# Patient Record
Sex: MALE | Race: WHITE | Employment: OTHER | URBAN - METROPOLITAN AREA
[De-identification: names, ages, dates, MRNs, and addresses within clinical notes are randomized per-mention and may not be internally consistent; named-entity substitution may affect disease eponyms.]

---

## 2017-07-01 PROBLEM — R65.10 SIRS (SYSTEMIC INFLAMMATORY RESPONSE SYNDROME) (HCC): Status: ACTIVE | Noted: 2017-07-01

## 2017-07-01 PROBLEM — R91.1 LUNG NODULE: Status: ACTIVE | Noted: 2017-07-01

## 2017-07-01 PROBLEM — E87.5 HYPERKALEMIA: Status: ACTIVE | Noted: 2017-07-01

## 2017-07-01 PROBLEM — E87.1 ACUTE HYPONATREMIA: Status: ACTIVE | Noted: 2017-07-01

## 2017-07-01 PROBLEM — E11.65 HYPERGLYCEMIA DUE TO TYPE 2 DIABETES MELLITUS (HCC): Status: ACTIVE | Noted: 2017-07-01

## 2017-07-01 PROBLEM — J44.1 COPD EXACERBATION (HCC): Status: ACTIVE | Noted: 2017-07-01

## 2017-07-01 PROBLEM — S22.41XK CLOSED FRACTURE OF MULTIPLE RIBS OF RIGHT SIDE WITH NONUNION: Status: ACTIVE | Noted: 2017-07-01

## 2017-07-02 PROBLEM — S22.41XA CLOSED FRACTURE OF MULTIPLE RIBS OF RIGHT SIDE: Status: ACTIVE | Noted: 2017-07-01

## 2017-07-02 PROBLEM — R78.81 MRSA BACTEREMIA: Status: ACTIVE | Noted: 2017-07-02

## 2017-07-02 PROBLEM — B95.62 MRSA BACTEREMIA: Status: ACTIVE | Noted: 2017-07-02

## 2018-03-28 PROBLEM — I24.9 ACS (ACUTE CORONARY SYNDROME) (HCC): Status: ACTIVE | Noted: 2018-03-28

## 2019-03-21 ENCOUNTER — APPOINTMENT (OUTPATIENT)
Dept: CT IMAGING | Age: 54
DRG: 638 | End: 2019-03-21
Payer: MEDICARE

## 2019-03-21 ENCOUNTER — APPOINTMENT (OUTPATIENT)
Dept: GENERAL RADIOLOGY | Age: 54
DRG: 638 | End: 2019-03-21
Payer: MEDICARE

## 2019-03-21 ENCOUNTER — HOSPITAL ENCOUNTER (INPATIENT)
Age: 54
LOS: 4 days | Discharge: HOME HEALTH | DRG: 638 | End: 2019-03-25
Attending: EMERGENCY MEDICINE | Admitting: INTERNAL MEDICINE
Payer: MEDICARE

## 2019-03-21 DIAGNOSIS — E11.10 DIABETIC KETOACIDOSIS WITHOUT COMA ASSOCIATED WITH TYPE 2 DIABETES MELLITUS (HCC): Primary | ICD-10-CM

## 2019-03-21 DIAGNOSIS — Z79.4 TYPE 2 DIABETES MELLITUS WITH HYPERGLYCEMIA, WITH LONG-TERM CURRENT USE OF INSULIN (HCC): ICD-10-CM

## 2019-03-21 DIAGNOSIS — R07.9 CHEST PAIN, UNSPECIFIED TYPE: ICD-10-CM

## 2019-03-21 DIAGNOSIS — E11.65 TYPE 2 DIABETES MELLITUS WITH HYPERGLYCEMIA, WITH LONG-TERM CURRENT USE OF INSULIN (HCC): ICD-10-CM

## 2019-03-21 DIAGNOSIS — J40 BRONCHITIS: ICD-10-CM

## 2019-03-21 DIAGNOSIS — J96.21 ACUTE ON CHRONIC RESPIRATORY FAILURE WITH HYPOXIA (HCC): ICD-10-CM

## 2019-03-21 PROBLEM — Z89.512 HX OF BKA, LEFT (HCC): Status: ACTIVE | Noted: 2019-03-21

## 2019-03-21 PROBLEM — J44.9 COPD (CHRONIC OBSTRUCTIVE PULMONARY DISEASE) (HCC): Status: ACTIVE | Noted: 2019-03-21

## 2019-03-21 LAB
A/G RATIO: 0.7 (ref 1.1–2.2)
ALBUMIN SERPL-MCNC: 3.6 G/DL (ref 3.4–5)
ALP BLD-CCNC: 87 U/L (ref 40–129)
ALT SERPL-CCNC: 38 U/L (ref 10–40)
ANION GAP SERPL CALCULATED.3IONS-SCNC: 19 MMOL/L (ref 3–16)
AST SERPL-CCNC: 25 U/L (ref 15–37)
BASOPHILS ABSOLUTE: 0.1 K/UL (ref 0–0.2)
BASOPHILS RELATIVE PERCENT: 0.9 %
BETA-HYDROXYBUTYRATE: 1.32 MMOL/L (ref 0–0.27)
BILIRUB SERPL-MCNC: 0.7 MG/DL (ref 0–1)
BILIRUBIN URINE: NEGATIVE
BLOOD, URINE: NEGATIVE
BUN BLDV-MCNC: 7 MG/DL (ref 7–20)
CALCIUM SERPL-MCNC: 9.1 MG/DL (ref 8.3–10.6)
CHLORIDE BLD-SCNC: 89 MMOL/L (ref 99–110)
CLARITY: CLEAR
CO2: 21 MMOL/L (ref 21–32)
COLOR: YELLOW
CREAT SERPL-MCNC: 0.6 MG/DL (ref 0.9–1.3)
D DIMER: 558 NG/ML DDU (ref 0–229)
EOSINOPHILS ABSOLUTE: 0 K/UL (ref 0–0.6)
EOSINOPHILS RELATIVE PERCENT: 0 %
GFR AFRICAN AMERICAN: >60
GFR NON-AFRICAN AMERICAN: >60
GLOBULIN: 4.9 G/DL
GLUCOSE BLD-MCNC: 553 MG/DL (ref 70–99)
GLUCOSE URINE: >=1000 MG/DL
HCT VFR BLD CALC: 46.1 % (ref 40.5–52.5)
HEMOGLOBIN: 15.9 G/DL (ref 13.5–17.5)
KETONES, URINE: ABNORMAL MG/DL
LEUKOCYTE ESTERASE, URINE: NEGATIVE
LIPASE: 44 U/L (ref 13–60)
LYMPHOCYTES ABSOLUTE: 1.8 K/UL (ref 1–5.1)
LYMPHOCYTES RELATIVE PERCENT: 28 %
MAGNESIUM: 1.9 MG/DL (ref 1.8–2.4)
MCH RBC QN AUTO: 32.7 PG (ref 26–34)
MCHC RBC AUTO-ENTMCNC: 34.5 G/DL (ref 31–36)
MCV RBC AUTO: 94.9 FL (ref 80–100)
MICROSCOPIC EXAMINATION: ABNORMAL
MONOCYTES ABSOLUTE: 0.8 K/UL (ref 0–1.3)
MONOCYTES RELATIVE PERCENT: 11.9 %
NEUTROPHILS ABSOLUTE: 3.9 K/UL (ref 1.7–7.7)
NEUTROPHILS RELATIVE PERCENT: 59.2 %
NITRITE, URINE: NEGATIVE
PDW BLD-RTO: 13.2 % (ref 12.4–15.4)
PH UA: 6.5 (ref 5–8)
PLATELET # BLD: 285 K/UL (ref 135–450)
PMV BLD AUTO: 10.2 FL (ref 5–10.5)
POTASSIUM REFLEX MAGNESIUM: 3.3 MMOL/L (ref 3.5–5.1)
PRO-BNP: 197 PG/ML (ref 0–124)
PROTEIN UA: NEGATIVE MG/DL
RAPID INFLUENZA  B AGN: NEGATIVE
RAPID INFLUENZA A AGN: NEGATIVE
RBC # BLD: 4.86 M/UL (ref 4.2–5.9)
SODIUM BLD-SCNC: 129 MMOL/L (ref 136–145)
SPECIFIC GRAVITY UA: >1.03 (ref 1–1.03)
TOTAL PROTEIN: 8.5 G/DL (ref 6.4–8.2)
TROPONIN: <0.01 NG/ML
URINE REFLEX TO CULTURE: ABNORMAL
URINE TYPE: ABNORMAL
UROBILINOGEN, URINE: 1 E.U./DL
WBC # BLD: 6.5 K/UL (ref 4–11)

## 2019-03-21 PROCEDURE — 87804 INFLUENZA ASSAY W/OPTIC: CPT

## 2019-03-21 PROCEDURE — 94640 AIRWAY INHALATION TREATMENT: CPT

## 2019-03-21 PROCEDURE — 2580000003 HC RX 258: Performed by: EMERGENCY MEDICINE

## 2019-03-21 PROCEDURE — 93005 ELECTROCARDIOGRAM TRACING: CPT | Performed by: EMERGENCY MEDICINE

## 2019-03-21 PROCEDURE — 80053 COMPREHEN METABOLIC PANEL: CPT

## 2019-03-21 PROCEDURE — 83880 ASSAY OF NATRIURETIC PEPTIDE: CPT

## 2019-03-21 PROCEDURE — 6370000000 HC RX 637 (ALT 250 FOR IP): Performed by: EMERGENCY MEDICINE

## 2019-03-21 PROCEDURE — 6360000004 HC RX CONTRAST MEDICATION: Performed by: EMERGENCY MEDICINE

## 2019-03-21 PROCEDURE — 94664 DEMO&/EVAL PT USE INHALER: CPT

## 2019-03-21 PROCEDURE — 71260 CT THORAX DX C+: CPT

## 2019-03-21 PROCEDURE — 99285 EMERGENCY DEPT VISIT HI MDM: CPT

## 2019-03-21 PROCEDURE — 81003 URINALYSIS AUTO W/O SCOPE: CPT

## 2019-03-21 PROCEDURE — 83735 ASSAY OF MAGNESIUM: CPT

## 2019-03-21 PROCEDURE — 83690 ASSAY OF LIPASE: CPT

## 2019-03-21 PROCEDURE — 2000000000 HC ICU R&B

## 2019-03-21 PROCEDURE — 96374 THER/PROPH/DIAG INJ IV PUSH: CPT

## 2019-03-21 PROCEDURE — 71046 X-RAY EXAM CHEST 2 VIEWS: CPT

## 2019-03-21 PROCEDURE — 85379 FIBRIN DEGRADATION QUANT: CPT

## 2019-03-21 PROCEDURE — 36415 COLL VENOUS BLD VENIPUNCTURE: CPT

## 2019-03-21 PROCEDURE — 6360000002 HC RX W HCPCS: Performed by: EMERGENCY MEDICINE

## 2019-03-21 PROCEDURE — 84484 ASSAY OF TROPONIN QUANT: CPT

## 2019-03-21 PROCEDURE — 82010 KETONE BODYS QUAN: CPT

## 2019-03-21 PROCEDURE — 96375 TX/PRO/DX INJ NEW DRUG ADDON: CPT

## 2019-03-21 PROCEDURE — 96361 HYDRATE IV INFUSION ADD-ON: CPT

## 2019-03-21 PROCEDURE — 6370000000 HC RX 637 (ALT 250 FOR IP): Performed by: INTERNAL MEDICINE

## 2019-03-21 PROCEDURE — 85025 COMPLETE CBC W/AUTO DIFF WBC: CPT

## 2019-03-21 RX ORDER — OXYCODONE HYDROCHLORIDE 30 MG/1
1 TABLET ORAL 4 TIMES DAILY
Refills: 0 | COMMUNITY
Start: 2019-02-01 | End: 2019-09-17 | Stop reason: ALTCHOICE

## 2019-03-21 RX ORDER — SODIUM CHLORIDE 450 MG/100ML
INJECTION, SOLUTION INTRAVENOUS CONTINUOUS
Status: DISCONTINUED | OUTPATIENT
Start: 2019-03-21 | End: 2019-03-22 | Stop reason: ALTCHOICE

## 2019-03-21 RX ORDER — ALBUTEROL SULFATE 90 UG/1
2 AEROSOL, METERED RESPIRATORY (INHALATION) EVERY 4 HOURS PRN
Status: DISCONTINUED | OUTPATIENT
Start: 2019-03-21 | End: 2019-03-25 | Stop reason: HOSPADM

## 2019-03-21 RX ORDER — IPRATROPIUM BROMIDE AND ALBUTEROL SULFATE 2.5; .5 MG/3ML; MG/3ML
1 SOLUTION RESPIRATORY (INHALATION)
Status: DISCONTINUED | OUTPATIENT
Start: 2019-03-21 | End: 2019-03-25 | Stop reason: HOSPADM

## 2019-03-21 RX ORDER — DEXTROSE MONOHYDRATE 25 G/50ML
12.5 INJECTION, SOLUTION INTRAVENOUS PRN
Status: DISCONTINUED | OUTPATIENT
Start: 2019-03-21 | End: 2019-03-25 | Stop reason: HOSPADM

## 2019-03-21 RX ORDER — PREDNISONE 20 MG/1
60 TABLET ORAL ONCE
Status: DISCONTINUED | OUTPATIENT
Start: 2019-03-21 | End: 2019-03-21

## 2019-03-21 RX ORDER — OXYCODONE HYDROCHLORIDE 30 MG/1
30 TABLET ORAL EVERY 6 HOURS SCHEDULED
Status: DISCONTINUED | OUTPATIENT
Start: 2019-03-22 | End: 2019-03-25 | Stop reason: HOSPADM

## 2019-03-21 RX ORDER — ASPIRIN 81 MG/1
81 TABLET, CHEWABLE ORAL DAILY
Status: DISCONTINUED | OUTPATIENT
Start: 2019-03-22 | End: 2019-03-25 | Stop reason: HOSPADM

## 2019-03-21 RX ORDER — MORPHINE SULFATE 4 MG/ML
4 INJECTION, SOLUTION INTRAMUSCULAR; INTRAVENOUS ONCE
Status: COMPLETED | OUTPATIENT
Start: 2019-03-21 | End: 2019-03-21

## 2019-03-21 RX ORDER — THIAMINE MONONITRATE (VIT B1) 100 MG
100 TABLET ORAL DAILY
Status: DISCONTINUED | OUTPATIENT
Start: 2019-03-22 | End: 2019-03-25 | Stop reason: HOSPADM

## 2019-03-21 RX ORDER — BENZONATATE 100 MG/1
200 CAPSULE ORAL 3 TIMES DAILY PRN
Status: DISCONTINUED | OUTPATIENT
Start: 2019-03-21 | End: 2019-03-25 | Stop reason: HOSPADM

## 2019-03-21 RX ORDER — BENZONATATE 100 MG/1
100 CAPSULE ORAL 3 TIMES DAILY PRN
Status: DISCONTINUED | OUTPATIENT
Start: 2019-03-21 | End: 2019-03-21

## 2019-03-21 RX ORDER — ASPIRIN 325 MG
325 TABLET ORAL ONCE
Status: COMPLETED | OUTPATIENT
Start: 2019-03-21 | End: 2019-03-21

## 2019-03-21 RX ORDER — IPRATROPIUM BROMIDE AND ALBUTEROL SULFATE 2.5; .5 MG/3ML; MG/3ML
1 SOLUTION RESPIRATORY (INHALATION) ONCE
Status: COMPLETED | OUTPATIENT
Start: 2019-03-21 | End: 2019-03-21

## 2019-03-21 RX ORDER — ALBUTEROL SULFATE 90 UG/1
2 AEROSOL, METERED RESPIRATORY (INHALATION)
COMMUNITY
Start: 2019-01-30

## 2019-03-21 RX ORDER — CLOPIDOGREL BISULFATE 75 MG/1
75 TABLET ORAL DAILY
Status: DISCONTINUED | OUTPATIENT
Start: 2019-03-22 | End: 2019-03-25 | Stop reason: HOSPADM

## 2019-03-21 RX ORDER — GABAPENTIN 300 MG/1
600 CAPSULE ORAL 3 TIMES DAILY
Status: DISCONTINUED | OUTPATIENT
Start: 2019-03-22 | End: 2019-03-25 | Stop reason: HOSPADM

## 2019-03-21 RX ORDER — GLIPIZIDE 5 MG/1
5 TABLET, FILM COATED, EXTENDED RELEASE ORAL DAILY
Status: ON HOLD | COMMUNITY
Start: 2019-03-20 | End: 2019-03-24 | Stop reason: HOSPADM

## 2019-03-21 RX ORDER — POTASSIUM CHLORIDE 7.45 MG/ML
10 INJECTION INTRAVENOUS PRN
Status: DISCONTINUED | OUTPATIENT
Start: 2019-03-21 | End: 2019-03-22 | Stop reason: ALTCHOICE

## 2019-03-21 RX ORDER — 0.9 % SODIUM CHLORIDE 0.9 %
1000 INTRAVENOUS SOLUTION INTRAVENOUS ONCE
Status: COMPLETED | OUTPATIENT
Start: 2019-03-21 | End: 2019-03-21

## 2019-03-21 RX ORDER — METFORMIN HYDROCHLORIDE 500 MG/1
500 TABLET, EXTENDED RELEASE ORAL DAILY
Status: ON HOLD | COMMUNITY
Start: 2019-03-20 | End: 2019-03-24 | Stop reason: HOSPADM

## 2019-03-21 RX ORDER — IPRATROPIUM BROMIDE AND ALBUTEROL SULFATE 2.5; .5 MG/3ML; MG/3ML
1 SOLUTION RESPIRATORY (INHALATION) EVERY 4 HOURS PRN
Status: DISCONTINUED | OUTPATIENT
Start: 2019-03-21 | End: 2019-03-25 | Stop reason: HOSPADM

## 2019-03-21 RX ORDER — MAGNESIUM SULFATE 1 G/100ML
1 INJECTION INTRAVENOUS PRN
Status: DISCONTINUED | OUTPATIENT
Start: 2019-03-21 | End: 2019-03-22 | Stop reason: ALTCHOICE

## 2019-03-21 RX ORDER — DEXTROSE AND SODIUM CHLORIDE 5; .45 G/100ML; G/100ML
INJECTION, SOLUTION INTRAVENOUS CONTINUOUS PRN
Status: DISCONTINUED | OUTPATIENT
Start: 2019-03-21 | End: 2019-03-24 | Stop reason: ALTCHOICE

## 2019-03-21 RX ORDER — DIAZEPAM 5 MG/1
5 TABLET ORAL NIGHTLY PRN
Status: DISCONTINUED | OUTPATIENT
Start: 2019-03-21 | End: 2019-03-25 | Stop reason: HOSPADM

## 2019-03-21 RX ORDER — BUDESONIDE AND FORMOTEROL FUMARATE DIHYDRATE 160; 4.5 UG/1; UG/1
2 AEROSOL RESPIRATORY (INHALATION) 2 TIMES DAILY
COMMUNITY

## 2019-03-21 RX ORDER — NICOTINE 21 MG/24HR
1 PATCH, TRANSDERMAL 24 HOURS TRANSDERMAL DAILY PRN
Status: DISCONTINUED | OUTPATIENT
Start: 2019-03-21 | End: 2019-03-25 | Stop reason: HOSPADM

## 2019-03-21 RX ORDER — METOPROLOL TARTRATE 50 MG/1
50 TABLET, FILM COATED ORAL 2 TIMES DAILY
Status: DISCONTINUED | OUTPATIENT
Start: 2019-03-22 | End: 2019-03-25 | Stop reason: HOSPADM

## 2019-03-21 RX ADMIN — IPRATROPIUM BROMIDE AND ALBUTEROL SULFATE 1 AMPULE: .5; 3 SOLUTION RESPIRATORY (INHALATION) at 18:57

## 2019-03-21 RX ADMIN — MORPHINE SULFATE 4 MG: 4 INJECTION INTRAVENOUS at 21:50

## 2019-03-21 RX ADMIN — SODIUM CHLORIDE 1000 ML: 9 INJECTION, SOLUTION INTRAVENOUS at 20:41

## 2019-03-21 RX ADMIN — IPRATROPIUM BROMIDE AND ALBUTEROL SULFATE 1 AMPULE: .5; 3 SOLUTION RESPIRATORY (INHALATION) at 18:50

## 2019-03-21 RX ADMIN — IPRATROPIUM BROMIDE AND ALBUTEROL SULFATE 1 AMPULE: .5; 3 SOLUTION RESPIRATORY (INHALATION) at 23:56

## 2019-03-21 RX ADMIN — ASPIRIN 325 MG ORAL TABLET 325 MG: 325 PILL ORAL at 19:22

## 2019-03-21 RX ADMIN — MOMETASONE FUROATE AND FORMOTEROL FUMARATE DIHYDRATE 2 PUFF: 200; 5 AEROSOL RESPIRATORY (INHALATION) at 23:56

## 2019-03-21 RX ADMIN — IOPAMIDOL 75 ML: 755 INJECTION, SOLUTION INTRAVENOUS at 20:21

## 2019-03-21 ASSESSMENT — PAIN SCALES - GENERAL
PAINLEVEL_OUTOF10: 10
PAINLEVEL_OUTOF10: 4
PAINLEVEL_OUTOF10: 4
PAINLEVEL_OUTOF10: 10

## 2019-03-21 ASSESSMENT — PAIN DESCRIPTION - FREQUENCY
FREQUENCY: CONTINUOUS

## 2019-03-21 ASSESSMENT — PAIN - FUNCTIONAL ASSESSMENT: PAIN_FUNCTIONAL_ASSESSMENT: PREVENTS OR INTERFERES SOME ACTIVE ACTIVITIES AND ADLS

## 2019-03-21 ASSESSMENT — PAIN DESCRIPTION - PAIN TYPE
TYPE: CHRONIC PAIN
TYPE: CHRONIC PAIN
TYPE: ACUTE PAIN
TYPE: ACUTE PAIN;CHRONIC PAIN

## 2019-03-21 ASSESSMENT — PAIN DESCRIPTION - LOCATION
LOCATION: LEG
LOCATION: CHEST

## 2019-03-21 ASSESSMENT — ENCOUNTER SYMPTOMS
COUGH: 1
SHORTNESS OF BREATH: 1
VOMITING: 0
COLOR CHANGE: 0
NAUSEA: 0
WHEEZING: 1
BACK PAIN: 0
PHOTOPHOBIA: 0
RHINORRHEA: 0

## 2019-03-21 ASSESSMENT — PAIN DESCRIPTION - DESCRIPTORS
DESCRIPTORS: ACHING

## 2019-03-21 ASSESSMENT — PAIN DESCRIPTION - ORIENTATION
ORIENTATION: RIGHT
ORIENTATION: RIGHT;LEFT

## 2019-03-21 ASSESSMENT — PAIN DESCRIPTION - ONSET
ONSET: ON-GOING
ONSET: ON-GOING

## 2019-03-21 ASSESSMENT — PAIN DESCRIPTION - PROGRESSION
CLINICAL_PROGRESSION: GRADUALLY WORSENING
CLINICAL_PROGRESSION: GRADUALLY IMPROVING
CLINICAL_PROGRESSION: NOT CHANGED
CLINICAL_PROGRESSION: NOT CHANGED

## 2019-03-22 LAB
ANION GAP SERPL CALCULATED.3IONS-SCNC: 12 MMOL/L (ref 3–16)
ANION GAP SERPL CALCULATED.3IONS-SCNC: 12 MMOL/L (ref 3–16)
ANION GAP SERPL CALCULATED.3IONS-SCNC: 14 MMOL/L (ref 3–16)
ANION GAP SERPL CALCULATED.3IONS-SCNC: 9 MMOL/L (ref 3–16)
BASOPHILS ABSOLUTE: 0 K/UL (ref 0–0.2)
BASOPHILS RELATIVE PERCENT: 0.3 %
BUN BLDV-MCNC: 2 MG/DL (ref 7–20)
BUN BLDV-MCNC: 2 MG/DL (ref 7–20)
BUN BLDV-MCNC: 3 MG/DL (ref 7–20)
BUN BLDV-MCNC: 3 MG/DL (ref 7–20)
CALCIUM SERPL-MCNC: 8.1 MG/DL (ref 8.3–10.6)
CALCIUM SERPL-MCNC: 8.3 MG/DL (ref 8.3–10.6)
CALCIUM SERPL-MCNC: 8.3 MG/DL (ref 8.3–10.6)
CALCIUM SERPL-MCNC: 8.7 MG/DL (ref 8.3–10.6)
CHLORIDE BLD-SCNC: 100 MMOL/L (ref 99–110)
CHLORIDE BLD-SCNC: 96 MMOL/L (ref 99–110)
CHLORIDE BLD-SCNC: 97 MMOL/L (ref 99–110)
CHLORIDE BLD-SCNC: 99 MMOL/L (ref 99–110)
CO2: 19 MMOL/L (ref 21–32)
CO2: 24 MMOL/L (ref 21–32)
CREAT SERPL-MCNC: 0.6 MG/DL (ref 0.9–1.3)
EOSINOPHILS ABSOLUTE: 0 K/UL (ref 0–0.6)
EOSINOPHILS RELATIVE PERCENT: 0.7 %
ESTIMATED AVERAGE GLUCOSE: 343.6 MG/DL
GFR AFRICAN AMERICAN: >60
GFR NON-AFRICAN AMERICAN: >60
GLUCOSE BLD-MCNC: 132 MG/DL (ref 70–99)
GLUCOSE BLD-MCNC: 141 MG/DL (ref 70–99)
GLUCOSE BLD-MCNC: 149 MG/DL (ref 70–99)
GLUCOSE BLD-MCNC: 151 MG/DL (ref 70–99)
GLUCOSE BLD-MCNC: 154 MG/DL (ref 70–99)
GLUCOSE BLD-MCNC: 164 MG/DL (ref 70–99)
GLUCOSE BLD-MCNC: 165 MG/DL (ref 70–99)
GLUCOSE BLD-MCNC: 166 MG/DL (ref 70–99)
GLUCOSE BLD-MCNC: 166 MG/DL (ref 70–99)
GLUCOSE BLD-MCNC: 176 MG/DL (ref 70–99)
GLUCOSE BLD-MCNC: 186 MG/DL (ref 70–99)
GLUCOSE BLD-MCNC: 188 MG/DL (ref 70–99)
GLUCOSE BLD-MCNC: 192 MG/DL (ref 70–99)
GLUCOSE BLD-MCNC: 198 MG/DL (ref 70–99)
GLUCOSE BLD-MCNC: 200 MG/DL (ref 70–99)
GLUCOSE BLD-MCNC: 201 MG/DL (ref 70–99)
GLUCOSE BLD-MCNC: 206 MG/DL (ref 70–99)
GLUCOSE BLD-MCNC: 252 MG/DL (ref 70–99)
GLUCOSE BLD-MCNC: 259 MG/DL (ref 70–99)
GLUCOSE BLD-MCNC: 306 MG/DL (ref 70–99)
GLUCOSE BLD-MCNC: 322 MG/DL (ref 70–99)
GLUCOSE BLD-MCNC: 322 MG/DL (ref 70–99)
HBA1C MFR BLD: 13.6 %
HCT VFR BLD CALC: 41.5 % (ref 40.5–52.5)
HEMOGLOBIN: 14.3 G/DL (ref 13.5–17.5)
LYMPHOCYTES ABSOLUTE: 2.3 K/UL (ref 1–5.1)
LYMPHOCYTES RELATIVE PERCENT: 40 %
MAGNESIUM: 1.7 MG/DL (ref 1.8–2.4)
MAGNESIUM: 1.8 MG/DL (ref 1.8–2.4)
MAGNESIUM: 2.1 MG/DL (ref 1.8–2.4)
MAGNESIUM: 2.1 MG/DL (ref 1.8–2.4)
MCH RBC QN AUTO: 32.2 PG (ref 26–34)
MCHC RBC AUTO-ENTMCNC: 34.5 G/DL (ref 31–36)
MCV RBC AUTO: 93.5 FL (ref 80–100)
MONOCYTES ABSOLUTE: 1 K/UL (ref 0–1.3)
MONOCYTES RELATIVE PERCENT: 17 %
NEUTROPHILS ABSOLUTE: 2.5 K/UL (ref 1.7–7.7)
NEUTROPHILS RELATIVE PERCENT: 42 %
PDW BLD-RTO: 13.1 % (ref 12.4–15.4)
PERFORMED ON: ABNORMAL
PHOSPHORUS: 2.3 MG/DL (ref 2.5–4.9)
PHOSPHORUS: 2.4 MG/DL (ref 2.5–4.9)
PHOSPHORUS: 2.6 MG/DL (ref 2.5–4.9)
PHOSPHORUS: 2.9 MG/DL (ref 2.5–4.9)
PLATELET # BLD: 293 K/UL (ref 135–450)
PMV BLD AUTO: 10.4 FL (ref 5–10.5)
POTASSIUM SERPL-SCNC: 3 MMOL/L (ref 3.5–5.1)
POTASSIUM SERPL-SCNC: 3.1 MMOL/L (ref 3.5–5.1)
POTASSIUM SERPL-SCNC: 3.4 MMOL/L (ref 3.5–5.1)
POTASSIUM SERPL-SCNC: 3.8 MMOL/L (ref 3.5–5.1)
RBC # BLD: 4.43 M/UL (ref 4.2–5.9)
SODIUM BLD-SCNC: 131 MMOL/L (ref 136–145)
SODIUM BLD-SCNC: 132 MMOL/L (ref 136–145)
SODIUM BLD-SCNC: 133 MMOL/L (ref 136–145)
SODIUM BLD-SCNC: 134 MMOL/L (ref 136–145)
TROPONIN: <0.01 NG/ML
TROPONIN: <0.01 NG/ML
WBC # BLD: 5.9 K/UL (ref 4–11)

## 2019-03-22 PROCEDURE — 96361 HYDRATE IV INFUSION ADD-ON: CPT

## 2019-03-22 PROCEDURE — 84100 ASSAY OF PHOSPHORUS: CPT

## 2019-03-22 PROCEDURE — 93010 ELECTROCARDIOGRAM REPORT: CPT | Performed by: INTERNAL MEDICINE

## 2019-03-22 PROCEDURE — 83036 HEMOGLOBIN GLYCOSYLATED A1C: CPT

## 2019-03-22 PROCEDURE — 2700000000 HC OXYGEN THERAPY PER DAY

## 2019-03-22 PROCEDURE — 6360000002 HC RX W HCPCS: Performed by: INTERNAL MEDICINE

## 2019-03-22 PROCEDURE — 84484 ASSAY OF TROPONIN QUANT: CPT

## 2019-03-22 PROCEDURE — 2580000003 HC RX 258: Performed by: INTERNAL MEDICINE

## 2019-03-22 PROCEDURE — 6370000000 HC RX 637 (ALT 250 FOR IP): Performed by: INTERNAL MEDICINE

## 2019-03-22 PROCEDURE — 80048 BASIC METABOLIC PNL TOTAL CA: CPT

## 2019-03-22 PROCEDURE — 96375 TX/PRO/DX INJ NEW DRUG ADDON: CPT

## 2019-03-22 PROCEDURE — 2580000003 HC RX 258: Performed by: HOSPITALIST

## 2019-03-22 PROCEDURE — 1200000000 HC SEMI PRIVATE

## 2019-03-22 PROCEDURE — 83735 ASSAY OF MAGNESIUM: CPT

## 2019-03-22 PROCEDURE — 2500000003 HC RX 250 WO HCPCS: Performed by: INTERNAL MEDICINE

## 2019-03-22 PROCEDURE — 96376 TX/PRO/DX INJ SAME DRUG ADON: CPT

## 2019-03-22 PROCEDURE — 85025 COMPLETE CBC W/AUTO DIFF WBC: CPT

## 2019-03-22 PROCEDURE — 94762 N-INVAS EAR/PLS OXIMTRY CONT: CPT

## 2019-03-22 PROCEDURE — 36415 COLL VENOUS BLD VENIPUNCTURE: CPT

## 2019-03-22 PROCEDURE — 96372 THER/PROPH/DIAG INJ SC/IM: CPT

## 2019-03-22 PROCEDURE — 96365 THER/PROPH/DIAG IV INF INIT: CPT

## 2019-03-22 PROCEDURE — 96366 THER/PROPH/DIAG IV INF ADDON: CPT

## 2019-03-22 PROCEDURE — 6370000000 HC RX 637 (ALT 250 FOR IP): Performed by: HOSPITALIST

## 2019-03-22 PROCEDURE — 94640 AIRWAY INHALATION TREATMENT: CPT

## 2019-03-22 RX ORDER — NICOTINE POLACRILEX 4 MG
15 LOZENGE BUCCAL PRN
Status: DISCONTINUED | OUTPATIENT
Start: 2019-03-22 | End: 2019-03-25 | Stop reason: HOSPADM

## 2019-03-22 RX ORDER — INSULIN GLARGINE 100 [IU]/ML
30 INJECTION, SOLUTION SUBCUTANEOUS NIGHTLY
Status: DISCONTINUED | OUTPATIENT
Start: 2019-03-22 | End: 2019-03-24

## 2019-03-22 RX ORDER — DEXTROSE MONOHYDRATE 50 MG/ML
100 INJECTION, SOLUTION INTRAVENOUS PRN
Status: DISCONTINUED | OUTPATIENT
Start: 2019-03-22 | End: 2019-03-25 | Stop reason: HOSPADM

## 2019-03-22 RX ORDER — DEXTROSE MONOHYDRATE 25 G/50ML
12.5 INJECTION, SOLUTION INTRAVENOUS PRN
Status: DISCONTINUED | OUTPATIENT
Start: 2019-03-22 | End: 2019-03-25 | Stop reason: HOSPADM

## 2019-03-22 RX ORDER — PANTOPRAZOLE SODIUM 40 MG/1
40 TABLET, DELAYED RELEASE ORAL
Status: DISCONTINUED | OUTPATIENT
Start: 2019-03-23 | End: 2019-03-25 | Stop reason: HOSPADM

## 2019-03-22 RX ORDER — SODIUM CHLORIDE 9 MG/ML
INJECTION, SOLUTION INTRAVENOUS CONTINUOUS
Status: DISCONTINUED | OUTPATIENT
Start: 2019-03-22 | End: 2019-03-23

## 2019-03-22 RX ADMIN — IPRATROPIUM BROMIDE AND ALBUTEROL SULFATE 1 AMPULE: .5; 3 SOLUTION RESPIRATORY (INHALATION) at 08:13

## 2019-03-22 RX ADMIN — OXYCODONE HYDROCHLORIDE 30 MG: 30 TABLET ORAL at 12:57

## 2019-03-22 RX ADMIN — SODIUM PHOSPHATE, MONOBASIC, MONOHYDRATE 10 MMOL: 276; 142 INJECTION, SOLUTION INTRAVENOUS at 16:08

## 2019-03-22 RX ADMIN — SODIUM CHLORIDE: 4.5 INJECTION, SOLUTION INTRAVENOUS at 00:06

## 2019-03-22 RX ADMIN — IPRATROPIUM BROMIDE AND ALBUTEROL SULFATE 1 AMPULE: .5; 3 SOLUTION RESPIRATORY (INHALATION) at 15:59

## 2019-03-22 RX ADMIN — Medication 100 MG: at 08:51

## 2019-03-22 RX ADMIN — ENOXAPARIN SODIUM 40 MG: 40 INJECTION SUBCUTANEOUS at 08:52

## 2019-03-22 RX ADMIN — POTASSIUM CHLORIDE 10 MEQ: 7.46 INJECTION, SOLUTION INTRAVENOUS at 06:34

## 2019-03-22 RX ADMIN — OXYCODONE HYDROCHLORIDE 30 MG: 30 TABLET ORAL at 23:37

## 2019-03-22 RX ADMIN — POTASSIUM CHLORIDE 10 MEQ: 7.46 INJECTION, SOLUTION INTRAVENOUS at 05:34

## 2019-03-22 RX ADMIN — POTASSIUM CHLORIDE 10 MEQ: 7.46 INJECTION, SOLUTION INTRAVENOUS at 16:08

## 2019-03-22 RX ADMIN — SODIUM CHLORIDE 9.7 UNITS/HR: 9 INJECTION, SOLUTION INTRAVENOUS at 00:08

## 2019-03-22 RX ADMIN — POTASSIUM CHLORIDE 10 MEQ: 7.46 INJECTION, SOLUTION INTRAVENOUS at 09:08

## 2019-03-22 RX ADMIN — MOMETASONE FUROATE AND FORMOTEROL FUMARATE DIHYDRATE 2 PUFF: 200; 5 AEROSOL RESPIRATORY (INHALATION) at 20:47

## 2019-03-22 RX ADMIN — GABAPENTIN 600 MG: 300 CAPSULE ORAL at 14:21

## 2019-03-22 RX ADMIN — METOPROLOL TARTRATE 50 MG: 50 TABLET ORAL at 08:52

## 2019-03-22 RX ADMIN — POTASSIUM CHLORIDE 10 MEQ: 7.46 INJECTION, SOLUTION INTRAVENOUS at 07:36

## 2019-03-22 RX ADMIN — POTASSIUM CHLORIDE 10 MEQ: 7.46 INJECTION, SOLUTION INTRAVENOUS at 10:29

## 2019-03-22 RX ADMIN — METOPROLOL TARTRATE 50 MG: 50 TABLET ORAL at 00:11

## 2019-03-22 RX ADMIN — OXYCODONE HYDROCHLORIDE 30 MG: 30 TABLET ORAL at 05:33

## 2019-03-22 RX ADMIN — IPRATROPIUM BROMIDE AND ALBUTEROL SULFATE 1 AMPULE: .5; 3 SOLUTION RESPIRATORY (INHALATION) at 20:46

## 2019-03-22 RX ADMIN — BENZONATATE 200 MG: 100 CAPSULE ORAL at 09:08

## 2019-03-22 RX ADMIN — ASPIRIN 81 MG 81 MG: 81 TABLET ORAL at 08:51

## 2019-03-22 RX ADMIN — GABAPENTIN 600 MG: 300 CAPSULE ORAL at 08:51

## 2019-03-22 RX ADMIN — OXYCODONE HYDROCHLORIDE 30 MG: 30 TABLET ORAL at 18:04

## 2019-03-22 RX ADMIN — METOPROLOL TARTRATE 50 MG: 50 TABLET ORAL at 22:02

## 2019-03-22 RX ADMIN — GABAPENTIN 600 MG: 300 CAPSULE ORAL at 22:01

## 2019-03-22 RX ADMIN — DEXTROSE AND SODIUM CHLORIDE: 5; 450 INJECTION, SOLUTION INTRAVENOUS at 09:10

## 2019-03-22 RX ADMIN — INSULIN LISPRO 4 UNITS: 100 INJECTION, SOLUTION INTRAVENOUS; SUBCUTANEOUS at 22:14

## 2019-03-22 RX ADMIN — BENZONATATE 200 MG: 100 CAPSULE ORAL at 00:11

## 2019-03-22 RX ADMIN — IPRATROPIUM BROMIDE AND ALBUTEROL SULFATE 1 AMPULE: .5; 3 SOLUTION RESPIRATORY (INHALATION) at 12:20

## 2019-03-22 RX ADMIN — CLOPIDOGREL BISULFATE 75 MG: 75 TABLET ORAL at 08:52

## 2019-03-22 RX ADMIN — INSULIN GLARGINE 30 UNITS: 100 INJECTION, SOLUTION SUBCUTANEOUS at 17:19

## 2019-03-22 RX ADMIN — POTASSIUM CHLORIDE 10 MEQ: 7.46 INJECTION, SOLUTION INTRAVENOUS at 15:05

## 2019-03-22 RX ADMIN — GABAPENTIN 600 MG: 300 CAPSULE ORAL at 00:11

## 2019-03-22 RX ADMIN — DEXTROSE AND SODIUM CHLORIDE: 5; 450 INJECTION, SOLUTION INTRAVENOUS at 16:05

## 2019-03-22 RX ADMIN — MOMETASONE FUROATE AND FORMOTEROL FUMARATE DIHYDRATE 2 PUFF: 200; 5 AEROSOL RESPIRATORY (INHALATION) at 08:14

## 2019-03-22 RX ADMIN — SODIUM CHLORIDE: 9 INJECTION, SOLUTION INTRAVENOUS at 18:05

## 2019-03-22 RX ADMIN — POTASSIUM CHLORIDE 10 MEQ: 7.46 INJECTION, SOLUTION INTRAVENOUS at 17:12

## 2019-03-22 RX ADMIN — OXYCODONE HYDROCHLORIDE 30 MG: 30 TABLET ORAL at 00:11

## 2019-03-22 RX ADMIN — SODIUM PHOSPHATE, MONOBASIC, MONOHYDRATE 10 MMOL: 276; 142 INJECTION, SOLUTION INTRAVENOUS at 06:00

## 2019-03-22 RX ADMIN — POTASSIUM CHLORIDE 10 MEQ: 7.46 INJECTION, SOLUTION INTRAVENOUS at 11:34

## 2019-03-22 RX ADMIN — DEXTROSE AND SODIUM CHLORIDE: 5; 450 INJECTION, SOLUTION INTRAVENOUS at 02:14

## 2019-03-22 ASSESSMENT — PAIN DESCRIPTION - LOCATION
LOCATION: LEG
LOCATION: LEG;CHEST
LOCATION: LEG
LOCATION: LEG;CHEST
LOCATION: LEG
LOCATION: LEG
LOCATION_2: CHEST

## 2019-03-22 ASSESSMENT — PAIN DESCRIPTION - FREQUENCY
FREQUENCY: CONTINUOUS

## 2019-03-22 ASSESSMENT — PAIN DESCRIPTION - PAIN TYPE
TYPE: CHRONIC PAIN
TYPE_2: ACUTE PAIN
TYPE: CHRONIC PAIN

## 2019-03-22 ASSESSMENT — PAIN DESCRIPTION - PROGRESSION
CLINICAL_PROGRESSION: NOT CHANGED
CLINICAL_PROGRESSION: OTHER (COMMENT)
CLINICAL_PROGRESSION: NOT CHANGED
CLINICAL_PROGRESSION_2: NOT CHANGED
CLINICAL_PROGRESSION: NOT CHANGED
CLINICAL_PROGRESSION: NOT CHANGED

## 2019-03-22 ASSESSMENT — PAIN SCALES - GENERAL
PAINLEVEL_OUTOF10: 9
PAINLEVEL_OUTOF10: 9
PAINLEVEL_OUTOF10: 4
PAINLEVEL_OUTOF10: 8
PAINLEVEL_OUTOF10: 9
PAINLEVEL_OUTOF10: 7
PAINLEVEL_OUTOF10: 6
PAINLEVEL_OUTOF10: 9
PAINLEVEL_OUTOF10: 9
PAINLEVEL_OUTOF10: 8
PAINLEVEL_OUTOF10: 4
PAINLEVEL_OUTOF10: 9
PAINLEVEL_OUTOF10: 8

## 2019-03-22 ASSESSMENT — PAIN DESCRIPTION - DESCRIPTORS
DESCRIPTORS: BURNING
DESCRIPTORS: ACHING
DESCRIPTORS_2: DISCOMFORT
DESCRIPTORS: ACHING
DESCRIPTORS: BURNING
DESCRIPTORS: ACHING
DESCRIPTORS: ACHING
DESCRIPTORS: BURNING
DESCRIPTORS: BURNING;THROBBING

## 2019-03-22 ASSESSMENT — PAIN DESCRIPTION - ORIENTATION
ORIENTATION: RIGHT;LEFT
ORIENTATION: RIGHT
ORIENTATION: RIGHT;LEFT
ORIENTATION: RIGHT
ORIENTATION: LEFT;RIGHT
ORIENTATION: RIGHT;LEFT
ORIENTATION_2: MID
ORIENTATION: RIGHT;LEFT
ORIENTATION: RIGHT;LEFT

## 2019-03-22 ASSESSMENT — PAIN DESCRIPTION - ONSET
ONSET: ON-GOING
ONSET_2: ON-GOING
ONSET: ON-GOING

## 2019-03-22 ASSESSMENT — PAIN DESCRIPTION - DURATION: DURATION_2: INTERMITTENT

## 2019-03-22 ASSESSMENT — PAIN - FUNCTIONAL ASSESSMENT
PAIN_FUNCTIONAL_ASSESSMENT: PREVENTS OR INTERFERES SOME ACTIVE ACTIVITIES AND ADLS

## 2019-03-22 ASSESSMENT — PAIN DESCRIPTION - INTENSITY: RATING_2: 7

## 2019-03-23 LAB
ANION GAP SERPL CALCULATED.3IONS-SCNC: 9 MMOL/L (ref 3–16)
BASOPHILS ABSOLUTE: 0 K/UL (ref 0–0.2)
BASOPHILS RELATIVE PERCENT: 0.3 %
BUN BLDV-MCNC: 2 MG/DL (ref 7–20)
CALCIUM SERPL-MCNC: 8.4 MG/DL (ref 8.3–10.6)
CHLORIDE BLD-SCNC: 103 MMOL/L (ref 99–110)
CO2: 24 MMOL/L (ref 21–32)
CREAT SERPL-MCNC: 0.6 MG/DL (ref 0.9–1.3)
EOSINOPHILS ABSOLUTE: 0 K/UL (ref 0–0.6)
EOSINOPHILS RELATIVE PERCENT: 0.9 %
GFR AFRICAN AMERICAN: >60
GFR NON-AFRICAN AMERICAN: >60
GLUCOSE BLD-MCNC: 183 MG/DL (ref 70–99)
GLUCOSE BLD-MCNC: 187 MG/DL (ref 70–99)
GLUCOSE BLD-MCNC: 208 MG/DL (ref 70–99)
GLUCOSE BLD-MCNC: 221 MG/DL (ref 70–99)
GLUCOSE BLD-MCNC: 283 MG/DL (ref 70–99)
HCT VFR BLD CALC: 41 % (ref 40.5–52.5)
HEMOGLOBIN: 14.3 G/DL (ref 13.5–17.5)
LYMPHOCYTES ABSOLUTE: 1.9 K/UL (ref 1–5.1)
LYMPHOCYTES RELATIVE PERCENT: 33.6 %
MAGNESIUM: 1.8 MG/DL (ref 1.8–2.4)
MCH RBC QN AUTO: 32.9 PG (ref 26–34)
MCHC RBC AUTO-ENTMCNC: 34.8 G/DL (ref 31–36)
MCV RBC AUTO: 94.5 FL (ref 80–100)
MONOCYTES ABSOLUTE: 0.8 K/UL (ref 0–1.3)
MONOCYTES RELATIVE PERCENT: 14.6 %
NEUTROPHILS ABSOLUTE: 2.8 K/UL (ref 1.7–7.7)
NEUTROPHILS RELATIVE PERCENT: 50.6 %
PDW BLD-RTO: 13 % (ref 12.4–15.4)
PERFORMED ON: ABNORMAL
PHOSPHORUS: 3.8 MG/DL (ref 2.5–4.9)
PLATELET # BLD: 262 K/UL (ref 135–450)
PMV BLD AUTO: 9.8 FL (ref 5–10.5)
POTASSIUM SERPL-SCNC: 3.8 MMOL/L (ref 3.5–5.1)
RBC # BLD: 4.33 M/UL (ref 4.2–5.9)
SODIUM BLD-SCNC: 136 MMOL/L (ref 136–145)
WBC # BLD: 5.6 K/UL (ref 4–11)

## 2019-03-23 PROCEDURE — 6370000000 HC RX 637 (ALT 250 FOR IP): Performed by: INTERNAL MEDICINE

## 2019-03-23 PROCEDURE — 96366 THER/PROPH/DIAG IV INF ADDON: CPT

## 2019-03-23 PROCEDURE — 96372 THER/PROPH/DIAG INJ SC/IM: CPT

## 2019-03-23 PROCEDURE — 94762 N-INVAS EAR/PLS OXIMTRY CONT: CPT

## 2019-03-23 PROCEDURE — 2580000003 HC RX 258: Performed by: HOSPITALIST

## 2019-03-23 PROCEDURE — 6360000002 HC RX W HCPCS: Performed by: INTERNAL MEDICINE

## 2019-03-23 PROCEDURE — 80048 BASIC METABOLIC PNL TOTAL CA: CPT

## 2019-03-23 PROCEDURE — 85025 COMPLETE CBC W/AUTO DIFF WBC: CPT

## 2019-03-23 PROCEDURE — 1200000000 HC SEMI PRIVATE

## 2019-03-23 PROCEDURE — 96367 TX/PROPH/DG ADDL SEQ IV INF: CPT

## 2019-03-23 PROCEDURE — 84100 ASSAY OF PHOSPHORUS: CPT

## 2019-03-23 PROCEDURE — 96361 HYDRATE IV INFUSION ADD-ON: CPT

## 2019-03-23 PROCEDURE — 6370000000 HC RX 637 (ALT 250 FOR IP): Performed by: HOSPITALIST

## 2019-03-23 PROCEDURE — 83735 ASSAY OF MAGNESIUM: CPT

## 2019-03-23 PROCEDURE — 94640 AIRWAY INHALATION TREATMENT: CPT

## 2019-03-23 RX ORDER — POTASSIUM CHLORIDE 750 MG/1
20 TABLET, FILM COATED, EXTENDED RELEASE ORAL ONCE
Status: COMPLETED | OUTPATIENT
Start: 2019-03-23 | End: 2019-03-23

## 2019-03-23 RX ORDER — MAGNESIUM SULFATE IN WATER 40 MG/ML
2 INJECTION, SOLUTION INTRAVENOUS ONCE
Status: COMPLETED | OUTPATIENT
Start: 2019-03-23 | End: 2019-03-23

## 2019-03-23 RX ADMIN — INSULIN LISPRO 3 UNITS: 100 INJECTION, SOLUTION INTRAVENOUS; SUBCUTANEOUS at 17:08

## 2019-03-23 RX ADMIN — OXYCODONE HYDROCHLORIDE 30 MG: 30 TABLET ORAL at 05:45

## 2019-03-23 RX ADMIN — GABAPENTIN 600 MG: 300 CAPSULE ORAL at 20:24

## 2019-03-23 RX ADMIN — BENZONATATE 200 MG: 100 CAPSULE ORAL at 05:53

## 2019-03-23 RX ADMIN — IPRATROPIUM BROMIDE AND ALBUTEROL SULFATE 1 AMPULE: .5; 3 SOLUTION RESPIRATORY (INHALATION) at 20:58

## 2019-03-23 RX ADMIN — PANTOPRAZOLE SODIUM 40 MG: 40 TABLET, DELAYED RELEASE ORAL at 05:46

## 2019-03-23 RX ADMIN — INSULIN LISPRO 4 UNITS: 100 INJECTION, SOLUTION INTRAVENOUS; SUBCUTANEOUS at 11:33

## 2019-03-23 RX ADMIN — MAGNESIUM SULFATE HEPTAHYDRATE 2 G: 40 INJECTION, SOLUTION INTRAVENOUS at 08:23

## 2019-03-23 RX ADMIN — OXYCODONE HYDROCHLORIDE 30 MG: 30 TABLET ORAL at 22:59

## 2019-03-23 RX ADMIN — OXYCODONE HYDROCHLORIDE 30 MG: 30 TABLET ORAL at 17:09

## 2019-03-23 RX ADMIN — INSULIN LISPRO 3 UNITS: 100 INJECTION, SOLUTION INTRAVENOUS; SUBCUTANEOUS at 11:35

## 2019-03-23 RX ADMIN — SODIUM CHLORIDE: 9 INJECTION, SOLUTION INTRAVENOUS at 04:08

## 2019-03-23 RX ADMIN — INSULIN LISPRO 2 UNITS: 100 INJECTION, SOLUTION INTRAVENOUS; SUBCUTANEOUS at 07:54

## 2019-03-23 RX ADMIN — MOMETASONE FUROATE AND FORMOTEROL FUMARATE DIHYDRATE 2 PUFF: 200; 5 AEROSOL RESPIRATORY (INHALATION) at 08:50

## 2019-03-23 RX ADMIN — INSULIN GLARGINE 30 UNITS: 100 INJECTION, SOLUTION SUBCUTANEOUS at 20:18

## 2019-03-23 RX ADMIN — CLOPIDOGREL BISULFATE 75 MG: 75 TABLET ORAL at 08:22

## 2019-03-23 RX ADMIN — BENZONATATE 200 MG: 100 CAPSULE ORAL at 15:49

## 2019-03-23 RX ADMIN — IPRATROPIUM BROMIDE AND ALBUTEROL SULFATE 1 AMPULE: .5; 3 SOLUTION RESPIRATORY (INHALATION) at 15:38

## 2019-03-23 RX ADMIN — METOPROLOL TARTRATE 50 MG: 50 TABLET ORAL at 08:22

## 2019-03-23 RX ADMIN — INSULIN LISPRO 3 UNITS: 100 INJECTION, SOLUTION INTRAVENOUS; SUBCUTANEOUS at 07:55

## 2019-03-23 RX ADMIN — METOPROLOL TARTRATE 50 MG: 50 TABLET ORAL at 20:24

## 2019-03-23 RX ADMIN — GABAPENTIN 600 MG: 300 CAPSULE ORAL at 13:26

## 2019-03-23 RX ADMIN — OXYCODONE HYDROCHLORIDE 30 MG: 30 TABLET ORAL at 11:37

## 2019-03-23 RX ADMIN — ENOXAPARIN SODIUM 40 MG: 40 INJECTION SUBCUTANEOUS at 08:22

## 2019-03-23 RX ADMIN — INSULIN LISPRO 1 UNITS: 100 INJECTION, SOLUTION INTRAVENOUS; SUBCUTANEOUS at 20:11

## 2019-03-23 RX ADMIN — ASPIRIN 81 MG 81 MG: 81 TABLET ORAL at 08:22

## 2019-03-23 RX ADMIN — GABAPENTIN 600 MG: 300 CAPSULE ORAL at 08:22

## 2019-03-23 RX ADMIN — POTASSIUM CHLORIDE 20 MEQ: 750 TABLET, FILM COATED, EXTENDED RELEASE ORAL at 15:49

## 2019-03-23 RX ADMIN — INSULIN LISPRO 6 UNITS: 100 INJECTION, SOLUTION INTRAVENOUS; SUBCUTANEOUS at 17:04

## 2019-03-23 RX ADMIN — Medication 100 MG: at 08:22

## 2019-03-23 RX ADMIN — IPRATROPIUM BROMIDE AND ALBUTEROL SULFATE 1 AMPULE: .5; 3 SOLUTION RESPIRATORY (INHALATION) at 08:50

## 2019-03-23 RX ADMIN — IPRATROPIUM BROMIDE AND ALBUTEROL SULFATE 1 AMPULE: .5; 3 SOLUTION RESPIRATORY (INHALATION) at 12:20

## 2019-03-23 RX ADMIN — MOMETASONE FUROATE AND FORMOTEROL FUMARATE DIHYDRATE 2 PUFF: 200; 5 AEROSOL RESPIRATORY (INHALATION) at 20:57

## 2019-03-23 ASSESSMENT — PAIN DESCRIPTION - FREQUENCY
FREQUENCY: CONTINUOUS

## 2019-03-23 ASSESSMENT — PAIN DESCRIPTION - DESCRIPTORS
DESCRIPTORS: BURNING;CONSTANT
DESCRIPTORS: ACHING
DESCRIPTORS: ACHING;BURNING
DESCRIPTORS: BURNING;CONSTANT
DESCRIPTORS: ACHING;BURNING
DESCRIPTORS: BURNING

## 2019-03-23 ASSESSMENT — PAIN SCALES - GENERAL
PAINLEVEL_OUTOF10: 0
PAINLEVEL_OUTOF10: 6
PAINLEVEL_OUTOF10: 7
PAINLEVEL_OUTOF10: 8
PAINLEVEL_OUTOF10: 8
PAINLEVEL_OUTOF10: 6
PAINLEVEL_OUTOF10: 8
PAINLEVEL_OUTOF10: 0
PAINLEVEL_OUTOF10: 5
PAINLEVEL_OUTOF10: 0
PAINLEVEL_OUTOF10: 9
PAINLEVEL_OUTOF10: 9

## 2019-03-23 ASSESSMENT — PAIN DESCRIPTION - ONSET
ONSET: ON-GOING
ONSET: AWAKENED FROM SLEEP
ONSET: ON-GOING
ONSET: AWAKENED FROM SLEEP
ONSET: AWAKENED FROM SLEEP
ONSET: ON-GOING

## 2019-03-23 ASSESSMENT — PAIN DESCRIPTION - LOCATION
LOCATION_2: CHEST
LOCATION: FOOT;OTHER (COMMENT)
LOCATION: LEG

## 2019-03-23 ASSESSMENT — PAIN DESCRIPTION - PAIN TYPE
TYPE: CHRONIC PAIN
TYPE_2: ACUTE PAIN

## 2019-03-23 ASSESSMENT — PAIN DESCRIPTION - ORIENTATION
ORIENTATION: RIGHT;LEFT

## 2019-03-23 ASSESSMENT — PAIN DESCRIPTION - PROGRESSION
CLINICAL_PROGRESSION: GRADUALLY WORSENING
CLINICAL_PROGRESSION: NOT CHANGED

## 2019-03-23 ASSESSMENT — PAIN DESCRIPTION - INTENSITY: RATING_2: 5

## 2019-03-24 LAB
ANION GAP SERPL CALCULATED.3IONS-SCNC: 11 MMOL/L (ref 3–16)
BUN BLDV-MCNC: 5 MG/DL (ref 7–20)
CALCIUM SERPL-MCNC: 8.7 MG/DL (ref 8.3–10.6)
CHLORIDE BLD-SCNC: 98 MMOL/L (ref 99–110)
CO2: 25 MMOL/L (ref 21–32)
CREAT SERPL-MCNC: 0.6 MG/DL (ref 0.9–1.3)
GFR AFRICAN AMERICAN: >60
GFR NON-AFRICAN AMERICAN: >60
GLUCOSE BLD-MCNC: 147 MG/DL (ref 70–99)
GLUCOSE BLD-MCNC: 179 MG/DL (ref 70–99)
GLUCOSE BLD-MCNC: 243 MG/DL (ref 70–99)
GLUCOSE BLD-MCNC: 247 MG/DL (ref 70–99)
GLUCOSE BLD-MCNC: 284 MG/DL (ref 70–99)
MAGNESIUM: 2 MG/DL (ref 1.8–2.4)
PERFORMED ON: ABNORMAL
POTASSIUM SERPL-SCNC: 3.6 MMOL/L (ref 3.5–5.1)
SODIUM BLD-SCNC: 134 MMOL/L (ref 136–145)

## 2019-03-24 PROCEDURE — 6370000000 HC RX 637 (ALT 250 FOR IP): Performed by: HOSPITALIST

## 2019-03-24 PROCEDURE — 97116 GAIT TRAINING THERAPY: CPT

## 2019-03-24 PROCEDURE — 94640 AIRWAY INHALATION TREATMENT: CPT

## 2019-03-24 PROCEDURE — 6370000000 HC RX 637 (ALT 250 FOR IP): Performed by: NURSE PRACTITIONER

## 2019-03-24 PROCEDURE — 96372 THER/PROPH/DIAG INJ SC/IM: CPT

## 2019-03-24 PROCEDURE — 80048 BASIC METABOLIC PNL TOTAL CA: CPT

## 2019-03-24 PROCEDURE — 94761 N-INVAS EAR/PLS OXIMETRY MLT: CPT

## 2019-03-24 PROCEDURE — 6360000002 HC RX W HCPCS: Performed by: INTERNAL MEDICINE

## 2019-03-24 PROCEDURE — 83735 ASSAY OF MAGNESIUM: CPT

## 2019-03-24 PROCEDURE — 6370000000 HC RX 637 (ALT 250 FOR IP): Performed by: INTERNAL MEDICINE

## 2019-03-24 PROCEDURE — 94760 N-INVAS EAR/PLS OXIMETRY 1: CPT

## 2019-03-24 PROCEDURE — 1200000000 HC SEMI PRIVATE

## 2019-03-24 PROCEDURE — 97162 PT EVAL MOD COMPLEX 30 MIN: CPT

## 2019-03-24 PROCEDURE — 97530 THERAPEUTIC ACTIVITIES: CPT

## 2019-03-24 PROCEDURE — 36415 COLL VENOUS BLD VENIPUNCTURE: CPT

## 2019-03-24 RX ORDER — POTASSIUM CHLORIDE 750 MG/1
40 TABLET, FILM COATED, EXTENDED RELEASE ORAL ONCE
Status: COMPLETED | OUTPATIENT
Start: 2019-03-24 | End: 2019-03-24

## 2019-03-24 RX ORDER — GUAIFENESIN/DEXTROMETHORPHAN 100-10MG/5
5 SYRUP ORAL EVERY 4 HOURS PRN
Status: DISCONTINUED | OUTPATIENT
Start: 2019-03-24 | End: 2019-03-25 | Stop reason: HOSPADM

## 2019-03-24 RX ADMIN — GABAPENTIN 600 MG: 300 CAPSULE ORAL at 09:04

## 2019-03-24 RX ADMIN — OXYCODONE HYDROCHLORIDE 30 MG: 30 TABLET ORAL at 23:38

## 2019-03-24 RX ADMIN — CLOPIDOGREL BISULFATE 75 MG: 75 TABLET ORAL at 09:04

## 2019-03-24 RX ADMIN — MOMETASONE FUROATE AND FORMOTEROL FUMARATE DIHYDRATE 2 PUFF: 200; 5 AEROSOL RESPIRATORY (INHALATION) at 20:17

## 2019-03-24 RX ADMIN — Medication 100 MG: at 09:04

## 2019-03-24 RX ADMIN — OXYCODONE HYDROCHLORIDE 30 MG: 30 TABLET ORAL at 17:36

## 2019-03-24 RX ADMIN — PANTOPRAZOLE SODIUM 40 MG: 40 TABLET, DELAYED RELEASE ORAL at 05:11

## 2019-03-24 RX ADMIN — INSULIN LISPRO 5 UNITS: 100 INJECTION, SOLUTION INTRAVENOUS; SUBCUTANEOUS at 17:36

## 2019-03-24 RX ADMIN — POTASSIUM CHLORIDE 40 MEQ: 750 TABLET, EXTENDED RELEASE ORAL at 10:25

## 2019-03-24 RX ADMIN — GUAIFENESIN AND DEXTROMETHORPHAN 5 ML: 100; 10 SYRUP ORAL at 20:01

## 2019-03-24 RX ADMIN — IPRATROPIUM BROMIDE AND ALBUTEROL SULFATE 1 AMPULE: .5; 3 SOLUTION RESPIRATORY (INHALATION) at 08:48

## 2019-03-24 RX ADMIN — METOPROLOL TARTRATE 50 MG: 50 TABLET ORAL at 20:01

## 2019-03-24 RX ADMIN — GABAPENTIN 600 MG: 300 CAPSULE ORAL at 15:23

## 2019-03-24 RX ADMIN — GABAPENTIN 600 MG: 300 CAPSULE ORAL at 20:01

## 2019-03-24 RX ADMIN — INSULIN HUMAN 35 UNITS: 100 INJECTION, SUSPENSION SUBCUTANEOUS at 18:14

## 2019-03-24 RX ADMIN — INSULIN LISPRO 1 UNITS: 100 INJECTION, SOLUTION INTRAVENOUS; SUBCUTANEOUS at 21:48

## 2019-03-24 RX ADMIN — METOPROLOL TARTRATE 50 MG: 50 TABLET ORAL at 09:04

## 2019-03-24 RX ADMIN — INSULIN LISPRO 4 UNITS: 100 INJECTION, SOLUTION INTRAVENOUS; SUBCUTANEOUS at 08:08

## 2019-03-24 RX ADMIN — INSULIN LISPRO 2 UNITS: 100 INJECTION, SOLUTION INTRAVENOUS; SUBCUTANEOUS at 11:54

## 2019-03-24 RX ADMIN — INSULIN LISPRO 3 UNITS: 100 INJECTION, SOLUTION INTRAVENOUS; SUBCUTANEOUS at 08:08

## 2019-03-24 RX ADMIN — ASPIRIN 81 MG 81 MG: 81 TABLET ORAL at 09:04

## 2019-03-24 RX ADMIN — OXYCODONE HYDROCHLORIDE 30 MG: 30 TABLET ORAL at 11:30

## 2019-03-24 RX ADMIN — INSULIN LISPRO 5 UNITS: 100 INJECTION, SOLUTION INTRAVENOUS; SUBCUTANEOUS at 12:02

## 2019-03-24 RX ADMIN — ENOXAPARIN SODIUM 40 MG: 40 INJECTION SUBCUTANEOUS at 09:04

## 2019-03-24 RX ADMIN — OXYCODONE HYDROCHLORIDE 30 MG: 30 TABLET ORAL at 05:11

## 2019-03-24 RX ADMIN — GUAIFENESIN AND DEXTROMETHORPHAN 5 ML: 100; 10 SYRUP ORAL at 05:28

## 2019-03-24 RX ADMIN — INSULIN LISPRO 6 UNITS: 100 INJECTION, SOLUTION INTRAVENOUS; SUBCUTANEOUS at 17:33

## 2019-03-24 RX ADMIN — DIAZEPAM 5 MG: 5 TABLET ORAL at 21:38

## 2019-03-24 RX ADMIN — IPRATROPIUM BROMIDE AND ALBUTEROL SULFATE 1 AMPULE: .5; 3 SOLUTION RESPIRATORY (INHALATION) at 20:17

## 2019-03-24 RX ADMIN — IPRATROPIUM BROMIDE AND ALBUTEROL SULFATE 1 AMPULE: .5; 3 SOLUTION RESPIRATORY (INHALATION) at 16:12

## 2019-03-24 RX ADMIN — MOMETASONE FUROATE AND FORMOTEROL FUMARATE DIHYDRATE 2 PUFF: 200; 5 AEROSOL RESPIRATORY (INHALATION) at 08:48

## 2019-03-24 RX ADMIN — IPRATROPIUM BROMIDE AND ALBUTEROL SULFATE 1 AMPULE: .5; 3 SOLUTION RESPIRATORY (INHALATION) at 11:41

## 2019-03-24 ASSESSMENT — PAIN DESCRIPTION - DESCRIPTORS
DESCRIPTORS: BURNING;CONSTANT
DESCRIPTORS: CONSTANT;BURNING
DESCRIPTORS: BURNING;CONSTANT
DESCRIPTORS: CONSTANT;BURNING
DESCRIPTORS: CONSTANT;BURNING

## 2019-03-24 ASSESSMENT — PAIN DESCRIPTION - ONSET
ONSET: ON-GOING
ONSET: AWAKENED FROM SLEEP
ONSET: ON-GOING

## 2019-03-24 ASSESSMENT — PAIN - FUNCTIONAL ASSESSMENT
PAIN_FUNCTIONAL_ASSESSMENT: PREVENTS OR INTERFERES SOME ACTIVE ACTIVITIES AND ADLS
PAIN_FUNCTIONAL_ASSESSMENT: ACTIVITIES ARE NOT PREVENTED
PAIN_FUNCTIONAL_ASSESSMENT: PREVENTS OR INTERFERES SOME ACTIVE ACTIVITIES AND ADLS

## 2019-03-24 ASSESSMENT — PAIN DESCRIPTION - DIRECTION
RADIATING_TOWARDS: NO
RADIATING_TOWARDS: NO

## 2019-03-24 ASSESSMENT — PAIN DESCRIPTION - LOCATION
LOCATION: LEG

## 2019-03-24 ASSESSMENT — PAIN DESCRIPTION - FREQUENCY
FREQUENCY: CONTINUOUS

## 2019-03-24 ASSESSMENT — PAIN DESCRIPTION - ORIENTATION
ORIENTATION: RIGHT;LEFT

## 2019-03-24 ASSESSMENT — PAIN DESCRIPTION - PAIN TYPE
TYPE: CHRONIC PAIN

## 2019-03-24 ASSESSMENT — PAIN SCALES - GENERAL
PAINLEVEL_OUTOF10: 0
PAINLEVEL_OUTOF10: 6
PAINLEVEL_OUTOF10: 7
PAINLEVEL_OUTOF10: 5
PAINLEVEL_OUTOF10: 5
PAINLEVEL_OUTOF10: 8
PAINLEVEL_OUTOF10: 5
PAINLEVEL_OUTOF10: 6
PAINLEVEL_OUTOF10: 7
PAINLEVEL_OUTOF10: 5
PAINLEVEL_OUTOF10: 0

## 2019-03-24 ASSESSMENT — PAIN DESCRIPTION - PROGRESSION
CLINICAL_PROGRESSION: GRADUALLY WORSENING
CLINICAL_PROGRESSION: GRADUALLY IMPROVING
CLINICAL_PROGRESSION: GRADUALLY IMPROVING
CLINICAL_PROGRESSION: GRADUALLY WORSENING
CLINICAL_PROGRESSION: GRADUALLY IMPROVING
CLINICAL_PROGRESSION: GRADUALLY WORSENING
CLINICAL_PROGRESSION: GRADUALLY IMPROVING
CLINICAL_PROGRESSION: NOT CHANGED
CLINICAL_PROGRESSION: GRADUALLY IMPROVING
CLINICAL_PROGRESSION: GRADUALLY IMPROVING

## 2019-03-25 VITALS
OXYGEN SATURATION: 91 % | BODY MASS INDEX: 30.97 KG/M2 | HEART RATE: 89 BPM | DIASTOLIC BLOOD PRESSURE: 55 MMHG | TEMPERATURE: 98.2 F | RESPIRATION RATE: 18 BRPM | HEIGHT: 67 IN | SYSTOLIC BLOOD PRESSURE: 133 MMHG | WEIGHT: 197.31 LBS

## 2019-03-25 LAB
ANION GAP SERPL CALCULATED.3IONS-SCNC: 8 MMOL/L (ref 3–16)
BUN BLDV-MCNC: 5 MG/DL (ref 7–20)
CALCIUM SERPL-MCNC: 8.9 MG/DL (ref 8.3–10.6)
CHLORIDE BLD-SCNC: 98 MMOL/L (ref 99–110)
CO2: 25 MMOL/L (ref 21–32)
CREAT SERPL-MCNC: 0.5 MG/DL (ref 0.9–1.3)
EKG ATRIAL RATE: 102 BPM
EKG DIAGNOSIS: NORMAL
EKG P AXIS: 45 DEGREES
EKG P-R INTERVAL: 150 MS
EKG Q-T INTERVAL: 378 MS
EKG QRS DURATION: 74 MS
EKG QTC CALCULATION (BAZETT): 492 MS
EKG R AXIS: 65 DEGREES
EKG T AXIS: 86 DEGREES
EKG VENTRICULAR RATE: 102 BPM
GFR AFRICAN AMERICAN: >60
GFR NON-AFRICAN AMERICAN: >60
GLUCOSE BLD-MCNC: 143 MG/DL (ref 70–99)
GLUCOSE BLD-MCNC: 156 MG/DL (ref 70–99)
GLUCOSE BLD-MCNC: 80 MG/DL (ref 70–99)
MAGNESIUM: 1.8 MG/DL (ref 1.8–2.4)
PERFORMED ON: ABNORMAL
PERFORMED ON: NORMAL
POTASSIUM SERPL-SCNC: 4.1 MMOL/L (ref 3.5–5.1)
SODIUM BLD-SCNC: 131 MMOL/L (ref 136–145)

## 2019-03-25 PROCEDURE — 83735 ASSAY OF MAGNESIUM: CPT

## 2019-03-25 PROCEDURE — 94640 AIRWAY INHALATION TREATMENT: CPT

## 2019-03-25 PROCEDURE — 97535 SELF CARE MNGMENT TRAINING: CPT

## 2019-03-25 PROCEDURE — 6360000002 HC RX W HCPCS: Performed by: INTERNAL MEDICINE

## 2019-03-25 PROCEDURE — 6370000000 HC RX 637 (ALT 250 FOR IP): Performed by: INTERNAL MEDICINE

## 2019-03-25 PROCEDURE — 80048 BASIC METABOLIC PNL TOTAL CA: CPT

## 2019-03-25 PROCEDURE — 96372 THER/PROPH/DIAG INJ SC/IM: CPT

## 2019-03-25 PROCEDURE — 97165 OT EVAL LOW COMPLEX 30 MIN: CPT

## 2019-03-25 PROCEDURE — 36415 COLL VENOUS BLD VENIPUNCTURE: CPT

## 2019-03-25 PROCEDURE — 6370000000 HC RX 637 (ALT 250 FOR IP): Performed by: NURSE PRACTITIONER

## 2019-03-25 PROCEDURE — 6370000000 HC RX 637 (ALT 250 FOR IP): Performed by: HOSPITALIST

## 2019-03-25 PROCEDURE — 94760 N-INVAS EAR/PLS OXIMETRY 1: CPT

## 2019-03-25 RX ORDER — LANCING DEVICE
1 EACH MISCELLANEOUS
Qty: 2 EACH | Refills: 5 | Status: SHIPPED | OUTPATIENT
Start: 2019-03-25

## 2019-03-25 RX ORDER — OMEPRAZOLE 20 MG/1
20 CAPSULE, DELAYED RELEASE ORAL DAILY
Qty: 30 CAPSULE | Refills: 3 | Status: SHIPPED | OUTPATIENT
Start: 2019-03-25

## 2019-03-25 RX ORDER — METOPROLOL TARTRATE 50 MG/1
50 TABLET, FILM COATED ORAL 2 TIMES DAILY
Qty: 60 TABLET | Refills: 3 | Status: SHIPPED | OUTPATIENT
Start: 2019-03-25

## 2019-03-25 RX ORDER — PRAVASTATIN SODIUM 40 MG
40 TABLET ORAL NIGHTLY
Qty: 30 TABLET | Refills: 3 | Status: SHIPPED | OUTPATIENT
Start: 2019-03-25

## 2019-03-25 RX ORDER — LANCETS 30 GAUGE
1 EACH MISCELLANEOUS 3 TIMES DAILY
Qty: 100 EACH | Refills: 1 | Status: SHIPPED | OUTPATIENT
Start: 2019-03-25

## 2019-03-25 RX ORDER — CLOPIDOGREL BISULFATE 75 MG/1
75 TABLET ORAL DAILY
Qty: 30 TABLET | Refills: 3 | Status: SHIPPED | OUTPATIENT
Start: 2019-03-25

## 2019-03-25 RX ADMIN — IPRATROPIUM BROMIDE AND ALBUTEROL SULFATE 1 AMPULE: .5; 3 SOLUTION RESPIRATORY (INHALATION) at 12:34

## 2019-03-25 RX ADMIN — PANTOPRAZOLE SODIUM 40 MG: 40 TABLET, DELAYED RELEASE ORAL at 05:39

## 2019-03-25 RX ADMIN — GUAIFENESIN AND DEXTROMETHORPHAN 5 ML: 100; 10 SYRUP ORAL at 06:56

## 2019-03-25 RX ADMIN — ASPIRIN 81 MG 81 MG: 81 TABLET ORAL at 09:21

## 2019-03-25 RX ADMIN — METFORMIN HYDROCHLORIDE 1000 MG: 500 TABLET ORAL at 09:24

## 2019-03-25 RX ADMIN — METOPROLOL TARTRATE 50 MG: 50 TABLET ORAL at 09:21

## 2019-03-25 RX ADMIN — GABAPENTIN 600 MG: 300 CAPSULE ORAL at 09:21

## 2019-03-25 RX ADMIN — INSULIN HUMAN 35 UNITS: 100 INJECTION, SUSPENSION SUBCUTANEOUS at 09:24

## 2019-03-25 RX ADMIN — OXYCODONE HYDROCHLORIDE 30 MG: 30 TABLET ORAL at 12:00

## 2019-03-25 RX ADMIN — IPRATROPIUM BROMIDE AND ALBUTEROL SULFATE 1 AMPULE: .5; 3 SOLUTION RESPIRATORY (INHALATION) at 15:52

## 2019-03-25 RX ADMIN — GUAIFENESIN AND DEXTROMETHORPHAN 5 ML: 100; 10 SYRUP ORAL at 02:49

## 2019-03-25 RX ADMIN — IPRATROPIUM BROMIDE AND ALBUTEROL SULFATE 1 AMPULE: .5; 3 SOLUTION RESPIRATORY (INHALATION) at 08:11

## 2019-03-25 RX ADMIN — ENOXAPARIN SODIUM 40 MG: 40 INJECTION SUBCUTANEOUS at 09:21

## 2019-03-25 RX ADMIN — MOMETASONE FUROATE AND FORMOTEROL FUMARATE DIHYDRATE 2 PUFF: 200; 5 AEROSOL RESPIRATORY (INHALATION) at 08:11

## 2019-03-25 RX ADMIN — INSULIN LISPRO 2 UNITS: 100 INJECTION, SOLUTION INTRAVENOUS; SUBCUTANEOUS at 09:25

## 2019-03-25 RX ADMIN — Medication 100 MG: at 09:21

## 2019-03-25 RX ADMIN — CLOPIDOGREL BISULFATE 75 MG: 75 TABLET ORAL at 09:21

## 2019-03-25 RX ADMIN — OXYCODONE HYDROCHLORIDE 30 MG: 30 TABLET ORAL at 05:39

## 2019-03-25 ASSESSMENT — PAIN DESCRIPTION - PROGRESSION
CLINICAL_PROGRESSION: GRADUALLY IMPROVING
CLINICAL_PROGRESSION: NOT CHANGED
CLINICAL_PROGRESSION: GRADUALLY IMPROVING

## 2019-03-25 ASSESSMENT — PAIN SCALES - GENERAL
PAINLEVEL_OUTOF10: 6
PAINLEVEL_OUTOF10: 4
PAINLEVEL_OUTOF10: 6
PAINLEVEL_OUTOF10: 5
PAINLEVEL_OUTOF10: 9
PAINLEVEL_OUTOF10: 3
PAINLEVEL_OUTOF10: 8

## 2019-03-25 ASSESSMENT — PAIN DESCRIPTION - LOCATION
LOCATION: LEG

## 2019-03-25 ASSESSMENT — PAIN DESCRIPTION - DESCRIPTORS
DESCRIPTORS: BURNING;CONSTANT
DESCRIPTORS: BURNING;CONSTANT

## 2019-03-25 ASSESSMENT — PAIN DESCRIPTION - ONSET
ONSET: ON-GOING

## 2019-03-25 ASSESSMENT — PAIN DESCRIPTION - ORIENTATION
ORIENTATION: LEFT;RIGHT
ORIENTATION: RIGHT;LEFT

## 2019-03-25 ASSESSMENT — PAIN DESCRIPTION - PAIN TYPE
TYPE: CHRONIC PAIN

## 2019-03-25 ASSESSMENT — PAIN DESCRIPTION - FREQUENCY
FREQUENCY: CONTINUOUS

## 2019-03-25 ASSESSMENT — PAIN - FUNCTIONAL ASSESSMENT: PAIN_FUNCTIONAL_ASSESSMENT: ACTIVITIES ARE NOT PREVENTED

## 2019-09-12 ENCOUNTER — APPOINTMENT (OUTPATIENT)
Dept: CT IMAGING | Age: 54
End: 2019-09-12
Payer: MEDICARE

## 2019-09-12 ENCOUNTER — HOSPITAL ENCOUNTER (EMERGENCY)
Age: 54
Discharge: ANOTHER HOSPITAL | End: 2019-09-12
Attending: EMERGENCY MEDICINE
Payer: MEDICARE

## 2019-09-12 VITALS
DIASTOLIC BLOOD PRESSURE: 67 MMHG | HEIGHT: 67 IN | BODY MASS INDEX: 33.22 KG/M2 | SYSTOLIC BLOOD PRESSURE: 153 MMHG | HEART RATE: 103 BPM | OXYGEN SATURATION: 98 % | WEIGHT: 211.64 LBS | TEMPERATURE: 98.5 F | RESPIRATION RATE: 14 BRPM

## 2019-09-12 DIAGNOSIS — S22.43XA CLOSED FRACTURE OF MULTIPLE RIBS OF BOTH SIDES, INITIAL ENCOUNTER: ICD-10-CM

## 2019-09-12 DIAGNOSIS — Z91.81 HISTORY OF FALL: ICD-10-CM

## 2019-09-12 DIAGNOSIS — F17.200 SMOKER: ICD-10-CM

## 2019-09-12 DIAGNOSIS — S29.9XXA TRAUMA OF CHEST, INITIAL ENCOUNTER: Primary | ICD-10-CM

## 2019-09-12 DIAGNOSIS — S02.2XXA CLOSED FRACTURE OF NASAL BONE, INITIAL ENCOUNTER: ICD-10-CM

## 2019-09-12 DIAGNOSIS — R00.0 TACHYCARDIA: ICD-10-CM

## 2019-09-12 LAB
A/G RATIO: 1 (ref 1.1–2.2)
ALBUMIN SERPL-MCNC: 4.2 G/DL (ref 3.4–5)
ALP BLD-CCNC: 104 U/L (ref 40–129)
ALT SERPL-CCNC: 133 U/L (ref 10–40)
ANION GAP SERPL CALCULATED.3IONS-SCNC: 14 MMOL/L (ref 3–16)
AST SERPL-CCNC: 79 U/L (ref 15–37)
BASE EXCESS VENOUS: 1.9 MMOL/L
BASOPHILS ABSOLUTE: 0 K/UL (ref 0–0.2)
BASOPHILS RELATIVE PERCENT: 0.5 %
BETA-HYDROXYBUTYRATE: 0.08 MMOL/L (ref 0–0.27)
BILIRUB SERPL-MCNC: 0.5 MG/DL (ref 0–1)
BILIRUBIN URINE: NEGATIVE
BLOOD, URINE: NEGATIVE
BUN BLDV-MCNC: 11 MG/DL (ref 7–20)
CALCIUM SERPL-MCNC: 9.6 MG/DL (ref 8.3–10.6)
CARBOXYHEMOGLOBIN: 3.2 %
CHLORIDE BLD-SCNC: 104 MMOL/L (ref 99–110)
CLARITY: CLEAR
CO2: 23 MMOL/L (ref 21–32)
COLOR: YELLOW
CREAT SERPL-MCNC: 0.7 MG/DL (ref 0.9–1.3)
EKG ATRIAL RATE: 122 BPM
EKG DIAGNOSIS: NORMAL
EKG P AXIS: 56 DEGREES
EKG P-R INTERVAL: 156 MS
EKG Q-T INTERVAL: 308 MS
EKG QRS DURATION: 78 MS
EKG QTC CALCULATION (BAZETT): 438 MS
EKG R AXIS: 48 DEGREES
EKG T AXIS: 58 DEGREES
EKG VENTRICULAR RATE: 122 BPM
EOSINOPHILS ABSOLUTE: 0.1 K/UL (ref 0–0.6)
EOSINOPHILS RELATIVE PERCENT: 1 %
ESTIMATED AVERAGE GLUCOSE: 122.6 MG/DL
GFR AFRICAN AMERICAN: >60
GFR NON-AFRICAN AMERICAN: >60
GLOBULIN: 4.2 G/DL
GLUCOSE BLD-MCNC: 123 MG/DL (ref 70–99)
GLUCOSE URINE: NEGATIVE MG/DL
HBA1C MFR BLD: 5.9 %
HCO3 VENOUS: 26 MMOL/L (ref 23–29)
HCT VFR BLD CALC: 42.2 % (ref 40.5–52.5)
HEMOGLOBIN: 14.8 G/DL (ref 13.5–17.5)
KETONES, URINE: NEGATIVE MG/DL
LEUKOCYTE ESTERASE, URINE: NEGATIVE
LIPASE: 19 U/L (ref 13–60)
LYMPHOCYTES ABSOLUTE: 1.9 K/UL (ref 1–5.1)
LYMPHOCYTES RELATIVE PERCENT: 25.8 %
MAGNESIUM: 2.3 MG/DL (ref 1.8–2.4)
MCH RBC QN AUTO: 33.5 PG (ref 26–34)
MCHC RBC AUTO-ENTMCNC: 34.9 G/DL (ref 31–36)
MCV RBC AUTO: 95.9 FL (ref 80–100)
METHEMOGLOBIN VENOUS: 0.6 %
MICROSCOPIC EXAMINATION: NORMAL
MONOCYTES ABSOLUTE: 0.6 K/UL (ref 0–1.3)
MONOCYTES RELATIVE PERCENT: 7.7 %
NEUTROPHILS ABSOLUTE: 4.7 K/UL (ref 1.7–7.7)
NEUTROPHILS RELATIVE PERCENT: 65 %
NITRITE, URINE: NEGATIVE
O2 CONTENT, VEN: 20 ML/DL
O2 SAT, VEN: 98 %
O2 THERAPY: NORMAL
PCO2, VEN: 42 MMHG (ref 40–50)
PDW BLD-RTO: 14 % (ref 12.4–15.4)
PH UA: 6 (ref 5–8)
PH VENOUS: 7.41 (ref 7.35–7.45)
PLATELET # BLD: 254 K/UL (ref 135–450)
PMV BLD AUTO: 9.6 FL (ref 5–10.5)
PO2, VEN: 96 MMHG
POTASSIUM SERPL-SCNC: 4.2 MMOL/L (ref 3.5–5.1)
PROTEIN UA: NEGATIVE MG/DL
RBC # BLD: 4.41 M/UL (ref 4.2–5.9)
REASON FOR REJECTION: NORMAL
REJECTED TEST: NORMAL
SODIUM BLD-SCNC: 141 MMOL/L (ref 136–145)
SPECIFIC GRAVITY UA: 1.01 (ref 1–1.03)
TCO2 CALC VENOUS: 28 MMOL/L
TOTAL PROTEIN: 8.4 G/DL (ref 6.4–8.2)
TROPONIN: <0.01 NG/ML
URINE REFLEX TO CULTURE: NORMAL
URINE TYPE: NORMAL
UROBILINOGEN, URINE: 1 E.U./DL
WBC # BLD: 7.3 K/UL (ref 4–11)

## 2019-09-12 PROCEDURE — 83690 ASSAY OF LIPASE: CPT

## 2019-09-12 PROCEDURE — 93005 ELECTROCARDIOGRAM TRACING: CPT | Performed by: PHYSICIAN ASSISTANT

## 2019-09-12 PROCEDURE — 96361 HYDRATE IV INFUSION ADD-ON: CPT

## 2019-09-12 PROCEDURE — 99285 EMERGENCY DEPT VISIT HI MDM: CPT

## 2019-09-12 PROCEDURE — 71250 CT THORAX DX C-: CPT

## 2019-09-12 PROCEDURE — 96374 THER/PROPH/DIAG INJ IV PUSH: CPT

## 2019-09-12 PROCEDURE — 85025 COMPLETE CBC W/AUTO DIFF WBC: CPT

## 2019-09-12 PROCEDURE — 82803 BLOOD GASES ANY COMBINATION: CPT

## 2019-09-12 PROCEDURE — 94640 AIRWAY INHALATION TREATMENT: CPT

## 2019-09-12 PROCEDURE — 82010 KETONE BODYS QUAN: CPT

## 2019-09-12 PROCEDURE — 93010 ELECTROCARDIOGRAM REPORT: CPT | Performed by: INTERNAL MEDICINE

## 2019-09-12 PROCEDURE — 80053 COMPREHEN METABOLIC PANEL: CPT

## 2019-09-12 PROCEDURE — 70450 CT HEAD/BRAIN W/O DYE: CPT

## 2019-09-12 PROCEDURE — 81003 URINALYSIS AUTO W/O SCOPE: CPT

## 2019-09-12 PROCEDURE — 6370000000 HC RX 637 (ALT 250 FOR IP): Performed by: PHYSICIAN ASSISTANT

## 2019-09-12 PROCEDURE — 6360000002 HC RX W HCPCS: Performed by: PHYSICIAN ASSISTANT

## 2019-09-12 PROCEDURE — 6370000000 HC RX 637 (ALT 250 FOR IP): Performed by: EMERGENCY MEDICINE

## 2019-09-12 PROCEDURE — 72125 CT NECK SPINE W/O DYE: CPT

## 2019-09-12 PROCEDURE — 36415 COLL VENOUS BLD VENIPUNCTURE: CPT

## 2019-09-12 PROCEDURE — 84484 ASSAY OF TROPONIN QUANT: CPT

## 2019-09-12 PROCEDURE — 83036 HEMOGLOBIN GLYCOSYLATED A1C: CPT

## 2019-09-12 PROCEDURE — 2580000003 HC RX 258: Performed by: PHYSICIAN ASSISTANT

## 2019-09-12 PROCEDURE — 83735 ASSAY OF MAGNESIUM: CPT

## 2019-09-12 RX ORDER — 0.9 % SODIUM CHLORIDE 0.9 %
1000 INTRAVENOUS SOLUTION INTRAVENOUS ONCE
Status: COMPLETED | OUTPATIENT
Start: 2019-09-12 | End: 2019-09-12

## 2019-09-12 RX ORDER — KETOROLAC TROMETHAMINE 30 MG/ML
15 INJECTION, SOLUTION INTRAMUSCULAR; INTRAVENOUS ONCE
Status: COMPLETED | OUTPATIENT
Start: 2019-09-12 | End: 2019-09-12

## 2019-09-12 RX ORDER — OXYCODONE HYDROCHLORIDE 5 MG/1
5 TABLET ORAL ONCE
Status: COMPLETED | OUTPATIENT
Start: 2019-09-12 | End: 2019-09-12

## 2019-09-12 RX ORDER — ACETAMINOPHEN 500 MG
1000 TABLET ORAL ONCE
Status: COMPLETED | OUTPATIENT
Start: 2019-09-12 | End: 2019-09-12

## 2019-09-12 RX ORDER — IPRATROPIUM BROMIDE AND ALBUTEROL SULFATE 2.5; .5 MG/3ML; MG/3ML
1 SOLUTION RESPIRATORY (INHALATION) ONCE
Status: COMPLETED | OUTPATIENT
Start: 2019-09-12 | End: 2019-09-12

## 2019-09-12 RX ORDER — LIDOCAINE 4 G/G
1 PATCH TOPICAL ONCE
Status: DISCONTINUED | OUTPATIENT
Start: 2019-09-12 | End: 2019-09-12 | Stop reason: HOSPADM

## 2019-09-12 RX ADMIN — IPRATROPIUM BROMIDE AND ALBUTEROL SULFATE 1 AMPULE: .5; 3 SOLUTION RESPIRATORY (INHALATION) at 10:48

## 2019-09-12 RX ADMIN — OXYCODONE HYDROCHLORIDE 5 MG: 5 TABLET ORAL at 11:49

## 2019-09-12 RX ADMIN — SODIUM CHLORIDE 1000 ML: 9 INJECTION, SOLUTION INTRAVENOUS at 10:20

## 2019-09-12 RX ADMIN — ACETAMINOPHEN 1000 MG: 500 TABLET ORAL at 11:49

## 2019-09-12 RX ADMIN — KETOROLAC TROMETHAMINE 15 MG: 30 INJECTION, SOLUTION INTRAMUSCULAR at 10:20

## 2019-09-12 ASSESSMENT — PAIN DESCRIPTION - ONSET
ONSET: ON-GOING

## 2019-09-12 ASSESSMENT — PAIN SCALES - GENERAL
PAINLEVEL_OUTOF10: 9

## 2019-09-12 ASSESSMENT — PAIN DESCRIPTION - LOCATION
LOCATION: CHEST

## 2019-09-12 ASSESSMENT — PAIN DESCRIPTION - FREQUENCY
FREQUENCY: CONTINUOUS

## 2019-09-12 ASSESSMENT — PAIN DESCRIPTION - DESCRIPTORS
DESCRIPTORS: CONSTANT

## 2019-09-12 ASSESSMENT — PAIN - FUNCTIONAL ASSESSMENT
PAIN_FUNCTIONAL_ASSESSMENT: PREVENTS OR INTERFERES SOME ACTIVE ACTIVITIES AND ADLS

## 2019-09-12 ASSESSMENT — PAIN DESCRIPTION - PROGRESSION
CLINICAL_PROGRESSION: GRADUALLY WORSENING

## 2019-09-12 ASSESSMENT — PAIN DESCRIPTION - PAIN TYPE
TYPE: ACUTE PAIN

## 2019-09-12 ASSESSMENT — PAIN DESCRIPTION - ORIENTATION
ORIENTATION: MID

## 2019-09-12 NOTE — RESULT ENCOUNTER NOTE
Hemoglobin A1c result reviewed, no further treatment needed. The result is actually significantly less than 5 months ago.

## 2019-09-17 ENCOUNTER — HOSPITAL ENCOUNTER (EMERGENCY)
Age: 54
Discharge: HOME | End: 2019-09-17
Attending: EMERGENCY MEDICINE
Payer: MEDICARE

## 2019-09-17 ENCOUNTER — APPOINTMENT (OUTPATIENT)
Dept: GENERAL RADIOLOGY | Age: 54
End: 2019-09-17
Payer: MEDICARE

## 2019-09-17 VITALS
HEART RATE: 70 BPM | OXYGEN SATURATION: 99 % | DIASTOLIC BLOOD PRESSURE: 77 MMHG | SYSTOLIC BLOOD PRESSURE: 153 MMHG | RESPIRATION RATE: 18 BRPM | TEMPERATURE: 97.7 F

## 2019-09-17 DIAGNOSIS — S22.43XD MULTIPLE CLOSED FRACTURES OF RIBS OF BOTH SIDES WITH ROUTINE HEALING, SUBSEQUENT ENCOUNTER: Primary | ICD-10-CM

## 2019-09-17 DIAGNOSIS — R05.9 COUGH: ICD-10-CM

## 2019-09-17 PROCEDURE — 6370000000 HC RX 637 (ALT 250 FOR IP): Performed by: EMERGENCY MEDICINE

## 2019-09-17 PROCEDURE — 71046 X-RAY EXAM CHEST 2 VIEWS: CPT

## 2019-09-17 PROCEDURE — 99283 EMERGENCY DEPT VISIT LOW MDM: CPT

## 2019-09-17 RX ORDER — OXYCODONE HYDROCHLORIDE AND ACETAMINOPHEN 5; 325 MG/1; MG/1
2 TABLET ORAL ONCE
Status: COMPLETED | OUTPATIENT
Start: 2019-09-17 | End: 2019-09-17

## 2019-09-17 RX ORDER — IBUPROFEN 600 MG/1
600 TABLET ORAL EVERY 6 HOURS PRN
Qty: 40 TABLET | Refills: 0 | Status: SHIPPED | OUTPATIENT
Start: 2019-09-17

## 2019-09-17 RX ORDER — OXYCODONE HYDROCHLORIDE AND ACETAMINOPHEN 5; 325 MG/1; MG/1
1 TABLET ORAL EVERY 6 HOURS PRN
Qty: 12 TABLET | Refills: 0 | Status: SHIPPED | OUTPATIENT
Start: 2019-09-17 | End: 2019-09-20

## 2019-09-17 RX ADMIN — OXYCODONE HYDROCHLORIDE AND ACETAMINOPHEN 2 TABLET: 5; 325 TABLET ORAL at 11:40

## 2019-09-17 ASSESSMENT — PAIN SCALES - GENERAL
PAINLEVEL_OUTOF10: 9
PAINLEVEL_OUTOF10: 8
PAINLEVEL_OUTOF10: 8
PAINLEVEL_OUTOF10: 9

## 2019-09-17 ASSESSMENT — PAIN - FUNCTIONAL ASSESSMENT: PAIN_FUNCTIONAL_ASSESSMENT: PREVENTS OR INTERFERES SOME ACTIVE ACTIVITIES AND ADLS

## 2019-09-17 ASSESSMENT — PAIN DESCRIPTION - DESCRIPTORS: DESCRIPTORS: ACHING;SHARP

## 2019-09-17 ASSESSMENT — PAIN DESCRIPTION - LOCATION: LOCATION: RIB CAGE

## 2019-09-17 ASSESSMENT — PAIN DESCRIPTION - ORIENTATION: ORIENTATION: RIGHT;LEFT

## 2019-09-17 ASSESSMENT — PAIN DESCRIPTION - PROGRESSION: CLINICAL_PROGRESSION: GRADUALLY IMPROVING

## 2019-09-17 NOTE — ED PROVIDER NOTES
education: Not on file    Highest education level: Not on file   Occupational History    Not on file   Social Needs    Financial resource strain: Not on file    Food insecurity:     Worry: Not on file     Inability: Not on file    Transportation needs:     Medical: Not on file     Non-medical: Not on file   Tobacco Use    Smoking status: Current Every Day Smoker     Packs/day: 0.50     Years: 32.00     Pack years: 16.00     Types: Cigarettes    Smokeless tobacco: Never Used   Substance and Sexual Activity    Alcohol use: Yes     Comment: rare    Drug use: No     Comment: Arelia Luke used 9/25/11     Sexual activity: Not on file     Comment: hx of heroin abuse last use 11/10   Lifestyle    Physical activity:     Days per week: Not on file     Minutes per session: Not on file    Stress: Not on file   Relationships    Social connections:     Talks on phone: Not on file     Gets together: Not on file     Attends Taoist service: Not on file     Active member of club or organization: Not on file     Attends meetings of clubs or organizations: Not on file     Relationship status: Not on file    Intimate partner violence:     Fear of current or ex partner: Not on file     Emotionally abused: Not on file     Physically abused: Not on file     Forced sexual activity: Not on file   Other Topics Concern    Not on file   Social History Narrative    Not on file     No current facility-administered medications for this encounter. Current Outpatient Medications   Medication Sig Dispense Refill    oxyCODONE-acetaminophen (PERCOCET) 5-325 MG per tablet Take 1 tablet by mouth every 6 hours as needed for Pain for up to 3 days. Intended supply: 3 days.  Take lowest dose possible to manage pain 12 tablet 0    ibuprofen (ADVIL;MOTRIN) 600 MG tablet Take 1 tablet by mouth every 6 hours as needed for Pain 40 tablet 0    Insulin Syringes, Disposable, U-100 1 ML MISC Inject 1 each into the skin 2 times daily 100 each 3  pravastatin (PRAVACHOL) 40 MG tablet Take 1 tablet by mouth nightly 30 tablet 3    metoprolol tartrate (LOPRESSOR) 50 MG tablet Take 1 tablet by mouth 2 times daily 60 tablet 3    clopidogrel (PLAVIX) 75 MG tablet Take 1 tablet by mouth daily 30 tablet 3    omeprazole (PRILOSEC) 20 MG delayed release capsule Take 1 capsule by mouth daily 30 capsule 3    Blood Glucose Monitoring Suppl (TRUE METRIX METER) w/Device KIT 1 Device by Does not apply route 3 times daily (before meals) 1 kit 1    blood glucose test strips (TRUE METRIX BLOOD GLUCOSE TEST) strip 1 each by In Vitro route daily As needed.  100 each 3    Lancet Devices (LANCING DEVICE) MISC 1 Device by Does not apply route 3 times daily (before meals) 2 each 5    Lancets MISC 1 each by Does not apply route 3 times daily 100 each 1    insulin NPH (HUMULIN N;NOVOLIN N) 100 UNIT/ML injection vial Inject 35 Units into the skin 2 times daily (before meals) 1 vial 3    metFORMIN (GLUCOPHAGE) 1000 MG tablet Take 1 tablet by mouth 2 times daily (with meals) 60 tablet 3    budesonide-formoterol (SYMBICORT) 160-4.5 MCG/ACT AERO Inhale 2 puffs into the lungs 2 times daily      albuterol sulfate HFA (PROAIR HFA) 108 (90 Base) MCG/ACT inhaler Inhale 2 puffs into the lungs every 4-6 hours as needed for Shortness of Breath      aspirin 81 MG chewable tablet Take 1 tablet by mouth daily 30 tablet 3    folic acid (FOLVITE) 1 MG tablet Take 1 tablet by mouth daily 30 tablet 3    docusate sodium (COLACE, DULCOLAX) 100 MG CAPS Take 100 mg by mouth 2 times daily 60 capsule 1    nicotine (NICODERM CQ) 14 MG/24HR Place 1 patch onto the skin daily as needed (if a smoker) 30 patch 3    Multiple Vitamin (MULTIVITAMIN) tablet Take 1 tablet by mouth daily 30 tablet 1    vitamin B-1 100 MG tablet Take 1 tablet by mouth daily 30 tablet 3    gabapentin (NEURONTIN) 600 MG tablet Take 600 mg by mouth 3 times daily      diazepam (VALIUM) 5 MG tablet Take 5 mg by mouth nightly as needed for Anxiety       No Known Allergies      REVIEW OF SYSTEMS  10 systems reviewed, pertinent positives per HPI otherwise noted to be negative    PHYSICAL EXAM  BP (!) 157/90   Pulse 70   Temp 97.7 °F (36.5 °C) (Oral)   Resp 18   SpO2 98%      CONSTITUTIONAL: AOx4, NAD, cooperative with exam, afebrile   HEAD: normocephalic, atraumatic   EYES: PERRL, EOMI, anicteric sclera   ENT: Moist mucous membranes, uvula midline   LUNGS: Bilateral breath sounds, CTAB, no rales/ronchi/wheezes   CARDIOVASCULAR: RRR, normal S1/S2, no m/r/g, 2+ pulses throughout   ABDOMEN: Soft, non-tender, non-distended, +BS   NEUROLOGIC:  MAEx4, 5/5 strength throughout; fine touch sensation intact throughout; GCS 15   MUSCULOSKELETAL: No clubbing, cyanosis or edema, 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 ALL OTHER NON-PLAIN FILM IMAGES SUCH AS CT, ULTRASOUND AND MRI HAVE BEEN READ BY THE RADIOLOGIST. XR CHEST STANDARD (2 VW)   Preliminary Result   No acute process. Stable mild cardiomegaly and postthoracotomy changes as   well as COPD. EKG INTERPRETATION  None    PROCEDURES    ED COURSE/MDM  Uncontrolled pain, multiple rib fractures, pneumonia, pneumothorax     Patient seen and evaluated. Nontoxic and afebrile. O2 saturation 98% on room air. No increased work of breathing. Does have recent history of multiple rib fractures and pain is uncontrolled after finishing his course of pain medication at home. Will obtain chest x-ray to evaluate for possible pneumonia as he has been coughing. Patient lungs clear to auscultation without any rales to suggest pneumonia. Patient encouraged to stop smoking. Chest x-ray unremarkable without any acute changes.     Will refill patient's course of

## 2019-09-17 NOTE — ED NOTES
Patient resting comfortably, respirations easy, unlabored. Patient in no acute distress. Denies needs at this time. Call light within reach, bed in lowest position, side rails up x 2.         Reji Gaines RN  09/17/19 [id number]

## 2019-09-17 NOTE — ED NOTES
D/C: Order noted for d/c. Pt confirmed d/c paperwork and two prescriptions have correct name. Discharge and education instructions reviewed with patient. Teach-back successful. Pt verbalized understanding and signed d/c papers. Pt denied questions at this time. No acute distress noted. Patient instructed to follow-up as noted - return to emergency department if symptoms worsen. Patient verbalized understanding. Discharged per EDMD with discharge instructions. Pt discharged via motorized wheelchair. Patient stable upon departure. Thanked patient for choosing [facility]) for care.         Avis Hernandez RN  09/17/19 [id number]

## 2019-11-07 ENCOUNTER — OFFICE VISIT (OUTPATIENT)
Dept: CARDIOLOGY | Facility: CLINIC | Age: 54
End: 2019-11-07

## 2019-11-07 VITALS
BODY MASS INDEX: 32.58 KG/M2 | WEIGHT: 207.6 LBS | SYSTOLIC BLOOD PRESSURE: 114 MMHG | HEIGHT: 67 IN | DIASTOLIC BLOOD PRESSURE: 69 MMHG | HEART RATE: 71 BPM | OXYGEN SATURATION: 97 %

## 2019-11-07 DIAGNOSIS — I50.9 CHRONIC CONGESTIVE HEART FAILURE, UNSPECIFIED HEART FAILURE TYPE (HCC): Primary | ICD-10-CM

## 2019-11-07 DIAGNOSIS — R06.02 SHORTNESS OF BREATH: ICD-10-CM

## 2019-11-07 DIAGNOSIS — I73.9 PVD (PERIPHERAL VASCULAR DISEASE) WITH CLAUDICATION (HCC): ICD-10-CM

## 2019-11-07 DIAGNOSIS — R07.89 CHEST PAIN, ATYPICAL: ICD-10-CM

## 2019-11-07 DIAGNOSIS — R00.2 PALPITATIONS: ICD-10-CM

## 2019-11-07 DIAGNOSIS — R53.83 FATIGUE, UNSPECIFIED TYPE: ICD-10-CM

## 2019-11-07 DIAGNOSIS — E78.2 MIXED HYPERLIPIDEMIA: ICD-10-CM

## 2019-11-07 PROCEDURE — 93000 ELECTROCARDIOGRAM COMPLETE: CPT | Performed by: NURSE PRACTITIONER

## 2019-11-07 PROCEDURE — 99204 OFFICE O/P NEW MOD 45 MIN: CPT | Performed by: NURSE PRACTITIONER

## 2019-11-07 RX ORDER — CLOPIDOGREL BISULFATE 75 MG/1
75 TABLET ORAL DAILY
COMMUNITY
End: 2021-01-12 | Stop reason: SDUPTHER

## 2019-11-07 RX ORDER — ALBUTEROL SULFATE 2.5 MG/3ML
2.5 SOLUTION RESPIRATORY (INHALATION) EVERY 4 HOURS PRN
COMMUNITY

## 2019-11-07 RX ORDER — DULOXETIN HYDROCHLORIDE 60 MG/1
60 CAPSULE, DELAYED RELEASE ORAL 2 TIMES DAILY
COMMUNITY
End: 2020-10-27

## 2019-11-07 RX ORDER — METOPROLOL TARTRATE 50 MG/1
50 TABLET, FILM COATED ORAL 2 TIMES DAILY
COMMUNITY

## 2019-11-07 RX ORDER — MIRTAZAPINE 15 MG/1
15 TABLET, FILM COATED ORAL NIGHTLY
COMMUNITY
End: 2020-10-27

## 2019-11-07 RX ORDER — ATORVASTATIN CALCIUM 40 MG/1
40 TABLET, FILM COATED ORAL DAILY
COMMUNITY

## 2019-11-07 RX ORDER — ROPINIROLE 3 MG/1
3 TABLET, FILM COATED ORAL NIGHTLY
Status: ON HOLD | COMMUNITY
End: 2021-01-20

## 2019-11-07 RX ORDER — ALBUTEROL SULFATE 90 UG/1
1 AEROSOL, METERED RESPIRATORY (INHALATION) EVERY 6 HOURS PRN
COMMUNITY

## 2019-11-07 RX ORDER — RAMELTEON 8 MG/1
8 TABLET ORAL NIGHTLY
COMMUNITY
End: 2020-10-27

## 2019-11-07 RX ORDER — BUDESONIDE AND FORMOTEROL FUMARATE DIHYDRATE 160; 4.5 UG/1; UG/1
2 AEROSOL RESPIRATORY (INHALATION)
COMMUNITY
End: 2020-10-27 | Stop reason: ALTCHOICE

## 2019-11-07 RX ORDER — NITROGLYCERIN 0.4 MG/1
TABLET SUBLINGUAL
Qty: 30 TABLET | Refills: 5 | Status: SHIPPED | OUTPATIENT
Start: 2019-11-07

## 2019-11-07 RX ORDER — MELOXICAM 15 MG/1
15 TABLET ORAL DAILY
COMMUNITY
End: 2020-10-27

## 2019-11-07 RX ORDER — ASPIRIN 81 MG/1
81 TABLET, CHEWABLE ORAL DAILY
COMMUNITY

## 2019-11-07 RX ORDER — PREGABALIN 150 MG/1
200 CAPSULE ORAL 2 TIMES DAILY
COMMUNITY
End: 2021-01-12 | Stop reason: DRUGHIGH

## 2019-11-07 NOTE — PATIENT INSTRUCTIONS
Nuclear Medicine Exam    A nuclear medicine exam is a safe and painless imaging test. It helps your health care provider detect and diagnose disease in the body. It also provides information about the way your organs work and how they are structured.  For a nuclear medicine exam, you will be given a radioactive tracer. This substance is absorbed by your body's organs. A large scanning machine detects the tracer and creates pictures of the areas that your health care provider wants to look at.  There are several kinds of nuclear medicine exams. They include:  · CT scan.  · MRI.  · PET scan.  Tell a health care provider about:  · Any allergies you have.  · All medicines you are taking, including vitamins, herbs, eye drops, creams, and over-the-counter medicines.  · Any problems you or family members have had with anesthetic medicines.  · Any blood disorders you have.  · Any surgeries you have had.  · Any medical conditions you have.  · Whether you are pregnant or may be pregnant.  · Whether you are nursing.  What happens before the procedure?  · Ask your health care provider about changing or stopping your regular medicines.  · Follow instructions from your health care provider about eating or drinking restrictions.  · Do not wear jewelry.  · Wear loose, comfortable clothing. You may be asked to wear a hospital gown for the procedure.  · Bring previous imaging studies, such as X-rays, with you to the exam if they are available.  What happens during the procedure?  · An IV tube may be inserted into one of your veins.  · You will be asked to lie on a table or sit in a chair.  · You will be given the radioactive tracer. You may get:  ? A pill or liquid to swallow.  ? An injection.  ? Medicine through your IV tube.  ? A gas to inhale.  · A large scanning machine will be used to create images of your body. After the pictures are taken, you may have to wait so your health care provider can make sure that enough good images  were taken.  The procedure may vary among health care providers and hospitals.  What happens after the procedure?  · You may go right home after the procedure and return to your usual activities, unless told otherwise by your health care provider.  · Drink enough water to keep urine clear or pale yellow. This helps to flush the radioactive tracer out of your body.  · It is your responsibility to get your test results. Ask your health care provider or the department performing the test when your results will be ready.  · Seek immediate medical care if you have shortness of breath.  This information is not intended to replace advice given to you by your health care provider. Make sure you discuss any questions you have with your health care provider.  Document Released: 01/25/2006 Document Revised: 08/17/2017 Document Reviewed: 07/06/2016  [employer] Interactive Patient Education © 2019 [employer]  Echocardiogram  An echocardiogram is a procedure that uses painless sound waves (ultrasound) to produce an image of the heart. Images from an echocardiogram can provide important information about:  · Signs of coronary artery disease (CAD).  · Aneurysm detection. An aneurysm is a weak or damaged part of an artery wall that bulges out from the normal force of blood pumping through the body.  · Heart size and shape. Changes in the size or shape of the heart can be associated with certain conditions, including heart failure, aneurysm, and CAD.  · Heart muscle function.  · Heart valve function.  · Signs of a past heart attack.  · Fluid buildup around the heart.  · Thickening of the heart muscle.  · A tumor or infectious growth around the heart valves.  Tell a health care provider about:  · Any allergies you have.  · All medicines you are taking, including vitamins, herbs, eye drops, creams, and over-the-counter medicines.  · Any blood disorders you have.  · Any surgeries you have had.  · Any medical conditions you  have.  · Whether you are pregnant or may be pregnant.  What are the risks?  Generally, this is a safe procedure. However, problems may occur, including:  · Allergic reaction to dye (contrast) that may be used during the procedure.  What happens before the procedure?  No specific preparation is needed. You may eat and drink normally.  What happens during the procedure?    · An IV tube may be inserted into one of your veins.  · You may receive contrast through this tube. A contrast is an injection that improves the quality of the pictures from your heart.  · A gel will be applied to your chest.  · A wand-like tool (transducer) will be moved over your chest. The gel will help to transmit the sound waves from the transducer.  · The sound waves will harmlessly bounce off of your heart to allow the heart images to be captured in real-time motion. The images will be recorded on a computer.  The procedure may vary among health care providers and hospitals.  What happens after the procedure?  · You may return to your normal, everyday life, including diet, activities, and medicines, unless your health care provider tells you not to do that.  Summary  · An echocardiogram is a procedure that uses painless sound waves (ultrasound) to produce an image of the heart.  · Images from an echocardiogram can provide important information about the size and shape of your heart, heart muscle function, heart valve function, and fluid buildup around your heart.  · You do not need to do anything to prepare before this procedure. You may eat and drink normally.  · After the echocardiogram is completed, you may return to your normal, everyday life, unless your health care provider tells you not to do that.  This information is not intended to replace advice given to you by your health care provider. Make sure you discuss any questions you have with your health care provider.  Document Released: 12/15/2001 Document Revised: 01/20/2018 Document  Reviewed: 01/20/2018  [employer] Interactive Patient Education © 2019 [employer]  Palpitations  Palpitations are feelings that your heartbeat is not normal. Your heartbeat may feel like it is:  · Uneven.  · Faster than normal.  · Fluttering.  · Skipping a beat.  This is usually not a serious problem. In some cases, you may need tests to rule out any serious problems.  Follow these instructions at home:  Pay attention to any changes in your condition. Take these actions to help manage your symptoms:  Eating and drinking  · Avoid:  ? Coffee, tea, soft drinks, and energy drinks.  ? Chocolate.  ? Alcohol.  ? Diet pills.  Lifestyle    · Try to lower your stress. These things can help you relax:  ? Yoga.  ? Deep breathing and meditation.  ? Exercise.  ? Using words and images to create positive thoughts (guided imagery).  ? Using your mind to control things in your body (biofeedback).  · Do not use drugs.  · Get plenty of rest and sleep. Keep a regular bed time.  General instructions    · Take over-the-counter and prescription medicines only as told by your doctor.  · Do not use any products that contain nicotine or tobacco, such as cigarettes and e-cigarettes. If you need help quitting, ask your doctor.  · Keep all follow-up visits as told by your doctor. This is important. You may need more tests if palpitations do not go away or get worse.  Contact a doctor if:  · Your symptoms last more than 24 hours.  · Your symptoms occur more often.  Get help right away if you:  · Have chest pain.  · Feel short of breath.  · Have a very bad headache.  · Feel dizzy.  · Pass out (faint).  Summary  · Palpitations are feelings that your heartbeat is uneven or faster than normal. It may feel like your heart is fluttering or skipping a beat.  · Avoid food and drinks that may cause palpitations. These include caffeine, chocolate, and alcohol.  · Try to lower your stress. Do not smoke or use drugs.  · Get help right away if you faint or  have chest pain, shortness of breath, a severe headache, or dizziness.  This information is not intended to replace advice given to you by your health care provider. Make sure you discuss any questions you have with your health care provider.  Document Released: 09/26/2009 Document Revised: 01/30/2019 Document Reviewed: 01/30/2019  [employer] Interactive Patient Education © 2019 [employer]      Acute Coronary Syndrome    Acute coronary syndrome (ACS) is a serious problem in which there is suddenly not enough blood and oxygen reaching the heart. ACS can result in chest pain or a heart attack.  This condition is a medical emergency. If you have any symptoms of this condition, get help right away.  What are the causes?  This condition may be caused by:  · Buildup of fat and cholesterol inside of the arteries (atherosclerosis). This is the most common cause. The buildup (plaque) can cause blood vessels in the heart (coronary arteries) to become narrow or blocked, which reduces blood flow to the heart. Plaque can also break off and lead to a clot, which can block an artery and cause a heart attack or stroke.  · Sudden tightening of the muscles around the coronary arteries (coronary spasm).  · Tearing of a coronary artery (spontaneous coronary artery dissection).  · Very low blood pressure (hypotension).  · An abnormal heartbeat (arrhythmia).  · Other medical conditions that cause a decrease of oxygen to the heart, such as anemiaorrespiratory failure.  · Using cocaine or methamphetamine.  What increases the risk?  The following factors may make you more likely to develop this condition:  · Age. The risk for ACS increases as you get older.  · History of chest pain, heart attack, peripheral artery disease, or stroke.  · Having taken chemotherapy or immune-suppressing medicines.  · Being male.  · Family history of chest pain, heart disease, or stroke.  · Smoking.  · Not exercising enough.  · Being overweight.  · High  cholesterol.  · High blood pressure (hypertension).  · Diabetes.  · Excessive alcohol use.  What are the signs or symptoms?  Common symptoms of this condition include:  · Chest pain. The pain may last a long time, or it may stop and come back (recur). It may feel like:  ? Crushing or squeezing.  ? Tightness, pressure, fullness, or heaviness.  · Arm, neck, jaw, or back pain.  · Heartburn or indigestion.  · Shortness of breath.  · Nausea.  · Sudden cold sweats.  · Light-headedness.  · Dizziness, or passing out.  · Tiredness (fatigue).  Sometimes there are no symptoms.  How is this diagnosed?  This condition may be diagnosed based on:  · Your medical history and symptoms.  · An electrocardiogram (ECG). This imaging test measures the heart's electrical activity.  · Blood tests. Cardiac blood tests may need to be repeated at designated time intervals.  · Chest X-ray.  · A CT scan of the chest.  · A coronary angiogram. This is a procedure in which dye is injected into the bloodstream and then X-rays are taken to show if there is a blockage in a coronary artery.  · Exercise stress testing.  · Echocardiography. This is a test that uses sound waves to produce detailed images of the heart.  How is this treated?  The treatment is to restore blood flow to the heart as soon as possible. Treatment for this condition may include:  · Oxygen therapy.  · Medicines, such as:  ? Antiplatelet medicines and blood-thinning medicines, such as aspirin. These help prevent blood clots.  ? Medicine that dissolves any blood clots (fibrinolytic therapy).  ? Blood pressure medicines.  ? Nitroglycerin. This helps relieve chest pain and widens blood vessels to improve blood flow.  ? Pain medicine.  ? Cholesterol-lowering medicine.  · Surgery, such as:  ? Coronary angioplasty with stent placement. This involves placing a small piece of metal that looks like mesh or a spring into a narrow coronary artery. This widens the artery and keep it  open.  ? Coronary artery bypass surgery. This involves taking a section of a blood vessel from a different part of your body, and placing it on the blocked coronary artery to allow blood to flow around (bypass) the blockage.  · Cardiac rehabilitation. This is a program that helps improve your health and well-being. It includes exercise training, education, and counseling to help you recover.  Follow these instructions at home:  Eating and drinking  · Eat a heart-healthy diet that includes whole grains, fruits and vegetables, lean proteins, and low-fat or nonfat dairy products.  · Limit how much salt (sodium) you eat as told by your health care provider. Follow instructions from your health care provider about any other eating or drinking restrictions, such as limiting foods that are high in fat and processed sugars.  · Use healthy cooking methods such as roasting, grilling, broiling, baking, poaching, steaming, or stir-frying.  · Talk with a dietitian to learn about healthy cooking methods and how to eat less sodium.  Medicines  · Take over-the-counter and prescription medicines only as told by your health care provider.  · Do not take these medicines unless your health care provider approves:  ? Vitamin supplements that contain vitamin A or vitamin E.  ? Nonsteroidal anti-inflammatory drugs (NSAIDs), such as ibuprofen, naproxen, or celecoxib.  ? Hormone replacement therapy that contains estrogen.  If you are taking blood thinners:  · Talk with your health care provider before you take any medicines that contain aspirin or NSAIDs. These medicines increase your risk for dangerous bleeding.  · Take your medicine exactly as told, at the same time every day.  · Avoid activities that could cause injury or bruising, and follow instructions about how to prevent falls.  · Wear a medical alert bracelet, and carry a card that lists what medicines you take.  Activity  · Join a cardiac rehabilitation program. An exercise plan  will be developed for you.  · Ask your health care provider:  ? What activities and exercises are safe for you.  ? If you should follow specific instructions about lifting, driving, or climbing stairs.  Lifestyle  · Do not use any products that contain nicotine or tobacco, such as cigarettes and e-cigarettes. If you need help quitting, ask your health care provider.  · If your health care provider says that alcohol is safe for you, limit your alcohol intake to no more than 1 drink a day. One drink equals 12 oz of beer, 5 oz of wine, or 1½ oz of hard liquor.  · Maintain a healthy weight. If you need to lose weight, work with your health care provider to do so safely.  General instructions  · Tell all the health care providers who care for you about your heart condition, including your dentist. This may affect the medicines or treatment you receive.  · Manage any other health conditions you have, such as hypertension or diabetes. These conditions affect your heart.  · Learn ways to manage stress.  · Get screened for depression, and get mental health treatment if you need it. People with ACS are at higher risk for depression.  · Keep your vaccinations up to date. Get the flu shot (influenza vaccine) every year.  · If directed, monitor your blood pressure at home.  · Keep all follow-up visits as told by your health care provider. This is important.  Contact a health care provider if:  · You feel overwhelmed or sad.  · You have trouble doing your daily activities.  Get help right away if:  · You have pain in your chest, neck, arm, jaw, stomach, or back that recurs, and:  ? It lasts for more than a few minutes.  ? It is not relieved by taking the medicineyour health care provider prescribed.  · You have unexplained:  ? Heavy sweating.  ? Heartburn or indigestion.  ? Nausea or vomiting.  ? Shortness of breath.  ? Difficulty breathing.  ? Fatigue.  ? Nervousness or anxiety.  ? Weakness.  ? Diarrhea.  ? Dark stools or blood  in your stool.  · You have sudden light-headedness or dizziness.  · Your blood pressure is higher than 180/120.  · You faint.  · You have thoughts about hurting yourself.  These symptoms may represent a serious problem that is an emergency. Do not wait to see if the symptoms will go away. Get medical help right away. Call your local emergency services (911 in the [place] Do not drive yourself to the hospital.   If you ever feel like you may hurt yourself or others, or have thoughts about taking your own life, get help right away. You can go to your nearest emergency department or call:  · Emergency services (911 in the [place]  · A suicide crisis helpline, such as the National Suicide Prevention Lifeline at 1-821.763.6824. This is open 24 hours a day.  Summary  · Acute coronary syndrome (ACS) is when there is not enough blood and oxygen being supplied to the heart. ACS can result in chest pain or a heart attack.  · Acute coronary syndrome is a medical emergency. If you have any symptoms of this condition, get help right away.  · Treatment includes medicines and procedures to open the blocked arteries and restore blood flow.  This information is not intended to replace advice given to you by your health care provider. Make sure you discuss any questions you have with your health care provider.  Document Released: 12/18/2006 Document Revised: 08/28/2018 Document Reviewed: 08/28/2018  [employer] Interactive Patient Education © 2019 [employer]

## 2019-11-07 NOTE — PROGRESS NOTES
Subjective     Reg Sanchez is a 53 y.o. male who presents to day for Congestive Heart Failure (CABG x 5 4-6-2011).    CHIEF COMPLIANT  Chief Complaint   Patient presents with   • Congestive Heart Failure     CABG x 5 4-6-2011       Active Problems:  Problem List Items Addressed This Visit     None      Visit Diagnoses     Chronic congestive heart failure, unspecified heart failure type (CMS/HCC)    -  Primary    Relevant Medications    metoprolol tartrate (LOPRESSOR) 50 MG tablet    clopidogrel (PLAVIX) 75 MG tablet    nitroglycerin (NITROSTAT) 0.4 MG SL tablet    Other Relevant Orders    Adult Transthoracic Echo Complete W/ Cont if Necessary Per Protocol    Stress Test With Myocardial Perfusion One Day    Duplex Lower Extremity Art / Grafts - Left CAR    Cardiac Event Monitor    CBC & Differential    Basic Metabolic Panel    Palpitations        Relevant Orders    Adult Transthoracic Echo Complete W/ Cont if Necessary Per Protocol    Stress Test With Myocardial Perfusion One Day    Duplex Lower Extremity Art / Grafts - Left CAR    Cardiac Event Monitor    CBC & Differential    TSH    Basic Metabolic Panel    Magnesium    PVD (peripheral vascular disease) with claudication (CMS/HCC)        Relevant Orders    Adult Transthoracic Echo Complete W/ Cont if Necessary Per Protocol    Stress Test With Myocardial Perfusion One Day    Duplex Lower Extremity Art / Grafts - Left CAR    Cardiac Event Monitor    CBC & Differential    Basic Metabolic Panel    Chest pain, atypical        Relevant Medications    nitroglycerin (NITROSTAT) 0.4 MG SL tablet    Other Relevant Orders    Adult Transthoracic Echo Complete W/ Cont if Necessary Per Protocol    Stress Test With Myocardial Perfusion One Day    Duplex Lower Extremity Art / Grafts - Left CAR    Cardiac Event Monitor    CBC & Differential    Basic Metabolic Panel    Shortness of breath        Relevant Orders    Adult Transthoracic Echo Complete W/ Cont if Necessary Per Protocol     Stress Test With Myocardial Perfusion One Day    Duplex Lower Extremity Art / Grafts - Left CAR    Cardiac Event Monitor    CBC & Differential    Basic Metabolic Panel    Fatigue, unspecified type        Relevant Orders    Adult Transthoracic Echo Complete W/ Cont if Necessary Per Protocol    Stress Test With Myocardial Perfusion One Day    Duplex Lower Extremity Art / Grafts - Left CAR    Cardiac Event Monitor    CBC & Differential    TSH    Basic Metabolic Panel    Mixed hyperlipidemia        Relevant Medications    atorvastatin (LIPITOR) 40 MG tablet    Other Relevant Orders    Lipid Panel          HPI  HPI  Patient establishing care today for history of congestive heart failure, CABG x4 in 2011 in which he has not followed up with a cardiologist since.  Patient states that he is moving here and has is now est patient ablished with family doctor and was referred for cardiac management.  She complains of frequent chest pain with the last episode being earlier today he says it comes and goes and lasts only a few minutes.  Describes the pain as sharp.  Associated symptoms include nausea, shortness of air and is made mainly worse when he is lying down.  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  No relieving factors were able to be determined.  Patient stated he has not had any nitro and has not tried it to resolve the chest pain.  Patient states the pain can get up as severe as 6-7/10.  Also reported palpitations described as a flutter which caused him to get out of breath.  Happens daily to every other day.  In addition patient does report orthopnea to the point where he has to sleep in a recliner.  Patient has a BKA which prosthesis is in place.  Patient complains of severe pain that occurs in his right lower leg with ambulation.  The right leg bothers actually more than his left leg.  Also reports a decrease in his exercise tolerance and previous had  histories of syncope and claudication of his right leg.  Patient has a left below-knee amputation with prosthesis in place.  Denies any PND, or neurological changes.    PRIOR MEDS  Current Outpatient Medications on File Prior to Visit   Medication Sig Dispense Refill   • albuterol (PROVENTIL) (2.5 MG/3ML) 0.083% nebulizer solution Take 2.5 mg by nebulization Every 4 (Four) Hours As Needed for Wheezing.     • albuterol sulfate  (90 Base) MCG/ACT inhaler Inhale 2 puffs Every 4 (Four) Hours As Needed for Wheezing.     • aspirin 81 MG chewable tablet Chew 81 mg Daily.     • atorvastatin (LIPITOR) 40 MG tablet Take 40 mg by mouth Daily.     • budesonide-formoterol (SYMBICORT) 160-4.5 MCG/ACT inhaler Inhale 2 puffs 2 (Two) Times a Day.     • clopidogrel (PLAVIX) 75 MG tablet Take 75 mg by mouth Daily.     • DULoxetine (CYMBALTA) 60 MG capsule Take 60 mg by mouth 2 (Two) Times a Day.     • Insulin Glargine (BASAGLAR KWIKPEN SC) Inject 20 Units under the skin into the appropriate area as directed Daily.     • meloxicam (MOBIC) 15 MG tablet Take 15 mg by mouth Daily.     • metFORMIN (GLUCOPHAGE) 1000 MG tablet Take 1,000 mg by mouth 2 (Two) Times a Day With Meals.     • metoprolol tartrate (LOPRESSOR) 50 MG tablet Take 50 mg by mouth 2 (Two) Times a Day.     • mirtazapine (REMERON) 15 MG tablet Take 15 mg by mouth Every Night.     • pregabalin (LYRICA) 75 MG capsule Take 75 mg by mouth 3 (Three) Times a Day.     • ramelteon (ROZEREM) 8 MG tablet Take 8 mg by mouth Every Night.     • rOPINIRole (REQUIP) 0.25 MG tablet Take 0.25 mg by mouth Every Night. 2 tablets once daily       No current facility-administered medications on file prior to visit.        ALLERGIES  Patient has no known allergies.    HISTORY  Past Medical History:   Diagnosis Date   • Anxiety    • CHF (congestive heart failure) (CMS/HCC)    • COPD (chronic obstructive pulmonary disease) (CMS/HCC)    • Diabetes mellitus (CMS/HCC)    • Hepatitis C    •  "Hyperlipidemia    • Hypertension    • Sleep apnea        Social History     Socioeconomic History   • Marital status: Legally      Spouse name: Not on file   • Number of children: Not on file   • Years of education: Not on file   • Highest education level: Not on file   Tobacco Use   • Smoking status: Current Every Day Smoker   • Smokeless tobacco: Never Used   Substance and Sexual Activity   • Alcohol use: Yes   • Drug use: No   • Sexual activity: Defer       Family History   Problem Relation Age of Onset   • Heart disease Mother    • Anuerysm Father    • Heart disease Father    • Heart disease Sister    • Heart disease Sister        Review of Systems   Constitutional: Positive for activity change (soa quick), appetite change (decreased), diaphoresis and fatigue (tired all the time).   HENT: Positive for rhinorrhea (with eating).         Trouble swollowing has been evaluated   Eyes: Positive for visual disturbance (glasses).   Respiratory: Positive for chest tightness and shortness of breath (with minimal activity).    Cardiovascular: Positive for chest pain and leg swelling.   Gastrointestinal: Positive for nausea.   Endocrine: Positive for polyuria.   Genitourinary: Positive for dysuria and frequency.   Musculoskeletal: Positive for gait problem and myalgias (rle).   Skin: Negative.    Neurological: Positive for dizziness (get up to quick) and syncope (a couple of months ago).   Hematological: Negative.  Does not bruise/bleed easily.   Psychiatric/Behavioral: Positive for sleep disturbance (can't sleep).       Objective     VITALS: /69 (BP Location: Left arm, Patient Position: Sitting)   Pulse 71   Ht 170.2 cm (67\")   Wt 94.2 kg (207 lb 9.6 oz)   SpO2 97%   BMI 32.51 kg/m²     LABS:   Lab Results (most recent)     None          IMAGING:   No Images in the past 120 days found..    EXAM:  Physical Exam   Constitutional: He is oriented to person, place, and time. Vital signs are normal. He " appears well-developed and well-nourished.   HENT:   Head: Normocephalic and atraumatic.   Eyes: EOM are normal. Pupils are equal, round, and reactive to light.   Neck: Neck supple. No JVD present. Carotid bruit is not present.   Cardiovascular: Normal rate, regular rhythm, normal heart sounds and intact distal pulses. Exam reveals no gallop.   No murmur heard.  Pulses:       Carotid pulses are 2+ on the right side, and 2+ on the left side.       Radial pulses are 2+ on the right side, and 2+ on the left side.        Posterior tibial pulses are 2+ on the right side, and 2+ on the left side.   Pulmonary/Chest: Effort normal. No respiratory distress. He has decreased breath sounds in the right upper field, the right middle field, the right lower field, the left upper field, the left middle field and the left lower field. He has rhonchi in the right upper field, the right middle field, the right lower field, the left upper field, the left middle field and the left lower field.   Abdominal: Soft. Bowel sounds are normal. He exhibits no distension. There is no tenderness.   Musculoskeletal: Normal range of motion. He exhibits no edema.        Legs:  Neurological: He is alert and oriented to person, place, and time. He has normal strength. No cranial nerve deficit or sensory deficit.   Skin: Skin is warm, dry and intact.   Psychiatric: He has a normal mood and affect. His speech is normal and behavior is normal. Judgment and thought content normal. Cognition and memory are normal.   Nursing note and vitals reviewed.      Procedure     ECG 12 Lead  Date/Time: 11/8/2019 2:31 PM  Performed by: Devon Kwan APRN  Authorized by: Devon Kwan APRN   Comparison: not compared with previous ECG   Previous ECG: no previous ECG available  Rhythm: sinus rhythm  Rate: normal  BPM: 69  QRS axis: normal    Clinical impression: normal ECG               Assessment/Plan    Diagnosis Plan   1. Chronic congestive heart failure,  unspecified heart failure type (CMS/HCC)  Adult Transthoracic Echo Complete W/ Cont if Necessary Per Protocol    Stress Test With Myocardial Perfusion One Day    Duplex Lower Extremity Art / Grafts - Left CAR    Cardiac Event Monitor    CBC & Differential    Basic Metabolic Panel   2. Palpitations  Adult Transthoracic Echo Complete W/ Cont if Necessary Per Protocol    Stress Test With Myocardial Perfusion One Day    Duplex Lower Extremity Art / Grafts - Left CAR    Cardiac Event Monitor    CBC & Differential    TSH    Basic Metabolic Panel    Magnesium   3. PVD (peripheral vascular disease) with claudication (CMS/HCC)  Adult Transthoracic Echo Complete W/ Cont if Necessary Per Protocol    Stress Test With Myocardial Perfusion One Day    Duplex Lower Extremity Art / Grafts - Left CAR    Cardiac Event Monitor    CBC & Differential    Basic Metabolic Panel   4. Chest pain, atypical  Adult Transthoracic Echo Complete W/ Cont if Necessary Per Protocol    Stress Test With Myocardial Perfusion One Day    Duplex Lower Extremity Art / Grafts - Left CAR    Cardiac Event Monitor    CBC & Differential    Basic Metabolic Panel    nitroglycerin (NITROSTAT) 0.4 MG SL tablet   5. Shortness of breath  Adult Transthoracic Echo Complete W/ Cont if Necessary Per Protocol    Stress Test With Myocardial Perfusion One Day    Duplex Lower Extremity Art / Grafts - Left CAR    Cardiac Event Monitor    CBC & Differential    Basic Metabolic Panel   6. Fatigue, unspecified type  Adult Transthoracic Echo Complete W/ Cont if Necessary Per Protocol    Stress Test With Myocardial Perfusion One Day    Duplex Lower Extremity Art / Grafts - Left CAR    Cardiac Event Monitor    CBC & Differential    TSH    Basic Metabolic Panel   7. Mixed hyperlipidemia  Lipid Panel   1.  Patient has a history of congestive heart failure with lost to follow-up for multiple years.  Patient is having fatigue and shortness of air and right lower extremity edema.  Also has  experienced some some chest pain/chest tightness that is occurring frequently.  And due to his known history of coronary artery disease which required coronary artery bypass grafting approximately 8 years ago I feel is reasonable to evaluate for further ischemic causes of his current symptoms with a stress test and echocardiogram.  Informed of the test and wishes to proceed with the procedures.  2.  Patient's describing anginal type chest pain will prescribe sublingual nitroglycerin.  Patient informed to take nitroglycerin 5 minutes apart for a total of 3 doses and if last dose of nitroglycerin does not relieve the pain to go to the emergency department immediately.  3.  She has a history of chronic lung disease which she is already is scheduled to see pulmonology for on 11/12/2019.  Also informed patient to request evaluation for sleep apnea.  4.  Due to patient's complaints that are consistent with claudication of the right lower extremity.  I think it is appropriate to evaluate an arterial Doppler of the right lower extremity.  5.  Due to the severity of symptoms and frequency of his palpitations I think it reasonable to order patient cardiac event monitor to help determine the actual etiology of the palpitations and to help better treat.  6.  Operatory values will be ordered to help determine if there is any electrolyte his balance is, thyroid disease, and lipid panel for further evaluation and treatment.  7.  Patient informed the signs and symptoms of ACS and advised to seek emergent treatment for any new or worsening symptoms.  Patient also advised for sooner follow-up as needed.    Return in about 3 months (around 2/7/2020), or if symptoms worsen or fail to improve.    Reg was seen today for congestive heart failure.    Diagnoses and all orders for this visit:    Chronic congestive heart failure, unspecified heart failure type (CMS/HCC)  -     Adult Transthoracic Echo Complete W/ Cont if Necessary Per  Protocol; Future  -     Stress Test With Myocardial Perfusion One Day; Future  -     Duplex Lower Extremity Art / Grafts - Left CAR; Future  -     Cardiac Event Monitor; Future  -     CBC & Differential; Future  -     Basic Metabolic Panel; Future    Palpitations  -     Adult Transthoracic Echo Complete W/ Cont if Necessary Per Protocol; Future  -     Stress Test With Myocardial Perfusion One Day; Future  -     Duplex Lower Extremity Art / Grafts - Left CAR; Future  -     Cardiac Event Monitor; Future  -     CBC & Differential; Future  -     TSH; Future  -     Basic Metabolic Panel; Future  -     Magnesium; Future    PVD (peripheral vascular disease) with claudication (CMS/HCC)  -     Adult Transthoracic Echo Complete W/ Cont if Necessary Per Protocol; Future  -     Stress Test With Myocardial Perfusion One Day; Future  -     Duplex Lower Extremity Art / Grafts - Left CAR; Future  -     Cardiac Event Monitor; Future  -     CBC & Differential; Future  -     Basic Metabolic Panel; Future    Chest pain, atypical  -     Adult Transthoracic Echo Complete W/ Cont if Necessary Per Protocol; Future  -     Stress Test With Myocardial Perfusion One Day; Future  -     Duplex Lower Extremity Art / Grafts - Left CAR; Future  -     Cardiac Event Monitor; Future  -     CBC & Differential; Future  -     Basic Metabolic Panel; Future  -     nitroglycerin (NITROSTAT) 0.4 MG SL tablet; 1 under the tongue as needed for angina, may repeat q5mins for up three doses    Shortness of breath  -     Adult Transthoracic Echo Complete W/ Cont if Necessary Per Protocol; Future  -     Stress Test With Myocardial Perfusion One Day; Future  -     Duplex Lower Extremity Art / Grafts - Left CAR; Future  -     Cardiac Event Monitor; Future  -     CBC & Differential; Future  -     Basic Metabolic Panel; Future    Fatigue, unspecified type  -     Adult Transthoracic Echo Complete W/ Cont if Necessary Per Protocol; Future  -     Stress Test With  Myocardial Perfusion One Day; Future  -     Duplex Lower Extremity Art / Grafts - Left CAR; Future  -     Cardiac Event Monitor; Future  -     CBC & Differential; Future  -     TSH; Future  -     Basic Metabolic Panel; Future    Mixed hyperlipidemia  -     Lipid Panel; Future        Reg Sanchez is a current cigarettes user.  He currently smokes 1 pack of cigarettes and packs of cigarettes per day for a duration of 0.5 years. I have educated him on the risk of diseases from using tobacco products such as cancer, COPD and heart diease.     I advised him to quit and he is not willing to quit.    I spent 3  minutes counseling the patient.          Patient's Body mass index is 32.51 kg/m². BMI is above normal parameters. Recommendations include: educational material.           MEDS ORDERED DURING VISIT:  New Medications Ordered This Visit   Medications   • nitroglycerin (NITROSTAT) 0.4 MG SL tablet     Si under the tongue as needed for angina, may repeat q5mins for up three doses     Dispense:  30 tablet     Refill:  5           This document has been electronically signed by Devon Kwan Jr., APRN  2019 2:26 PM

## 2019-11-12 ENCOUNTER — RESULTS ENCOUNTER (OUTPATIENT)
Dept: CARDIOLOGY | Facility: CLINIC | Age: 54
End: 2019-11-12

## 2019-11-12 DIAGNOSIS — R00.2 PALPITATIONS: ICD-10-CM

## 2019-12-07 ENCOUNTER — RESULTS ENCOUNTER (OUTPATIENT)
Dept: CARDIOLOGY | Facility: CLINIC | Age: 54
End: 2019-12-07

## 2019-12-07 DIAGNOSIS — R06.02 SHORTNESS OF BREATH: ICD-10-CM

## 2019-12-07 DIAGNOSIS — I73.9 PVD (PERIPHERAL VASCULAR DISEASE) WITH CLAUDICATION (HCC): ICD-10-CM

## 2019-12-07 DIAGNOSIS — I50.9 CHRONIC CONGESTIVE HEART FAILURE, UNSPECIFIED HEART FAILURE TYPE (HCC): ICD-10-CM

## 2019-12-07 DIAGNOSIS — E78.2 MIXED HYPERLIPIDEMIA: ICD-10-CM

## 2019-12-07 DIAGNOSIS — R53.83 FATIGUE, UNSPECIFIED TYPE: ICD-10-CM

## 2019-12-07 DIAGNOSIS — R07.89 CHEST PAIN, ATYPICAL: ICD-10-CM

## 2019-12-07 DIAGNOSIS — R00.2 PALPITATIONS: ICD-10-CM

## 2020-02-13 ENCOUNTER — OFFICE VISIT (OUTPATIENT)
Dept: CARDIOLOGY | Facility: CLINIC | Age: 55
End: 2020-02-13

## 2020-02-13 VITALS
OXYGEN SATURATION: 95 % | BODY MASS INDEX: 34.06 KG/M2 | WEIGHT: 217 LBS | SYSTOLIC BLOOD PRESSURE: 154 MMHG | DIASTOLIC BLOOD PRESSURE: 82 MMHG | HEIGHT: 67 IN | HEART RATE: 76 BPM

## 2020-02-13 DIAGNOSIS — R00.2 PALPITATIONS: ICD-10-CM

## 2020-02-13 DIAGNOSIS — E78.2 MIXED HYPERLIPIDEMIA: ICD-10-CM

## 2020-02-13 DIAGNOSIS — Z72.0 TOBACCO USE: ICD-10-CM

## 2020-02-13 DIAGNOSIS — I73.9 PVD (PERIPHERAL VASCULAR DISEASE) WITH CLAUDICATION (HCC): ICD-10-CM

## 2020-02-13 DIAGNOSIS — I50.9 CHRONIC CONGESTIVE HEART FAILURE, UNSPECIFIED HEART FAILURE TYPE (HCC): Primary | ICD-10-CM

## 2020-02-13 DIAGNOSIS — L03.115 CELLULITIS OF LEG, RIGHT: ICD-10-CM

## 2020-02-13 DIAGNOSIS — R06.02 SHORTNESS OF BREATH: ICD-10-CM

## 2020-02-13 DIAGNOSIS — R53.83 FATIGUE, UNSPECIFIED TYPE: ICD-10-CM

## 2020-02-13 DIAGNOSIS — R07.89 CHEST PAIN, ATYPICAL: ICD-10-CM

## 2020-02-13 PROCEDURE — 99214 OFFICE O/P EST MOD 30 MIN: CPT | Performed by: NURSE PRACTITIONER

## 2020-02-13 RX ORDER — LISINOPRIL 10 MG/1
10 TABLET ORAL DAILY
COMMUNITY

## 2020-02-13 RX ORDER — OMEPRAZOLE 40 MG/1
40 CAPSULE, DELAYED RELEASE ORAL DAILY
Status: ON HOLD | COMMUNITY
End: 2021-01-20

## 2020-02-13 RX ORDER — CEPHALEXIN 500 MG/1
500 CAPSULE ORAL 2 TIMES DAILY
Qty: 20 CAPSULE | Refills: 0 | Status: SHIPPED | OUTPATIENT
Start: 2020-02-13 | End: 2020-10-27

## 2020-02-13 NOTE — PATIENT INSTRUCTIONS
Steps to Quit Smoking    Smoking tobacco can be bad for your health. It can also affect almost every organ in your body. Smoking puts you and people around you at risk for many serious long-lasting (chronic) diseases. Quitting smoking is hard, but it is one of the best things that you can do for your health. It is never too late to quit.  What are the benefits of quitting smoking?  When you quit smoking, you lower your risk for getting serious diseases and conditions. They can include:  · Lung cancer or lung disease.  · Heart disease.  · Stroke.  · Heart attack.  · Not being able to have children (infertility).  · Weak bones (osteoporosis) and broken bones (fractures).  If you have coughing, wheezing, and shortness of breath, those symptoms may get better when you quit. You may also get sick less often. If you are pregnant, quitting smoking can help to lower your chances of having a baby of low birth weight.  What can I do to help me quit smoking?  Talk with your doctor about what can help you quit smoking. Some things you can do (strategies) include:  · Quitting smoking totally, instead of slowly cutting back how much you smoke over a period of time.  · Going to in-person counseling. You are more likely to quit if you go to many counseling sessions.  · Using resources and support systems, such as:  ? Online chats with a counselor.  ? Phone quitlines.  ? Printed self-help materials.  ? Support groups or group counseling.  ? Text messaging programs.  ? Mobile phone apps or applications.  · Taking medicines. Some of these medicines may have nicotine in them. If you are pregnant or breastfeeding, do not take any medicines to quit smoking unless your doctor says it is okay. Talk with your doctor about counseling or other things that can help you.  Talk with your doctor about using more than one strategy at the same time, such as taking medicines while you are also going to in-person counseling. This can help make  quitting easier.  What things can I do to make it easier to quit?  Quitting smoking might feel very hard at first, but there is a lot that you can do to make it easier. Take these steps:  · Talk to your family and friends. Ask them to support and encourage you.  · Call phone quitlines, reach out to support groups, or work with a counselor.  · Ask people who smoke to not smoke around you.  · Avoid places that make you want (trigger) to smoke, such as:  ? Bars.  ? Parties.  ? Smoke-break areas at work.  · Spend time with people who do not smoke.  · Lower the stress in your life. Stress can make you want to smoke. Try these things to help your stress:  ? Getting regular exercise.  ? Deep-breathing exercises.  ? Yoga.  ? Meditating.  ? Doing a body scan. To do this, close your eyes, focus on one area of your body at a time from head to toe, and notice which parts of your body are tense. Try to relax the muscles in those areas.  · Download or buy apps on your mobile phone or tablet that can help you stick to your quit plan. There are many free apps, such as QuitGuide from the CDC (Centers for Disease Control and Prevention). You can find more support from smokefree.gov and other websites.  This information is not intended to replace advice given to you by your health care provider. Make sure you discuss any questions you have with your health care provider.  Document Released: 10/14/2010 Document Revised: 08/15/2017 Document Reviewed: 05/03/2016  [employer] Interactive Patient Education © 2019 [employer]    Acute Coronary Syndrome    Acute coronary syndrome (ACS) is a serious problem in which there is suddenly not enough blood and oxygen reaching the heart. ACS can result in chest pain or a heart attack.  This condition is a medical emergency. If you have any symptoms of this condition, get help right away.  What are the causes?  This condition may be caused by:  · Buildup of fat and cholesterol inside of the arteries  (atherosclerosis). This is the most common cause. The buildup (plaque) can cause blood vessels in the heart (coronary arteries) to become narrow or blocked, which reduces blood flow to the heart. Plaque can also break off and lead to a clot, which can block an artery and cause a heart attack or stroke.  · Sudden tightening of the muscles around the coronary arteries (coronary spasm).  · Tearing of a coronary artery (spontaneous coronary artery dissection).  · Very low blood pressure (hypotension).  · An abnormal heartbeat (arrhythmia).  · Other medical conditions that cause a decrease of oxygen to the heart, such as anemiaorrespiratory failure.  · Using cocaine or methamphetamine.  What increases the risk?  The following factors may make you more likely to develop this condition:  · Age. The risk for ACS increases as you get older.  · History of chest pain, heart attack, peripheral artery disease, or stroke.  · Having taken chemotherapy or immune-suppressing medicines.  · Being male.  · Family history of chest pain, heart disease, or stroke.  · Smoking.  · Not exercising enough.  · Being overweight.  · High cholesterol.  · High blood pressure (hypertension).  · Diabetes.  · Excessive alcohol use.  What are the signs or symptoms?  Common symptoms of this condition include:  · Chest pain. The pain may last a long time, or it may stop and come back (recur). It may feel like:  ? Crushing or squeezing.  ? Tightness, pressure, fullness, or heaviness.  · Arm, neck, jaw, or back pain.  · Heartburn or indigestion.  · Shortness of breath.  · Nausea.  · Sudden cold sweats.  · Light-headedness.  · Dizziness, or passing out.  · Tiredness (fatigue).  Sometimes there are no symptoms.  How is this diagnosed?  This condition may be diagnosed based on:  · Your medical history and symptoms.  · An electrocardiogram (ECG). This imaging test measures the heart's electrical activity.  · Blood tests. Cardiac blood tests may need to be  repeated at designated time intervals.  · Chest X-ray.  · A CT scan of the chest.  · A coronary angiogram. This is a procedure in which dye is injected into the bloodstream and then X-rays are taken to show if there is a blockage in a coronary artery.  · Exercise stress testing.  · Echocardiography. This is a test that uses sound waves to produce detailed images of the heart.  How is this treated?  The treatment is to restore blood flow to the heart as soon as possible. Treatment for this condition may include:  · Oxygen therapy.  · Medicines, such as:  ? Antiplatelet medicines and blood-thinning medicines, such as aspirin. These help prevent blood clots.  ? Medicine that dissolves any blood clots (fibrinolytic therapy).  ? Blood pressure medicines.  ? Nitroglycerin. This helps relieve chest pain and widens blood vessels to improve blood flow.  ? Pain medicine.  ? Cholesterol-lowering medicine.  · Surgery, such as:  ? Coronary angioplasty with stent placement. This involves placing a small piece of metal that looks like mesh or a spring into a narrow coronary artery. This widens the artery and keep it open.  ? Coronary artery bypass surgery. This involves taking a section of a blood vessel from a different part of your body, and placing it on the blocked coronary artery to allow blood to flow around (bypass) the blockage.  · Cardiac rehabilitation. This is a program that helps improve your health and well-being. It includes exercise training, education, and counseling to help you recover.  Follow these instructions at home:  Eating and drinking  · Eat a heart-healthy diet that includes whole grains, fruits and vegetables, lean proteins, and low-fat or nonfat dairy products.  · Limit how much salt (sodium) you eat as told by your health care provider. Follow instructions from your health care provider about any other eating or drinking restrictions, such as limiting foods that are high in fat and processed  sugars.  · Use healthy cooking methods such as roasting, grilling, broiling, baking, poaching, steaming, or stir-frying.  · Talk with a dietitian to learn about healthy cooking methods and how to eat less sodium.  Medicines  · Take over-the-counter and prescription medicines only as told by your health care provider.  · Do not take these medicines unless your health care provider approves:  ? Vitamin supplements that contain vitamin A or vitamin E.  ? Nonsteroidal anti-inflammatory drugs (NSAIDs), such as ibuprofen, naproxen, or celecoxib.  ? Hormone replacement therapy that contains estrogen.  If you are taking blood thinners:  · Talk with your health care provider before you take any medicines that contain aspirin or NSAIDs. These medicines increase your risk for dangerous bleeding.  · Take your medicine exactly as told, at the same time every day.  · Avoid activities that could cause injury or bruising, and follow instructions about how to prevent falls.  · Wear a medical alert bracelet, and carry a card that lists what medicines you take.  Activity  · Join a cardiac rehabilitation program. An exercise plan will be developed for you.  · Ask your health care provider:  ? What activities and exercises are safe for you.  ? If you should follow specific instructions about lifting, driving, or climbing stairs.  Lifestyle  · Do not use any products that contain nicotine or tobacco, such as cigarettes and e-cigarettes. If you need help quitting, ask your health care provider.  · If your health care provider says that alcohol is safe for you, limit your alcohol intake to no more than 1 drink a day. One drink equals 12 oz of beer, 5 oz of wine, or 1½ oz of hard liquor.  · Maintain a healthy weight. If you need to lose weight, work with your health care provider to do so safely.  General instructions  · Tell all the health care providers who care for you about your heart condition, including your dentist. This may affect  the medicines or treatment you receive.  · Manage any other health conditions you have, such as hypertension or diabetes. These conditions affect your heart.  · Learn ways to manage stress.  · Get screened for depression, and get mental health treatment if you need it. People with ACS are at higher risk for depression.  · Keep your vaccinations up to date. Get the flu shot (influenza vaccine) every year.  · If directed, monitor your blood pressure at home.  · Keep all follow-up visits as told by your health care provider. This is important.  Contact a health care provider if:  · You feel overwhelmed or sad.  · You have trouble doing your daily activities.  Get help right away if:  · You have pain in your chest, neck, arm, jaw, stomach, or back that recurs, and:  ? It lasts for more than a few minutes.  ? It is not relieved by taking the medicineyour health care provider prescribed.  · You have unexplained:  ? Heavy sweating.  ? Heartburn or indigestion.  ? Nausea or vomiting.  ? Shortness of breath.  ? Difficulty breathing.  ? Fatigue.  ? Nervousness or anxiety.  ? Weakness.  ? Diarrhea.  ? Dark stools or blood in your stool.  · You have sudden light-headedness or dizziness.  · Your blood pressure is higher than 180/120.  · You faint.  · You have thoughts about hurting yourself.  These symptoms may represent a serious problem that is an emergency. Do not wait to see if the symptoms will go away. Get medical help right away. Call your local emergency services (421 in the [place] Do not drive yourself to the hospital.   If you ever feel like you may hurt yourself or others, or have thoughts about taking your own life, get help right away. You can go to your nearest emergency department or call:  · Emergency services (134 in the [place]  · A suicide crisis helpline, such as the National Suicide Prevention Lifeline at 1-543.486.7858. This is open 24 hours a day.  Summary  · Acute coronary syndrome (ACS) is when there is  not enough blood and oxygen being supplied to the heart. ACS can result in chest pain or a heart attack.  · Acute coronary syndrome is a medical emergency. If you have any symptoms of this condition, get help right away.  · Treatment includes medicines and procedures to open the blocked arteries and restore blood flow.  This information is not intended to replace advice given to you by your health care provider. Make sure you discuss any questions you have with your health care provider.  Document Released: 12/18/2006 Document Revised: 08/28/2018 Document Reviewed: 08/28/2018  [employer] Patient Education © 2019 [employer]

## 2020-02-13 NOTE — PROGRESS NOTES
Subjective     Reg Sanchez is a 54 y.o. male who presents to day for Congestive Heart Failure (follow up).    CHIEF COMPLIANT  Chief Complaint   Patient presents with   • Congestive Heart Failure     follow up       Active Problems:  Problem List Items Addressed This Visit     None      Visit Diagnoses     Chronic congestive heart failure, unspecified heart failure type (CMS/HCC)    -  Primary    Relevant Orders    Adult Transthoracic Echo Complete W/ Cont if Necessary Per Protocol    Stress Test With Myocardial Perfusion One Day    Palpitations        Relevant Orders    Adult Transthoracic Echo Complete W/ Cont if Necessary Per Protocol    Stress Test With Myocardial Perfusion One Day    PVD (peripheral vascular disease) with claudication (CMS/HCC)        Relevant Orders    Stress Test With Myocardial Perfusion One Day    Duplex Lower Extremity Art / Grafts - Bilateral CAR    Chest pain, atypical        Relevant Orders    Adult Transthoracic Echo Complete W/ Cont if Necessary Per Protocol    Stress Test With Myocardial Perfusion One Day    Shortness of breath        Relevant Orders    Adult Transthoracic Echo Complete W/ Cont if Necessary Per Protocol    Stress Test With Myocardial Perfusion One Day    Fatigue, unspecified type        Relevant Orders    Adult Transthoracic Echo Complete W/ Cont if Necessary Per Protocol    Stress Test With Myocardial Perfusion One Day    Mixed hyperlipidemia        Relevant Orders    Adult Transthoracic Echo Complete W/ Cont if Necessary Per Protocol    Stress Test With Myocardial Perfusion One Day    Cellulitis of leg, right        Relevant Medications    cephalexin (KEFLEX) 500 MG capsule    Tobacco use        Relevant Medications    varenicline (CHANTIX STARTING MONTH GREGG) 0.5 MG X 11 & 1 MG X 42 tablet          HPI  HPI  Mr. Sanchez is a 54-year-old male who is being followed up today for congestive heart failure.  Patient previously complained of frequent chest pain but now states  that chest pain has significantly decreased.  He is also reports no significant change in his shortness of breath and only occurs with exertion.  Does still report associated symptoms of nausea and shortness of air with his chest pain that is intermittent, and sharp in nature.  Activity does seem to make worse.  Potation's have also significantly resided in which she verbalized he has not had any more.  He still complains of dizziness and lightheadedness that occurs more frequently even with walking not just with position changes.  Also complains of chronic fatigue with no significant changes.  He complains of bilateral lower extremity edema and pain.  He also has a 6 mm circular wound on the right lower leg approximately mid shin that is surrounded by erythema.  Margarito with patient in regards to previous stress and echo not being completed.  Patient states that he is having insurance issues and has it all straightened out now and wishes to proceed.  Patient also reports a degree of erectile dysfunction.  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  Patient states that he has less trouble of it than he does the right but it still bothers him to some degree.  Patient denies any chest pain, PND, orthopnea, palpitations, syncope, or neurological changes.  PRIOR MEDS  Current Outpatient Medications on File Prior to Visit   Medication Sig Dispense Refill   • albuterol (PROVENTIL) (2.5 MG/3ML) 0.083% nebulizer solution Take 2.5 mg by nebulization Every 4 (Four) Hours As Needed for Wheezing.     • albuterol sulfate  (90 Base) MCG/ACT inhaler Inhale 2 puffs Every 4 (Four) Hours As Needed for Wheezing.     • aspirin 81 MG chewable tablet Chew 81 mg Daily.     • atorvastatin (LIPITOR) 40 MG tablet Take 40 mg by mouth Daily.     • budesonide-formoterol (SYMBICORT) 160-4.5 MCG/ACT inhaler Inhale 2 puffs 2 (Two) Times a Day.     • clopidogrel (PLAVIX) 75 MG tablet Take 75 mg by mouth Daily.     •  DULoxetine (CYMBALTA) 60 MG capsule Take 60 mg by mouth 2 (Two) Times a Day.     • Insulin Glargine (BASAGLAR KWIKPEN SC) Inject 20 Units under the skin into the appropriate area as directed Daily.     • lisinopril (PRINIVIL,ZESTRIL) 10 MG tablet Take 10 mg by mouth Daily.     • meloxicam (MOBIC) 15 MG tablet Take 15 mg by mouth Daily.     • metFORMIN (GLUCOPHAGE) 1000 MG tablet Take 1,000 mg by mouth 2 (Two) Times a Day With Meals.     • metoprolol tartrate (LOPRESSOR) 50 MG tablet Take 50 mg by mouth 2 (Two) Times a Day.     • nitroglycerin (NITROSTAT) 0.4 MG SL tablet 1 under the tongue as needed for angina, may repeat q5mins for up three doses 30 tablet 5   • omeprazole (priLOSEC) 40 MG capsule Take 40 mg by mouth Daily.     • pregabalin (LYRICA) 150 MG capsule Take 150 mg by mouth 3 (Three) Times a Day.     • rOPINIRole (REQUIP) 0.25 MG tablet Take 0.25 mg by mouth Every Night.     • mirtazapine (REMERON) 15 MG tablet Take 15 mg by mouth Every Night.     • ramelteon (ROZEREM) 8 MG tablet Take 8 mg by mouth Every Night.       No current facility-administered medications on file prior to visit.        ALLERGIES  Patient has no known allergies.    HISTORY  Past Medical History:   Diagnosis Date   • Anxiety    • CHF (congestive heart failure) (CMS/[facility])    • COPD (chronic obstructive pulmonary disease) (CMS/[facility])    • Diabetes mellitus (CMS/[facility])    • Hepatitis C    • Hyperlipidemia    • Hypertension    • Sleep apnea        Social History     Socioeconomic History   • Marital status: Legally      Spouse name: Not on file   • Number of children: Not on file   • Years of education: Not on file   • Highest education level: Not on file   Tobacco Use   • Smoking status: Current Every Day Smoker   • Smokeless tobacco: Never Used   Substance and Sexual Activity   • Alcohol use: Yes   • Drug use: No   • Sexual activity: Defer       Family History   Problem Relation Age of Onset   • Heart disease Mother    • Anuerysm  "Father    • Heart disease Father    • Heart disease Sister    • Heart disease Sister        Review of Systems   Constitutional: Positive for fatigue. Negative for chills and fever.   HENT: Positive for congestion.    Eyes: Positive for visual disturbance (glasses).   Respiratory: Positive for shortness of breath. Negative for chest tightness.    Cardiovascular: Positive for leg swelling. Negative for chest pain and palpitations.   Gastrointestinal: Negative.  Negative for abdominal pain, blood in stool, constipation, diarrhea, nausea and vomiting.   Endocrine: Negative.  Negative for cold intolerance and heat intolerance.   Genitourinary: Negative.  Negative for dysuria, frequency, hematuria and urgency.   Musculoskeletal: Positive for back pain and neck pain.   Skin: Positive for wound.   Allergic/Immunologic: Negative.  Negative for environmental allergies and food allergies.   Neurological: Positive for dizziness and light-headedness. Negative for syncope.   Hematological: Bruises/bleeds easily (bruises).   Psychiatric/Behavioral: Positive for sleep disturbance (wakes with soa and cp).       Objective     VITALS: /82 (BP Location: Left arm, Patient Position: Sitting)   Pulse 76   Ht 170.2 cm (67\")   Wt 98.4 kg (217 lb)   SpO2 95%   BMI 33.99 kg/m²     LABS:   Lab Results (most recent)     None          IMAGING:   No Images in the past 120 days found..    EXAM:  Physical Exam   Constitutional: He is oriented to person, place, and time. He appears well-developed and well-nourished.   HENT:   Head: Normocephalic and atraumatic.   Eyes: Pupils are equal, round, and reactive to light. EOM are normal.   Neck: Neck supple. No JVD present. Carotid bruit is not present.   Cardiovascular: Normal rate, regular rhythm, normal heart sounds and intact distal pulses. Exam reveals no gallop.   No murmur heard.  Pulses:       Carotid pulses are 2+ on the right side, and 2+ on the left side.       Radial pulses are 2+ " on the right side, and 2+ on the left side.        Posterior tibial pulses are 2+ on the right side.   Pulmonary/Chest: Effort normal. No respiratory distress. He has decreased breath sounds. He has rhonchi in the right middle field and the right lower field.   Abdominal: Soft. Bowel sounds are normal. He exhibits no distension. There is no tenderness.   Musculoskeletal: Normal range of motion. He exhibits edema (LBKA, 2+ RLE).   Neurological: He is alert and oriented to person, place, and time. He has normal strength. No cranial nerve deficit or sensory deficit.   Skin: Skin is warm, dry and intact.        Psychiatric: He has a normal mood and affect. His speech is normal and behavior is normal. Judgment and thought content normal. Cognition and memory are normal.   Nursing note and vitals reviewed.      Procedure   Procedures       Assessment/Plan    Diagnosis Plan   1. Chronic congestive heart failure, unspecified heart failure type (CMS/HCC)  Adult Transthoracic Echo Complete W/ Cont if Necessary Per Protocol    Stress Test With Myocardial Perfusion One Day   2. Palpitations  Adult Transthoracic Echo Complete W/ Cont if Necessary Per Protocol    Stress Test With Myocardial Perfusion One Day   3. PVD (peripheral vascular disease) with claudication (CMS/HCC)  Stress Test With Myocardial Perfusion One Day    Duplex Lower Extremity Art / Grafts - Bilateral CAR   4. Chest pain, atypical  Adult Transthoracic Echo Complete W/ Cont if Necessary Per Protocol    Stress Test With Myocardial Perfusion One Day   5. Shortness of breath  Adult Transthoracic Echo Complete W/ Cont if Necessary Per Protocol    Stress Test With Myocardial Perfusion One Day   6. Fatigue, unspecified type  Adult Transthoracic Echo Complete W/ Cont if Necessary Per Protocol    Stress Test With Myocardial Perfusion One Day   7. Mixed hyperlipidemia  Adult Transthoracic Echo Complete W/ Cont if Necessary Per Protocol    Stress Test With Myocardial  Perfusion One Day   8. Cellulitis of leg, right  cephalexin (KEFLEX) 500 MG capsule   9. Tobacco use  varenicline (CHANTIX STARTING MONTH PAK) 0.5 MG X 11 & 1 MG X 42 tablet   1.  Patient does have chronic just of heart failure which is self-reported by patient who is recently relocated here.  Old medical records are unable to be obtained.  We will try to obtain these medical records.  Patient does not have any gross edema or crackles in his lungs which leads me to think that his congestive heart failure is relatively stable.  2.  Due to patient's previous reported chest pain, shortness of air, fatigue, and known history of hypertension, hyperlipidemia, diabetes mellitus, and obstructive sleep apnea as well and has a strong family history of coronary artery disease before the age of 60 and his mother I feel it is appropriate to reorder stress and echocardiogram for further evaluation of ischemia pelvic splaying patient symptoms.  Patient also has known PVD with left BKA.  3.  Patient does have known PVD in which led to amputation of his left lower extremity below the knee.  Patient still complains of symptoms and has unhealing wound on the right lower extremity.  I will get a arterial Doppler of bilateral lower extremities to rule out any further PVD.  If no nephric and PVD remains will refer patient to the wound care center to help get the wound healed.  Due to the fact of the surrounding erythema and potential cellulitis I will start patient on Keflex 500 mg twice daily for 10 days.  4.  Patient does have a history of tobacco use and is a current user.  Patient requested Chantix.  We will ordered the starting pack and advised to follow-up with his family doctor for further management of his smoking cessation.  Patient also warned of the potential side effects of Chantix.  5.  Informed of signs and symptoms of ACS and advised to seek emergent treatment for any new worsening symptoms.  Patient also advised sooner  follow-up as needed.  Also advised to follow-up with family doctor as needed  6.  And also complained of erectile dysfunction.  Will get echocardiogram and stress test and will reevaluate after that.  Did discuss medications for erectile dysfunction with patient but wants to make sure that his heart is healthy enough for these type medications.    Received patient's operative report from [facility].  It appears as if patient had LIMA to LAD, SVG to OM 2 to circumflex, SVG to first OM side to side transverse anastomosis, and SVG to first diagonal.  Underwent left heart catheterization on 3/28/2018 which identified the LAD LAD 7080% stenosis in the proximal segment and 100% occluded in the midsegment, circumflex 70% stenosis in the proximal segment and was 100% occluded in the midsegment, RCA discrete 70% stenosis in the proximal portion of the right coronary artery and 100% occluded in the midsegment with acute marginal has moderate disease just before the 100% occluded lesion, SVG to the distal right is widely patent with patent distal RCA beyond the anastomosis, SVG to OM1 OM 2 and diagonal branch the body of the graft is widely patent the anastomosis is also patent throughout the distal vessels beyond anastomosis are extremely small suggestive of diffusely diseased arteries, left internal mammary artery to the LAD is widely patent with patent anastomosis, aortogram shows a normal caliber aorta with mild aortic regurgitation with an ejection fraction of 50 to 55%.    Return in about 6 weeks (around 3/26/2020), or if symptoms worsen or fail to improve.    Reg was seen today for congestive heart failure.    Diagnoses and all orders for this visit:    Chronic congestive heart failure, unspecified heart failure type (CMS/HCC)  -     Adult Transthoracic Echo Complete W/ Cont if Necessary Per Protocol; Future  -     Cancel: Cardiac Event Monitor; Future  -     Stress Test With Myocardial Perfusion One Day;  Future    Palpitations  -     Adult Transthoracic Echo Complete W/ Cont if Necessary Per Protocol; Future  -     Cancel: Cardiac Event Monitor; Future  -     Stress Test With Myocardial Perfusion One Day; Future    PVD (peripheral vascular disease) with claudication (CMS/HCC)  -     Cancel: Cardiac Event Monitor; Future  -     Cancel: Duplex Lower Extremity Art / Grafts - Right CAR; Future  -     Stress Test With Myocardial Perfusion One Day; Future  -     Duplex Lower Extremity Art / Grafts - Bilateral CAR; Future    Chest pain, atypical  -     Adult Transthoracic Echo Complete W/ Cont if Necessary Per Protocol; Future  -     Cancel: Cardiac Event Monitor; Future  -     Stress Test With Myocardial Perfusion One Day; Future    Shortness of breath  -     Adult Transthoracic Echo Complete W/ Cont if Necessary Per Protocol; Future  -     Cancel: Cardiac Event Monitor; Future  -     Stress Test With Myocardial Perfusion One Day; Future    Fatigue, unspecified type  -     Adult Transthoracic Echo Complete W/ Cont if Necessary Per Protocol; Future  -     Cancel: Cardiac Event Monitor; Future  -     Cancel: Duplex Lower Extremity Art / Grafts - Right CAR; Future  -     Stress Test With Myocardial Perfusion One Day; Future    Mixed hyperlipidemia  -     Adult Transthoracic Echo Complete W/ Cont if Necessary Per Protocol; Future  -     Cancel: Cardiac Event Monitor; Future  -     Cancel: Duplex Lower Extremity Art / Grafts - Right CAR; Future  -     Stress Test With Myocardial Perfusion One Day; Future    Cellulitis of leg, right  -     cephalexin (KEFLEX) 500 MG capsule; Take 1 capsule by mouth 2 (Two) Times a Day.    Tobacco use  -     varenicline (CHANTIX STARTING MONTH PAK) 0.5 MG X 11 & 1 MG X 42 tablet; Take 0.5 mg one daily on days 1-3 and and 0.5 mg twice daily on days 4-7.Then 1 mg twice daily for a total of 12 weeks.        Reg Sanchez  reports that he has been smoking. He has never used smokeless tobacco.. I have  educated him on the risk of diseases from using tobacco products such as cancer, COPD and heart diease.     I advised him to quit and he is not willing to quit.    I spent 3  minutes counseling the patient.           Patient's Body mass index is 33.99 kg/m². BMI is above normal parameters. Recommendations include: educational material.           MEDS ORDERED DURING VISIT:  New Medications Ordered This Visit   Medications   • cephalexin (KEFLEX) 500 MG capsule     Sig: Take 1 capsule by mouth 2 (Two) Times a Day.     Dispense:  20 capsule     Refill:  0   • varenicline (CHANTIX STARTING MONTH PAK) 0.5 MG X 11 & 1 MG X 42 tablet     Sig: Take 0.5 mg one daily on days 1-3 and and 0.5 mg twice daily on days 4-7.Then 1 mg twice daily for a total of 12 weeks.     Dispense:  42 tablet     Refill:  0           This document has been electronically signed by Devon Kwan Jr., APRN  February 17, 2020 7:52 AM

## 2020-03-16 ENCOUNTER — APPOINTMENT (OUTPATIENT)
Dept: CARDIOLOGY | Facility: HOSPITAL | Age: 55
End: 2020-03-16

## 2020-03-30 ENCOUNTER — TELEPHONE (OUTPATIENT)
Dept: CARDIOLOGY | Facility: CLINIC | Age: 55
End: 2020-03-30

## 2020-03-30 DIAGNOSIS — R00.2 PALPITATIONS: Primary | ICD-10-CM

## 2020-03-30 NOTE — TELEPHONE ENCOUNTER
Left patient message,name on voicemail, per DARRYL VIDAL, patient to wear monitor. Order in chart.   Pallavi Becerra LPN      ----- Message from YOUNG Alvarado sent at 3/30/2020 10:36 AM EDT -----  I would like to have the cardiac event monitor due to the patient's frequent palpitations that occur on a daily basis in which he describes as a fluttering.  ----- Message -----  From: Pallavi Becerra LPN  Sent: 3/30/2020   9:53 AM EDT  To: YOUNG Alvarado        ----- Message -----  From: Vivienne Leon  Sent: 3/26/2020   8:00 AM EDT  To: Pallavi Becerra LPN    Pt wants to know if he still needs an event monitor ordered. It looks like one was ordered but cancelled at his last visit.

## 2020-04-14 ENCOUNTER — APPOINTMENT (OUTPATIENT)
Dept: CARDIOLOGY | Facility: HOSPITAL | Age: 55
End: 2020-04-14

## 2020-10-22 ENCOUNTER — HOSPITAL ENCOUNTER (OUTPATIENT)
Dept: CARDIOLOGY | Facility: HOSPITAL | Age: 55
Discharge: HOME | End: 2020-10-22

## 2020-10-22 DIAGNOSIS — R07.89 CHEST PAIN, ATYPICAL: ICD-10-CM

## 2020-10-22 DIAGNOSIS — I73.9 PVD (PERIPHERAL VASCULAR DISEASE) WITH CLAUDICATION (HCC): ICD-10-CM

## 2020-10-22 DIAGNOSIS — E78.2 MIXED HYPERLIPIDEMIA: ICD-10-CM

## 2020-10-22 DIAGNOSIS — R00.2 PALPITATIONS: ICD-10-CM

## 2020-10-22 DIAGNOSIS — R53.83 FATIGUE, UNSPECIFIED TYPE: ICD-10-CM

## 2020-10-22 DIAGNOSIS — R06.02 SHORTNESS OF BREATH: ICD-10-CM

## 2020-10-22 DIAGNOSIS — I50.9 CHRONIC CONGESTIVE HEART FAILURE, UNSPECIFIED HEART FAILURE TYPE (HCC): ICD-10-CM

## 2020-10-22 LAB
BH CV NUCLEAR PRIOR STUDY: 3
BH CV STRESS BP STAGE 1: NORMAL
BH CV STRESS COMMENTS STAGE 1: NORMAL
BH CV STRESS DOSE REGADENOSON STAGE 1: 0.4
BH CV STRESS DURATION MIN STAGE 1: 0
BH CV STRESS DURATION SEC STAGE 1: 10
BH CV STRESS HR STAGE 1: 81
BH CV STRESS PROTOCOL 1: NORMAL
BH CV STRESS RECOVERY BP: NORMAL MMHG
BH CV STRESS RECOVERY HR: 80 BPM
BH CV STRESS STAGE 1: 1
MAXIMAL PREDICTED HEART RATE: 166 BPM
PERCENT MAX PREDICTED HR: 48.8 %
STRESS BASELINE BP: NORMAL MMHG
STRESS BASELINE HR: 73 BPM
STRESS PERCENT HR: 57 %
STRESS POST PEAK BP: NORMAL MMHG
STRESS POST PEAK HR: 81 BPM
STRESS TARGET HR: 141 BPM

## 2020-10-22 PROCEDURE — 25010000002 REGADENOSON 0.4 MG/5ML SOLUTION: Performed by: INTERNAL MEDICINE

## 2020-10-22 PROCEDURE — 93016 CV STRESS TEST SUPVJ ONLY: CPT | Performed by: NURSE PRACTITIONER

## 2020-10-22 PROCEDURE — 93017 CV STRESS TEST TRACING ONLY: CPT

## 2020-10-22 PROCEDURE — A9500 TC99M SESTAMIBI: HCPCS | Performed by: INTERNAL MEDICINE

## 2020-10-22 PROCEDURE — 0 TECHNETIUM SESTAMIBI: Performed by: INTERNAL MEDICINE

## 2020-10-22 PROCEDURE — 93925 LOWER EXTREMITY STUDY: CPT | Performed by: INTERNAL MEDICINE

## 2020-10-22 PROCEDURE — 78452 HT MUSCLE IMAGE SPECT MULT: CPT | Performed by: INTERNAL MEDICINE

## 2020-10-22 PROCEDURE — 78452 HT MUSCLE IMAGE SPECT MULT: CPT

## 2020-10-22 PROCEDURE — 93925 LOWER EXTREMITY STUDY: CPT

## 2020-10-22 PROCEDURE — 93018 CV STRESS TEST I&R ONLY: CPT | Performed by: INTERNAL MEDICINE

## 2020-10-22 RX ADMIN — REGADENOSON 0.4 MG: 0.08 INJECTION, SOLUTION INTRAVENOUS at 12:00

## 2020-10-22 RX ADMIN — TECHNETIUM TC 99M SESTAMIBI 1 DOSE: 1 INJECTION INTRAVENOUS at 12:00

## 2020-10-22 RX ADMIN — TECHNETIUM TC 99M SESTAMIBI 1 DOSE: 1 INJECTION INTRAVENOUS at 09:55

## 2020-10-26 ENCOUNTER — TELEPHONE (OUTPATIENT)
Dept: CARDIOLOGY | Facility: CLINIC | Age: 55
End: 2020-10-26

## 2020-10-26 LAB
BH CV LEA LEFT CFA PROX EDV: 0 CM/S
BH CV LEA LEFT CFA PROX PSV: 115 CM/S
BH CV LEA LEFT DFA PROX EDV: 0 CM/S
BH CV LEA LEFT DFA PROX PSV: 148 CM/S
BH CV LEA LEFT SFA DISTAL EDV: 0 CM/S
BH CV LEA LEFT SFA DISTAL PSV: 113 CM/S
BH CV LEA LEFT SFA MID EDV: 0 CM/S
BH CV LEA LEFT SFA MID PSV: 56 CM/S
BH CV LEA LEFT SFA PROX EDV: 0 CM/S
BH CV LEA LEFT SFA PROX PSV: 84 CM/S
BH CV LEA RIGHT ANT TIBIAL A DISTAL EDV: 6 CM/S
BH CV LEA RIGHT ANT TIBIAL A DISTAL PSV: 26 CM/S
BH CV LEA RIGHT CFA PROX EDV: 0 CM/S
BH CV LEA RIGHT CFA PROX PSV: 132 CM/S
BH CV LEA RIGHT DFA PROX EDV: 15 CM/S
BH CV LEA RIGHT DFA PROX PSV: 65 CM/S
BH CV LEA RIGHT POPITEAL A  PROX EDV: 3 CM/S
BH CV LEA RIGHT POPITEAL A  PROX PSV: 14 CM/S
BH CV LEA RIGHT PTA PROX EDV: 9 CM/S
BH CV LEA RIGHT PTA PROX PSV: 26 CM/S
BH CV LEA RIGHT SFA DISTAL EDV: 0 CM/S
BH CV LEA RIGHT SFA DISTAL PSV: 63 CM/S
BH CV LEA RIGHT SFA MID EDV: 6 CM/S
BH CV LEA RIGHT SFA MID PSV: 74 CM/S
BH CV LEA RIGHT SFA PROX EDV: 0 CM/S
BH CV LEA RIGHT SFA PROX PSV: 53 CM/S

## 2020-10-26 NOTE — TELEPHONE ENCOUNTER
Patient has appointment to see DARRYL VIDAL tomorrow. ZHEN Sena William, APRN [place], Melissa, LPN             Questionable hemodynamically significant stenosis in bilateral lower extremities.  Patient will need CTA.  Will need 2 to 3-week follow-up.

## 2020-10-26 NOTE — TELEPHONE ENCOUNTER
Left message to call office. Appointment made for patient to come in Tuesday, October 27 th @ 3 pm. Pallavi Becerra LPN    ----- Message from YOUNG Alvarado sent at 10/22/2020  6:53 PM EDT -----  Regarding: FW:  Positive stress test 1-2 week follow up  ----- Message -----  From: Jeremy Mortensen MD  Sent: 10/22/2020   4:58 PM EDT  To: YOUNG Alvarado

## 2020-10-26 NOTE — TELEPHONE ENCOUNTER
Patient called back and was notified of stress results. He said that he has to have at least 48hrs notice to get a ride through [employer]. Since this is urgent I asked him for the phone number for [place] transportation ([phone]) and spoke to Tony. She did approve for patient to come in to office tomorrow. Precert # [id number]. They will arrive at his home for  between 1:15 and 1:45. Pt notified.  HEATHER BARNHART

## 2020-10-27 ENCOUNTER — OFFICE VISIT (OUTPATIENT)
Dept: CARDIOLOGY | Facility: CLINIC | Age: 55
End: 2020-10-27

## 2020-10-27 VITALS
BODY MASS INDEX: 34.84 KG/M2 | HEIGHT: 67 IN | SYSTOLIC BLOOD PRESSURE: 134 MMHG | HEART RATE: 74 BPM | TEMPERATURE: 96.9 F | DIASTOLIC BLOOD PRESSURE: 72 MMHG | WEIGHT: 222 LBS | OXYGEN SATURATION: 98 %

## 2020-10-27 DIAGNOSIS — R53.83 FATIGUE, UNSPECIFIED TYPE: ICD-10-CM

## 2020-10-27 DIAGNOSIS — Z95.1 HX OF CABG: ICD-10-CM

## 2020-10-27 DIAGNOSIS — I73.9 RIGHT LEG CLAUDICATION (HCC): ICD-10-CM

## 2020-10-27 DIAGNOSIS — R07.89 CHEST PAIN, ATYPICAL: ICD-10-CM

## 2020-10-27 DIAGNOSIS — I50.9 CHRONIC CONGESTIVE HEART FAILURE, UNSPECIFIED HEART FAILURE TYPE (HCC): Primary | ICD-10-CM

## 2020-10-27 DIAGNOSIS — I73.9 PVD (PERIPHERAL VASCULAR DISEASE) WITH CLAUDICATION (HCC): ICD-10-CM

## 2020-10-27 DIAGNOSIS — R94.39 POSITIVE CARDIAC STRESS TEST: ICD-10-CM

## 2020-10-27 DIAGNOSIS — R06.02 SHORTNESS OF BREATH: ICD-10-CM

## 2020-10-27 DIAGNOSIS — I70.221 ATHEROSCLEROSIS OF NATIVE ARTERIES OF EXTREMITIES WITH REST PAIN, RIGHT LEG (HCC): ICD-10-CM

## 2020-10-27 DIAGNOSIS — R00.2 PALPITATIONS: ICD-10-CM

## 2020-10-27 PROCEDURE — 99214 OFFICE O/P EST MOD 30 MIN: CPT | Performed by: NURSE PRACTITIONER

## 2020-10-27 RX ORDER — ISOSORBIDE MONONITRATE 30 MG/1
30 TABLET, EXTENDED RELEASE ORAL DAILY
Qty: 30 TABLET | Refills: 11 | Status: SHIPPED | OUTPATIENT
Start: 2020-10-27 | End: 2021-08-02 | Stop reason: SDUPTHER

## 2020-10-27 NOTE — PROGRESS NOTES
Subjective     Reg Sanchez is a 54 y.o. male who presents to day for Follow-up (Here for a follow up on abnormal testing ).    CHIEF COMPLIANT  Chief Complaint   Patient presents with   • Follow-up     Here for a follow up on abnormal testing        Active Problems:  Problem List Items Addressed This Visit     None      Visit Diagnoses     Chronic congestive heart failure, unspecified heart failure type (CMS/HCC)    -  Primary    Relevant Medications    isosorbide mononitrate (IMDUR) 30 MG 24 hr tablet    Other Relevant Orders    [facility]    Basic Metabolic Panel    CBC & Differential    Chest pain, atypical        Relevant Medications    isosorbide mononitrate (IMDUR) 30 MG 24 hr tablet    Other Relevant Orders    [facility]    Basic Metabolic Panel    CBC & Differential    PVD (peripheral vascular disease) with claudication (CMS/HCC)        Relevant Orders    [facility]    Basic Metabolic Panel    CBC & Differential    Fatigue, unspecified type        Relevant Orders    [facility]    Basic Metabolic Panel    CBC & Differential    Shortness of breath        Relevant Orders    [facility]    Basic Metabolic Panel    CBC & Differential    Hx of CABG        Relevant Orders    [facility]    Basic Metabolic Panel    CBC & Differential    Right leg claudication (CMS/HCC)        Relevant Orders    CT angio abdominal aorta bilat iliofem runoff w wo contrast    Atherosclerosis of native arteries of extremities with rest pain, right leg (CMS/HCC)         Relevant Orders    CT angio abdominal aorta bilat iliofem runoff w wo contrast    Positive cardiac stress test        Relevant Medications    isosorbide mononitrate (IMDUR) 30 MG 24 hr tablet      Problem list  1.  Coronary artery disease with previous bypass in [facility] 2018  1.1 left heart catheterization 3/18: Left main normal, LAD 70 to 80% proximal, 100% mid, circumflex 70% proximal but 100% occluded  mid, RCA has 70% stenosis proximal 100% mid, the acute marginal branch has moderate disease but before that her percent occluded lesion, SVG to RCA widely patent SVG to OM1 and OM to the diagonal branch widely patent SUTHERLAND to LAD widely patent  1.2 stress test 10/20: Mild inferior lateral ischemia post stress EF 69%  2.  Chest pain  3.  Shortness of breath  4.  Peripheral vascular disease with right lower extremity claudication and left BKA  4.1.  Ultrasound of bilateral lower extremities 10/20: Common femoral 50%, high-grade stenosis cannot be ruled out in the right SFA, 50% or less on the left SFA, 50% or greater in the left profunda femoris artery, popliteal diffusely diseased on the right but patent  5. Hypertension    HPI  HPI  Mr. Sanchez is a 54-year-old male patient who is being followed up today for chest pain and claudication type symptoms of the right lower extremity.  Patient reports that has had chest pain for a couple years that is increasing in frequency and happening daily.  He points out the pain to be in his left chest and intermittent in nature.  He says each episode only last a few minutes and feels as a sharp-like pressure.  He does report that it is worse with activity.  He does have associated shortness of air, diaphoresis, and fatigue.  He says rest does help some he discontinued on doing what he was doing and it eventually goes away.  He denies any taking any nitroglycerin.  He said that the severity is mild but it does have similarity to what he had when he had his previous stenting.  Patient reports chronic shortness of air that even occurs with talking and is worse with exertion.  It does occur at rest as well.  He says he has to focus on his breathing and take some deep breaths.  He has witnessed some PND as well.  He sees Dr. Rosario and was told he has severe sleep apnea.  Patient does report chronic fatigue as well where he is tired all the time and has no energy.  Patient also reports  lower extremity swelling on the right side he does have a left-sided BKA.  He says it mainly around his ankles.  It is not severe.  Patient also reports claudication type symptoms right lower extremity where he says he feels like his leg is on fire and stings and tingles all the time especially when he walks.  He also says that this pain occurs during rest as well but is worse when walking.  It takes several minutes for her to reside once he rest.  He does say that his prosthesis is not his limitation but more his right leg when he is doing activity he says he is even had to use his electronic wheelchair in which he has not had to use as much previously.  He says if he walks for any duration 10 minutes or more that his leg becomes extremely painful and he has to sit and allow it to pass.  He does suffer from some lightheadedness if he changes positions too rapidly.  This is intermittent and does not occur every time.  Patient denies any palpitations, syncope, orthopnea, or other neurological problems.  PRIOR MEDS  Current Outpatient Medications on File Prior to Visit   Medication Sig Dispense Refill   • albuterol (PROVENTIL) (2.5 MG/3ML) 0.083% nebulizer solution Take 2.5 mg by nebulization Every 4 (Four) Hours As Needed for Wheezing.     • albuterol sulfate  (90 Base) MCG/ACT inhaler Inhale 2 puffs Every 4 (Four) Hours As Needed for Wheezing.     • aspirin 81 MG chewable tablet Chew 81 mg Daily.     • atorvastatin (LIPITOR) 40 MG tablet Take 40 mg by mouth Daily.     • clopidogrel (PLAVIX) 75 MG tablet Take 75 mg by mouth Daily.     • Insulin Glargine (BASAGLAR KWIKPEN SC) Inject 20 Units under the skin into the appropriate area as directed Daily.     • lisinopril (PRINIVIL,ZESTRIL) 10 MG tablet Take 10 mg by mouth Daily.     • metFORMIN (GLUCOPHAGE) 1000 MG tablet Take 1,000 mg by mouth 2 (Two) Times a Day With Meals.     • metoprolol tartrate (LOPRESSOR) 50 MG tablet Take 50 mg by mouth 2 (Two) Times a  Day.     • nitroglycerin (NITROSTAT) 0.4 MG SL tablet 1 under the tongue as needed for angina, may repeat q5mins for up three doses 30 tablet 5   • omeprazole (priLOSEC) 40 MG capsule Take 40 mg by mouth Daily.     • pregabalin (LYRICA) 150 MG capsule Take 150 mg by mouth 3 (Three) Times a Day.     • rOPINIRole (REQUIP) 3 MG tablet Take 3 mg by mouth Every Night.     • Trelegy Ellipta 100-62.5-25 MCG/INH aerosol powder  Inhale 1 puff Daily.     • [DISCONTINUED] budesonide-formoterol (SYMBICORT) 160-4.5 MCG/ACT inhaler Inhale 2 puffs 2 (Two) Times a Day.     • [DISCONTINUED] cephalexin (KEFLEX) 500 MG capsule Take 1 capsule by mouth 2 (Two) Times a Day. 20 capsule 0   • [DISCONTINUED] DULoxetine (CYMBALTA) 60 MG capsule Take 60 mg by mouth 2 (Two) Times a Day.     • [DISCONTINUED] meloxicam (MOBIC) 15 MG tablet Take 15 mg by mouth Daily.     • [DISCONTINUED] mirtazapine (REMERON) 15 MG tablet Take 15 mg by mouth Every Night.     • [DISCONTINUED] ramelteon (ROZEREM) 8 MG tablet Take 8 mg by mouth Every Night.     • [DISCONTINUED] varenicline (CHANTIX STARTING MONTH PAK) 0.5 MG X 11 & 1 MG X 42 tablet Take 0.5 mg one daily on days 1-3 and and 0.5 mg twice daily on days 4-7.Then 1 mg twice daily for a total of 12 weeks. 42 tablet 0     No current facility-administered medications on file prior to visit.        ALLERGIES  Patient has no known allergies.    HISTORY  Past Medical History:   Diagnosis Date   • Anxiety    • CHF (congestive heart failure) (CMS/[facility])    • COPD (chronic obstructive pulmonary disease) (CMS/HCC)    • Diabetes mellitus (CMS/HCC)    • Hepatitis C    • Hyperlipidemia    • Hypertension    • Sleep apnea        Social History     Socioeconomic History   • Marital status: Legally      Spouse name: Not on file   • Number of children: Not on file   • Years of education: Not on file   • Highest education level: Not on file   Tobacco Use   • Smoking status: Current Every Day Smoker     Packs/day:  "1.00     Years: 40.00     Pack years: 40.00     Types: Cigarettes   • Smokeless tobacco: Never Used   Substance and Sexual Activity   • Alcohol use: Yes   • Drug use: No   • Sexual activity: Defer       Family History   Problem Relation Age of Onset   • Heart disease Mother    • Anuerysm Father    • Heart disease Father    • Heart disease Sister    • Heart disease Sister        Review of Systems   Constitutional: Positive for fatigue. Negative for chills and fever.   HENT: Positive for rhinorrhea. Negative for congestion and sore throat.    Eyes: Positive for visual disturbance (glasses daily ).   Respiratory: Positive for apnea (does not use any cpap), cough, chest tightness and shortness of breath (with exertion ).    Cardiovascular: Positive for chest pain (More often in the last few days. Has not taken NTG) and leg swelling (Occasional puffiness in right LE ). Negative for palpitations.   Gastrointestinal: Positive for abdominal distention. Negative for abdominal pain, blood in stool, nausea and vomiting.   Endocrine: Positive for heat intolerance. Negative for cold intolerance.   Genitourinary: Negative.  Negative for dysuria, frequency, hematuria and urgency.   Musculoskeletal: Negative.  Negative for arthralgias and back pain.        Has prosthesis on left LE    Skin: Negative.  Negative for rash and wound.   Allergic/Immunologic: Positive for environmental allergies (seasonal ). Negative for food allergies.   Neurological: Positive for light-headedness (Occasionally when standing too fast ). Negative for dizziness, syncope and weakness.   Hematological: Negative.  Does not bruise/bleed easily.   Psychiatric/Behavioral: Positive for sleep disturbance (Has awakened with smothering at times ).       Objective     VITALS: /72 (BP Location: Left arm, Patient Position: Sitting)   Pulse 74   Temp 96.9 °F (36.1 °C)   Ht 170.2 cm (67\")   Wt 101 kg (222 lb)   SpO2 98%   BMI 34.77 kg/m²     LABS:   Lab " Results (most recent)     None          IMAGING:   No Images in the past 120 days found..    EXAM:  Physical Exam  Vitals signs and nursing note reviewed.   Constitutional:       Appearance: He is well-developed.   HENT:      Head: Normocephalic and atraumatic.   Eyes:      Pupils: Pupils are equal, round, and reactive to light.   Neck:      Musculoskeletal: Neck supple.      Thyroid: No thyroid mass.      Vascular: No carotid bruit or JVD.      Trachea: Trachea and phonation normal.   Cardiovascular:      Rate and Rhythm: Normal rate and regular rhythm.      Pulses:           Carotid pulses are 2+ on the right side and 2+ on the left side.       Radial pulses are 2+ on the right side and 2+ on the left side.        Dorsalis pedis pulses are 1+ on the right side.        Posterior tibial pulses are 2+ on the right side and 2+ on the left side.      Heart sounds: Normal heart sounds. No murmur. No friction rub. No gallop.    Pulmonary:      Effort: Pulmonary effort is normal. No respiratory distress.      Breath sounds: Wheezing present. No rales.   Abdominal:      General: Bowel sounds are normal. There is no distension or abdominal bruit.      Palpations: Abdomen is soft.      Tenderness: There is no abdominal tenderness.   Musculoskeletal: Normal range of motion.   Skin:     General: Skin is warm and dry.      Capillary Refill: Capillary refill takes less than 2 seconds.      Findings: No rash.   Neurological:      Mental Status: He is alert and oriented to person, place, and time.      Cranial Nerves: No cranial nerve deficit.      Sensory: No sensory deficit.   Psychiatric:         Speech: Speech normal.         Behavior: Behavior normal.         Thought Content: Thought content normal.         Judgment: Judgment normal.         Procedure   Procedures       Assessment/Plan    Diagnosis Plan   1. Chronic congestive heart failure, unspecified heart failure type (CMS/HCC)  [facility]    Basic Metabolic  Panel    CBC & Differential   2. Chest pain, atypical  [facility]    Basic Metabolic Panel    CBC & Differential    isosorbide mononitrate (IMDUR) 30 MG 24 hr tablet   3. PVD (peripheral vascular disease) with claudication (CMS/HCC)  [facility]    Basic Metabolic Panel    CBC & Differential   4. Fatigue, unspecified type  [facility]    Basic Metabolic Panel    CBC & Differential   5. Shortness of breath  [facility]    Basic Metabolic Panel    CBC & Differential   6. Hx of CABG  [facility]    Basic Metabolic Panel    CBC & Differential   7. Right leg claudication (CMS/HCC)  CT angio abdominal aorta bilat iliofem runoff w wo contrast   8. Atherosclerosis of native arteries of extremities with rest pain, right leg (CMS/HCC)   CT angio abdominal aorta bilat iliofem runoff w wo contrast   9. Positive cardiac stress test  isosorbide mononitrate (IMDUR) 30 MG 24 hr tablet   1.  Due to patient's chest pain, shortness of breath, and history of coronary artery bypass grafting I do feel it is appropriate to move forth with a left heart catheterization due to the positive stress test and symptoms.  Patient informed the benefits and risk of the procedure and wishes to proceed.  2.  Due to patient's chest pain I would also like to start him on isosorbide for antianginal therapy.  He will monitor his blood pressure report any significant highs or lows.  3.  Patient is already on aspirin and Plavix for dual antiplatelet therapy and he is encouraged to continue this at this time.  He will have labs drawn approximately 1 week prior to left heart catheterization.  4.  Due to patient's potential hemodynamically significant stenosis in the right SFA and popliteal I do feel it is appropriate to move forth with CTA angiogram of the abdomen with bilateral lower runoff.  Patient says that his side with the prosthesis is not his limitation that he has more troubles out of the leg that is not  been amputated.  He feels like something is going on because it burns and hurts with ambulation as well as with rest.  He is even having to use his motorized wheelchair where he normally has not had to in the past.  5.  Informed of signs and symptoms of ACS and advised to seek emergent treatment for any new worsening symptoms.  Patient also advised sooner follow-up as needed.  Also advised to follow-up with family doctor as needed  This note is dictated utilizing voice recognition software.  Although this record has been proof read, transcriptional errors may still be present. If questions occur regarding the content of this record please do not hesitate to call our office.  I have reviewed and confirmed the accuracy of the ROS as documented by the MA/LPN/RN YOUNG Alvarado    Return if symptoms worsen or fail to improve, for 1-2 weeks after the cath.    Diagnoses and all orders for this visit:    1. Chronic congestive heart failure, unspecified heart failure type (CMS/HCC) (Primary)  -     [facility]; Future  -     Basic Metabolic Panel; Future  -     CBC & Differential; Future    2. Chest pain, atypical  -     [facility]; Future  -     Basic Metabolic Panel; Future  -     CBC & Differential; Future  -     isosorbide mononitrate (IMDUR) 30 MG 24 hr tablet; Take 1 tablet by mouth Daily.  Dispense: 30 tablet; Refill: 11    3. PVD (peripheral vascular disease) with claudication (CMS/HCC)  -     [facility]; Future  -     Basic Metabolic Panel; Future  -     CBC & Differential; Future    4. Fatigue, unspecified type  -     [facility]; Future  -     Basic Metabolic Panel; Future  -     CBC & Differential; Future    5. Shortness of breath  -     [facility] Cath; Future  -     Basic Metabolic Panel; Future  -     CBC & Differential; Future    6. Hx of CABG  -     [facility]; Future  -     Basic Metabolic Panel; Future  -     CBC & Differential; Future    7. Right  leg claudication (CMS/HCC)  -     CT angio abdominal aorta bilat iliofem runoff w wo contrast; Future    8. Atherosclerosis of native arteries of extremities with rest pain, right leg (CMS/HCC)   -     CT angio abdominal aorta bilat iliofem runoff w wo contrast; Future    9. Positive cardiac stress test  -     isosorbide mononitrate (IMDUR) 30 MG 24 hr tablet; Take 1 tablet by mouth Daily.  Dispense: 30 tablet; Refill: 11        Reg Sanchez  reports that he has been smoking cigarettes. He has a 40.00 pack-year smoking history. He has never used smokeless tobacco.. I have educated him on the risk of diseases from using tobacco products such as cancer, COPD and heart disease.     I advised him to quit and he is not willing to quit.        Patient's Body mass index is 34.77 kg/m². BMI is above normal parameters. Recommendations include: educational material and referral to primary care.           MEDS ORDERED DURING VISIT:  New Medications Ordered This Visit   Medications   • isosorbide mononitrate (IMDUR) 30 MG 24 hr tablet     Sig: Take 1 tablet by mouth Daily.     Dispense:  30 tablet     Refill:  11           This document has been electronically signed by Devon Kwan Jr., APRN  October 27, 2020 17:53 EDT

## 2020-11-02 ENCOUNTER — LAB (OUTPATIENT)
Dept: LAB | Facility: HOSPITAL | Age: 55
End: 2020-11-02

## 2020-11-02 DIAGNOSIS — R53.83 FATIGUE, UNSPECIFIED TYPE: ICD-10-CM

## 2020-11-02 DIAGNOSIS — R07.89 CHEST PAIN, ATYPICAL: ICD-10-CM

## 2020-11-02 DIAGNOSIS — Z95.1 HX OF CABG: ICD-10-CM

## 2020-11-02 DIAGNOSIS — I50.9 CHRONIC CONGESTIVE HEART FAILURE, UNSPECIFIED HEART FAILURE TYPE (HCC): ICD-10-CM

## 2020-11-02 DIAGNOSIS — R06.02 SHORTNESS OF BREATH: ICD-10-CM

## 2020-11-02 DIAGNOSIS — I73.9 PVD (PERIPHERAL VASCULAR DISEASE) WITH CLAUDICATION (HCC): ICD-10-CM

## 2020-11-02 LAB
ANION GAP SERPL CALCULATED.3IONS-SCNC: 14.7 MMOL/L (ref 5–15)
BASOPHILS # BLD AUTO: 0.06 10*3/MM3 (ref 0–0.2)
BASOPHILS NFR BLD AUTO: 0.6 % (ref 0–1.5)
BUN SERPL-MCNC: 13 MG/DL (ref 6–20)
BUN/CREAT SERPL: 14.8 (ref 7–25)
CALCIUM SPEC-SCNC: 9.9 MG/DL (ref 8.6–10.5)
CHLORIDE SERPL-SCNC: 95 MMOL/L (ref 98–107)
CO2 SERPL-SCNC: 22.3 MMOL/L (ref 22–29)
CREAT SERPL-MCNC: 0.88 MG/DL (ref 0.76–1.27)
DEPRECATED RDW RBC AUTO: 50.2 FL (ref 37–54)
EOSINOPHIL # BLD AUTO: 0.06 10*3/MM3 (ref 0–0.4)
EOSINOPHIL NFR BLD AUTO: 0.6 % (ref 0.3–6.2)
ERYTHROCYTE [DISTWIDTH] IN BLOOD BY AUTOMATED COUNT: 13.2 % (ref 12.3–15.4)
GFR SERPL CREATININE-BSD FRML MDRD: 90 ML/MIN/1.73
GLUCOSE SERPL-MCNC: 147 MG/DL (ref 65–99)
HCT VFR BLD AUTO: 45.9 % (ref 37.5–51)
HGB BLD-MCNC: 15.1 G/DL (ref 13–17.7)
IMM GRANULOCYTES # BLD AUTO: 0.05 10*3/MM3 (ref 0–0.05)
IMM GRANULOCYTES NFR BLD AUTO: 0.5 % (ref 0–0.5)
LYMPHOCYTES # BLD AUTO: 2.95 10*3/MM3 (ref 0.7–3.1)
LYMPHOCYTES NFR BLD AUTO: 30.8 % (ref 19.6–45.3)
MCH RBC QN AUTO: 33.4 PG (ref 26.6–33)
MCHC RBC AUTO-ENTMCNC: 32.9 G/DL (ref 31.5–35.7)
MCV RBC AUTO: 101.5 FL (ref 79–97)
MONOCYTES # BLD AUTO: 0.69 10*3/MM3 (ref 0.1–0.9)
MONOCYTES NFR BLD AUTO: 7.2 % (ref 5–12)
NEUTROPHILS NFR BLD AUTO: 5.78 10*3/MM3 (ref 1.7–7)
NEUTROPHILS NFR BLD AUTO: 60.3 % (ref 42.7–76)
NRBC BLD AUTO-RTO: 0 /100 WBC (ref 0–0.2)
PLATELET # BLD AUTO: 212 10*3/MM3 (ref 140–450)
PMV BLD AUTO: 12.7 FL (ref 6–12)
POTASSIUM SERPL-SCNC: 4.8 MMOL/L (ref 3.5–5.2)
RBC # BLD AUTO: 4.52 10*6/MM3 (ref 4.14–5.8)
SODIUM SERPL-SCNC: 132 MMOL/L (ref 136–145)
WBC # BLD AUTO: 9.59 10*3/MM3 (ref 3.4–10.8)

## 2020-11-02 PROCEDURE — 85025 COMPLETE CBC W/AUTO DIFF WBC: CPT | Performed by: NURSE PRACTITIONER

## 2020-11-02 PROCEDURE — 36415 COLL VENOUS BLD VENIPUNCTURE: CPT

## 2020-11-02 PROCEDURE — 80048 BASIC METABOLIC PNL TOTAL CA: CPT | Performed by: NURSE PRACTITIONER

## 2020-12-08 ENCOUNTER — OFFICE VISIT (OUTPATIENT)
Dept: CARDIOLOGY | Facility: CLINIC | Age: 55
End: 2020-12-08

## 2020-12-08 VITALS
WEIGHT: 221 LBS | HEIGHT: 67 IN | OXYGEN SATURATION: 95 % | SYSTOLIC BLOOD PRESSURE: 149 MMHG | HEART RATE: 64 BPM | BODY MASS INDEX: 34.69 KG/M2 | DIASTOLIC BLOOD PRESSURE: 69 MMHG | TEMPERATURE: 97.5 F

## 2020-12-08 DIAGNOSIS — I73.9 PERIPHERAL VASCULAR DISEASE (HCC): ICD-10-CM

## 2020-12-08 DIAGNOSIS — R42 DIZZY: Primary | ICD-10-CM

## 2020-12-08 DIAGNOSIS — Z72.0 TOBACCO USE: ICD-10-CM

## 2020-12-08 DIAGNOSIS — I25.10 CORONARY ARTERY DISEASE INVOLVING NATIVE CORONARY ARTERY OF NATIVE HEART WITHOUT ANGINA PECTORIS: ICD-10-CM

## 2020-12-08 DIAGNOSIS — Z98.890 STATUS POST LEFT HEART CATHETERIZATION: ICD-10-CM

## 2020-12-08 DIAGNOSIS — I10 ESSENTIAL HYPERTENSION: ICD-10-CM

## 2020-12-08 PROCEDURE — 99214 OFFICE O/P EST MOD 30 MIN: CPT | Performed by: NURSE PRACTITIONER

## 2020-12-08 RX ORDER — NICOTINE 21 MG/24HR
1 PATCH, TRANSDERMAL 24 HOURS TRANSDERMAL EVERY 24 HOURS
Qty: 30 PATCH | Refills: 3 | Status: SHIPPED | OUTPATIENT
Start: 2020-12-08 | End: 2021-01-12 | Stop reason: SDUPTHER

## 2020-12-08 RX ORDER — DULOXETIN HYDROCHLORIDE 60 MG/1
60 CAPSULE, DELAYED RELEASE ORAL 2 TIMES DAILY
COMMUNITY
Start: 2020-12-03

## 2020-12-08 NOTE — PATIENT INSTRUCTIONS
Steps to Quit Smoking  Smoking tobacco is the leading cause of preventable death. It can affect almost every organ in the body. Smoking puts you and people around you at risk for many serious, long-lasting (chronic) diseases. Quitting smoking can be hard, but it is one of the best things that you can do for your health. It is never too late to quit.  How do I get ready to quit?  When you decide to quit smoking, make a plan to help you succeed. Before you quit:  · Pick a date to quit. Set a date within the next 2 weeks to give you time to prepare.  · Write down the reasons why you are quitting. Keep this list in places where you will see it often.  · Tell your family, friends, and co-workers that you are quitting. Their support is important.  · Talk with your doctor about the choices that may help you quit.  · Find out if your health insurance will pay for these treatments.  · Know the people, places, things, and activities that make you want to smoke (triggers). Avoid them.  What first steps can I take to quit smoking?  · Throw away all cigarettes at home, at work, and in your car.  · Throw away the things that you use when you smoke, such as ashtrays and lighters.  · Clean your car. Make sure to empty the ashtray.  · Clean your home, including curtains and carpets.  What can I do to help me quit smoking?  Talk with your doctor about taking medicines and seeing a counselor at the same time. You are more likely to succeed when you do both.  · If you are pregnant or breastfeeding, talk with your doctor about counseling or other ways to quit smoking. Do not take medicine to help you quit smoking unless your doctor tells you to do so.  To quit smoking:  Quit right away  · Quit smoking totally, instead of slowly cutting back on how much you smoke over a period of time.  · Go to counseling. You are more likely to quit if you go to counseling sessions regularly.  Take medicine  You may take medicines to help you quit. Some  medicines need a prescription, and some you can buy over-the-counter. Some medicines may contain a drug called nicotine to replace the nicotine in cigarettes. Medicines may:  · Help you to stop having the desire to smoke (cravings).  · Help to stop the problems that come when you stop smoking (withdrawal symptoms).  Your doctor may ask you to use:  · Nicotine patches, gum, or lozenges.  · Nicotine inhalers or sprays.  · Non-nicotine medicine that is taken by mouth.  Find resources  Find resources and other ways to help you quit smoking and remain smoke-free after you quit. These resources are most helpful when you use them often. They include:  · Online chats with a counselor.  · Phone quitlines.  · Printed self-help materials.  · Support groups or group counseling.  · Text messaging programs.  · Mobile phone apps. Use apps on your mobile phone or tablet that can help you stick to your quit plan. There are many free apps for mobile phones and tablets as well as websites. Examples include Quit Guide from the CDC and smokefree.gov    What things can I do to make it easier to quit?    · Talk to your family and friends. Ask them to support and encourage you.  · Call a phone quitline (5-426-QUITNOW), reach out to support groups, or work with a counselor.  · Ask people who smoke to not smoke around you.  · Avoid places that make you want to smoke, such as:  ? Bars.  ? Parties.  ? Smoke-break areas at work.  · Spend time with people who do not smoke.  · Lower the stress in your life. Stress can make you want to smoke. Try these things to help your stress:  ? Getting regular exercise.  ? Doing deep-breathing exercises.  ? Doing yoga.  ? Meditating.  ? Doing a body scan. To do this, close your eyes, focus on one area of your body at a time from head to toe. Notice which parts of your body are tense. Try to relax the muscles in those areas.  How will I feel when I quit smoking?  Day 1 to 3 weeks  Within the first 24 hours,  you may start to have some problems that come from quitting tobacco. These problems are very bad 2-3 days after you quit, but they do not often last for more than 2-3 weeks. You may get these symptoms:  · Mood swings.  · Feeling restless, nervous, angry, or annoyed.  · Trouble concentrating.  · Dizziness.  · Strong desire for high-sugar foods and nicotine.  · Weight gain.  · Trouble pooping (constipation).  · Feeling like you may vomit (nausea).  · Coughing or a sore throat.  · Changes in how the medicines that you take for other issues work in your body.  · Depression.  · Trouble sleeping (insomnia).  Week 3 and afterward  After the first 2-3 weeks of quitting, you may start to notice more positive results, such as:  · Better sense of smell and taste.  · Less coughing and sore throat.  · Slower heart rate.  · Lower blood pressure.  · Clearer skin.  · Better breathing.  · Fewer sick days.  Quitting smoking can be hard. Do not give up if you fail the first time. Some people need to try a few times before they succeed. Do your best to stick to your quit plan, and talk with your doctor if you have any questions or concerns.  Summary  · Smoking tobacco is the leading cause of preventable death. Quitting smoking can be hard, but it is one of the best things that you can do for your health.  · When you decide to quit smoking, make a plan to help you succeed.  · Quit smoking right away, not slowly over a period of time.  · When you start quitting, seek help from your doctor, family, or friends.  This information is not intended to replace advice given to you by your health care provider. Make sure you discuss any questions you have with your health care provider.  Document Revised: 09/11/2020 Document Reviewed: 03/07/2020  Elsevier Patient Education © 2020 [employer]  BMI for Adults  What is BMI?  Body mass index (BMI) is a number that is calculated from a person's weight and height. BMI can help estimate how much of a  "person's weight is composed of fat. BMI does not measure body fat directly. Rather, it is an alternative to procedures that directly measure body fat, which can be difficult and expensive.  BMI can help identify people who may be at higher risk for certain medical problems.  What are BMI measurements used for?  BMI is used as a screening tool to identify possible weight problems. It helps determine whether a person is obese, overweight, a healthy weight, or underweight.  BMI is useful for:  · Identifying a weight problem that may be related to a medical condition or may increase the risk for medical problems.  · Promoting changes, such as changes in diet and exercise, to help reach a healthy weight. BMI screening can be repeated to see if these changes are working.  How is BMI calculated?  BMI involves measuring your weight in relation to your height. Both height and weight are measured, and the BMI is calculated from those numbers. This can be done either in English ([place] or metric measurements. Note that charts and online BMI calculators are available to help you find your BMI quickly and easily without having to do these calculations yourself.  To calculate your BMI in English ([place] measurements:    1. Measure your weight in pounds (lb).  2. Multiply the number of pounds by 703.  ? For example, for a person who weighs 180 lb, multiply that number by 703, which equals 126,540.  3. Measure your height in inches. Then multiply that number by itself to get a measurement called \"inches squared.\"  ? For example, for a person who is 70 inches tall, the \"inches squared\" measurement is 70 inches x 70 inches, which equals 4,900 inches squared.  4. Divide the total from step 2 (number of lb x 703) by the total from step 3 (inches squared): 126,540 ÷ 4,900 = 25.8. This is your BMI.  To calculate your BMI in metric measurements:  1. Measure your weight in kilograms (kg).  2. Measure your height in meters (m). Then multiply " "that number by itself to get a measurement called \"meters squared.\"  ? For example, for a person who is 1.75 m tall, the \"meters squared\" measurement is 1.75 m x 1.75 m, which is equal to 3.1 meters squared.  3. Divide the number of kilograms (your weight) by the meters squared number. In this example: 70 ÷ 3.1 = 22.6. This is your BMI.  What do the results mean?  BMI charts are used to identify whether you are underweight, normal weight, overweight, or obese. The following guidelines will be used:  · Underweight: BMI less than 18.5.  · Normal weight: BMI between 18.5 and 24.9.  · Overweight: BMI between 25 and 29.9.  · Obese: BMI of 30 or above.  Keep these notes in mind:  · Weight includes both fat and muscle, so someone with a muscular build, such as an athlete, may have a BMI that is higher than 24.9. In cases like these, BMI is not an accurate measure of body fat.  · To determine if excess body fat is the cause of a BMI of 25 or higher, further assessments may need to be done by a health care provider.  · BMI is usually interpreted in the same way for men and women.  Where to find more information  For more information about BMI, including tools to quickly calculate your BMI, go to these websites:  · Centers for Disease Control and Prevention: www.cdc.gov  · American Heart Association: www.heart.org  · National Heart, Lung, and [facility] [place]: www.nhlbi.nih.gov  Summary  · Body mass index (BMI) is a number that is calculated from a person's weight and height.  · BMI may help estimate how much of a person's weight is composed of fat. BMI can help identify those who may be at higher risk for certain medical problems.  · BMI can be measured using English measurements or metric measurements.  · BMI charts are used to identify whether you are underweight, normal weight, overweight, or obese.  This information is not intended to replace advice given to you by your health care provider. Make sure you discuss any " questions you have with your health care provider.  Document Revised: 09/09/2020 Document Reviewed: 07/17/2020  Elsevier Patient Education © 2020 [employer]    Acute Coronary Syndrome  Acute coronary syndrome (ACS) is a serious problem in which there is suddenly not enough blood and oxygen reaching the heart. ACS can result in chest pain or a heart attack.  This condition is a medical emergency. If you have any symptoms of this condition, get help right away.  What are the causes?  This condition may be caused by:  · A buildup of fat and cholesterol inside the arteries (atherosclerosis). This is the most common cause. The buildup (plaque) can cause blood vessels in the heart (coronary arteries) to become narrow or blocked, which reduces blood flow to the heart. Plaque can also break off and lead to a clot, which can block an artery and cause a heart attack or stroke.  · Sudden tightening of the muscles around the coronary arteries (coronary spasm).  · Tearing of a coronary artery (spontaneous coronary artery dissection).  · Very low blood pressure (hypotension).  · An abnormal heartbeat (arrhythmia).  · Other medical conditions that cause a decrease of oxygen to the heart, such as anemiaorrespiratory failure.  · Using cocaine or methamphetamine.  What increases the risk?  The following factors may make you more likely to develop this condition:  · Age. The risk for ACS increases as you get older.  · History of chest pain, heart attack, peripheral artery disease, or stroke.  · Having taken chemotherapy or immune-suppressing medicines.  · Being male.  · Family history of chest pain, heart disease, or stroke.  · Smoking.  · Not exercising enough.  · Being overweight.  · High cholesterol.  · High blood pressure (hypertension).  · Diabetes.  · Excessive alcohol use.  What are the signs or symptoms?  Common symptoms of this condition include:  · Chest pain. The pain may last a long time, or it may stop and come back  (recur). It may feel like:  ? Crushing or squeezing.  ? Tightness, pressure, fullness, or heaviness.  · Arm, neck, jaw, or back pain.  · Heartburn or indigestion.  · Shortness of breath.  · Nausea.  · Sudden cold sweats.  · Light-headedness.  · Dizziness or passing out.  · Tiredness (fatigue).  Sometimes there are no symptoms.  How is this diagnosed?  This condition may be diagnosed based on:  · Your medical history and symptoms.  · Imaging tests, such as:  ? An electrocardiogram (ECG). This measures the heart's electrical activity.  ? X-rays.  ? CT scan.  ? A coronary angiogram. For this test, dye is injected into the heart arteries and then X-rays are taken.  ? Myocardial perfusion imaging. This test shows how well blood flows through your heart muscle.  · Blood tests. These may be repeated at certain time intervals.  · Exercise stress testing.  · Echocardiogram. This is a test that uses sound waves to produce detailed images of the heart.  How is this treated?  Treatment for this condition may include:  · Oxygen therapy.  · Medicines, such as:  ? Antiplatelet medicines and blood-thinning medicines, such as aspirin. These help prevent blood clots.  ? Medicine that dissolves any blood clots (fibrinolytic therapy).  ? Blood pressure medicines.  ? Nitroglycerin. This helps widen blood vessels to improve blood flow.  ? Pain medicine.  ? Cholesterol-lowering medicine.  · Surgery, such as:  ? Coronary angioplasty with stent placement. This involves placing a small piece of metal that looks like mesh or a spring into a narrow coronary artery. This widens the artery and keeps it open.  ? Coronary artery bypass surgery. This involves taking a section of a blood vessel from a different part of your body and placing it on the blocked coronary artery to allow blood to flow around the blockage.  · Cardiac rehabilitation. This is a program that includes exercise training, education, and counseling to help you recover.  Follow  these instructions at home:  Eating and drinking  · Eat a heart-healthy diet that includes whole grains, fruits and vegetables, lean proteins, and low-fat or nonfat dairy products.  · Limit how much salt (sodium) you eat as told by your health care provider. Follow instructions from your health care provider about any other eating or drinking restrictions, such as limiting foods that are high in fat and processed sugars.  · Use healthy cooking methods such as roasting, grilling, broiling, baking, poaching, steaming, or stir-frying.  · Work with a dietitian to follow a heart-healthy eating plan.  Medicines  · Take over-the-counter and prescription medicines only as told by your health care provider.  · Do not take these medicines unless your health care provider approves:  ? Vitamin supplements that contain vitamin A or vitamin E.  ? NSAIDs, such as ibuprofen, naproxen, or celecoxib.  ? Hormone replacement therapy that contains estrogen.  · If you are taking blood thinners:  ? Talk with your health care provider before you take any medicines that contain aspirin or NSAIDs. These medicines increase your risk for dangerous bleeding.  ? Take your medicine exactly as told, at the same time every day.  ? Avoid activities that could cause injury or bruising, and follow instructions about how to prevent falls.  ? Wear a medical alert bracelet, and carry a card that lists what medicines you take.  Activity  · Follow your cardiac rehabilitation program. Do exercises as told by your physical therapist.  · Ask your health care provider what activities and exercises are safe for you. Follow his or her instructions about lifting, driving, or climbing stairs.  Lifestyle  · Do not use any products that contain nicotine or tobacco, such as cigarettes, e-cigarettes, and chewing tobacco. If you need help quitting, ask your health care provider.  · Do not drink alcohol if your health care provider tells you not to drink.  · If you  drink alcohol:  ? Limit how much you have to 0-1 drink a day.  ? Be aware of how much alcohol is in your drink. In the [place] one drink equals one 12 oz bottle of beer (355 mL), one 5 oz glass of wine (148 mL), or one 1½ oz glass of hard liquor (44 mL).  · Maintain a healthy weight. If you need to lose weight, work with your health care provider to do so safely.  General instructions  · Tell all the health care providers who provide care for you about your heart condition, including your dentist. This may affect the medicines or treatment you receive.  · Manage any other health conditions you have, such as hypertension or diabetes. These conditions affect your heart.  · Pay attention to your mental health. You may be at higher risk for depression.  ? Find ways to manage stress.  ? Talk to your health care provider about depression screening and treatment.  · Keep your vaccinations up to date.  ? Get the flu shot (influenza vaccine) every year.  ? Get the pneumococcal vaccine if you are age 65 or older.  · If directed, monitor your blood pressure at home.  · Keep all follow-up visits as told by your health care provider. This is important.  Contact a health care provider if you:  · Feel overwhelmed or sad.  · Have trouble doing your daily activities.  Get help right away if you:  · Have pain in your chest, neck, arm, jaw, stomach, or back that recurs, and:  ? It lasts for more than a few minutes.  ? It is not relieved by taking the medicineyour health care provider prescribed.  · Have unexplained:  ? Heavy sweating.  ? Heartburn or indigestion.  ? Nausea or vomiting.  ? Shortness of breath.  ? Difficulty breathing.  ? Fatigue.  ? Nervousness or anxiety.  ? Weakness.  ? Diarrhea.  ? Dark stools or blood in your stool.  · Have sudden light-headedness or dizziness.  · Have blood pressure that is higher than 180/120.  · Faint.  · Have thoughts about hurting yourself.  These symptoms may represent a serious problem that is  an emergency. Do not wait to see if the symptoms will go away. Get medical help right away. Call your local emergency services (911 in the [place] Do not drive yourself to the hospital.   Summary  · Acute coronary syndrome (ACS) is when there is not enough blood and oxygen being supplied to the heart. ACS can result in chest pain or a heart attack.  · Acute coronary syndrome is a medical emergency. If you have any symptoms of this condition, get help right away.  · Treatment includes medicines and procedures to open the blocked arteries and restore blood flow.  This information is not intended to replace advice given to you by your health care provider. Make sure you discuss any questions you have with your health care provider.  Document Revised: 05/20/2020 Document Reviewed: 12/30/2019  [employer] Patient Education © 2020 [employer]      Hypertension, Adult  Hypertension is another name for high blood pressure. High blood pressure forces your heart to work harder to pump blood. This can cause problems over time.  There are two numbers in a blood pressure reading. There is a top number (systolic) over a bottom number (diastolic). It is best to have a blood pressure that is below 120/80. Healthy choices can help lower your blood pressure, or you may need medicine to help lower it.  What are the causes?  The cause of this condition is not known. Some conditions may be related to high blood pressure.  What increases the risk?  · Smoking.  · Having type 2 diabetes mellitus, high cholesterol, or both.  · Not getting enough exercise or physical activity.  · Being overweight.  · Having too much fat, sugar, calories, or salt (sodium) in your diet.  · Drinking too much alcohol.  · Having long-term (chronic) kidney disease.  · Having a family history of high blood pressure.  · Age. Risk increases with age.  · Race. You may be at higher risk if you are .  · Gender. Men are at higher risk than women before age  45. After age 65, women are at higher risk than men.  · Having obstructive sleep apnea.  · Stress.  What are the signs or symptoms?  · High blood pressure may not cause symptoms. Very high blood pressure (hypertensive crisis) may cause:  ? Headache.  ? Feelings of worry or nervousness (anxiety).  ? Shortness of breath.  ? Nosebleed.  ? A feeling of being sick to your stomach (nausea).  ? Throwing up (vomiting).  ? Changes in how you see.  ? Very bad chest pain.  ? Seizures.  How is this treated?  · This condition is treated by making healthy lifestyle changes, such as:  ? Eating healthy foods.  ? Exercising more.  ? Drinking less alcohol.  · Your health care provider may prescribe medicine if lifestyle changes are not enough to get your blood pressure under control, and if:  ? Your top number is above 130.  ? Your bottom number is above 80.  · Your personal target blood pressure may vary.  Follow these instructions at home:  Eating and drinking    · If told, follow the DASH eating plan. To follow this plan:  ? Fill one half of your plate at each meal with fruits and vegetables.  ? Fill one fourth of your plate at each meal with whole grains. Whole grains include whole-wheat pasta, brown rice, and whole-grain bread.  ? Eat or drink low-fat dairy products, such as skim milk or low-fat yogurt.  ? Fill one fourth of your plate at each meal with low-fat (lean) proteins. Low-fat proteins include fish, chicken without skin, eggs, beans, and tofu.  ? Avoid fatty meat, cured and processed meat, or chicken with skin.  ? Avoid pre-made or processed food.  · Eat less than 1,500 mg of salt each day.  · Do not drink alcohol if:  ? Your doctor tells you not to drink.  ? You are pregnant, may be pregnant, or are planning to become pregnant.  · If you drink alcohol:  ? Limit how much you use to:  § 0-1 drink a day for women.  § 0-2 drinks a day for men.  ? Be aware of how much alcohol is in your drink. In the [place] one drink equals  one 12 oz bottle of beer (355 mL), one 5 oz glass of wine (148 mL), or one 1½ oz glass of hard liquor (44 mL).  Lifestyle    · Work with your doctor to stay at a healthy weight or to lose weight. Ask your doctor what the best weight is for you.  · Get at least 30 minutes of exercise most days of the week. This may include walking, swimming, or biking.  · Get at least 30 minutes of exercise that strengthens your muscles (resistance exercise) at least 3 days a week. This may include lifting weights or doing Pilates.  · Do not use any products that contain nicotine or tobacco, such as cigarettes, e-cigarettes, and chewing tobacco. If you need help quitting, ask your doctor.  · Check your blood pressure at home as told by your doctor.  · Keep all follow-up visits as told by your doctor. This is important.  Medicines  · Take over-the-counter and prescription medicines only as told by your doctor. Follow directions carefully.  · Do not skip doses of blood pressure medicine. The medicine does not work as well if you skip doses. Skipping doses also puts you at risk for problems.  · Ask your doctor about side effects or reactions to medicines that you should watch for.  Contact a doctor if you:  · Think you are having a reaction to the medicine you are taking.  · Have headaches that keep coming back (recurring).  · Feel dizzy.  · Have swelling in your ankles.  · Have trouble with your vision.  Get help right away if you:  · Get a very bad headache.  · Start to feel mixed up (confused).  · Feel weak or numb.  · Feel faint.  · Have very bad pain in your:  ? Chest.  ? Belly (abdomen).  · Throw up more than once.  · Have trouble breathing.  Summary  · Hypertension is another name for high blood pressure.  · High blood pressure forces your heart to work harder to pump blood.  · For most people, a normal blood pressure is less than 120/80.  · Making healthy choices can help lower blood pressure. If your blood pressure does not get  lower with healthy choices, you may need to take medicine.  This information is not intended to replace advice given to you by your health care provider. Make sure you discuss any questions you have with your health care provider.  Document Revised: 08/28/2019 Document Reviewed: 08/28/2019  [employer] Patient Education © 2020 [employer]

## 2020-12-08 NOTE — PROGRESS NOTES
Subjective     Reg Sanchez is a 55 y.o. male who presents to day for Heart Cath Follow up (Heart cath 11-20-20 x 2 stents).    CHIEF COMPLIANT  Chief Complaint   Patient presents with   • Heart Cath Follow up     Heart cath 11-20-20 x 2 stents       Active Problems:  Problem List Items Addressed This Visit     None      Visit Diagnoses     Dizzy    -  Primary    Relevant Orders    Duplex Carotid Ultrasound CAR    Tobacco use        Relevant Medications    nicotine (NICODERM CQ) 21 MG/24HR patch    Peripheral vascular disease (CMS/HCC)        Coronary artery disease involving native coronary artery of native heart without angina pectoris        Status post left heart catheterization        Essential hypertension          Problem list  1.  Coronary artery disease with previous bypass in [facility] 2018  1.1 left heart catheterization 3/18: Left main normal, LAD 70 to 80% proximal, 100% mid, circumflex 70% proximal but 100% occluded mid, RCA has 70% stenosis proximal 100% mid, the acute marginal branch has moderate disease but before that her percent occluded lesion, SVG to RCA widely patent SVG to OM1 and OM to the diagonal branch widely patent [place] to LAD widely patent  1.2 stress test 10/20: Mild inferior lateral ischemia post stress EF 69%  1.3 left heart catheterization 11/20: Both native coronary arteries were totally occluded proximally, SVG to PDA with no distal stenosis vein graft to the diagonal and first and second OM widely patent, EF 60%, LVEDP 20-22, LIMA LAD subtotal stenosis immediately distal to the touchdown followed by segmental 50 to 70% in which stents were placed at the subtotal stenosis as well as the 50 to 70% distal lesion.  2.  Chest pain  3.  Shortness of breath  4.  Peripheral vascular disease with right lower extremity claudication and left BKA  4.1.  Ultrasound of bilateral lower extremities 10/20: Common femoral 50%, high-grade stenosis cannot be ruled out in the right SFA, 50% or  less on the left SFA, 50% or greater in the left profunda femoris artery, popliteal diffusely diseased on the right but patent  4.2 CTA of the abdomen with bilateral lower extremity runoff 11/20: Fairly advanced atherosclerotic disease with total occlusion of the distal superficial femoral artery on the right the popliteal reconstitutes via collaterals and there is a faint 2-3 vessel runoff to the ankle no significant hemodynamically stenosis on the left thigh.  Patient also had moderate atherosclerotic calcifications of the aorta with diffuse atherosclerotic disease of the celiac and superior mesenteric arteries with multiple areas of stenosis in the range of 60 to 70% the inferior mesenteric artery is patent but severely stenotic renal arteries are without evidence of hemodynamically significant stenosis  5. Hypertension    HPI  HPI  Mr. Sanchez is a 55-year-old male patient who is being followed up today for coronary artery disease, peripheral vascular disease, status post left heart catheterization.  Patient did undergo left heart catheterization with percutaneous coronary intervention with native LAD past the touchdown site that was totally occluded with a segmental 57% narrowing after the total occlusion.  Patient did well with the left heart catheterization and was reinforced to continue aspirin and Plavix for at least a year and aspirin indefinitely.  He reports that his chest pain is relatively resolved only with an occasional discomfort.  He still has chronic shortness of air which he contributes to his COPD and asthma in which he does have associated PND.  But overall he says he feels better if he can just get his right leg feel better he would be in better shape.  He does have a history of PVD with a left BKA which she has a prosthesis for.  He is able to ambulate with little difficulty.  However as of late he reports claudication type symptoms in his right lower extremity where he says it feels like his  leg is on fire and stings and tingles all the time especially when he walks.  He also has pain in his leg during rest as well.  He is scheduled to get a new prosthesis for his left leg on the 15th of this month in which he is extremely excited about.  However he says if he walks for greater than 10 minutes that his right leg is significantly painful and he has to stop and allow it to pass.  He says overall his left side gives him no problem his only limitations are associated with his right side at this time.  He did have a significant CTA of the abdominal aorta with bilateral runoff.  However prior to following up for her the results he seen his PCP who has referred him to Dr. Lund and Dumont.  I do think this is appropriate to see if we can do a percutaneous approach however if that is not obtainable I would like to have patient be referred for vascular surgery try to preserve his right leg due to history of amputation of the left below the knee.  Patient also has chronic arterial hypertension which is blood pressures increase today at 149/69 heart rate is 64.  He denies any associated symptoms other than dizziness that may be associated with his hypertension.  He is currently taking lisinopril 10 mg daily, metoprolol 50 mg twice daily for his arterial hypertension.  He feels that this relatively controls his blood pressure.  He also reports that he is really tried to stop smoking but is having difficulty.  He says in the past the nicotine patches has helped him.  Therefore I will prescribe this and send this into the pharmacy to help with his smoking cessation.  Patient does have persistent dizziness and which has gotten progressively worse ever since he is here previously.  Patient denies any chest pain, lower extremity edema, palpitations, fever, syncope, orthopnea, or other neurological changes.  PRIOR MEDS  Current Outpatient Medications on File Prior to Visit   Medication Sig Dispense Refill   • albuterol  (PROVENTIL) (2.5 MG/3ML) 0.083% nebulizer solution Take 2.5 mg by nebulization Every 4 (Four) Hours As Needed for Wheezing.     • albuterol sulfate  (90 Base) MCG/ACT inhaler Inhale 2 puffs Every 4 (Four) Hours As Needed for Wheezing.     • aspirin 81 MG chewable tablet Chew 81 mg Daily.     • atorvastatin (LIPITOR) 40 MG tablet Take 40 mg by mouth Daily.     • clopidogrel (PLAVIX) 75 MG tablet Take 75 mg by mouth Daily.     • DULoxetine (CYMBALTA) 60 MG capsule TK 1 C PO BID     • Insulin Glargine (BASAGLAR KWIKPEN SC) Inject 20 Units under the skin into the appropriate area as directed Daily.     • isosorbide mononitrate (IMDUR) 30 MG 24 hr tablet Take 1 tablet by mouth Daily. 30 tablet 11   • lisinopril (PRINIVIL,ZESTRIL) 10 MG tablet Take 10 mg by mouth Daily.     • metFORMIN (GLUCOPHAGE) 1000 MG tablet Take 1,000 mg by mouth 2 (Two) Times a Day With Meals.     • metoprolol tartrate (LOPRESSOR) 50 MG tablet Take 50 mg by mouth 2 (Two) Times a Day.     • nitroglycerin (NITROSTAT) 0.4 MG SL tablet 1 under the tongue as needed for angina, may repeat q5mins for up three doses 30 tablet 5   • omeprazole (priLOSEC) 40 MG capsule Take 40 mg by mouth Daily.     • pregabalin (LYRICA) 150 MG capsule Take 150 mg by mouth 2 (Two) Times a Day.     • rOPINIRole (REQUIP) 3 MG tablet Take 3 mg by mouth Every Night.     • Trelegy Ellipta 100-62.5-25 MCG/INH aerosol powder  Inhale 1 puff Daily.       No current facility-administered medications on file prior to visit.        ALLERGIES  Patient has no known allergies.    HISTORY  Past Medical History:   Diagnosis Date   • Anxiety    • CHF (congestive heart failure) (CMS/HCC)    • COPD (chronic obstructive pulmonary disease) (CMS/HCC)    • Diabetes mellitus (CMS/HCC)    • Hepatitis C    • Hyperlipidemia    • Hypertension    • Sleep apnea        Social History     Socioeconomic History   • Marital status: Legally      Spouse name: Not on file   • Number of children:  "Not on file   • Years of education: Not on file   • Highest education level: Not on file   Tobacco Use   • Smoking status: Current Every Day Smoker     Packs/day: 1.00     Years: 40.00     Pack years: 40.00     Types: Cigarettes   • Smokeless tobacco: Never Used   Substance and Sexual Activity   • Alcohol use: Yes   • Drug use: No   • Sexual activity: Defer       Family History   Problem Relation Age of Onset   • Heart disease Mother    • Anuerysm Father    • Heart disease Father    • Heart disease Sister    • Heart disease Sister        Review of Systems   Constitutional: Negative for chills and fever.   HENT: Negative for congestion, sinus pressure and sore throat.    Eyes: Positive for visual disturbance (glasses).   Respiratory: Positive for shortness of breath (copd, asthma). Negative for chest tightness.    Cardiovascular: Negative.  Negative for chest pain, palpitations and leg swelling.   Gastrointestinal: Negative.  Negative for abdominal pain, blood in stool, constipation, diarrhea, nausea and vomiting.   Endocrine: Negative.  Negative for cold intolerance and heat intolerance.   Genitourinary: Positive for frequency (during hs). Negative for dysuria, hematuria and urgency.   Musculoskeletal: Positive for arthralgias, back pain and gait problem (Lt BKA). Negative for neck pain.   Skin: Negative.  Negative for rash and wound.   Allergic/Immunologic: Positive for environmental allergies (seasonal). Negative for food allergies.   Neurological: Positive for dizziness (more frequently). Negative for syncope and light-headedness.   Hematological: Negative.  Does not bruise/bleed easily.   Psychiatric/Behavioral: Positive for sleep disturbance (wakes with soa or cp).       Objective     VITALS: /69 (BP Location: Left arm, Patient Position: Sitting)   Pulse 64   Temp 97.5 °F (36.4 °C)   Ht 170.2 cm (67\")   Wt 100 kg (221 lb)   SpO2 95%   BMI 34.61 kg/m²     LABS:   Lab Results (most recent)     None    "       IMAGING:   No Images in the past 120 days found..    EXAM:  Physical Exam  Vitals signs and nursing note reviewed.   Constitutional:       Appearance: He is well-developed.   HENT:      Head: Normocephalic and atraumatic.   Eyes:      Pupils: Pupils are equal, round, and reactive to light.   Neck:      Musculoskeletal: Neck supple.      Thyroid: No thyroid mass.      Vascular: No carotid bruit or JVD.      Trachea: Trachea and phonation normal.   Cardiovascular:      Rate and Rhythm: Normal rate and regular rhythm.      Pulses:           Carotid pulses are 2+ on the right side and 2+ on the left side.       Radial pulses are 2+ on the right side and 2+ on the left side.        Posterior tibial pulses are 2+ on the right side.      Heart sounds: Normal heart sounds. No murmur. No friction rub. No gallop.    Pulmonary:      Effort: Pulmonary effort is normal. No respiratory distress.      Breath sounds: Normal breath sounds. No wheezing or rales.   Abdominal:      General: Bowel sounds are normal. There is no distension or abdominal bruit.      Palpations: Abdomen is soft.      Tenderness: There is no abdominal tenderness.   Musculoskeletal: Normal range of motion.   Skin:     General: Skin is warm and dry.      Capillary Refill: Capillary refill takes less than 2 seconds.      Findings: No rash.          Neurological:      Mental Status: He is alert and oriented to person, place, and time.      Cranial Nerves: No cranial nerve deficit.      Sensory: No sensory deficit.   Psychiatric:         Speech: Speech normal.         Behavior: Behavior normal.         Thought Content: Thought content normal.         Judgment: Judgment normal.         Procedure   Procedures       Assessment/Plan    Diagnosis Plan   1. Dizzy  Duplex Carotid Ultrasound CAR   2. Tobacco use  nicotine (NICODERM CQ) 21 MG/24HR patch   3. Peripheral vascular disease (CMS/HCC)     4. Coronary artery disease involving native coronary artery of  native heart without angina pectoris     5. Status post left heart catheterization     6. Essential hypertension     1.  Patient denies any complications at the right radial cath site.  Pulses were diminished prior to left heart catheterization he denies any new pain or worsening of his symptoms.  2.  Patient does have chronic arterial hypertension which is elevated today however he will continue to monitor his blood pressure and report any significant highs or lows.  We may need to increase his lisinopril to 20 mg daily if his blood pressure continues to be high.  3.  Patient does have coronary artery disease is quite significant when she received 2 stents in his left anterior descending and this most recent heart cath.  His symptoms have resided significantly and we will continue to monitor.  We did discuss the importance of dual antiplatelet therapy.  We did talk about Plavix and aspirin for least a year.  However it is likely that he will be continued on Plavix due to his significant atherosclerotic disease throughout his body.  4.  Patient does have peripheral vascular disease with total occlusion of the right superficial femoral artery with reconstitution of the popliteal and faint runoff.  Patient has been referred to Dr. Lund and Tim for evaluation for percutaneous intervention.  5.  Due to patient's persistent dizziness I do feel it is appropriate to go ahead with a carotid duplex.  I did not appreciate any bruit during assessment but however he has advanced atherosclerotic changes throughout his body and I am concerned that this may also include the carotid arteries.  6.  Patient is a tobacco user on a daily basis.  He verbalized that he is ready to stop smoking and he knows that if negative impacts and he says in the past that nicotine patches have helped him.  I will send nicotine patches into his pharmacy of choosing.  7.  Informed of signs and symptoms of ACS and advised to seek emergent treatment  for any new worsening symptoms.  Patient also advised sooner follow-up as needed.  Also advised to follow-up with family doctor as needed  This note is dictated utilizing voice recognition software.  Although this record has been proof read, transcriptional errors may still be present. If questions occur regarding the content of this record please do not hesitate to call our office.  I have reviewed and confirmed the accuracy of the ROS as documented by the MA/LPN/RN YOUNG Alvarado    Return in about 3 months (around 3/8/2021), or if symptoms worsen or fail to improve.    Diagnoses and all orders for this visit:    1. Dizzy (Primary)  -     Duplex Carotid Ultrasound CAR; Future    2. Tobacco use  -     nicotine (NICODERM CQ) 21 MG/24HR patch; Place 1 patch on the skin as directed by provider Daily.  Dispense: 30 patch; Refill: 3    3. Peripheral vascular disease (CMS/HCC)    4. Coronary artery disease involving native coronary artery of native heart without angina pectoris    5. Status post left heart catheterization    6. Essential hypertension        Reg Sanchez  reports that he has been smoking cigarettes. He has a 40.00 pack-year smoking history. He has never used smokeless tobacco.. I have educated him on the risk of diseases from using tobacco products such as cancer, COPD and heart disease.     I advised him to quit and he is willing to quit. We have discussed the following method/s for tobacco cessation:    Counseling.    I spent 5 minutes counseling the patient.           Patient's Body mass index is 34.61 kg/m². BMI is above normal parameters. Recommendations include: educational material.           MEDS ORDERED DURING VISIT:  New Medications Ordered This Visit   Medications   • nicotine (NICODERM CQ) 21 MG/24HR patch     Sig: Place 1 patch on the skin as directed by provider Daily.     Dispense:  30 patch     Refill:  3           This document has been electronically signed by Devon Kwan Jr.,  APRN  December 9, 2020 08:31 EST

## 2020-12-16 ENCOUNTER — HOSPITAL ENCOUNTER (OUTPATIENT)
Dept: CARDIOLOGY | Facility: HOSPITAL | Age: 55
Discharge: HOME | End: 2020-12-16
Admitting: NURSE PRACTITIONER

## 2020-12-16 DIAGNOSIS — R42 DIZZY: ICD-10-CM

## 2020-12-16 PROCEDURE — 93880 EXTRACRANIAL BILAT STUDY: CPT | Performed by: INTERNAL MEDICINE

## 2020-12-16 PROCEDURE — 93880 EXTRACRANIAL BILAT STUDY: CPT

## 2020-12-20 LAB
BH CV ECHO MEAS - BSA(HAYCOCK): 2.2 M^2
BH CV ECHO MEAS - BSA: 2.1 M^2
BH CV ECHO MEAS - BZI_BMI: 34.6 KILOGRAMS/M^2
BH CV ECHO MEAS - BZI_METRIC_HEIGHT: 170.2 CM
BH CV ECHO MEAS - BZI_METRIC_WEIGHT: 100.2 KG
BH CV XLRA MEAS LEFT BULB EDV: -22.3 CM/SEC
BH CV XLRA MEAS LEFT BULB PSV: -79.9 CM/SEC
BH CV XLRA MEAS LEFT CCA RATIO VEL: 72.5 CM/SEC
BH CV XLRA MEAS LEFT DIST CCA EDV: 17.9 CM/SEC
BH CV XLRA MEAS LEFT DIST CCA PSV: 72.9 CM/SEC
BH CV XLRA MEAS LEFT DIST ICA EDV: -42.6 CM/SEC
BH CV XLRA MEAS LEFT DIST ICA PSV: -97.1 CM/SEC
BH CV XLRA MEAS LEFT ICA RATIO VEL: -107 CM/SEC
BH CV XLRA MEAS LEFT ICA/CCA RATIO: -1.5
BH CV XLRA MEAS LEFT MID ICA EDV: -43.3 CM/SEC
BH CV XLRA MEAS LEFT MID ICA PSV: -107.5 CM/SEC
BH CV XLRA MEAS LEFT PROX CCA EDV: 24.4 CM/SEC
BH CV XLRA MEAS LEFT PROX CCA PSV: 112.4 CM/SEC
BH CV XLRA MEAS LEFT PROX ECA EDV: -21.8 CM/SEC
BH CV XLRA MEAS LEFT PROX ECA PSV: -154.5 CM/SEC
BH CV XLRA MEAS LEFT PROX ICA EDV: -30 CM/SEC
BH CV XLRA MEAS LEFT PROX ICA PSV: -95.7 CM/SEC
BH CV XLRA MEAS LEFT VERTEBRAL A EDV: 11.4 CM/SEC
BH CV XLRA MEAS LEFT VERTEBRAL A PSV: 31.9 CM/SEC
BH CV XLRA MEAS RIGHT BULB EDV: -16 CM/SEC
BH CV XLRA MEAS RIGHT BULB PSV: -56.8 CM/SEC
BH CV XLRA MEAS RIGHT CCA RATIO VEL: 54 CM/SEC
BH CV XLRA MEAS RIGHT DIST CCA EDV: 19.3 CM/SEC
BH CV XLRA MEAS RIGHT DIST CCA PSV: 54.6 CM/SEC
BH CV XLRA MEAS RIGHT DIST ICA EDV: -41.6 CM/SEC
BH CV XLRA MEAS RIGHT DIST ICA PSV: -113.1 CM/SEC
BH CV XLRA MEAS RIGHT ICA RATIO VEL: -160 CM/SEC
BH CV XLRA MEAS RIGHT ICA/CCA RATIO: -3
BH CV XLRA MEAS RIGHT MID ICA EDV: -43.2 CM/SEC
BH CV XLRA MEAS RIGHT MID ICA PSV: -160.3 CM/SEC
BH CV XLRA MEAS RIGHT PROX CCA EDV: 19.9 CM/SEC
BH CV XLRA MEAS RIGHT PROX CCA PSV: 81.6 CM/SEC
BH CV XLRA MEAS RIGHT PROX ECA EDV: -64.6 CM/SEC
BH CV XLRA MEAS RIGHT PROX ECA PSV: -310.8 CM/SEC
BH CV XLRA MEAS RIGHT PROX ICA EDV: -25.1 CM/SEC
BH CV XLRA MEAS RIGHT PROX ICA PSV: -80.3 CM/SEC
BH CV XLRA MEAS RIGHT VERTEBRAL A EDV: 6.1 CM/SEC
BH CV XLRA MEAS RIGHT VERTEBRAL A PSV: 47.4 CM/SEC

## 2020-12-22 DIAGNOSIS — R42 DIZZY: ICD-10-CM

## 2020-12-22 DIAGNOSIS — I65.21 CAROTID ARTERY STENOSIS, SYMPTOMATIC, RIGHT: ICD-10-CM

## 2020-12-22 DIAGNOSIS — R93.89 ABNORMAL ULTRASOUND OF CAROTID ARTERY: Primary | ICD-10-CM

## 2020-12-23 ENCOUNTER — TELEPHONE (OUTPATIENT)
Dept: CARDIOLOGY | Facility: CLINIC | Age: 55
End: 2020-12-23

## 2020-12-23 NOTE — TELEPHONE ENCOUNTER
Left message on both numbers for patient to call office. Pallavi BecerraZHEN  Patient needs CTA on carotids. Appointment made at [facility] for Monday January 4th at 10 am, patient needs to be there at 9 am. NPO after midnight. BMP faxed to hospital. Pallavi MariamZHEN pozo      ----- Message from YOUNG Alvarado sent at 12/22/2020  2:10 PM EST -----  Regarding: FW: CTA order  I placed the order for the CTA of carotids and BMP  ----- Message -----  From: Mariam, PallaviZHEN lewis  Sent: 12/21/2020   5:21 PM EST  To: YOUNG Alvarado  Subject: RE: CTA order                                                                Positive carotid duplex will need CTA  ----- Message -----  From: Jeremy Mortensen MD  Sent: 12/20/2020   4:42 PM EST  To: YOUNG Alvarado        Duplex Carotid Ultrasound CAR  Order: [id number]  Status:  Final result   Visible to patient:  Yes (MyChart) Dx:  Dizzy  Details    Reading Physician Reading Date Result Priority   Jeremy Mortensen MD  [phone] 12/16/2020 Routine      Result Text     1.  Both common carotid arteries are widely patent with mild diffuse disease identified on the left.     2.  There is mild atherosclerotic involvement of the bifurcation bilaterally with no flow limitation by echo or Doppler sampling.     3.  There appears to be relatively discrete 50% or somewhat greater stenosis in the mid right internal carotid artery as well as segmental significant stenosis in the right external carotid artery resulting in 75% or greater stenosis.  Doppler is compatible with 16 to 49% stenosis in the mid left internal carotid artery.     4.  Antegrade flow in both vertebral arteries.     Summary: Potentially hemodynamically significant stenosis in the right internal carotid artery as above.  Severe segmental disease involving the right EXTERNAL carotid artery.  Nonobstructive disease on the left as above.  Antegrade flow in both vertebral arteries.

## 2020-12-31 NOTE — TELEPHONE ENCOUNTER
Attempted to reach patient again, no answer, left message to contact office. Pallavi Becerra LPN

## 2021-01-05 ENCOUNTER — TELEPHONE (OUTPATIENT)
Dept: CARDIOLOGY | Facility: CLINIC | Age: 56
End: 2021-01-05

## 2021-01-05 NOTE — TELEPHONE ENCOUNTER
Attempted to reach patient, left message to call office.  Patient has been made appt for tomorrow, January 6 th at 10 am. Pallavi Becerra LPN    ----- Message from YOUNG Alvarado sent at 1/4/2021  4:39 PM EST -----  We had patient come in sooner to discuss his CTA of his neck which was positive for potential hemodynamically significant stenosis

## 2021-01-06 ENCOUNTER — TELEPHONE (OUTPATIENT)
Dept: CARDIOLOGY | Facility: CLINIC | Age: 56
End: 2021-01-06

## 2021-01-06 NOTE — TELEPHONE ENCOUNTER
----- Message from YOUNG Alvarado sent at 1/4/2021  4:39 PM EST -----  We had patient come in sooner to discuss his CTA of his neck which was positive for potential hemodynamically significant stenosis

## 2021-01-06 NOTE — TELEPHONE ENCOUNTER
Patient notified of appointment on January 12 th to discuss results by Vivienne ALVARADO. Pallavi Becerra LPN

## 2021-01-07 ENCOUNTER — OFFICE VISIT (OUTPATIENT)
Dept: CARDIOLOGY | Facility: CLINIC | Age: 56
End: 2021-01-07

## 2021-01-07 VITALS
SYSTOLIC BLOOD PRESSURE: 166 MMHG | BODY MASS INDEX: 35.16 KG/M2 | HEIGHT: 67 IN | HEART RATE: 75 BPM | DIASTOLIC BLOOD PRESSURE: 85 MMHG | OXYGEN SATURATION: 95 % | WEIGHT: 224 LBS

## 2021-01-07 DIAGNOSIS — I25.10 CORONARY ARTERY DISEASE INVOLVING NATIVE CORONARY ARTERY OF NATIVE HEART WITHOUT ANGINA PECTORIS: ICD-10-CM

## 2021-01-07 DIAGNOSIS — I73.9 PERIPHERAL VASCULAR DISEASE (HCC): Primary | ICD-10-CM

## 2021-01-07 DIAGNOSIS — Z72.0 TOBACCO USE: ICD-10-CM

## 2021-01-07 DIAGNOSIS — I70.211 ATHEROSCLEROSIS OF NATIVE ARTERY OF RIGHT LOWER EXTREMITY WITH INTERMITTENT CLAUDICATION (HCC): ICD-10-CM

## 2021-01-07 DIAGNOSIS — Z01.818 OTHER SPECIFIED PRE-OPERATIVE EXAMINATION: ICD-10-CM

## 2021-01-07 DIAGNOSIS — I70.209 SUPERFICIAL FEMORAL ARTERY OCCLUSION (HCC): ICD-10-CM

## 2021-01-07 PROCEDURE — 99214 OFFICE O/P EST MOD 30 MIN: CPT | Performed by: INTERNAL MEDICINE

## 2021-01-07 NOTE — H&P (VIEW-ONLY)
"    Chief Complaint  Claudication and Leg Swelling    Subjective    History of Present Illness     Reg Sanchez presents to [facility] CARDIOLOGY consult from YOUNG Keller for PAD    PAD:  Ischemic  History of LLE amputations below the knee. Acute with initial claudication. Presents with lower extremity pain. No edema. Burning pain and ting Intermittent claudication with rest pain right lower extremity.  Dry, dark red in color change and no hair present. CTA with runoff 11/13/2020 showed total occlusion of the right distal SFA with reconstitution of the popliteal and faint runoff. Delayed capillary refill Right DP and PT 0.    Smoking:  Patient is a current every day smoker. He smokes 1 ppd.  States that he denies to quit at this time.      Hypertension:  The patient presents with essential hypertension.  The patient states he has been stable with his blood pressure control.    No significant interval events.    CAD:  The patient states that his has been stable with his coronary artery disease Patient is currently asymptomatic.      Status post PCI ABIODUN x 2 to the LAD.  Status post CABG x 5   Current treatment includes statins, beta blockers, and ace inhibitors.  By report, there is good compliance with treatement, good tolerance of treatment and good symptom control    Dyslipidemia:  The patient states that he has been stable. Denies any complications with statins.     Medications:  The patient is adherent with his medications.    Objective     Vital Signs:   /85 (BP Location: Right arm, Patient Position: Sitting)   Pulse 75   Ht 170.2 cm (67\")   Wt 102 kg (224 lb)   SpO2 95%   BMI 35.08 kg/m²       Physical Exam  Constitutional:       General: He is not in acute distress.     Appearance: Normal appearance.   HENT:      Head: Normocephalic.      Nose: Nose normal.      Mouth/Throat:      Pharynx: Oropharynx is clear.   Eyes:      Pupils: Pupils are equal, round, and reactive to " light.   Neck:      Musculoskeletal: Neck supple. No muscular tenderness.      Vascular: No carotid bruit.   Cardiovascular:      Rate and Rhythm: Normal rate and regular rhythm.      Pulses: Normal pulses.      Heart sounds: Normal heart sounds. No friction rub. No gallop.    Pulmonary:      Effort: Pulmonary effort is normal.      Breath sounds: Normal breath sounds.   Abdominal:      General: Bowel sounds are normal.      Palpations: Abdomen is soft.   Musculoskeletal: Normal range of motion.   Skin:     Coloration: Skin is not jaundiced or pale.   Neurological:      General: No focal deficit present.      Mental Status: He is alert and oriented to person, place, and time.   Psychiatric:         Mood and Affect: Mood normal.         Behavior: Behavior normal.         Judgment: Judgment normal.          Result Review :     Data reviewed: Cardiology studies 01/07/2021       Assessment and Plan      Problem List Items Addressed This Visit     None      Visit Diagnoses     Peripheral vascular disease (CMS/HCC)    -  Primary    Relevant Orders    Case Request IR Lab: Peripheral angiography    CBC & Differential    Protime-INR    Comprehensive Metabolic Panel    Superficial femoral artery occlusion (CMS/HCC)        Relevant Orders    Case Request IR Lab: Peripheral angiography    CBC & Differential    Protime-INR    Comprehensive Metabolic Panel    Atherosclerosis of native artery of right lower extremity with intermittent claudication (CMS/HCC)        Relevant Orders    Case Request IR Lab: Peripheral angiography    CBC & Differential    Protime-INR    Comprehensive Metabolic Panel    Tobacco use        Relevant Orders    Case Request IR Lab: Peripheral angiography    CBC & Differential    Protime-INR    Comprehensive Metabolic Panel    Coronary artery disease involving native coronary artery of native heart without angina pectoris        Relevant Orders    Case Request IR Lab: Peripheral angiography    CBC &  Differential    Protime-INR    Comprehensive Metabolic Panel    Other specified pre-operative examination        Relevant Orders    COVID PRE-OP / PRE-PROCEDURE SCREENING ORDER (NO ISOLATION) - Swab, Nasopharynx        PAD with status post LLE amputation below the knee  Atherosclerosis of native artery with intermittent claudication and rest pain of right lower extremity  Abnormal CTA with runoff. total occlusion of the right distal SFA  CAD with status post PCI ABIODUN x 2 to the LAD.  Status post CABG x 5   Essential hypertension is well controlled  Dyslipidemia is stable. Being follow by his primary cardiologist in [place].  Chronic tobacco abuse. Continues to smoke 1 ppd    We will proceed with a peripheral angiogram 1-  Follow up post procedure.     I have explained the risks associated with the procedure to the patient including but not limited to an allergic reaction to the contrast material or medications used during the procedure bleeding, infection, and bruising at the catheter insertion site blood clots, which may trigger heart attack, stroke,   damage to the artery where the catheter was inserted, or damage to the arteries as the catheter travels through your body, irregular heart rhythm arrhythmias, kidney damage caused by the contrast material.     I extensively discussed consequences of smoking namely cardiovascular/metabolic, lung cancer, mouth cancer, pulmonary disorder including COPD and reduced quality of life.  At the end of the conversation, patient voices understanding of the risk involved in smoking. Patient also understands the benefits of quitting.  I provided the patient smoking cessation material. Patient displayed understanding and stated that he denies to quit smoking.  During this visit I spent 5 minutes counseling the patient regarding the smoking cessation.      Follow Up Post procedure    Return for Post procedure.  Patient was given instructions and counseling regarding his  condition or for health maintenance advice. Please see specific information pulled into the AVS if appropriate.             Director, Cardiac Cath Lab    HEATHER Daigle, acting as scribe for Wesly Lund MD, [facility], [facility].   01/07/21  14:53 EST     I have read and agree the documentation that has been completed regarding this visit.  By signing this record, I attest that the documentation was completed by my physical presence and is an accurate record of the encounter.  Voice recognition / transcription technology used for some documentation in this chart in attempt to mitigate substantial inefficiencies created by this electronic health record technology.  As a result, there may be some typos and.or nonsensical language introduced into the chart that either overlooked in editing/review and/or that I am unable to correct as patient care needs require me to prioritize my attention to bedside patient care rather than electronic documentation. MA

## 2021-01-07 NOTE — PROGRESS NOTES
"    Chief Complaint  Claudication and Leg Swelling    Subjective    History of Present Illness     Reg Sanchez presents to [facility] CARDIOLOGY consult from [facility] for PAD    PAD:  Ischemic  History of LLE amputations below the knee. Acute with initial claudication. Presents with lower extremity pain. No edema. Burning pain and ting Intermittent claudication with rest pain right lower extremity.  Dry, dark red in color change and no hair present. CTA with runoff 11/13/2020 showed total occlusion of the right distal SFA with reconstitution of the popliteal and faint runoff. Delayed capillary refill Right DP and PT 0.    Smoking:  Patient is a current every day smoker. He smokes 1 ppd.  States that he denies to quit at this time.      Hypertension:  The patient presents with essential hypertension.  The patient states he has been stable with his blood pressure control.    No significant interval events.    CAD:  The patient states that his has been stable with his coronary artery disease Patient is currently asymptomatic.      Status post PCI ABIODUN x 2 to the LAD.  Status post CABG x 5   Current treatment includes statins, beta blockers, and ace inhibitors.  By report, there is good compliance with treatement, good tolerance of treatment and good symptom control    Dyslipidemia:  The patient states that he has been stable. Denies any complications with statins.     Medications:  The patient is adherent with his medications.    Objective     Vital Signs:   /85 (BP Location: Right arm, Patient Position: Sitting)   Pulse 75   Ht 170.2 cm (67\")   Wt 102 kg (224 lb)   SpO2 95%   BMI 35.08 kg/m²       Physical Exam  Constitutional:       General: He is not in acute distress.     Appearance: Normal appearance.   HENT:      Head: Normocephalic.      Nose: Nose normal.      Mouth/Throat:      Pharynx: Oropharynx is clear.   Eyes:      Pupils: Pupils are equal, round, and reactive to " light.   Neck:      Musculoskeletal: Neck supple. No muscular tenderness.      Vascular: No carotid bruit.   Cardiovascular:      Rate and Rhythm: Normal rate and regular rhythm.      Pulses: Normal pulses.      Heart sounds: Normal heart sounds. No friction rub. No gallop.    Pulmonary:      Effort: Pulmonary effort is normal.      Breath sounds: Normal breath sounds.   Abdominal:      General: Bowel sounds are normal.      Palpations: Abdomen is soft.   Musculoskeletal: Normal range of motion.   Skin:     Coloration: Skin is not jaundiced or pale.   Neurological:      General: No focal deficit present.      Mental Status: He is alert and oriented to person, place, and time.   Psychiatric:         Mood and Affect: Mood normal.         Behavior: Behavior normal.         Judgment: Judgment normal.          Result Review :     Data reviewed: Cardiology studies 01/07/2021       Assessment and Plan      Problem List Items Addressed This Visit     None      Visit Diagnoses     Peripheral vascular disease (CMS/HCC)    -  Primary    Relevant Orders    Case Request IR Lab: Peripheral angiography    CBC & Differential    Protime-INR    Comprehensive Metabolic Panel    Superficial femoral artery occlusion (CMS/HCC)        Relevant Orders    Case Request IR Lab: Peripheral angiography    CBC & Differential    Protime-INR    Comprehensive Metabolic Panel    Atherosclerosis of native artery of right lower extremity with intermittent claudication (CMS/HCC)        Relevant Orders    Case Request IR Lab: Peripheral angiography    CBC & Differential    Protime-INR    Comprehensive Metabolic Panel    Tobacco use        Relevant Orders    Case Request IR Lab: Peripheral angiography    CBC & Differential    Protime-INR    Comprehensive Metabolic Panel    Coronary artery disease involving native coronary artery of native heart without angina pectoris        Relevant Orders    Case Request IR Lab: Peripheral angiography    CBC &  Differential    Protime-INR    Comprehensive Metabolic Panel    Other specified pre-operative examination        Relevant Orders    COVID PRE-OP / PRE-PROCEDURE SCREENING ORDER (NO ISOLATION) - Swab, Nasopharynx        PAD with status post LLE amputation below the knee  Atherosclerosis of native artery with intermittent claudication and rest pain of right lower extremity  Abnormal CTA with runoff. total occlusion of the right distal SFA  CAD with status post PCI ABIODUN x 2 to the LAD.  Status post CABG x 5   Essential hypertension is well controlled  Dyslipidemia is stable. Being follow by his primary cardiologist in [place].  Chronic tobacco abuse. Continues to smoke 1 ppd    We will proceed with a peripheral angiogram 1-  Follow up post procedure.     I have explained the risks associated with the procedure to the patient including but not limited to an allergic reaction to the contrast material or medications used during the procedure bleeding, infection, and bruising at the catheter insertion site blood clots, which may trigger heart attack, stroke,   damage to the artery where the catheter was inserted, or damage to the arteries as the catheter travels through your body, irregular heart rhythm arrhythmias, kidney damage caused by the contrast material.     I extensively discussed consequences of smoking namely cardiovascular/metabolic, lung cancer, mouth cancer, pulmonary disorder including COPD and reduced quality of life.  At the end of the conversation, patient voices understanding of the risk involved in smoking. Patient also understands the benefits of quitting.  I provided the patient smoking cessation material. Patient displayed understanding and stated that he denies to quit smoking.  During this visit I spent 5 minutes counseling the patient regarding the smoking cessation.      Follow Up Post procedure    Return for Post procedure.  Patient was given instructions and counseling regarding his  condition or for health maintenance advice. Please see specific information pulled into the AVS if appropriate.             Director, Cardiac Cath Lab    HEATHER Daigle, acting as scribe for Wesly Lund MD, [facility].   01/07/21  14:53 EST     I have read and agree the documentation that has been completed regarding this visit.  By signing this record, I attest that the documentation was completed by my physical presence and is an accurate record of the encounter.  Voice recognition / transcription technology used for some documentation in this chart in attempt to mitigate substantial inefficiencies created by this electronic health record technology.  As a result, there may be some typos and.or nonsensical language introduced into the chart that either overlooked in editing/review and/or that I am unable to correct as patient care needs require me to prioritize my attention to bedside patient care rather than electronic documentation. MA

## 2021-01-12 ENCOUNTER — OFFICE VISIT (OUTPATIENT)
Dept: CARDIOLOGY | Facility: CLINIC | Age: 56
End: 2021-01-12

## 2021-01-12 ENCOUNTER — TELEPHONE (OUTPATIENT)
Dept: CARDIOLOGY | Facility: CLINIC | Age: 56
End: 2021-01-12

## 2021-01-12 VITALS
HEIGHT: 67 IN | DIASTOLIC BLOOD PRESSURE: 82 MMHG | HEART RATE: 62 BPM | OXYGEN SATURATION: 96 % | WEIGHT: 225 LBS | BODY MASS INDEX: 35.31 KG/M2 | TEMPERATURE: 96.2 F | SYSTOLIC BLOOD PRESSURE: 152 MMHG

## 2021-01-12 DIAGNOSIS — L08.9 BITE WOUND OF LEFT HAND WITH INFECTION, INITIAL ENCOUNTER: ICD-10-CM

## 2021-01-12 DIAGNOSIS — I65.21 CAROTID ARTERY STENOSIS, SYMPTOMATIC, RIGHT: ICD-10-CM

## 2021-01-12 DIAGNOSIS — R41.3 MEMORY LOSS, SHORT TERM: ICD-10-CM

## 2021-01-12 DIAGNOSIS — R60.0 EDEMA OF ABDOMINAL WALL: ICD-10-CM

## 2021-01-12 DIAGNOSIS — Z72.0 TOBACCO USE: ICD-10-CM

## 2021-01-12 DIAGNOSIS — I25.10 CORONARY ARTERY DISEASE INVOLVING NATIVE CORONARY ARTERY OF NATIVE HEART WITHOUT ANGINA PECTORIS: ICD-10-CM

## 2021-01-12 DIAGNOSIS — R42 DIZZY: ICD-10-CM

## 2021-01-12 DIAGNOSIS — R07.2 PRECORDIAL PAIN: Primary | ICD-10-CM

## 2021-01-12 DIAGNOSIS — R60.0 EDEMA OF RIGHT LOWER EXTREMITY: ICD-10-CM

## 2021-01-12 DIAGNOSIS — S61.452A BITE WOUND OF LEFT HAND WITH INFECTION, INITIAL ENCOUNTER: ICD-10-CM

## 2021-01-12 PROBLEM — I73.9 PERIPHERAL VASCULAR DISEASE (HCC): Status: ACTIVE | Noted: 2021-01-12

## 2021-01-12 PROBLEM — I70.209 SUPERFICIAL FEMORAL ARTERY OCCLUSION (HCC): Status: ACTIVE | Noted: 2021-01-12

## 2021-01-12 PROBLEM — I70.211 ATHEROSCLEROSIS OF NATIVE ARTERY OF RIGHT LOWER EXTREMITY WITH INTERMITTENT CLAUDICATION (HCC): Status: ACTIVE | Noted: 2021-01-12

## 2021-01-12 PROCEDURE — 99214 OFFICE O/P EST MOD 30 MIN: CPT | Performed by: NURSE PRACTITIONER

## 2021-01-12 PROCEDURE — 93000 ELECTROCARDIOGRAM COMPLETE: CPT | Performed by: NURSE PRACTITIONER

## 2021-01-12 RX ORDER — CLOPIDOGREL BISULFATE 75 MG/1
75 TABLET ORAL DAILY
Qty: 30 TABLET | Refills: 11 | Status: SHIPPED | OUTPATIENT
Start: 2021-01-12 | End: 2021-01-21 | Stop reason: HOSPADM

## 2021-01-12 RX ORDER — POTASSIUM CHLORIDE 750 MG/1
10 TABLET, FILM COATED, EXTENDED RELEASE ORAL DAILY
Qty: 30 TABLET | Refills: 11 | Status: SHIPPED | OUTPATIENT
Start: 2021-01-12 | End: 2021-08-02 | Stop reason: SDUPTHER

## 2021-01-12 RX ORDER — AMOXICILLIN AND CLAVULANATE POTASSIUM 875; 125 MG/1; MG/1
1 TABLET, FILM COATED ORAL 2 TIMES DAILY
Qty: 20 TABLET | Refills: 0 | Status: SHIPPED | OUTPATIENT
Start: 2021-01-12 | End: 2021-02-01 | Stop reason: HOSPADM

## 2021-01-12 RX ORDER — PREGABALIN 200 MG/1
200 CAPSULE ORAL 3 TIMES DAILY
COMMUNITY
Start: 2021-01-08

## 2021-01-12 RX ORDER — NICOTINE 21 MG/24HR
1 PATCH, TRANSDERMAL 24 HOURS TRANSDERMAL EVERY 24 HOURS
Qty: 30 PATCH | Refills: 3 | Status: ON HOLD | OUTPATIENT
Start: 2021-01-12 | End: 2021-01-20

## 2021-01-12 RX ORDER — FUROSEMIDE 20 MG/1
20 TABLET ORAL DAILY
Qty: 30 TABLET | Refills: 11 | Status: SHIPPED | OUTPATIENT
Start: 2021-01-12 | End: 2021-08-02 | Stop reason: SDUPTHER

## 2021-01-12 NOTE — TELEPHONE ENCOUNTER
Faxed copy of CTA of neck, CT angio abd runoff to patients pcp, Sheila Sheikh APRN. Pallavi Becerra, JERAMYN

## 2021-01-12 NOTE — PATIENT INSTRUCTIONS
"BMI for Adults  What is BMI?  Body mass index (BMI) is a number that is calculated from a person's weight and height. BMI can help estimate how much of a person's weight is composed of fat. BMI does not measure body fat directly. Rather, it is an alternative to procedures that directly measure body fat, which can be difficult and expensive.  BMI can help identify people who may be at higher risk for certain medical problems.  What are BMI measurements used for?  BMI is used as a screening tool to identify possible weight problems. It helps determine whether a person is obese, overweight, a healthy weight, or underweight.  BMI is useful for:  · Identifying a weight problem that may be related to a medical condition or may increase the risk for medical problems.  · Promoting changes, such as changes in diet and exercise, to help reach a healthy weight. BMI screening can be repeated to see if these changes are working.  How is BMI calculated?  BMI involves measuring your weight in relation to your height. Both height and weight are measured, and the BMI is calculated from those numbers. This can be done either in English (U.S[place] or metric measurements. Note that charts and online BMI calculators are available to help you find your BMI quickly and easily without having to do these calculations yourself.  To calculate your BMI in English (U.S[place] measurements:    1. Measure your weight in pounds (lb).  2. Multiply the number of pounds by 703.  ? For example, for a person who weighs 180 lb, multiply that number by 703, which equals 126,540.  3. Measure your height in inches. Then multiply that number by itself to get a measurement called \"inches squared.\"  ? For example, for a person who is 70 inches tall, the \"inches squared\" measurement is 70 inches x 70 inches, which equals 4,900 inches squared.  4. Divide the total from step 2 (number of lb x 703) by the total from step 3 (inches squared): 126,540 ÷ 4,900 = 25.8. This is " "your BMI.  To calculate your BMI in metric measurements:  1. Measure your weight in kilograms (kg).  2. Measure your height in meters (m). Then multiply that number by itself to get a measurement called \"meters squared.\"  ? For example, for a person who is 1.75 m tall, the \"meters squared\" measurement is 1.75 m x 1.75 m, which is equal to 3.1 meters squared.  3. Divide the number of kilograms (your weight) by the meters squared number. In this example: 70 ÷ 3.1 = 22.6. This is your BMI.  What do the results mean?  BMI charts are used to identify whether you are underweight, normal weight, overweight, or obese. The following guidelines will be used:  · Underweight: BMI less than 18.5.  · Normal weight: BMI between 18.5 and 24.9.  · Overweight: BMI between 25 and 29.9.  · Obese: BMI of 30 or above.  Keep these notes in mind:  · Weight includes both fat and muscle, so someone with a muscular build, such as an athlete, may have a BMI that is higher than 24.9. In cases like these, BMI is not an accurate measure of body fat.  · To determine if excess body fat is the cause of a BMI of 25 or higher, further assessments may need to be done by a health care provider.  · BMI is usually interpreted in the same way for men and women.  Where to find more information  For more information about BMI, including tools to quickly calculate your BMI, go to these websites:  · Centers for Disease Control and Prevention: www.cdc.gov  · American Heart Association: www.heart.org  · National Heart, Lung, and Blood Rand: www.nhlbi.nih.gov  Summary  · Body mass index (BMI) is a number that is calculated from a person's weight and height.  · BMI may help estimate how much of a person's weight is composed of fat. BMI can help identify those who may be at higher risk for certain medical problems.  · BMI can be measured using English measurements or metric measurements.  · BMI charts are used to identify whether you are underweight, normal " weight, overweight, or obese.  This information is not intended to replace advice given to you by your health care provider. Make sure you discuss any questions you have with your health care provider.  Document Revised: 09/09/2020 Document Reviewed: 07/17/2020  Elsevier Patient Education © 2020 [employer]  Steps to Quit Smoking  Smoking tobacco is the leading cause of preventable death. It can affect almost every organ in the body. Smoking puts you and people around you at risk for many serious, long-lasting (chronic) diseases. Quitting smoking can be hard, but it is one of the best things that you can do for your health. It is never too late to quit.  How do I get ready to quit?  When you decide to quit smoking, make a plan to help you succeed. Before you quit:  · Pick a date to quit. Set a date within the next 2 weeks to give you time to prepare.  · Write down the reasons why you are quitting. Keep this list in places where you will see it often.  · Tell your family, friends, and co-workers that you are quitting. Their support is important.  · Talk with your doctor about the choices that may help you quit.  · Find out if your health insurance will pay for these treatments.  · Know the people, places, things, and activities that make you want to smoke (triggers). Avoid them.  What first steps can I take to quit smoking?  · Throw away all cigarettes at home, at work, and in your car.  · Throw away the things that you use when you smoke, such as ashtrays and lighters.  · Clean your car. Make sure to empty the ashtray.  · Clean your home, including curtains and carpets.  What can I do to help me quit smoking?  Talk with your doctor about taking medicines and seeing a counselor at the same time. You are more likely to succeed when you do both.  · If you are pregnant or breastfeeding, talk with your doctor about counseling or other ways to quit smoking. Do not take medicine to help you quit smoking unless your  doctor tells you to do so.  To quit smoking:  Quit right away  · Quit smoking totally, instead of slowly cutting back on how much you smoke over a period of time.  · Go to counseling. You are more likely to quit if you go to counseling sessions regularly.  Take medicine  You may take medicines to help you quit. Some medicines need a prescription, and some you can buy over-the-counter. Some medicines may contain a drug called nicotine to replace the nicotine in cigarettes. Medicines may:  · Help you to stop having the desire to smoke (cravings).  · Help to stop the problems that come when you stop smoking (withdrawal symptoms).  Your doctor may ask you to use:  · Nicotine patches, gum, or lozenges.  · Nicotine inhalers or sprays.  · Non-nicotine medicine that is taken by mouth.  Find resources  Find resources and other ways to help you quit smoking and remain smoke-free after you quit. These resources are most helpful when you use them often. They include:  · Online chats with a counselor.  · Phone quitlines.  · Printed self-help materials.  · Support groups or group counseling.  · Text messaging programs.  · Mobile phone apps. Use apps on your mobile phone or tablet that can help you stick to your quit plan. There are many free apps for mobile phones and tablets as well as websites. Examples include Quit Guide from the CDC and smokefree.gov    What things can I do to make it easier to quit?    · Talk to your family and friends. Ask them to support and encourage you.  · Call a phone quitline (3-936-QUIT-NOW), reach out to support groups, or work with a counselor.  · Ask people who smoke to not smoke around you.  · Avoid places that make you want to smoke, such as:  ? Bars.  ? Parties.  ? Smoke-break areas at work.  · Spend time with people who do not smoke.  · Lower the stress in your life. Stress can make you want to smoke. Try these things to help your stress:  ? Getting regular exercise.  ? Doing deep-breathing  exercises.  ? Doing yoga.  ? Meditating.  ? Doing a body scan. To do this, close your eyes, focus on one area of your body at a time from head to toe. Notice which parts of your body are tense. Try to relax the muscles in those areas.  How will I feel when I quit smoking?  Day 1 to 3 weeks  Within the first 24 hours, you may start to have some problems that come from quitting tobacco. These problems are very bad 2-3 days after you quit, but they do not often last for more than 2-3 weeks. You may get these symptoms:  · Mood swings.  · Feeling restless, nervous, angry, or annoyed.  · Trouble concentrating.  · Dizziness.  · Strong desire for high-sugar foods and nicotine.  · Weight gain.  · Trouble pooping (constipation).  · Feeling like you may vomit (nausea).  · Coughing or a sore throat.  · Changes in how the medicines that you take for other issues work in your body.  · Depression.  · Trouble sleeping (insomnia).  Week 3 and afterward  After the first 2-3 weeks of quitting, you may start to notice more positive results, such as:  · Better sense of smell and taste.  · Less coughing and sore throat.  · Slower heart rate.  · Lower blood pressure.  · Clearer skin.  · Better breathing.  · Fewer sick days.  Quitting smoking can be hard. Do not give up if you fail the first time. Some people need to try a few times before they succeed. Do your best to stick to your quit plan, and talk with your doctor if you have any questions or concerns.  Summary  · Smoking tobacco is the leading cause of preventable death. Quitting smoking can be hard, but it is one of the best things that you can do for your health.  · When you decide to quit smoking, make a plan to help you succeed.  · Quit smoking right away, not slowly over a period of time.  · When you start quitting, seek help from your doctor, family, or friends.  This information is not intended to replace advice given to you by your health care provider. Make sure you discuss  any questions you have with your health care provider.  Document Revised: 09/11/2020 Document Reviewed: 03/07/2020  Elsevier Patient Education © 2020 [employer]vier Inc.

## 2021-01-12 NOTE — PROGRESS NOTES
Subjective     Reg Sanchez is a 55 y.o. male who presents to day for CTA of Neck (1-4-20 Patient thinks he has not been taking Plavix).    CHIEF COMPLIANT  Chief Complaint   Patient presents with   • CTA of Neck     1-4-20 Patient thinks he has not been taking Plavix       Active Problems:  Problem List Items Addressed This Visit        Other    Tobacco use    Overview     Added automatically from request for surgery [id number]         Relevant Medications    nicotine (NICODERM CQ) 21 MG/24HR patch    Coronary artery disease involving native coronary artery of native heart without angina pectoris    Overview     Added automatically from request for surgery [id number]         Relevant Medications    clopidogrel (PLAVIX) 75 MG tablet      Other Visit Diagnoses     Precordial pain    -  Primary    Relevant Orders    ECG 12 Lead    Carotid artery stenosis, symptomatic, right        Relevant Orders    Ambulatory Referral to Neurosurgery    Dizzy        Relevant Orders    Ambulatory Referral to Neurosurgery    Memory loss, short term        Relevant Orders    Ambulatory Referral to Neurosurgery    Edema of right lower extremity        Relevant Medications    furosemide (LASIX) 20 MG tablet    potassium chloride 10 MEQ CR tablet    Edema of abdominal wall        Relevant Medications    furosemide (LASIX) 20 MG tablet    potassium chloride 10 MEQ CR tablet    Bite wound of left hand with infection, initial encounter        Relevant Medications    amoxicillin-clavulanate (Augmentin) 875-125 MG per tablet      Problem list  1.  Coronary artery disease with previous bypass in [facility] 2018  1.1 left heart catheterization 3/18: Left main normal, LAD 70 to 80% proximal, 100% mid, circumflex 70% proximal but 100% occluded mid, RCA has 70% stenosis proximal 100% mid, the acute marginal branch has moderate disease but before that her percent occluded lesion, SVG to RCA widely patent SVG to OM1 and OM to the diagonal branch widely  patent [place] to LAD widely patent  1.2 stress test 10/20: Mild inferior lateral ischemia post stress EF 69%  1.3 left heart catheterization 11/20: Both native coronary arteries were totally occluded proximally, SVG to PDA with no distal stenosis vein graft to the diagonal and first and second OM widely patent, EF 60%, LVEDP 20-22, LIMA LAD subtotal stenosis immediately distal to the touchdown followed by segmental 50 to 70% in which stents were placed at the subtotal stenosis as well as the 50 to 70% distal lesion.  2.  Chest pain  3.  Shortness of breath  4.  Peripheral vascular disease with right lower extremity claudication and left BKA  4.1.  Ultrasound of bilateral lower extremities 10/20: Common femoral 50%, high-grade stenosis cannot be ruled out in the right SFA, 50% or less on the left SFA, 50% or greater in the left profunda femoris artery, popliteal diffusely diseased on the right but patent  4.2 CTA of the abdomen with bilateral lower extremity runoff 11/20: Fairly advanced atherosclerotic disease with total occlusion of the distal superficial femoral artery on the right the popliteal reconstitutes via collaterals and there is a faint 2-3 vessel runoff to the ankle no significant hemodynamically stenosis on the left thigh.  Patient also had moderate atherosclerotic calcifications of the aorta with diffuse atherosclerotic disease of the celiac and superior mesenteric arteries with multiple areas of stenosis in the range of 60 to 70% the inferior mesenteric artery is patent but severely stenotic renal arteries are without evidence of hemodynamically significant stenosis  5. Hypertension  6.  Dizziness  6.112/20 carotid duplex: Potentially hemodynamically significant stenosis in the right internal carotid artery severe segmental disease involving the right external carotid artery,       HPI  HPI  Mr. Sanchez is a 55-year-old male patient who is being followed up today for carotid duplex results.  Patient  underwent carotid duplex due to increasing dizziness.  It was found that he had potentially hemodynamically significant carotid artery disease in the right internal and external carotid arteries.  He also reports associated memory loss.  Patient also reports chronic chest pain that occurred earlier today and the left side of his chest is intermittent and last about 10 minutes.  He described as sharp.  He does have associated shortness of air and usually occurs with activity.  He does say it is tender to touch as well.  He says that chest pain is occurring less often patient also has chronic shortness of breath that occurs at rest as well.  He says he has to stop to take a deep breath at times definitely worse with activity.  He says carrying wood in the house causes him to be dyspneic.  He says is not very heavy but if he carries anything at all he becomes dyspneic.  He has been taking Plavix but that there is an accidental mixup in which she will resume taking Plavix.  He is also followed by Dr. Lund which he follows up on the 20th.  He also experiences some PND.  Patient also has fatigue where he gets tired easier than what he used to.  He does report dizziness that he has when he is up moving around he says at times that he even has a dark.  Patient denies palpitations syncope, orthopnea, or other neurological problems.  PRIOR MEDS  Current Outpatient Medications on File Prior to Visit   Medication Sig Dispense Refill   • albuterol (PROVENTIL) (2.5 MG/3ML) 0.083% nebulizer solution Take 2.5 mg by nebulization Every 4 (Four) Hours As Needed for Wheezing.     • albuterol sulfate  (90 Base) MCG/ACT inhaler Inhale 2 puffs Every 4 (Four) Hours As Needed for Wheezing.     • aspirin 81 MG chewable tablet Chew 81 mg Daily.     • atorvastatin (LIPITOR) 40 MG tablet Take 40 mg by mouth Daily.     • DULoxetine (CYMBALTA) 60 MG capsule TK 1 C PO BID     • Fluticasone-Umeclidin-Vilant (Trelegy Ellipta) 100-62.5-25  MCG/INH aerosol powder  Inhale 1 puff.     • Insulin Glargine (BASAGLAR KWIKPEN SC) Inject 20 Units under the skin into the appropriate area as directed Daily.     • isosorbide mononitrate (IMDUR) 30 MG 24 hr tablet Take 1 tablet by mouth Daily. 30 tablet 11   • lisinopril (PRINIVIL,ZESTRIL) 10 MG tablet Take 10 mg by mouth Daily.     • metFORMIN (GLUCOPHAGE) 1000 MG tablet Take 1,000 mg by mouth 2 (Two) Times a Day With Meals.     • metoprolol tartrate (LOPRESSOR) 50 MG tablet Take 50 mg by mouth 2 (Two) Times a Day.     • nitroglycerin (NITROSTAT) 0.4 MG SL tablet 1 under the tongue as needed for angina, may repeat q5mins for up three doses 30 tablet 5   • omeprazole (priLOSEC) 40 MG capsule Take 40 mg by mouth Daily.     • pregabalin (LYRICA) 200 MG capsule Take 200 mg by mouth 3 (Three) Times a Day.     • rOPINIRole (REQUIP) 3 MG tablet Take 3 mg by mouth Every Night.       No current facility-administered medications on file prior to visit.        ALLERGIES  Patient has no known allergies.    HISTORY  Past Medical History:   Diagnosis Date   • Anxiety    • CHF (congestive heart failure) (CMS/[facility])    • COPD (chronic obstructive pulmonary disease) (CMS/[facility])    • Diabetes mellitus (CMS/[facility])    • Hepatitis C    • Hyperlipidemia    • Hypertension    • Sleep apnea        Social History     Socioeconomic History   • Marital status: Legally      Spouse name: Not on file   • Number of children: Not on file   • Years of education: Not on file   • Highest education level: Not on file   Tobacco Use   • Smoking status: Current Every Day Smoker     Packs/day: 1.00     Years: 40.00     Pack years: 40.00     Types: Cigarettes   • Smokeless tobacco: Never Used   Substance and Sexual Activity   • Alcohol use: Yes   • Drug use: No   • Sexual activity: Defer       Family History   Problem Relation Age of Onset   • Heart disease Mother    • Anuerysm Father    • Heart disease Father    • Heart disease Sister    • Heart  "disease Sister        Review of Systems   Constitutional: Positive for diaphoresis and fatigue. Negative for chills and fever.   HENT: Negative.  Negative for congestion, sinus pressure and sore throat.    Eyes: Positive for visual disturbance (contacts).   Respiratory: Positive for chest tightness and shortness of breath.    Cardiovascular: Positive for chest pain (comes and goes, cp today) and leg swelling. Negative for palpitations.   Gastrointestinal: Negative.  Negative for abdominal pain, blood in stool, constipation, diarrhea, nausea and vomiting.   Endocrine: Positive for heat intolerance. Negative for cold intolerance.   Genitourinary: Positive for urgency. Negative for dysuria, frequency and hematuria.   Musculoskeletal: Positive for arthralgias, back pain, gait problem (Left BKA) and neck stiffness.   Skin: Positive for wound (dog bit left hand). Negative for rash.   Allergic/Immunologic: Negative.  Negative for environmental allergies and food allergies.   Neurological: Positive for dizziness (with position change at times, moving around, blackness for a second, does not pass out). Negative for syncope and light-headedness.   Hematological: Bruises/bleeds easily (bruises).   Psychiatric/Behavioral: Positive for sleep disturbance (wakes with soa and cp).       Objective     VITALS: /82 (BP Location: Left arm, Patient Position: Sitting)   Pulse 62   Temp 96.2 °F (35.7 °C)   Ht 170.2 cm (67\")   Wt 102 kg (225 lb)   SpO2 96%   BMI 35.24 kg/m²     LABS:   Lab Results (most recent)     None          IMAGING:   No Images in the past 120 days found..    EXAM:  Physical Exam  Skin:               Procedure     ECG 12 Lead    Date/Time: 1/12/2021 10:32 AM  Performed by: Devon Kwan APRN  Authorized by: Devon Kwan APRN   Comparison: compared with previous ECG from 11/7/2019  Similar to previous ECG  Rhythm: sinus arrhythmia  Rate: bradycardic  BPM: 59  ST Elevation: II, III and aVF  QRS axis: " normal  Comments:  ms  No acute changes               Assessment/Plan    Diagnosis Plan   1. Precordial pain  ECG 12 Lead   2. Carotid artery stenosis, symptomatic, right  Ambulatory Referral to Neurosurgery   3. Dizzy  Ambulatory Referral to Neurosurgery   4. Memory loss, short term  Ambulatory Referral to Neurosurgery   5. Tobacco use  nicotine (NICODERM CQ) 21 MG/24HR patch   6. Coronary artery disease involving native coronary artery of native heart without angina pectoris  clopidogrel (PLAVIX) 75 MG tablet   7. Edema of right lower extremity  furosemide (LASIX) 20 MG tablet    potassium chloride 10 MEQ CR tablet   8. Edema of abdominal wall  furosemide (LASIX) 20 MG tablet    potassium chloride 10 MEQ CR tablet   9. Bite wound of left hand with infection, initial encounter  amoxicillin-clavulanate (Augmentin) 875-125 MG per tablet   1.  Patient does have persistent chest pain however is occurring less frequent than what it was.  We will continue antianginal therapy at this time.  He recently went underneath left heart catheterization in November 2020.  2.  Patient was found to have right sided carotid artery stenosis that may be hemodynamically significant in which she has associated dizziness and memory loss.  Therefore I would like to refer him to neurosurgery for further evaluation.  3.  Patient also reports some lower extremity edema and his right lower extremity I would like to start patient on Lasix 20 mg daily and supplement him with potassium 10 mEq.  4.  Patient has an infected wound to his left hand in which I would like to put him on Augmentin.  5.  Patient's blood pressure is up today 152/82 heart rate is 67.  We will continue to monitor his blood pressure at this time if it continues to be elevated we will need to increase patient's blood pressure medication.  6.  Patient does have tobacco abuse and requested nicotine gum to see if he could stop smoking.  I will send this into the pharmacy  of his choosing.  7.  We did discuss potential of stress test due to his chest pain however he wishes to defer at this time.  8.  Informed of signs and symptoms of ACS and advised to seek emergent treatment for any new worsening symptoms.  Patient also advised sooner follow-up as needed.  Also advised to follow-up with family doctor as needed  This note is dictated utilizing voice recognition software.  Although this record has been proof read, transcriptional errors may still be present. If questions occur regarding the content of this record please do not hesitate to call our office.  I have reviewed and confirmed the accuracy of the ROS as documented by the MA/LPN/RN YOUNG Alvarado    No follow-ups on file.    Diagnoses and all orders for this visit:    1. Precordial pain (Primary)  -     ECG 12 Lead    2. Carotid artery stenosis, symptomatic, right  -     Ambulatory Referral to Neurosurgery    3. Dizzy  -     Ambulatory Referral to Neurosurgery    4. Memory loss, short term  -     Ambulatory Referral to Neurosurgery    5. Tobacco use  -     nicotine (NICODERM CQ) 21 MG/24HR patch; Place 1 patch on the skin as directed by provider Daily.  Dispense: 30 patch; Refill: 3    6. Coronary artery disease involving native coronary artery of native heart without angina pectoris  -     clopidogrel (PLAVIX) 75 MG tablet; Take 1 tablet by mouth Daily.  Dispense: 30 tablet; Refill: 11    7. Edema of right lower extremity  -     furosemide (LASIX) 20 MG tablet; Take 1 tablet by mouth Daily.  Dispense: 30 tablet; Refill: 11  -     potassium chloride 10 MEQ CR tablet; Take 1 tablet by mouth Daily.  Dispense: 30 tablet; Refill: 11    8. Edema of abdominal wall  -     furosemide (LASIX) 20 MG tablet; Take 1 tablet by mouth Daily.  Dispense: 30 tablet; Refill: 11  -     potassium chloride 10 MEQ CR tablet; Take 1 tablet by mouth Daily.  Dispense: 30 tablet; Refill: 11    9. Bite wound of left hand with infection, initial  encounter  -     amoxicillin-clavulanate (Augmentin) 875-125 MG per tablet; Take 1 tablet by mouth 2 (Two) Times a Day.  Dispense: 20 tablet; Refill: 0        Reg Sanchez  reports that he has been smoking cigarettes. He has a 40.00 pack-year smoking history. He has never used smokeless tobacco.. I have educated him on the risk of diseases from using tobacco products such as cancer, COPD and heart disease.     I advised him to quit and he is willing to quit. We have discussed the following method/s for tobacco cessation:  Counseling.      I spent 3  minutes counseling the patient.           Patient's Body mass index is 35.24 kg/m². BMI is above normal parameters. Recommendations include: educational material.           MEDS ORDERED DURING VISIT:  New Medications Ordered This Visit   Medications   • furosemide (LASIX) 20 MG tablet     Sig: Take 1 tablet by mouth Daily.     Dispense:  30 tablet     Refill:  11   • clopidogrel (PLAVIX) 75 MG tablet     Sig: Take 1 tablet by mouth Daily.     Dispense:  30 tablet     Refill:  11   • nicotine (NICODERM CQ) 21 MG/24HR patch     Sig: Place 1 patch on the skin as directed by provider Daily.     Dispense:  30 patch     Refill:  3   • potassium chloride 10 MEQ CR tablet     Sig: Take 1 tablet by mouth Daily.     Dispense:  30 tablet     Refill:  11   • amoxicillin-clavulanate (Augmentin) 875-125 MG per tablet     Sig: Take 1 tablet by mouth 2 (Two) Times a Day.     Dispense:  20 tablet     Refill:  0           This document has been electronically signed by Devon Kwan Jr., APRN  January 18, 2021 09:49 EST

## 2021-01-14 ENCOUNTER — TRANSCRIBE ORDERS (OUTPATIENT)
Dept: ADMINISTRATIVE | Facility: HOSPITAL | Age: 56
End: 2021-01-14

## 2021-01-14 DIAGNOSIS — Z01.818 PRE-OPERATIVE CLEARANCE: Primary | ICD-10-CM

## 2021-01-18 ENCOUNTER — LAB (OUTPATIENT)
Dept: LAB | Facility: HOSPITAL | Age: 56
End: 2021-01-18

## 2021-01-18 DIAGNOSIS — Z01.818 PRE-OPERATIVE CLEARANCE: ICD-10-CM

## 2021-01-18 PROCEDURE — C9803 HOPD COVID-19 SPEC COLLECT: HCPCS | Performed by: INTERNAL MEDICINE

## 2021-01-18 PROCEDURE — U0004 COV-19 TEST NON-CDC HGH THRU: HCPCS | Performed by: INTERNAL MEDICINE

## 2021-01-19 LAB — SARS-COV-2 RNA RESP QL NAA+PROBE: NOT DETECTED

## 2021-01-20 ENCOUNTER — HOSPITAL ENCOUNTER (OUTPATIENT)
Facility: HOSPITAL | Age: 56
Discharge: HOME | End: 2021-01-21
Attending: INTERNAL MEDICINE | Admitting: INTERNAL MEDICINE

## 2021-01-20 DIAGNOSIS — R93.89 ABNORMAL ULTRASOUND OF CAROTID ARTERY: ICD-10-CM

## 2021-01-20 DIAGNOSIS — I65.21 CAROTID ARTERY STENOSIS, SYMPTOMATIC, RIGHT: ICD-10-CM

## 2021-01-20 DIAGNOSIS — I70.209 SUPERFICIAL FEMORAL ARTERY OCCLUSION (HCC): ICD-10-CM

## 2021-01-20 DIAGNOSIS — I73.9 PERIPHERAL VASCULAR DISEASE (HCC): ICD-10-CM

## 2021-01-20 DIAGNOSIS — I70.211 ATHEROSCLEROSIS OF NATIVE ARTERY OF RIGHT LOWER EXTREMITY WITH INTERMITTENT CLAUDICATION (HCC): ICD-10-CM

## 2021-01-20 DIAGNOSIS — R42 DIZZY: ICD-10-CM

## 2021-01-20 DIAGNOSIS — Z72.0 TOBACCO USE: ICD-10-CM

## 2021-01-20 DIAGNOSIS — I25.10 CORONARY ARTERY DISEASE INVOLVING NATIVE CORONARY ARTERY OF NATIVE HEART WITHOUT ANGINA PECTORIS: ICD-10-CM

## 2021-01-20 DIAGNOSIS — I73.9 PERIPHERAL VASCULAR DISEASE (HCC): Primary | ICD-10-CM

## 2021-01-20 LAB
ACT BLD: 213 SECONDS (ref 82–152)
ALBUMIN SERPL-MCNC: 3.92 G/DL (ref 3.5–5.2)
ALBUMIN/GLOB SERPL: 1 G/DL
ALP SERPL-CCNC: 89 U/L (ref 39–117)
ALT SERPL W P-5'-P-CCNC: 103 U/L (ref 1–41)
ANION GAP SERPL CALCULATED.3IONS-SCNC: 8 MMOL/L (ref 5–15)
AST SERPL-CCNC: 69 U/L (ref 1–40)
BASOPHILS # BLD AUTO: 0.05 10*3/MM3 (ref 0–0.2)
BASOPHILS NFR BLD AUTO: 0.6 % (ref 0–1.5)
BILIRUB SERPL-MCNC: 0.4 MG/DL (ref 0–1.2)
BUN SERPL-MCNC: 16 MG/DL (ref 6–20)
BUN/CREAT SERPL: 20.3 (ref 7–25)
CALCIUM SPEC-SCNC: 9.8 MG/DL (ref 8.6–10.5)
CHLORIDE SERPL-SCNC: 94 MMOL/L (ref 98–107)
CO2 SERPL-SCNC: 29 MMOL/L (ref 22–29)
CREAT SERPL-MCNC: 0.79 MG/DL (ref 0.76–1.27)
DEPRECATED RDW RBC AUTO: 46.2 FL (ref 37–54)
EOSINOPHIL # BLD AUTO: 0.13 10*3/MM3 (ref 0–0.4)
EOSINOPHIL NFR BLD AUTO: 1.4 % (ref 0.3–6.2)
ERYTHROCYTE [DISTWIDTH] IN BLOOD BY AUTOMATED COUNT: 12.9 % (ref 12.3–15.4)
GFR SERPL CREATININE-BSD FRML MDRD: 102 ML/MIN/1.73
GLOBULIN UR ELPH-MCNC: 4.1 GM/DL
GLUCOSE BLDC GLUCOMTR-MCNC: 235 MG/DL (ref 70–130)
GLUCOSE BLDC GLUCOMTR-MCNC: 245 MG/DL (ref 70–130)
GLUCOSE SERPL-MCNC: 265 MG/DL (ref 65–99)
HCT VFR BLD AUTO: 43.9 % (ref 37.5–51)
HGB BLD-MCNC: 15 G/DL (ref 13–17.7)
IMM GRANULOCYTES # BLD AUTO: 0.04 10*3/MM3 (ref 0–0.05)
IMM GRANULOCYTES NFR BLD AUTO: 0.4 % (ref 0–0.5)
INR PPP: 0.91 (ref 0.9–1.1)
LYMPHOCYTES # BLD AUTO: 2.55 10*3/MM3 (ref 0.7–3.1)
LYMPHOCYTES NFR BLD AUTO: 28.1 % (ref 19.6–45.3)
MCH RBC QN AUTO: 33.1 PG (ref 26.6–33)
MCHC RBC AUTO-ENTMCNC: 34.2 G/DL (ref 31.5–35.7)
MCV RBC AUTO: 96.9 FL (ref 79–97)
MONOCYTES # BLD AUTO: 0.81 10*3/MM3 (ref 0.1–0.9)
MONOCYTES NFR BLD AUTO: 8.9 % (ref 5–12)
NEUTROPHILS NFR BLD AUTO: 5.48 10*3/MM3 (ref 1.7–7)
NEUTROPHILS NFR BLD AUTO: 60.6 % (ref 42.7–76)
NRBC BLD AUTO-RTO: 0 /100 WBC (ref 0–0.2)
PLATELET # BLD AUTO: 195 10*3/MM3 (ref 140–450)
PMV BLD AUTO: 12.5 FL (ref 6–12)
POTASSIUM SERPL-SCNC: 4.6 MMOL/L (ref 3.5–5.2)
PROT SERPL-MCNC: 8 G/DL (ref 6–8.5)
PROTHROMBIN TIME: 12.1 SECONDS (ref 11.9–14.1)
RBC # BLD AUTO: 4.53 10*6/MM3 (ref 4.14–5.8)
SODIUM SERPL-SCNC: 131 MMOL/L (ref 136–145)
WBC # BLD AUTO: 9.06 10*3/MM3 (ref 3.4–10.8)

## 2021-01-20 PROCEDURE — 99152 MOD SED SAME PHYS/QHP 5/>YRS: CPT | Performed by: INTERNAL MEDICINE

## 2021-01-20 PROCEDURE — 63710000001 ASPIRIN 81 MG TABLET DELAYED-RELEASE: Performed by: INTERNAL MEDICINE

## 2021-01-20 PROCEDURE — 94799 UNLISTED PULMONARY SVC/PX: CPT

## 2021-01-20 PROCEDURE — A9270 NON-COVERED ITEM OR SERVICE: HCPCS | Performed by: INTERNAL MEDICINE

## 2021-01-20 PROCEDURE — 75625 CONTRAST EXAM ABDOMINL AORTA: CPT | Performed by: INTERNAL MEDICINE

## 2021-01-20 PROCEDURE — 85347 COAGULATION TIME ACTIVATED: CPT

## 2021-01-20 PROCEDURE — 63710000001 ROPINIROLE 1 MG TABLET: Performed by: INTERNAL MEDICINE

## 2021-01-20 PROCEDURE — 99153 MOD SED SAME PHYS/QHP EA: CPT | Performed by: INTERNAL MEDICINE

## 2021-01-20 PROCEDURE — C1887 CATHETER, GUIDING: HCPCS | Performed by: INTERNAL MEDICINE

## 2021-01-20 PROCEDURE — 63710000001 DULOXETINE 60 MG CAPSULE DELAYED-RELEASE PARTICLES: Performed by: INTERNAL MEDICINE

## 2021-01-20 PROCEDURE — 80053 COMPREHEN METABOLIC PANEL: CPT | Performed by: INTERNAL MEDICINE

## 2021-01-20 PROCEDURE — 85025 COMPLETE CBC W/AUTO DIFF WBC: CPT | Performed by: INTERNAL MEDICINE

## 2021-01-20 PROCEDURE — 63710000001 ISOSORBIDE MONONITRATE 30 MG TABLET SUSTAINED-RELEASE 24 HOUR: Performed by: INTERNAL MEDICINE

## 2021-01-20 PROCEDURE — 82962 GLUCOSE BLOOD TEST: CPT

## 2021-01-20 PROCEDURE — C1894 INTRO/SHEATH, NON-LASER: HCPCS | Performed by: INTERNAL MEDICINE

## 2021-01-20 PROCEDURE — 25010000002 IOPAMIDOL 61 % SOLUTION: Performed by: INTERNAL MEDICINE

## 2021-01-20 PROCEDURE — 63710000001 NICOTINE 21 MG/24HR PATCH 24 HOUR: Performed by: INTERNAL MEDICINE

## 2021-01-20 PROCEDURE — 63710000001 PREGABALIN 100 MG CAPSULE: Performed by: INTERNAL MEDICINE

## 2021-01-20 PROCEDURE — 36245 INS CATH ABD/L-EXT ART 1ST: CPT | Performed by: INTERNAL MEDICINE

## 2021-01-20 PROCEDURE — 85610 PROTHROMBIN TIME: CPT | Performed by: INTERNAL MEDICINE

## 2021-01-20 PROCEDURE — 63710000001 LISINOPRIL 10 MG TABLET: Performed by: INTERNAL MEDICINE

## 2021-01-20 PROCEDURE — C1760 CLOSURE DEV, VASC: HCPCS

## 2021-01-20 PROCEDURE — 25010000002 HEPARIN (PORCINE) PER 1000 UNITS: Performed by: INTERNAL MEDICINE

## 2021-01-20 PROCEDURE — 25010000002 FENTANYL CITRATE (PF) 100 MCG/2ML SOLUTION: Performed by: INTERNAL MEDICINE

## 2021-01-20 PROCEDURE — 25010000002 MIDAZOLAM PER 1 MG: Performed by: INTERNAL MEDICINE

## 2021-01-20 PROCEDURE — C1769 GUIDE WIRE: HCPCS | Performed by: INTERNAL MEDICINE

## 2021-01-20 PROCEDURE — 63710000001 METOPROLOL TARTRATE 50 MG TABLET: Performed by: INTERNAL MEDICINE

## 2021-01-20 PROCEDURE — 75716 ARTERY X-RAYS ARMS/LEGS: CPT | Performed by: INTERNAL MEDICINE

## 2021-01-20 PROCEDURE — 63710000001 AMOXICILLIN-CLAVULANATE 875-125 MG TABLET: Performed by: INTERNAL MEDICINE

## 2021-01-20 PROCEDURE — 63710000001 ATORVASTATIN 40 MG TABLET: Performed by: INTERNAL MEDICINE

## 2021-01-20 PROCEDURE — 63710000001 BUDESONIDE-FORMOTEROL 160-4.5 MCG/ACT AEROSOL 6 G INHALER: Performed by: INTERNAL MEDICINE

## 2021-01-20 PROCEDURE — C1751 CATH, INF, PER/CENT/MIDLINE: HCPCS | Performed by: INTERNAL MEDICINE

## 2021-01-20 PROCEDURE — 63710000001 INSULIN ASPART PER 5 UNITS: Performed by: INTERNAL MEDICINE

## 2021-01-20 PROCEDURE — 63710000001 HYDROCODONE-ACETAMINOPHEN 7.5-325 MG TABLET: Performed by: INTERNAL MEDICINE

## 2021-01-20 PROCEDURE — 63710000001 CLOPIDOGREL 75 MG TABLET: Performed by: INTERNAL MEDICINE

## 2021-01-20 RX ORDER — ASPIRIN 81 MG/1
81 TABLET ORAL DAILY
Status: DISCONTINUED | OUTPATIENT
Start: 2021-01-20 | End: 2021-01-21 | Stop reason: HOSPADM

## 2021-01-20 RX ORDER — MIDAZOLAM HYDROCHLORIDE 1 MG/ML
INJECTION INTRAMUSCULAR; INTRAVENOUS AS NEEDED
Status: DISCONTINUED | OUTPATIENT
Start: 2021-01-20 | End: 2021-01-20 | Stop reason: HOSPADM

## 2021-01-20 RX ORDER — BUDESONIDE AND FORMOTEROL FUMARATE DIHYDRATE 160; 4.5 UG/1; UG/1
2 AEROSOL RESPIRATORY (INHALATION) 2 TIMES DAILY PRN
Status: CANCELLED | OUTPATIENT
Start: 2021-01-20

## 2021-01-20 RX ORDER — DIPHENOXYLATE HYDROCHLORIDE AND ATROPINE SULFATE 2.5; .025 MG/1; MG/1
1 TABLET ORAL DAILY
COMMUNITY

## 2021-01-20 RX ORDER — LIDOCAINE HYDROCHLORIDE 20 MG/ML
INJECTION, SOLUTION INFILTRATION; PERINEURAL AS NEEDED
Status: DISCONTINUED | OUTPATIENT
Start: 2021-01-20 | End: 2021-01-20 | Stop reason: HOSPADM

## 2021-01-20 RX ORDER — DIPHENOXYLATE HYDROCHLORIDE AND ATROPINE SULFATE 2.5; .025 MG/1; MG/1
1 TABLET ORAL DAILY
Status: CANCELLED | OUTPATIENT
Start: 2021-01-20

## 2021-01-20 RX ORDER — PANTOPRAZOLE SODIUM 40 MG/1
40 TABLET, DELAYED RELEASE ORAL EVERY MORNING
Status: CANCELLED | OUTPATIENT
Start: 2021-01-21

## 2021-01-20 RX ORDER — FENTANYL CITRATE 50 UG/ML
INJECTION, SOLUTION INTRAMUSCULAR; INTRAVENOUS AS NEEDED
Status: DISCONTINUED | OUTPATIENT
Start: 2021-01-20 | End: 2021-01-20 | Stop reason: HOSPADM

## 2021-01-20 RX ORDER — SODIUM CHLORIDE 9 MG/ML
INJECTION, SOLUTION INTRAVENOUS CONTINUOUS PRN
Status: COMPLETED | OUTPATIENT
Start: 2021-01-20 | End: 2021-01-20

## 2021-01-20 RX ORDER — OMEPRAZOLE 20 MG/1
20 CAPSULE, DELAYED RELEASE ORAL DAILY
COMMUNITY

## 2021-01-20 RX ORDER — BUDESONIDE AND FORMOTEROL FUMARATE DIHYDRATE 160; 4.5 UG/1; UG/1
2 AEROSOL RESPIRATORY (INHALATION) 2 TIMES DAILY PRN
Status: DISCONTINUED | OUTPATIENT
Start: 2021-01-20 | End: 2021-01-21 | Stop reason: HOSPADM

## 2021-01-20 RX ORDER — PANTOPRAZOLE SODIUM 40 MG/1
40 TABLET, DELAYED RELEASE ORAL EVERY MORNING
Status: DISCONTINUED | OUTPATIENT
Start: 2021-01-21 | End: 2021-01-21 | Stop reason: HOSPADM

## 2021-01-20 RX ORDER — NICOTINE 21 MG/24HR
1 PATCH, TRANSDERMAL 24 HOURS TRANSDERMAL EVERY 24 HOURS
Status: DISCONTINUED | OUTPATIENT
Start: 2021-01-20 | End: 2021-01-21 | Stop reason: HOSPADM

## 2021-01-20 RX ORDER — ROPINIROLE 1 MG/1
4 TABLET, FILM COATED ORAL NIGHTLY
Status: DISCONTINUED | OUTPATIENT
Start: 2021-01-20 | End: 2021-01-21 | Stop reason: HOSPADM

## 2021-01-20 RX ORDER — ISOSORBIDE MONONITRATE 30 MG/1
30 TABLET, EXTENDED RELEASE ORAL DAILY
Status: DISCONTINUED | OUTPATIENT
Start: 2021-01-20 | End: 2021-01-21 | Stop reason: HOSPADM

## 2021-01-20 RX ORDER — DIPHENOXYLATE HYDROCHLORIDE AND ATROPINE SULFATE 2.5; .025 MG/1; MG/1
1 TABLET ORAL DAILY
Status: DISCONTINUED | OUTPATIENT
Start: 2021-01-21 | End: 2021-01-21 | Stop reason: HOSPADM

## 2021-01-20 RX ORDER — SODIUM CHLORIDE 9 MG/ML
100 INJECTION, SOLUTION INTRAVENOUS ONCE
Status: DISCONTINUED | OUTPATIENT
Start: 2021-01-20 | End: 2021-01-21 | Stop reason: HOSPADM

## 2021-01-20 RX ORDER — ASPIRIN 81 MG/1
81 TABLET, CHEWABLE ORAL DAILY
Status: DISCONTINUED | OUTPATIENT
Start: 2021-01-20 | End: 2021-01-20 | Stop reason: SDUPTHER

## 2021-01-20 RX ORDER — PREGABALIN 100 MG/1
200 CAPSULE ORAL 3 TIMES DAILY
Status: DISCONTINUED | OUTPATIENT
Start: 2021-01-20 | End: 2021-01-21 | Stop reason: HOSPADM

## 2021-01-20 RX ORDER — LISINOPRIL 10 MG/1
10 TABLET ORAL DAILY
Status: DISCONTINUED | OUTPATIENT
Start: 2021-01-20 | End: 2021-01-21 | Stop reason: HOSPADM

## 2021-01-20 RX ORDER — NAPROXEN 250 MG/1
250 TABLET ORAL 2 TIMES DAILY WITH MEALS
Status: CANCELLED | OUTPATIENT
Start: 2021-01-20

## 2021-01-20 RX ORDER — SULINDAC 200 MG/1
200 TABLET ORAL 2 TIMES DAILY
COMMUNITY
End: 2021-02-17 | Stop reason: HOSPADM

## 2021-01-20 RX ORDER — ROPINIROLE 1 MG/1
3 TABLET, FILM COATED ORAL NIGHTLY
Status: DISCONTINUED | OUTPATIENT
Start: 2021-01-20 | End: 2021-01-20 | Stop reason: ALTCHOICE

## 2021-01-20 RX ORDER — CLOPIDOGREL BISULFATE 75 MG/1
75 TABLET ORAL DAILY
Status: DISCONTINUED | OUTPATIENT
Start: 2021-01-20 | End: 2021-01-21 | Stop reason: HOSPADM

## 2021-01-20 RX ORDER — BUDESONIDE AND FORMOTEROL FUMARATE DIHYDRATE 160; 4.5 UG/1; UG/1
2 AEROSOL RESPIRATORY (INHALATION) 2 TIMES DAILY PRN
COMMUNITY

## 2021-01-20 RX ORDER — NICOTINE POLACRILEX 4 MG
15 LOZENGE BUCCAL
Status: DISCONTINUED | OUTPATIENT
Start: 2021-01-20 | End: 2021-01-21 | Stop reason: HOSPADM

## 2021-01-20 RX ORDER — HEPARIN SODIUM 1000 [USP'U]/ML
INJECTION, SOLUTION INTRAVENOUS; SUBCUTANEOUS AS NEEDED
Status: DISCONTINUED | OUTPATIENT
Start: 2021-01-20 | End: 2021-01-20 | Stop reason: HOSPADM

## 2021-01-20 RX ORDER — AMOXICILLIN AND CLAVULANATE POTASSIUM 875; 125 MG/1; MG/1
1 TABLET, FILM COATED ORAL 2 TIMES DAILY
Status: DISCONTINUED | OUTPATIENT
Start: 2021-01-20 | End: 2021-01-21 | Stop reason: HOSPADM

## 2021-01-20 RX ORDER — DEXTROSE MONOHYDRATE 25 G/50ML
25 INJECTION, SOLUTION INTRAVENOUS
Status: DISCONTINUED | OUTPATIENT
Start: 2021-01-20 | End: 2021-01-21 | Stop reason: HOSPADM

## 2021-01-20 RX ORDER — ROPINIROLE 4 MG/1
4 TABLET, FILM COATED ORAL NIGHTLY
COMMUNITY

## 2021-01-20 RX ORDER — ATORVASTATIN CALCIUM 40 MG/1
40 TABLET, FILM COATED ORAL DAILY
Status: DISCONTINUED | OUTPATIENT
Start: 2021-01-20 | End: 2021-01-21 | Stop reason: HOSPADM

## 2021-01-20 RX ORDER — METOPROLOL TARTRATE 50 MG/1
50 TABLET, FILM COATED ORAL 2 TIMES DAILY
Status: DISCONTINUED | OUTPATIENT
Start: 2021-01-20 | End: 2021-01-21 | Stop reason: HOSPADM

## 2021-01-20 RX ORDER — DULOXETIN HYDROCHLORIDE 60 MG/1
60 CAPSULE, DELAYED RELEASE ORAL EVERY 12 HOURS SCHEDULED
Status: DISCONTINUED | OUTPATIENT
Start: 2021-01-20 | End: 2021-01-21 | Stop reason: HOSPADM

## 2021-01-20 RX ORDER — DULOXETIN HYDROCHLORIDE 20 MG/1
20 CAPSULE, DELAYED RELEASE ORAL DAILY
Status: DISCONTINUED | OUTPATIENT
Start: 2021-01-20 | End: 2021-01-20

## 2021-01-20 RX ORDER — ALBUTEROL SULFATE 2.5 MG/3ML
2.5 SOLUTION RESPIRATORY (INHALATION) EVERY 4 HOURS PRN
Status: DISCONTINUED | OUTPATIENT
Start: 2021-01-20 | End: 2021-01-21 | Stop reason: HOSPADM

## 2021-01-20 RX ORDER — HYDROCODONE BITARTRATE AND ACETAMINOPHEN 7.5; 325 MG/1; MG/1
1 TABLET ORAL EVERY 6 HOURS PRN
Status: DISCONTINUED | OUTPATIENT
Start: 2021-01-20 | End: 2021-01-21 | Stop reason: HOSPADM

## 2021-01-20 RX ORDER — ACETAMINOPHEN 325 MG/1
650 TABLET ORAL EVERY 4 HOURS PRN
Status: DISCONTINUED | OUTPATIENT
Start: 2021-01-20 | End: 2021-01-21 | Stop reason: HOSPADM

## 2021-01-20 RX ORDER — ROPINIROLE 1 MG/1
4 TABLET, FILM COATED ORAL NIGHTLY
Status: CANCELLED | OUTPATIENT
Start: 2021-01-20

## 2021-01-20 RX ADMIN — ATORVASTATIN CALCIUM 40 MG: 40 TABLET, FILM COATED ORAL at 18:42

## 2021-01-20 RX ADMIN — ASPIRIN 81 MG: 81 TABLET, COATED ORAL at 18:42

## 2021-01-20 RX ADMIN — BUDESONIDE AND FORMOTEROL FUMARATE DIHYDRATE 2 PUFF: 160; 4.5 AEROSOL RESPIRATORY (INHALATION) at 19:16

## 2021-01-20 RX ADMIN — NICOTINE 1 PATCH: 21 PATCH TRANSDERMAL at 18:42

## 2021-01-20 RX ADMIN — METOPROLOL TARTRATE 50 MG: 50 TABLET, FILM COATED ORAL at 20:19

## 2021-01-20 RX ADMIN — LISINOPRIL 10 MG: 10 TABLET ORAL at 18:42

## 2021-01-20 RX ADMIN — AMOXICILLIN AND CLAVULANATE POTASSIUM 1 TABLET: 875; 125 TABLET, FILM COATED ORAL at 20:19

## 2021-01-20 RX ADMIN — ROPINIROLE HYDROCHLORIDE 4 MG: 1 TABLET, FILM COATED ORAL at 20:19

## 2021-01-20 RX ADMIN — CLOPIDOGREL 75 MG: 75 TABLET, FILM COATED ORAL at 18:42

## 2021-01-20 RX ADMIN — ISOSORBIDE MONONITRATE 30 MG: 30 TABLET, EXTENDED RELEASE ORAL at 18:41

## 2021-01-20 RX ADMIN — INSULIN ASPART 3 UNITS: 100 INJECTION, SOLUTION INTRAVENOUS; SUBCUTANEOUS at 18:42

## 2021-01-20 RX ADMIN — HYDROCODONE BITARTRATE AND ACETAMINOPHEN 1 TABLET: 7.5; 325 TABLET ORAL at 18:38

## 2021-01-20 RX ADMIN — PREGABALIN 200 MG: 100 CAPSULE ORAL at 18:41

## 2021-01-20 RX ADMIN — PREGABALIN 200 MG: 100 CAPSULE ORAL at 20:18

## 2021-01-20 RX ADMIN — DULOXETINE HYDROCHLORIDE 60 MG: 60 CAPSULE, DELAYED RELEASE ORAL at 20:23

## 2021-01-20 NOTE — PLAN OF CARE
Goal Outcome Evaluation:  Plan of Care Reviewed With: patient     Outcome Summary: Pt arrived to the floor from cath lab this shift. Dressing on L groin area is clean, dry, and intact. Pt has complained of leg pain, prn meds given. No s/s of distress noted at this time. Will continue to monitor and follow plan of care.

## 2021-01-21 VITALS
SYSTOLIC BLOOD PRESSURE: 154 MMHG | DIASTOLIC BLOOD PRESSURE: 76 MMHG | BODY MASS INDEX: 34.84 KG/M2 | HEIGHT: 67 IN | RESPIRATION RATE: 16 BRPM | OXYGEN SATURATION: 97 % | WEIGHT: 222 LBS | TEMPERATURE: 98.7 F | HEART RATE: 91 BPM

## 2021-01-21 LAB
ANION GAP SERPL CALCULATED.3IONS-SCNC: 7.8 MMOL/L (ref 5–15)
BUN SERPL-MCNC: 16 MG/DL (ref 6–20)
BUN/CREAT SERPL: 19 (ref 7–25)
CALCIUM SPEC-SCNC: 9.4 MG/DL (ref 8.6–10.5)
CHLORIDE SERPL-SCNC: 94 MMOL/L (ref 98–107)
CO2 SERPL-SCNC: 28.2 MMOL/L (ref 22–29)
CREAT SERPL-MCNC: 0.84 MG/DL (ref 0.76–1.27)
DEPRECATED RDW RBC AUTO: 47.5 FL (ref 37–54)
ERYTHROCYTE [DISTWIDTH] IN BLOOD BY AUTOMATED COUNT: 12.8 % (ref 12.3–15.4)
GFR SERPL CREATININE-BSD FRML MDRD: 95 ML/MIN/1.73
GLUCOSE BLDC GLUCOMTR-MCNC: 297 MG/DL (ref 70–130)
GLUCOSE BLDC GLUCOMTR-MCNC: 302 MG/DL (ref 70–130)
GLUCOSE SERPL-MCNC: 276 MG/DL (ref 65–99)
HBA1C MFR BLD: 7.2 % (ref 4.8–5.6)
HCT VFR BLD AUTO: 42.7 % (ref 37.5–51)
HGB BLD-MCNC: 14.4 G/DL (ref 13–17.7)
MCH RBC QN AUTO: 33.7 PG (ref 26.6–33)
MCHC RBC AUTO-ENTMCNC: 33.7 G/DL (ref 31.5–35.7)
MCV RBC AUTO: 100 FL (ref 79–97)
PLATELET # BLD AUTO: 176 10*3/MM3 (ref 140–450)
PMV BLD AUTO: 13.2 FL (ref 6–12)
POTASSIUM SERPL-SCNC: 4.3 MMOL/L (ref 3.5–5.2)
RBC # BLD AUTO: 4.27 10*6/MM3 (ref 4.14–5.8)
SODIUM SERPL-SCNC: 130 MMOL/L (ref 136–145)
TROPONIN T SERPL-MCNC: <0.01 NG/ML (ref 0–0.03)
TROPONIN T SERPL-MCNC: <0.01 NG/ML (ref 0–0.03)
WBC # BLD AUTO: 7.76 10*3/MM3 (ref 3.4–10.8)

## 2021-01-21 PROCEDURE — 63710000001 CLOPIDOGREL 75 MG TABLET: Performed by: INTERNAL MEDICINE

## 2021-01-21 PROCEDURE — A9270 NON-COVERED ITEM OR SERVICE: HCPCS | Performed by: INTERNAL MEDICINE

## 2021-01-21 PROCEDURE — 63710000001 ASPIRIN 81 MG TABLET DELAYED-RELEASE: Performed by: INTERNAL MEDICINE

## 2021-01-21 PROCEDURE — 63710000001 MULTIVITAMIN TABLET: Performed by: INTERNAL MEDICINE

## 2021-01-21 PROCEDURE — 63710000001 ISOSORBIDE MONONITRATE 30 MG TABLET SUSTAINED-RELEASE 24 HOUR: Performed by: INTERNAL MEDICINE

## 2021-01-21 PROCEDURE — 94799 UNLISTED PULMONARY SVC/PX: CPT

## 2021-01-21 PROCEDURE — 63710000001 LISINOPRIL 10 MG TABLET: Performed by: INTERNAL MEDICINE

## 2021-01-21 PROCEDURE — 63710000001 ATORVASTATIN 40 MG TABLET: Performed by: INTERNAL MEDICINE

## 2021-01-21 PROCEDURE — 80048 BASIC METABOLIC PNL TOTAL CA: CPT | Performed by: INTERNAL MEDICINE

## 2021-01-21 PROCEDURE — 93005 ELECTROCARDIOGRAM TRACING: CPT | Performed by: INTERNAL MEDICINE

## 2021-01-21 PROCEDURE — 99212 OFFICE O/P EST SF 10 MIN: CPT | Performed by: INTERNAL MEDICINE

## 2021-01-21 PROCEDURE — 84484 ASSAY OF TROPONIN QUANT: CPT | Performed by: INTERNAL MEDICINE

## 2021-01-21 PROCEDURE — 63710000001 PANTOPRAZOLE 40 MG TABLET DELAYED-RELEASE: Performed by: INTERNAL MEDICINE

## 2021-01-21 PROCEDURE — 83036 HEMOGLOBIN GLYCOSYLATED A1C: CPT | Performed by: INTERNAL MEDICINE

## 2021-01-21 PROCEDURE — 63710000001 DULOXETINE 60 MG CAPSULE DELAYED-RELEASE PARTICLES: Performed by: INTERNAL MEDICINE

## 2021-01-21 PROCEDURE — 82962 GLUCOSE BLOOD TEST: CPT

## 2021-01-21 PROCEDURE — 63710000001 INSULIN ASPART PER 5 UNITS: Performed by: INTERNAL MEDICINE

## 2021-01-21 PROCEDURE — 63710000001 METOPROLOL TARTRATE 50 MG TABLET: Performed by: INTERNAL MEDICINE

## 2021-01-21 PROCEDURE — 63710000001 HYDROCODONE-ACETAMINOPHEN 7.5-325 MG TABLET: Performed by: INTERNAL MEDICINE

## 2021-01-21 PROCEDURE — 85027 COMPLETE CBC AUTOMATED: CPT | Performed by: INTERNAL MEDICINE

## 2021-01-21 PROCEDURE — 63710000001 PREGABALIN 100 MG CAPSULE: Performed by: INTERNAL MEDICINE

## 2021-01-21 PROCEDURE — 63710000001 AMOXICILLIN-CLAVULANATE 875-125 MG TABLET: Performed by: INTERNAL MEDICINE

## 2021-01-21 RX ORDER — NICOTINE 21 MG/24HR
1 PATCH, TRANSDERMAL 24 HOURS TRANSDERMAL EVERY 24 HOURS
Qty: 30 PATCH | Refills: 3 | Status: ON HOLD | OUTPATIENT
Start: 2021-01-21 | End: 2021-02-01 | Stop reason: SDUPTHER

## 2021-01-21 RX ADMIN — ATORVASTATIN CALCIUM 40 MG: 40 TABLET, FILM COATED ORAL at 08:18

## 2021-01-21 RX ADMIN — LISINOPRIL 10 MG: 10 TABLET ORAL at 08:18

## 2021-01-21 RX ADMIN — HYDROCODONE BITARTRATE AND ACETAMINOPHEN 1 TABLET: 7.5; 325 TABLET ORAL at 06:00

## 2021-01-21 RX ADMIN — Medication 1 TABLET: at 08:18

## 2021-01-21 RX ADMIN — BUDESONIDE AND FORMOTEROL FUMARATE DIHYDRATE 2 PUFF: 160; 4.5 AEROSOL RESPIRATORY (INHALATION) at 07:09

## 2021-01-21 RX ADMIN — PANTOPRAZOLE SODIUM 40 MG: 40 TABLET, DELAYED RELEASE ORAL at 06:01

## 2021-01-21 RX ADMIN — ASPIRIN 81 MG: 81 TABLET, COATED ORAL at 08:18

## 2021-01-21 RX ADMIN — INSULIN ASPART 5 UNITS: 100 INJECTION, SOLUTION INTRAVENOUS; SUBCUTANEOUS at 12:04

## 2021-01-21 RX ADMIN — ISOSORBIDE MONONITRATE 30 MG: 30 TABLET, EXTENDED RELEASE ORAL at 08:18

## 2021-01-21 RX ADMIN — AMOXICILLIN AND CLAVULANATE POTASSIUM 1 TABLET: 875; 125 TABLET, FILM COATED ORAL at 08:18

## 2021-01-21 RX ADMIN — INSULIN ASPART 4 UNITS: 100 INJECTION, SOLUTION INTRAVENOUS; SUBCUTANEOUS at 08:17

## 2021-01-21 RX ADMIN — DULOXETINE HYDROCHLORIDE 60 MG: 60 CAPSULE, DELAYED RELEASE ORAL at 08:18

## 2021-01-21 RX ADMIN — METOPROLOL TARTRATE 50 MG: 50 TABLET, FILM COATED ORAL at 08:18

## 2021-01-21 RX ADMIN — CLOPIDOGREL 75 MG: 75 TABLET, FILM COATED ORAL at 08:18

## 2021-01-21 RX ADMIN — HYDROCODONE BITARTRATE AND ACETAMINOPHEN 1 TABLET: 7.5; 325 TABLET ORAL at 00:02

## 2021-01-21 RX ADMIN — HYDROCODONE BITARTRATE AND ACETAMINOPHEN 1 TABLET: 7.5; 325 TABLET ORAL at 12:04

## 2021-01-21 RX ADMIN — PREGABALIN 200 MG: 100 CAPSULE ORAL at 08:17

## 2021-01-21 NOTE — NURSING NOTE
Cardiac Rehab referral received on patient. After reviewing patient information there is no qualifying diagnosis noted at this time .Qualifications for Cardiac Rehab include Coronary Stenting, AMI (NSTEMI, STEMI), Stable Angina, CABG, Heart Valve Repair/Replacement, Heart Transplant or Stable CHF (with these specific qualifications, Left Ventricular Ejection Fraction of 35% or less, NYHA class II to IV symptoms despite optimal heart failure therapy for at least 6 weeks and has not had a recent (less than or equal to 6 weeks) or planned (less than or equal to 6 months) major cardiovascular hospitalizations or procedures. Due to patient being in the hospital, does not meet criteria at this time) Please contact us at [phone] with questions.    If the diagnosis is CHF when the patient meets the CHF criteria for Cardiac Rehab with a new order we will gladly schedule them.

## 2021-01-21 NOTE — NURSING NOTE
Spoke with Jake Lock. Scheduled discharge transportation approximately 2 hours from now. Trip # [phone].

## 2021-01-21 NOTE — PLAN OF CARE
Goal Outcome Evaluation:  Plan of Care Reviewed With: patient  Patient appears to be resting in bed. Vital signs are stable and is on room air. Cath site appears to be free of blood or hematoma. No complaints are noted at this time. Will continue to monitor.

## 2021-01-21 NOTE — PLAN OF CARE
Problem: Adult Inpatient Plan of Care  Goal: Plan of Care Review  Outcome: Met  Flowsheets (Taken 1/21/2021 1344)  Outcome Summary: Patient being discharged today. Will check in Monday at [facility] for further intervention.  Goal: Patient-Specific Goal (Individualized)  Outcome: Met  Goal: Absence of Hospital-Acquired Illness or Injury  Outcome: Met  Intervention: Identify and Manage Fall Risk  Recent Flowsheet Documentation  Taken 1/21/2021 1300 by Damaris Obrien RN  Safety Promotion/Fall Prevention: safety round/check completed  Taken 1/21/2021 1100 by Damaris Obrien RN  Safety Promotion/Fall Prevention: safety round/check completed  Taken 1/21/2021 0900 by Damaris Obrien RN  Safety Promotion/Fall Prevention: safety round/check completed  Taken 1/21/2021 0700 by Damaris Obrien RN  Safety Promotion/Fall Prevention: safety round/check completed  Intervention: Prevent and Manage VTE (venous thromboembolism) Risk  Recent Flowsheet Documentation  Taken 1/21/2021 0830 by Damaris Obrien RN  VTE Prevention/Management: bleeding risk factor(s) identified  Intervention: Prevent Infection  Recent Flowsheet Documentation  Taken 1/21/2021 1300 by Damaris Obrien RN  Infection Prevention: rest/sleep promoted  Taken 1/21/2021 1100 by Damaris Obrien RN  Infection Prevention: rest/sleep promoted  Taken 1/21/2021 0900 by Damaris Obrien RN  Infection Prevention: rest/sleep promoted  Taken 1/21/2021 0700 by Damaris Obrien RN  Infection Prevention: rest/sleep promoted  Goal: Optimal Comfort and Wellbeing  Outcome: Met  Intervention: Provide Person-Centered Care  Recent Flowsheet Documentation  Taken 1/21/2021 0830 by Damaris Obrien RN  Trust Relationship/Rapport:   care explained   choices provided  Goal: Readiness for Transition of Care  Outcome: Met     Problem: Diabetes Comorbidity  Goal: Blood Glucose Level Within Desired Range  Outcome: Met  Intervention: Maintain Glycemic Control  Recent Flowsheet  Documentation  Taken 1/21/2021 0830 by Damaris Obrien RN  Glycemic Management: oral hydration promoted     Problem: Hypertension Comorbidity  Goal: Blood Pressure in Desired Range  Outcome: Met  Intervention: Maintain Hypertension-Management Strategies  Recent Flowsheet Documentation  Taken 1/21/2021 1300 by Damaris Obrien RN  Medication Review/Management: medications reviewed  Taken 1/21/2021 1100 by Damaris Obrien RN  Medication Review/Management: medications reviewed  Taken 1/21/2021 0900 by Damaris Obrien RN  Medication Review/Management: medications reviewed  Taken 1/21/2021 0700 by Damaris Obrien RN  Medication Review/Management: medications reviewed     Problem: Obstructive Sleep Apnea Risk or Actual (Comorbidity Management)  Goal: Unobstructed Breathing During Sleep  Outcome: Met   Goal Outcome Evaluation:  Plan of Care Reviewed With: patient     Outcome Summary: Patient being discharged today. Will check in Monday at [facility] for further intervention.

## 2021-01-21 NOTE — PROGRESS NOTES
Discharge Planning Assessment  MOSHE Higgins     Patient Name: Reg Sanchez  MRN: [id number]  Today's Date: 1/21/2021    Admit Date: 1/20/2021    Discharge Needs Assessment    No documentation.       Discharge Plan     Row Name 01/21/21 1403       Plan    Final Discharge Disposition Code  01 - home or self-care    Final Note  Pt is being discharged home. No other needs identified. Pt states [facility] will be transporting him home.          Damaris Foster

## 2021-01-21 NOTE — DISCHARGE SUMMARY
Patient Identification:  Name:  Reg Sanchez  Age:  55 y.o.  Sex:  male  :  1965  MRN:  [id number]  Visit Number:  [id number]    Date of Admission: 2021  Date of Discharge:   21    PCP: Sheila Sheikh APRN    DISCHARGE DIAGNOSIS  Severe claudication right LE  Hx of BKA LLE  Chronic pain syndrome  Right SFA occlusion    CONSULTS   None    PROCEDURES PERFORMED  Peripheral arteriogram with bilat LE runoff  Attempt at crossing right SFA unsuccessful via left Femoral approach.      HOSPITAL COURSE  Patient is a 55 y.o. male presented to [facility] complaining of Limiting claudication of the left leg with pain at rest.  Please see the admitting history and physical for further details.      Peripheral angiogram showed  short occlusion of the right SFA at adductor canal which reconstitutes just above the ankle joint and then secondary distal occlusions of the Anterior and posterior tibial arteries proximally with good recon distally of both large AT and PT.     Attempt to open the SFA was not successful as even the stiff wires would not cross the distal cap. At this time I contacted Dr. Trevizo and reviewed images. He agreed that pt needs a above knee to AT and/or PT bypass. He will see patient on Monday and schedule for surgery. Today his left groin cath site is benign and right foot is warm. He will be discharged today.    VITAL SIGNS:      21  1021   Weight: 101 kg (222 lb)     Body mass index is 34.77 kg/m².    PHYSICAL EXAM:  Constitutional:  Well-developed and well-nourished.  No respiratory distress.      HENT:  Head: Normocephalic and atraumatic.  Mouth:  Moist mucous membranes.    Eyes:  Conjunctivae and EOM are normal.  Pupils are equal, round, and reactive to light.  No scleral icterus.  Neck:  Neck supple.  No JVD present.    Cardiovascular:  Normal rate, regular rhythm and normal heart sounds with no murmur.  Pulmonary/Chest:  No respiratory distress, no wheezes, no  crackles, with normal breath sounds and good air movement.  Abdominal:  Soft.  Bowel sounds are normal.  No distension and no tenderness.   Musculoskeletal:  No edema, no tenderness, and no deformity.  No red or swollen joints anywhere.    Neurological:  Alert and oriented to person, place, and time.  No cranial nerve deficit.  No tongue deviation.  No facial droop.  No slurred speech.   Skin:  Skin is warm and dry.  No rash noted.  No pallor.   Psychiatric:  Normal mood and affect.  Behavior is normal.  Judgment and thought content normal.   Peripheral vascular:  No edema and strong pulses on all 4 extremities.    DISCHARGE DISPOSITION   Stable    DISCHARGE MEDICATIONS:     Discharge Medications      Changes to Medications      Instructions Start Date   amoxicillin-clavulanate 875-125 MG per tablet  Commonly known as: Augmentin  What changed: additional instructions   1 tablet, Oral, 2 Times Daily         Continue These Medications      Instructions Start Date   albuterol sulfate  (90 Base) MCG/ACT inhaler  Commonly known as: PROVENTIL HFA;VENTOLIN HFA;PROAIR HFA   1 puff, Inhalation, Every 6 Hours PRN, Prior to [facility] Admission, Patient was on: 1 to 2 puffs every 4 to 6 hours as needed      albuterol (2.5 MG/3ML) 0.083% nebulizer solution  Commonly known as: PROVENTIL   2.5 mg, Nebulization, Every 4 Hours PRN      aspirin 81 MG chewable tablet   81 mg, Oral, Daily      atorvastatin 40 MG tablet  Commonly known as: LIPITOR   40 mg, Oral, Daily      BASAGLAR KWIKPEN SC   20 Units, Subcutaneous, 2 Times Daily PRN      budesonide-formoterol 160-4.5 MCG/ACT inhaler  Commonly known as: SYMBICORT   2 puffs, Inhalation, 2 Times Daily PRN      Diclofenac Sodium 1 % gel gel  Commonly known as: VOLTAREN   2 g, Topical, Every 6 Hours PRN, Prior to [facility] Admission, Patient was on: apply 2G to upper body & 4G to lower body every 6 hours as needed      Diclofenac Sodium 1 % gel gel  Commonly known as: VOLTAREN   4 g,  Topical, Every 6 Hours PRN, Prior to [facility] Admission, Patient was on: apply 2G to upper body & 4G to lower body every 6 hours as needed      DULoxetine 60 MG capsule  Commonly known as: CYMBALTA   60 mg, Oral, 2 Times Daily      furosemide 20 MG tablet  Commonly known as: LASIX   20 mg, Oral, Daily      isosorbide mononitrate 30 MG 24 hr tablet  Commonly known as: IMDUR   30 mg, Oral, Daily      lisinopril 10 MG tablet  Commonly known as: PRINIVIL,ZESTRIL   10 mg, Oral, Daily      metFORMIN 1000 MG tablet  Commonly known as: GLUCOPHAGE   1,000 mg, Oral, 2 Times Daily With Meals      metoprolol tartrate 50 MG tablet  Commonly known as: LOPRESSOR   50 mg, Oral, 2 Times Daily      multivitamin tablet tablet   1 tablet, Oral, Daily      nicotine 21 MG/24HR patch  Commonly known as: NICODERM CQ   1 patch, Transdermal, Every 24 Hours      nitroglycerin 0.4 MG SL tablet  Commonly known as: NITROSTAT   1 under the tongue as needed for angina, may repeat q5mins for up three doses      omeprazole 20 MG capsule  Commonly known as: priLOSEC   20 mg, Oral, Daily      pregabalin 200 MG capsule  Commonly known as: LYRICA   200 mg, Oral, 3 Times Daily      rOPINIRole 4 MG tablet  Commonly known as: REQUIP   4 mg, Oral, Nightly      sulindac 200 MG tablet  Commonly known as: CLINORIL   200 mg, Oral, 2 Times Daily         Stop These Medications    clopidogrel 75 MG tablet  Commonly known as: PLAVIX        ASK your doctor about these medications      Instructions Start Date   potassium chloride 10 MEQ CR tablet   10 mEq, Oral, Daily             Your Scheduled Appointments    Jan 25, 2021  8:00 AM  New Patient with Jeremy Trevizo MD  [facility] (--) [address] [place] [id number] [phone]   Bring all previous medical records and films, along with current medications and insurance information.     Apr 12, 2021  2:00 PM  Follow Up with Devon Kwan  [facility]  [facility] CARDIOLOGY (--) [address] [facility] [address] [phone]   Arrive 15 minutes prior to appointment.            Additional Instructions for the Follow-ups that You Need to Schedule     Discharge Follow-up with Specialty: Vascular surgery Dr. Trevizo; 3 Days   As directed      Specialty: Vascular surgery Dr. Trevizo    Follow Up: 3 Days    Follow Up Details: Pt is schedule to visit Dr. Trevizo on Monday. Please provide him the office number for PREETHI Trevizo. He is to stop Plavix until after surgery.           Follow-up Information     Sheila Sheikh APRN .    Specialty: Nurse Practitioner  Contact information:  [address] [place] [id number]  [phone]                    Wesly Lund MD  01/21/21  12:53 EST    Please note that this discharge summary required more than 30 minutes to complete.        Director, Cardiac Cath Lab

## 2021-01-22 ENCOUNTER — HOSPITAL ENCOUNTER (OUTPATIENT)
Facility: HOSPITAL | Age: 56
Setting detail: SURGERY ADMIT
End: 2021-01-22
Attending: THORACIC SURGERY (CARDIOTHORACIC VASCULAR SURGERY) | Admitting: THORACIC SURGERY (CARDIOTHORACIC VASCULAR SURGERY)

## 2021-01-24 LAB
QT INTERVAL: 390 MS
QT INTERVAL: 394 MS
QTC INTERVAL: 439 MS
QTC INTERVAL: 449 MS

## 2021-01-25 ENCOUNTER — HOSPITAL ENCOUNTER (INPATIENT)
Facility: HOSPITAL | Age: 56
LOS: 7 days | Discharge: HOME | End: 2021-02-01
Attending: THORACIC SURGERY (CARDIOTHORACIC VASCULAR SURGERY) | Admitting: THORACIC SURGERY (CARDIOTHORACIC VASCULAR SURGERY)

## 2021-01-25 ENCOUNTER — APPOINTMENT (OUTPATIENT)
Dept: CARDIOLOGY | Facility: HOSPITAL | Age: 56
End: 2021-01-25

## 2021-01-25 ENCOUNTER — OFFICE VISIT (OUTPATIENT)
Dept: CARDIAC SURGERY | Facility: CLINIC | Age: 56
End: 2021-01-25

## 2021-01-25 VITALS
HEART RATE: 84 BPM | HEIGHT: 67 IN | TEMPERATURE: 97.3 F | SYSTOLIC BLOOD PRESSURE: 148 MMHG | BODY MASS INDEX: 35 KG/M2 | DIASTOLIC BLOOD PRESSURE: 80 MMHG | OXYGEN SATURATION: 97 % | WEIGHT: 223 LBS

## 2021-01-25 DIAGNOSIS — I73.9 PAD (PERIPHERAL ARTERY DISEASE) (HCC): Primary | ICD-10-CM

## 2021-01-25 DIAGNOSIS — I73.9 PERIPHERAL VASCULAR DISEASE (HCC): Primary | Chronic | ICD-10-CM

## 2021-01-25 DIAGNOSIS — I73.9 PERIPHERAL VASCULAR DISEASE (HCC): ICD-10-CM

## 2021-01-25 DIAGNOSIS — Z72.0 TOBACCO USE: ICD-10-CM

## 2021-01-25 PROBLEM — I25.10 CORONARY ARTERY DISEASE: Status: ACTIVE | Noted: 2021-01-25

## 2021-01-25 LAB
ANION GAP SERPL CALCULATED.3IONS-SCNC: 10 MMOL/L (ref 5–15)
BH CV ECHO MEAS - BSA(HAYCOCK): 2.2 M^2
BH CV ECHO MEAS - BSA: 2.1 M^2
BH CV ECHO MEAS - BZI_BMI: 34.9 KILOGRAMS/M^2
BH CV ECHO MEAS - BZI_METRIC_HEIGHT: 170.2 CM
BH CV ECHO MEAS - BZI_METRIC_WEIGHT: 101.2 KG
BH CV XLRA MEAS - DIST GSV CALF DIST RIGHT: 0.4 CM
BH CV XLRA MEAS - DIST GSV THIGH DIST RIGHT: 0.2 CM
BH CV XLRA MEAS - DIST LSV CALF DIST RIGHT: 0.1 CM
BH CV XLRA MEAS - GSV ANKLE DIST RIGHT: 0.4 CM
BH CV XLRA MEAS - GSV KNEE DIST RIGHT: 0.2 CM
BH CV XLRA MEAS - GSV ORIGIN DIST RIGHT: 0.6 CM
BH CV XLRA MEAS - MID GSV CALF RIGHT: 0.1 CM
BH CV XLRA MEAS - MID GSV THIGH  RIGHT: 0.2 CM
BH CV XLRA MEAS - MID LSV CALF DIST RIGHT: 0.1 CM
BH CV XLRA MEAS - PROX GSV CALF DIST RIGHT: 0.1 CM
BH CV XLRA MEAS - PROX GSV THIGH  RIGHT: 0.2 CM
BH CV XLRA MEAS - PROX LSV CALF DIST RIGHT: 0.1 CM
BUN SERPL-MCNC: 15 MG/DL (ref 6–20)
BUN/CREAT SERPL: 16.5 (ref 7–25)
CALCIUM SPEC-SCNC: 9 MG/DL (ref 8.6–10.5)
CHLORIDE SERPL-SCNC: 95 MMOL/L (ref 98–107)
CO2 SERPL-SCNC: 27 MMOL/L (ref 22–29)
CREAT SERPL-MCNC: 0.91 MG/DL (ref 0.76–1.27)
DEPRECATED RDW RBC AUTO: 46.4 FL (ref 37–54)
ERYTHROCYTE [DISTWIDTH] IN BLOOD BY AUTOMATED COUNT: 12.6 % (ref 12.3–15.4)
GFR SERPL CREATININE-BSD FRML MDRD: 86 ML/MIN/1.73
GLUCOSE BLDC GLUCOMTR-MCNC: 169 MG/DL (ref 70–130)
GLUCOSE BLDC GLUCOMTR-MCNC: 180 MG/DL (ref 70–130)
GLUCOSE BLDC GLUCOMTR-MCNC: 263 MG/DL (ref 70–130)
GLUCOSE SERPL-MCNC: 260 MG/DL (ref 65–99)
HCT VFR BLD AUTO: 40.2 % (ref 37.5–51)
HGB BLD-MCNC: 13.3 G/DL (ref 13–17.7)
MCH RBC QN AUTO: 33.2 PG (ref 26.6–33)
MCHC RBC AUTO-ENTMCNC: 33.1 G/DL (ref 31.5–35.7)
MCV RBC AUTO: 100.2 FL (ref 79–97)
PLATELET # BLD AUTO: 176 10*3/MM3 (ref 140–450)
PMV BLD AUTO: 12.5 FL (ref 6–12)
POTASSIUM SERPL-SCNC: 4.4 MMOL/L (ref 3.5–5.2)
RBC # BLD AUTO: 4.01 10*6/MM3 (ref 4.14–5.8)
SODIUM SERPL-SCNC: 132 MMOL/L (ref 136–145)
WBC # BLD AUTO: 7.65 10*3/MM3 (ref 3.4–10.8)

## 2021-01-25 PROCEDURE — 82962 GLUCOSE BLOOD TEST: CPT

## 2021-01-25 PROCEDURE — 93306 TTE W/DOPPLER COMPLETE: CPT

## 2021-01-25 PROCEDURE — 99205 OFFICE O/P NEW HI 60 MIN: CPT | Performed by: PHYSICIAN ASSISTANT

## 2021-01-25 PROCEDURE — 80048 BASIC METABOLIC PNL TOTAL CA: CPT | Performed by: PHYSICIAN ASSISTANT

## 2021-01-25 PROCEDURE — 63710000001 INSULIN LISPRO (HUMAN) PER 5 UNITS: Performed by: PHYSICIAN ASSISTANT

## 2021-01-25 PROCEDURE — 85027 COMPLETE CBC AUTOMATED: CPT | Performed by: PHYSICIAN ASSISTANT

## 2021-01-25 PROCEDURE — 93306 TTE W/DOPPLER COMPLETE: CPT | Performed by: INTERNAL MEDICINE

## 2021-01-25 PROCEDURE — 93971 EXTREMITY STUDY: CPT | Performed by: INTERNAL MEDICINE

## 2021-01-25 PROCEDURE — 93971 EXTREMITY STUDY: CPT

## 2021-01-25 PROCEDURE — U0004 COV-19 TEST NON-CDC HGH THRU: HCPCS | Performed by: PHYSICIAN ASSISTANT

## 2021-01-25 PROCEDURE — 99223 1ST HOSP IP/OBS HIGH 75: CPT | Performed by: THORACIC SURGERY (CARDIOTHORACIC VASCULAR SURGERY)

## 2021-01-25 RX ORDER — ROPINIROLE 2 MG/1
4 TABLET, FILM COATED ORAL NIGHTLY
Status: DISCONTINUED | OUTPATIENT
Start: 2021-01-25 | End: 2021-02-01 | Stop reason: HOSPADM

## 2021-01-25 RX ORDER — BUDESONIDE AND FORMOTEROL FUMARATE DIHYDRATE 160; 4.5 UG/1; UG/1
2 AEROSOL RESPIRATORY (INHALATION) 2 TIMES DAILY PRN
Status: DISCONTINUED | OUTPATIENT
Start: 2021-01-25 | End: 2021-01-27

## 2021-01-25 RX ORDER — ISOSORBIDE MONONITRATE 30 MG/1
30 TABLET, EXTENDED RELEASE ORAL DAILY
Status: DISCONTINUED | OUTPATIENT
Start: 2021-01-25 | End: 2021-01-27

## 2021-01-25 RX ORDER — ATORVASTATIN CALCIUM 40 MG/1
40 TABLET, FILM COATED ORAL NIGHTLY
Status: DISCONTINUED | OUTPATIENT
Start: 2021-01-25 | End: 2021-02-01 | Stop reason: HOSPADM

## 2021-01-25 RX ORDER — DEXTROSE MONOHYDRATE 25 G/50ML
25 INJECTION, SOLUTION INTRAVENOUS
Status: DISCONTINUED | OUTPATIENT
Start: 2021-01-25 | End: 2021-02-01 | Stop reason: HOSPADM

## 2021-01-25 RX ORDER — METOPROLOL TARTRATE 50 MG/1
50 TABLET, FILM COATED ORAL 2 TIMES DAILY
Status: DISCONTINUED | OUTPATIENT
Start: 2021-01-25 | End: 2021-01-27

## 2021-01-25 RX ORDER — ALBUTEROL SULFATE 2.5 MG/3ML
2.5 SOLUTION RESPIRATORY (INHALATION) EVERY 4 HOURS PRN
Status: DISCONTINUED | OUTPATIENT
Start: 2021-01-25 | End: 2021-02-01 | Stop reason: HOSPADM

## 2021-01-25 RX ORDER — POTASSIUM CHLORIDE 750 MG/1
10 CAPSULE, EXTENDED RELEASE ORAL DAILY
Status: DISCONTINUED | OUTPATIENT
Start: 2021-01-25 | End: 2021-02-01 | Stop reason: HOSPADM

## 2021-01-25 RX ORDER — PANTOPRAZOLE SODIUM 40 MG/1
40 TABLET, DELAYED RELEASE ORAL EVERY MORNING
Status: DISCONTINUED | OUTPATIENT
Start: 2021-01-25 | End: 2021-02-01 | Stop reason: HOSPADM

## 2021-01-25 RX ORDER — DULOXETIN HYDROCHLORIDE 60 MG/1
60 CAPSULE, DELAYED RELEASE ORAL 2 TIMES DAILY
Status: DISCONTINUED | OUTPATIENT
Start: 2021-01-25 | End: 2021-02-01 | Stop reason: HOSPADM

## 2021-01-25 RX ORDER — AMOXICILLIN AND CLAVULANATE POTASSIUM 875; 125 MG/1; MG/1
1 TABLET, FILM COATED ORAL 2 TIMES DAILY
Status: COMPLETED | OUTPATIENT
Start: 2021-01-25 | End: 2021-01-25

## 2021-01-25 RX ORDER — OXYCODONE AND ACETAMINOPHEN 7.5; 325 MG/1; MG/1
1 TABLET ORAL EVERY 6 HOURS PRN
Status: DISPENSED | OUTPATIENT
Start: 2021-01-25 | End: 2021-02-01

## 2021-01-25 RX ORDER — ASPIRIN 81 MG/1
81 TABLET, CHEWABLE ORAL DAILY
Status: DISCONTINUED | OUTPATIENT
Start: 2021-01-25 | End: 2021-02-01 | Stop reason: HOSPADM

## 2021-01-25 RX ORDER — NITROGLYCERIN 0.4 MG/1
0.4 TABLET SUBLINGUAL
Status: DISCONTINUED | OUTPATIENT
Start: 2021-01-25 | End: 2021-02-01 | Stop reason: HOSPADM

## 2021-01-25 RX ORDER — NICOTINE 21 MG/24HR
1 PATCH, TRANSDERMAL 24 HOURS TRANSDERMAL EVERY 24 HOURS
Status: DISCONTINUED | OUTPATIENT
Start: 2021-01-25 | End: 2021-02-01 | Stop reason: HOSPADM

## 2021-01-25 RX ORDER — LISINOPRIL 10 MG/1
10 TABLET ORAL DAILY
Status: DISCONTINUED | OUTPATIENT
Start: 2021-01-25 | End: 2021-01-27

## 2021-01-25 RX ORDER — DIPHENOXYLATE HYDROCHLORIDE AND ATROPINE SULFATE 2.5; .025 MG/1; MG/1
1 TABLET ORAL DAILY
Status: DISCONTINUED | OUTPATIENT
Start: 2021-01-25 | End: 2021-02-01 | Stop reason: HOSPADM

## 2021-01-25 RX ORDER — FUROSEMIDE 20 MG/1
20 TABLET ORAL DAILY
Status: DISCONTINUED | OUTPATIENT
Start: 2021-01-25 | End: 2021-01-27

## 2021-01-25 RX ORDER — NICOTINE POLACRILEX 4 MG
15 LOZENGE BUCCAL
Status: DISCONTINUED | OUTPATIENT
Start: 2021-01-25 | End: 2021-02-01 | Stop reason: HOSPADM

## 2021-01-25 RX ORDER — ALBUTEROL SULFATE 2.5 MG/3ML
2.5 SOLUTION RESPIRATORY (INHALATION) EVERY 6 HOURS PRN
Status: DISCONTINUED | OUTPATIENT
Start: 2021-01-25 | End: 2021-02-01 | Stop reason: HOSPADM

## 2021-01-25 RX ORDER — PREGABALIN 100 MG/1
200 CAPSULE ORAL EVERY 8 HOURS SCHEDULED
Status: DISCONTINUED | OUTPATIENT
Start: 2021-01-25 | End: 2021-02-01 | Stop reason: HOSPADM

## 2021-01-25 RX ADMIN — ATORVASTATIN CALCIUM 40 MG: 40 TABLET, FILM COATED ORAL at 20:45

## 2021-01-25 RX ADMIN — OXYCODONE HYDROCHLORIDE AND ACETAMINOPHEN 1 TABLET: 7.5; 325 TABLET ORAL at 20:45

## 2021-01-25 RX ADMIN — AMOXICILLIN AND CLAVULANATE POTASSIUM 1 TABLET: 875; 125 TABLET, FILM COATED ORAL at 14:03

## 2021-01-25 RX ADMIN — ISOSORBIDE MONONITRATE 30 MG: 30 TABLET, EXTENDED RELEASE ORAL at 11:52

## 2021-01-25 RX ADMIN — ASPIRIN 81 MG CHEWABLE TABLET 81 MG: 81 TABLET CHEWABLE at 11:53

## 2021-01-25 RX ADMIN — METFORMIN HYDROCHLORIDE 1000 MG: 1000 TABLET ORAL at 18:23

## 2021-01-25 RX ADMIN — Medication 1 PATCH: at 11:54

## 2021-01-25 RX ADMIN — INSULIN LISPRO 2 UNITS: 100 INJECTION, SOLUTION INTRAVENOUS; SUBCUTANEOUS at 18:23

## 2021-01-25 RX ADMIN — DULOXETINE 60 MG: 60 CAPSULE, DELAYED RELEASE ORAL at 20:45

## 2021-01-25 RX ADMIN — POTASSIUM CHLORIDE 10 MEQ: 10 CAPSULE, COATED, EXTENDED RELEASE ORAL at 11:53

## 2021-01-25 RX ADMIN — METOPROLOL TARTRATE 50 MG: 50 TABLET, FILM COATED ORAL at 20:45

## 2021-01-25 RX ADMIN — DULOXETINE 60 MG: 60 CAPSULE, DELAYED RELEASE ORAL at 11:53

## 2021-01-25 RX ADMIN — PANTOPRAZOLE SODIUM 40 MG: 40 TABLET, DELAYED RELEASE ORAL at 11:52

## 2021-01-25 RX ADMIN — FUROSEMIDE 20 MG: 20 TABLET ORAL at 11:53

## 2021-01-25 RX ADMIN — AMOXICILLIN AND CLAVULANATE POTASSIUM 1 TABLET: 875; 125 TABLET, FILM COATED ORAL at 20:45

## 2021-01-25 RX ADMIN — ROPINIROLE HYDROCHLORIDE 4 MG: 2 TABLET, FILM COATED ORAL at 20:46

## 2021-01-25 RX ADMIN — MULTIVITAMIN TABLET 1 TABLET: TABLET at 11:52

## 2021-01-25 RX ADMIN — PREGABALIN 200 MG: 100 CAPSULE ORAL at 14:02

## 2021-01-25 RX ADMIN — OXYCODONE HYDROCHLORIDE AND ACETAMINOPHEN 1 TABLET: 7.5; 325 TABLET ORAL at 14:59

## 2021-01-25 RX ADMIN — LISINOPRIL 10 MG: 10 TABLET ORAL at 11:53

## 2021-01-25 RX ADMIN — PREGABALIN 200 MG: 100 CAPSULE ORAL at 21:21

## 2021-01-25 RX ADMIN — METOPROLOL TARTRATE 50 MG: 50 TABLET, FILM COATED ORAL at 11:52

## 2021-01-25 NOTE — PROGRESS NOTES
01/25/2021  Patient Information  Reg Sanchez                                                                                          [address] [facility] [address]   1965  'PCP/Referring Physician'  hSeila Sheikh, APRN  [phone]  No ref. provider found    Chief Complaint   Patient presents with   • Consult     NP per Dr. Lund for PVD. Pt stated he has severe pain in his RT leg.    • Peripheral Vascular Disease       History of Present Illness:   The patient is a 55-year-old male who was referred to me to evaluate severe peripheral arterial vascular disease. The patient states he has rest pain in the right foot and he essentially says it keeps him up all night and because he cannot sleep secondary to the pain.  He saw a vascular surgeon in [place], approximately 1 year ago, who indicated that there was no good options for repair and gave him some pain medications and blood thinners. The patient does not walk fast because he had a previous left leg amputation secondary to trauma in 2013.  The patient has also had a 5 vessel coronary bypass grafting surgery as well as a 70% right-sided carotid artery stenosis.  Recently, this patient underwent attempted catheter-based intervention in [facility].  They were unable to cross the chronically occluded distal superficial femoral and popliteal artery and there is also trifurcation disease.  He does have single-vessel runoff through the posterior tibial artery.  The patient also has some ulcerations in the ankle area which have not healed.      Patient Active Problem List   Diagnosis   • PAD (peripheral artery disease) (CMS/HCC)   • Superficial femoral artery occlusion (CMS/HCC)   • Atherosclerosis of native artery of right lower extremity with intermittent claudication (CMS/HCC)   • Tobacco use   • Coronary artery disease involving native coronary artery of native heart without angina pectoris     Past Medical History:   Diagnosis  Date   • Anxiety    • CHF (congestive heart failure) (CMS/[facility])    • COPD (chronic obstructive pulmonary disease) (CMS/[facility])    • Diabetes mellitus (CMS/[facility])    • Hepatitis C    • Hyperlipidemia    • Hypertension    • Sleep apnea      Past Surgical History:   Procedure Laterality Date   • CARDIAC CATHETERIZATION  11/20/2020    x 2 stents   • CARDIAC CATHETERIZATION Right 1/20/2021    Procedure: Peripheral angiography;  Surgeon: Wesly Lund MD;  Location: [facility] INVASIVE LOCATION;  Service: Cardiovascular;  Laterality: Right;   • CORONARY ARTERY BYPASS GRAFT      x 5   • CORONARY STENT PLACEMENT  11/20/2020    Dr Mortensen x 2 stents   • FINGER SURGERY      left   • LEG AMPUTATION Left     left lower leg   • SHOULDER SURGERY Bilateral      No current outpatient medications on file.  No Known Allergies  Social History     Socioeconomic History   • Marital status: Legally      Spouse name: Not on file   • Number of children: 3   • Years of education: Not on file   • Highest education level: Not on file   Occupational History   • Occupation: [employer]      Comment: Disabled    Tobacco Use   • Smoking status: Current Every Day Smoker     Packs/day: 1.00     Years: 40.00     Pack years: 40.00     Types: Cigarettes   • Smokeless tobacco: Never Used   Substance and Sexual Activity   • Alcohol use: Yes     Alcohol/week: 3.0 standard drinks     Types: 2 Cans of beer, 1 Shots of liquor per week     Comment: weekly   • Drug use: Yes     Types: Marijuana     Comment: smoke Marijuana everyday   • Sexual activity: Defer   Social History Narrative    Pt lives in [place], [place] with somebody else.      Family History   Problem Relation Age of Onset   • Heart disease Mother    • Anuerysm Father    • Heart disease Father    • Heart disease Sister    • Heart disease Sister      Review of Systems   Constitution: Negative for chills, fever, malaise/fatigue, night sweats and weight loss.   HENT: Negative for hearing loss,  "odynophagia and sore throat.    Cardiovascular: Positive for chest pain, leg swelling and palpitations. Negative for dyspnea on exertion and orthopnea.   Respiratory: Positive for cough, shortness of breath and wheezing.    Hematologic/Lymphatic: Bruises/bleeds easily.   Skin: Negative for itching and rash.   Musculoskeletal: Positive for arthritis and back pain. Negative for joint pain, joint swelling and myalgias.   Gastrointestinal: Positive for abdominal pain and constipation. Negative for diarrhea, hematemesis, hematochezia, nausea and vomiting.   Genitourinary: Negative for dysuria, frequency and hematuria.   Neurological: Positive for dizziness, light-headedness, loss of balance and numbness. Negative for focal weakness, headaches and seizures.   Psychiatric/Behavioral: Positive for depression. Negative for suicidal ideas. The patient has insomnia.      Vitals:    01/25/21 0840   BP: 148/80   BP Location: Left arm   Pulse: 84   Temp: 97.3 °F (36.3 °C)   SpO2: 97%   Weight: 101 kg (223 lb)   Height: 170.2 cm (67\")      Physical Exam   CONSTITUTIONAL: Alert and conversant, Well dressed,  no acute distress obese  EYES: Sclera clean, Anicteric, Pupils equal  ENT: No nasal deviation, Trachea midline  NECK: No neck masses, Supple  LUNGS: No wheezing, Cough, non-congested  HEART: No rubs, No murmurs  GASTROINTESTINAL: Soft, non-distended, No masses, Non tender  to palpation, normal bowel sounds  NEURO: No motor deficits, No sensory deficits, Cranial Nerves 2 through 12 grossly intact  PSYCHIATRIC: Oriented to person, place and time, No memory deficits, Mood appropriate  VASCULAR: No carotid bruits, there is been a left leg amputation.  Right femoral pulses palpable.  The right foot is extremely hyperemic with thin hairless skin.  The Doppler signal in the posterior tibial artery is extremely weak.  I cannot detect a Doppler signal in the dorsalis pedis  MUSKULOSKELETAL: Left leg amputation    The ROS, past medical " history, surgical history, family history, social history and vitals were reviewed by myself and corrected as needed.      Labs/Imaging:   I reviewed the outside CT scan as well as the formal arteriogram from [place] of the reviewed the actual images.    Assessment/Plan:   The patient is a 55-year-old male who is being referred for peripheral artery disease. He has a severe problem in that he has a left leg amputation and has severe peripheral vascular disease.  Unfortunately, he has obesity, diabetes, and ongoing tobacco abuse.  He has almost continuous rest pain in the right foot and his foot is hyperemic with thin hairless skin and has tenuous viability.  This patient will probably not do well with an above-knee to posterior tibial bypass, because of the extensive calcium burden in the distal third of the superficial femoral artery.  He will probably require a right femoral to posterior tibial bypass with vein graft.  I have explained that the risk of failure are high and there is a risk of limb loss bleeding infection and death.  The patient does have threatened limb loss and I will admit him to the hospital today.  We will obtain an echocardiogram because of his heart history and he will be scheduled for surgery Wednesday morning.  1 admit to the hospital.  2 saphenous vein mapping of the right great saphenous vein.  3 echocardiogram.  4 anticipate right femoral to posterior tibial bypass Wednesday morning.    Patient Active Problem List   Diagnosis   • PAD (peripheral artery disease) (CMS/HCC)   • Superficial femoral artery occlusion (CMS/HCC)   • Atherosclerosis of native artery of right lower extremity with intermittent claudication (CMS/HCC)   • Tobacco use   • Coronary artery disease involving native coronary artery of native heart without angina pectoris       CC: YOUNG Keller editing for Jeremy Trevizo MD    I, Jeremy Trevizo MD, have read and agree with the editing done by  Denise Anne, .

## 2021-01-26 ENCOUNTER — APPOINTMENT (OUTPATIENT)
Dept: OTHER | Facility: HOSPITAL | Age: 56
End: 2021-01-26

## 2021-01-26 DIAGNOSIS — Z00.6 EXAMINATION FOR NORMAL COMPARISON FOR CLINICAL RESEARCH: ICD-10-CM

## 2021-01-26 DIAGNOSIS — Z00.6 EXAMINATION FOR NORMAL COMPARISON FOR CLINICAL RESEARCH: Primary | ICD-10-CM

## 2021-01-26 LAB
ANION GAP SERPL CALCULATED.3IONS-SCNC: 10 MMOL/L (ref 5–15)
BH CV ECHO MEAS - AO MAX PG (FULL): 1.2 MMHG
BH CV ECHO MEAS - AO MAX PG: 4.5 MMHG
BH CV ECHO MEAS - AO MEAN PG (FULL): 0.61 MMHG
BH CV ECHO MEAS - AO MEAN PG: 2.4 MMHG
BH CV ECHO MEAS - AO ROOT AREA (BSA CORRECTED): 1.7
BH CV ECHO MEAS - AO ROOT AREA: 9.9 CM^2
BH CV ECHO MEAS - AO ROOT DIAM: 3.5 CM
BH CV ECHO MEAS - AO V2 MAX: 106.2 CM/SEC
BH CV ECHO MEAS - AO V2 MEAN: 72.2 CM/SEC
BH CV ECHO MEAS - AO V2 VTI: 25.7 CM
BH CV ECHO MEAS - AVA(I,A): 3.1 CM^2
BH CV ECHO MEAS - AVA(I,D): 3.1 CM^2
BH CV ECHO MEAS - AVA(V,A): 3 CM^2
BH CV ECHO MEAS - AVA(V,D): 3 CM^2
BH CV ECHO MEAS - BSA(HAYCOCK): 2.2 M^2
BH CV ECHO MEAS - BSA: 2.1 M^2
BH CV ECHO MEAS - BZI_BMI: 34.9 KILOGRAMS/M^2
BH CV ECHO MEAS - BZI_METRIC_HEIGHT: 170.2 CM
BH CV ECHO MEAS - BZI_METRIC_WEIGHT: 101.2 KG
BH CV ECHO MEAS - EDV(CUBED): 89 ML
BH CV ECHO MEAS - EDV(MOD-SP2): 38 ML
BH CV ECHO MEAS - EDV(MOD-SP4): 61 ML
BH CV ECHO MEAS - EDV(TEICH): 90.8 ML
BH CV ECHO MEAS - EF(CUBED): 83 %
BH CV ECHO MEAS - EF(MOD-BP): 69 %
BH CV ECHO MEAS - EF(MOD-SP2): 73.7 %
BH CV ECHO MEAS - EF(MOD-SP4): 62.3 %
BH CV ECHO MEAS - EF(TEICH): 76.1 %
BH CV ECHO MEAS - ESV(CUBED): 15.1 ML
BH CV ECHO MEAS - ESV(MOD-SP2): 10 ML
BH CV ECHO MEAS - ESV(MOD-SP4): 23 ML
BH CV ECHO MEAS - ESV(TEICH): 21.7 ML
BH CV ECHO MEAS - FS: 44.6 %
BH CV ECHO MEAS - IVS/LVPW: 0.93
BH CV ECHO MEAS - IVSD: 1.1 CM
BH CV ECHO MEAS - LA DIMENSION: 3.8 CM
BH CV ECHO MEAS - LA/AO: 1.1
BH CV ECHO MEAS - LAD MAJOR: 5.2 CM
BH CV ECHO MEAS - LAT PEAK E' VEL: 8.1 CM/SEC
BH CV ECHO MEAS - LATERAL E/E' RATIO: 7.7
BH CV ECHO MEAS - LV DIASTOLIC VOL/BSA (35-75): 28.8 ML/M^2
BH CV ECHO MEAS - LV MASS(C)D: 173.7 GRAMS
BH CV ECHO MEAS - LV MASS(C)DI: 82 GRAMS/M^2
BH CV ECHO MEAS - LV MAX PG: 3.3 MMHG
BH CV ECHO MEAS - LV MEAN PG: 1.8 MMHG
BH CV ECHO MEAS - LV SYSTOLIC VOL/BSA (12-30): 10.9 ML/M^2
BH CV ECHO MEAS - LV V1 MAX: 91.3 CM/SEC
BH CV ECHO MEAS - LV V1 MEAN: 63.5 CM/SEC
BH CV ECHO MEAS - LV V1 VTI: 22.4 CM
BH CV ECHO MEAS - LVIDD: 4.5 CM
BH CV ECHO MEAS - LVIDS: 2.5 CM
BH CV ECHO MEAS - LVLD AP2: 7 CM
BH CV ECHO MEAS - LVLD AP4: 7.2 CM
BH CV ECHO MEAS - LVLS AP2: 5.8 CM
BH CV ECHO MEAS - LVLS AP4: 6 CM
BH CV ECHO MEAS - LVOT AREA (M): 3.5 CM^2
BH CV ECHO MEAS - LVOT AREA: 3.5 CM^2
BH CV ECHO MEAS - LVOT DIAM: 2.1 CM
BH CV ECHO MEAS - LVPWD: 1.1 CM
BH CV ECHO MEAS - MED PEAK E' VEL: 6.8 CM/SEC
BH CV ECHO MEAS - MEDIAL E/E' RATIO: 9.2
BH CV ECHO MEAS - MV A MAX VEL: 56.8 CM/SEC
BH CV ECHO MEAS - MV DEC TIME: 0.24 SEC
BH CV ECHO MEAS - MV E MAX VEL: 63.9 CM/SEC
BH CV ECHO MEAS - MV E/A: 1.1
BH CV ECHO MEAS - PA ACC SLOPE: 535.2 CM/SEC^2
BH CV ECHO MEAS - PA ACC TIME: 0.15 SEC
BH CV ECHO MEAS - PA PR(ACCEL): 9.3 MMHG
BH CV ECHO MEAS - PULM DIAS VEL: 41.8 CM/SEC
BH CV ECHO MEAS - PULM S/D: 1.2
BH CV ECHO MEAS - PULM SYS VEL: 51.8 CM/SEC
BH CV ECHO MEAS - SI(AO): 119.8 ML/M^2
BH CV ECHO MEAS - SI(CUBED): 34.9 ML/M^2
BH CV ECHO MEAS - SI(LVOT): 37.2 ML/M^2
BH CV ECHO MEAS - SI(MOD-SP2): 13.2 ML/M^2
BH CV ECHO MEAS - SI(MOD-SP4): 17.9 ML/M^2
BH CV ECHO MEAS - SI(TEICH): 32.6 ML/M^2
BH CV ECHO MEAS - SV(AO): 253.7 ML
BH CV ECHO MEAS - SV(CUBED): 73.9 ML
BH CV ECHO MEAS - SV(LVOT): 78.8 ML
BH CV ECHO MEAS - SV(MOD-SP2): 28 ML
BH CV ECHO MEAS - SV(MOD-SP4): 38 ML
BH CV ECHO MEAS - SV(TEICH): 69 ML
BH CV ECHO MEAS - TAPSE (>1.6): 1.9 CM
BH CV ECHO MEASUREMENTS AVERAGE E/E' RATIO: 8.58
BH CV VAS BP LEFT ARM: NORMAL MMHG
BH CV XLRA - RV BASE: 4.1 CM
BH CV XLRA - RV LENGTH: 7.3 CM
BH CV XLRA - RV MID: 4 CM
BH CV XLRA - TDI S': 9.21 CM/SEC
BUN SERPL-MCNC: 14 MG/DL (ref 6–20)
BUN/CREAT SERPL: 17.5 (ref 7–25)
CALCIUM SPEC-SCNC: 9.1 MG/DL (ref 8.6–10.5)
CHLORIDE SERPL-SCNC: 98 MMOL/L (ref 98–107)
CO2 SERPL-SCNC: 25 MMOL/L (ref 22–29)
CREAT SERPL-MCNC: 0.8 MG/DL (ref 0.76–1.27)
DEPRECATED RDW RBC AUTO: 47.1 FL (ref 37–54)
ERYTHROCYTE [DISTWIDTH] IN BLOOD BY AUTOMATED COUNT: 12.9 % (ref 12.3–15.4)
GFR SERPL CREATININE-BSD FRML MDRD: 100 ML/MIN/1.73
GLUCOSE BLDC GLUCOMTR-MCNC: 153 MG/DL (ref 70–130)
GLUCOSE BLDC GLUCOMTR-MCNC: 246 MG/DL (ref 70–130)
GLUCOSE BLDC GLUCOMTR-MCNC: 329 MG/DL (ref 70–130)
GLUCOSE BLDC GLUCOMTR-MCNC: 92 MG/DL (ref 70–130)
GLUCOSE SERPL-MCNC: 294 MG/DL (ref 65–99)
HCT VFR BLD AUTO: 44 % (ref 37.5–51)
HGB BLD-MCNC: 14.3 G/DL (ref 13–17.7)
LEFT ATRIUM VOLUME INDEX: 13.7 ML/M^2
LEFT ATRIUM VOLUME: 29 ML
MCH RBC QN AUTO: 32.3 PG (ref 26.6–33)
MCHC RBC AUTO-ENTMCNC: 32.5 G/DL (ref 31.5–35.7)
MCV RBC AUTO: 99.3 FL (ref 79–97)
PLATELET # BLD AUTO: 188 10*3/MM3 (ref 140–450)
PMV BLD AUTO: 12.7 FL (ref 6–12)
POTASSIUM SERPL-SCNC: 4.3 MMOL/L (ref 3.5–5.2)
RBC # BLD AUTO: 4.43 10*6/MM3 (ref 4.14–5.8)
SARS-COV-2 RNA RESP QL NAA+PROBE: NOT DETECTED
SODIUM SERPL-SCNC: 133 MMOL/L (ref 136–145)
WBC # BLD AUTO: 6.66 10*3/MM3 (ref 3.4–10.8)

## 2021-01-26 PROCEDURE — 86901 BLOOD TYPING SEROLOGIC RH(D): CPT

## 2021-01-26 PROCEDURE — 99024 POSTOP FOLLOW-UP VISIT: CPT | Performed by: THORACIC SURGERY (CARDIOTHORACIC VASCULAR SURGERY)

## 2021-01-26 PROCEDURE — 85027 COMPLETE CBC AUTOMATED: CPT | Performed by: PHYSICIAN ASSISTANT

## 2021-01-26 PROCEDURE — 82962 GLUCOSE BLOOD TEST: CPT

## 2021-01-26 PROCEDURE — 63710000001 INSULIN LISPRO (HUMAN) PER 5 UNITS: Performed by: PHYSICIAN ASSISTANT

## 2021-01-26 PROCEDURE — 86900 BLOOD TYPING SEROLOGIC ABO: CPT

## 2021-01-26 PROCEDURE — 80048 BASIC METABOLIC PNL TOTAL CA: CPT | Performed by: PHYSICIAN ASSISTANT

## 2021-01-26 RX ORDER — ALPRAZOLAM 0.25 MG/1
0.25 TABLET ORAL NIGHTLY PRN
Status: DISCONTINUED | OUTPATIENT
Start: 2021-01-26 | End: 2021-01-27

## 2021-01-26 RX ADMIN — INSULIN LISPRO 7 UNITS: 100 INJECTION, SOLUTION INTRAVENOUS; SUBCUTANEOUS at 08:20

## 2021-01-26 RX ADMIN — OXYCODONE HYDROCHLORIDE AND ACETAMINOPHEN 1 TABLET: 7.5; 325 TABLET ORAL at 22:28

## 2021-01-26 RX ADMIN — DULOXETINE 60 MG: 60 CAPSULE, DELAYED RELEASE ORAL at 08:20

## 2021-01-26 RX ADMIN — ASPIRIN 81 MG CHEWABLE TABLET 81 MG: 81 TABLET CHEWABLE at 08:19

## 2021-01-26 RX ADMIN — OXYCODONE HYDROCHLORIDE AND ACETAMINOPHEN 1 TABLET: 7.5; 325 TABLET ORAL at 16:50

## 2021-01-26 RX ADMIN — METOPROLOL TARTRATE 50 MG: 50 TABLET, FILM COATED ORAL at 08:20

## 2021-01-26 RX ADMIN — ATORVASTATIN CALCIUM 40 MG: 40 TABLET, FILM COATED ORAL at 20:13

## 2021-01-26 RX ADMIN — POTASSIUM CHLORIDE 10 MEQ: 10 CAPSULE, COATED, EXTENDED RELEASE ORAL at 08:19

## 2021-01-26 RX ADMIN — FUROSEMIDE 20 MG: 20 TABLET ORAL at 08:20

## 2021-01-26 RX ADMIN — PREGABALIN 200 MG: 100 CAPSULE ORAL at 06:06

## 2021-01-26 RX ADMIN — ISOSORBIDE MONONITRATE 30 MG: 30 TABLET, EXTENDED RELEASE ORAL at 08:19

## 2021-01-26 RX ADMIN — OXYCODONE HYDROCHLORIDE AND ACETAMINOPHEN 1 TABLET: 7.5; 325 TABLET ORAL at 10:22

## 2021-01-26 RX ADMIN — METFORMIN HYDROCHLORIDE 1000 MG: 1000 TABLET ORAL at 08:24

## 2021-01-26 RX ADMIN — PREGABALIN 200 MG: 100 CAPSULE ORAL at 21:01

## 2021-01-26 RX ADMIN — PREGABALIN 200 MG: 100 CAPSULE ORAL at 14:25

## 2021-01-26 RX ADMIN — OXYCODONE HYDROCHLORIDE AND ACETAMINOPHEN 1 TABLET: 7.5; 325 TABLET ORAL at 04:08

## 2021-01-26 RX ADMIN — LISINOPRIL 10 MG: 10 TABLET ORAL at 08:20

## 2021-01-26 RX ADMIN — ALPRAZOLAM 0.25 MG: 0.25 TABLET ORAL at 20:13

## 2021-01-26 RX ADMIN — INSULIN LISPRO 4 UNITS: 100 INJECTION, SOLUTION INTRAVENOUS; SUBCUTANEOUS at 16:50

## 2021-01-26 RX ADMIN — Medication 1 PATCH: at 10:58

## 2021-01-26 RX ADMIN — MULTIVITAMIN TABLET 1 TABLET: TABLET at 08:19

## 2021-01-26 RX ADMIN — METFORMIN HYDROCHLORIDE 1000 MG: 1000 TABLET ORAL at 16:53

## 2021-01-26 RX ADMIN — ROPINIROLE HYDROCHLORIDE 4 MG: 2 TABLET, FILM COATED ORAL at 20:13

## 2021-01-26 RX ADMIN — DULOXETINE 60 MG: 60 CAPSULE, DELAYED RELEASE ORAL at 20:13

## 2021-01-26 RX ADMIN — PANTOPRAZOLE SODIUM 40 MG: 40 TABLET, DELAYED RELEASE ORAL at 08:19

## 2021-01-26 RX ADMIN — METOPROLOL TARTRATE 50 MG: 50 TABLET, FILM COATED ORAL at 20:13

## 2021-01-27 ENCOUNTER — ANESTHESIA EVENT (OUTPATIENT)
Dept: PERIOP | Facility: HOSPITAL | Age: 56
End: 2021-01-27

## 2021-01-27 ENCOUNTER — ANESTHESIA (OUTPATIENT)
Dept: PERIOP | Facility: HOSPITAL | Age: 56
End: 2021-01-27

## 2021-01-27 PROBLEM — S88.919A AMPUTATION OF LEG (HCC): Chronic | Status: ACTIVE | Noted: 2021-01-27

## 2021-01-27 PROBLEM — F17.200 SMOKER: Chronic | Status: ACTIVE | Noted: 2021-01-27

## 2021-01-27 PROBLEM — E66.9 CLASS 1 OBESITY IN ADULT: Status: ACTIVE | Noted: 2021-01-27

## 2021-01-27 PROBLEM — D75.89 MACROCYTOSIS WITHOUT ANEMIA: Status: ACTIVE | Noted: 2021-01-27

## 2021-01-27 PROBLEM — Z72.0 TOBACCO USE: Chronic | Status: ACTIVE | Noted: 2021-01-12

## 2021-01-27 PROBLEM — F12.90 MARIJUANA USE: Chronic | Status: ACTIVE | Noted: 2021-01-27

## 2021-01-27 PROBLEM — S88.919A AMPUTATION OF LEG (HCC): Status: ACTIVE | Noted: 2021-01-27

## 2021-01-27 PROBLEM — I73.9 PERIPHERAL VASCULAR DISEASE (HCC): Chronic | Status: ACTIVE | Noted: 2021-01-22

## 2021-01-27 PROBLEM — I25.10 CORONARY ARTERY DISEASE: Chronic | Status: ACTIVE | Noted: 2021-01-25

## 2021-01-27 PROBLEM — I70.209 SUPERFICIAL FEMORAL ARTERY OCCLUSION (HCC): Chronic | Status: ACTIVE | Noted: 2021-01-12

## 2021-01-27 PROBLEM — F10.939 ALCOHOL WITHDRAWAL (HCC): Status: ACTIVE | Noted: 2021-01-27

## 2021-01-27 PROBLEM — F17.200 SMOKER: Status: ACTIVE | Noted: 2021-01-27

## 2021-01-27 PROBLEM — E11.9 TYPE 2 DIABETES MELLITUS: Chronic | Status: ACTIVE | Noted: 2021-01-27

## 2021-01-27 PROBLEM — E66.9 CLASS 1 OBESITY IN ADULT: Chronic | Status: ACTIVE | Noted: 2021-01-27

## 2021-01-27 PROBLEM — E11.9 TYPE 2 DIABETES MELLITUS (HCC): Status: ACTIVE | Noted: 2021-01-27

## 2021-01-27 PROBLEM — F12.90 MARIJUANA USE: Status: ACTIVE | Noted: 2021-01-27

## 2021-01-27 LAB
ABO GROUP BLD: NORMAL
ABO GROUP BLD: NORMAL
BLD GP AB SCN SERPL QL: NEGATIVE
GLUCOSE BLDC GLUCOMTR-MCNC: 132 MG/DL (ref 70–130)
GLUCOSE BLDC GLUCOMTR-MCNC: 180 MG/DL (ref 70–130)
GLUCOSE BLDC GLUCOMTR-MCNC: 381 MG/DL (ref 70–130)
RH BLD: NEGATIVE
RH BLD: NEGATIVE
T&S EXPIRATION DATE: NORMAL

## 2021-01-27 PROCEDURE — 041K09N BYPASS RIGHT FEMORAL ARTERY TO POSTERIOR TIBIAL ARTERY WITH AUTOLOGOUS VENOUS TISSUE, OPEN APPROACH: ICD-10-PCS | Performed by: THORACIC SURGERY (CARDIOTHORACIC VASCULAR SURGERY)

## 2021-01-27 PROCEDURE — 25010000002 HYDROMORPHONE PER 4 MG: Performed by: NURSE ANESTHETIST, CERTIFIED REGISTERED

## 2021-01-27 PROCEDURE — 25010000002 NEOSTIGMINE 10 MG/10ML SOLUTION: Performed by: NURSE ANESTHETIST, CERTIFIED REGISTERED

## 2021-01-27 PROCEDURE — 25010000002 ALBUMIN HUMAN 25% PER 50 ML: Performed by: INTERNAL MEDICINE

## 2021-01-27 PROCEDURE — 25010000002 FENTANYL CITRATE (PF) 100 MCG/2ML SOLUTION: Performed by: NURSE ANESTHETIST, CERTIFIED REGISTERED

## 2021-01-27 PROCEDURE — P9041 ALBUMIN (HUMAN),5%, 50ML: HCPCS | Performed by: ANESTHESIOLOGY

## 2021-01-27 PROCEDURE — 25010000002 CEFAZOLIN PER 500 MG: Performed by: PHYSICIAN ASSISTANT

## 2021-01-27 PROCEDURE — 35566 ART BYP FEM-ANT-POST TIB/PRL: CPT | Performed by: THORACIC SURGERY (CARDIOTHORACIC VASCULAR SURGERY)

## 2021-01-27 PROCEDURE — 25010000002 ALBUMIN HUMAN 5% PER 50 ML: Performed by: ANESTHESIOLOGY

## 2021-01-27 PROCEDURE — P9047 ALBUMIN (HUMAN), 25%, 50ML: HCPCS | Performed by: INTERNAL MEDICINE

## 2021-01-27 PROCEDURE — C1768 GRAFT, VASCULAR: HCPCS | Performed by: THORACIC SURGERY (CARDIOTHORACIC VASCULAR SURGERY)

## 2021-01-27 PROCEDURE — 25010000002 PHENYLEPHRINE 10 MG/ML SOLUTION 1 ML VIAL: Performed by: NURSE ANESTHETIST, CERTIFIED REGISTERED

## 2021-01-27 PROCEDURE — 99233 SBSQ HOSP IP/OBS HIGH 50: CPT | Performed by: INTERNAL MEDICINE

## 2021-01-27 PROCEDURE — 25010000002 CEFUROXIME: Performed by: THORACIC SURGERY (CARDIOTHORACIC VASCULAR SURGERY)

## 2021-01-27 PROCEDURE — 25010000002 PROTAMINE SULFATE PER 10 MG: Performed by: NURSE ANESTHETIST, CERTIFIED REGISTERED

## 2021-01-27 PROCEDURE — 86900 BLOOD TYPING SEROLOGIC ABO: CPT | Performed by: PHYSICIAN ASSISTANT

## 2021-01-27 PROCEDURE — 25010000002 HEPARIN (PORCINE) PER 1000 UNITS: Performed by: NURSE ANESTHETIST, CERTIFIED REGISTERED

## 2021-01-27 PROCEDURE — 35566 ART BYP FEM-ANT-POST TIB/PRL: CPT | Performed by: PHYSICIAN ASSISTANT

## 2021-01-27 PROCEDURE — 63710000001 INSULIN LISPRO (HUMAN) PER 5 UNITS: Performed by: NURSE PRACTITIONER

## 2021-01-27 PROCEDURE — 25010000002 MIDAZOLAM PER 1 MG: Performed by: NURSE ANESTHETIST, CERTIFIED REGISTERED

## 2021-01-27 PROCEDURE — 82962 GLUCOSE BLOOD TEST: CPT

## 2021-01-27 PROCEDURE — 25010000002 ONDANSETRON PER 1 MG: Performed by: NURSE ANESTHETIST, CERTIFIED REGISTERED

## 2021-01-27 PROCEDURE — 99024 POSTOP FOLLOW-UP VISIT: CPT | Performed by: THORACIC SURGERY (CARDIOTHORACIC VASCULAR SURGERY)

## 2021-01-27 PROCEDURE — 86850 RBC ANTIBODY SCREEN: CPT | Performed by: PHYSICIAN ASSISTANT

## 2021-01-27 PROCEDURE — 25010000002 KETOROLAC TROMETHAMINE PER 15 MG: Performed by: NURSE PRACTITIONER

## 2021-01-27 PROCEDURE — 25010000002 HEPARIN (PORCINE) PER 1000 UNITS: Performed by: THORACIC SURGERY (CARDIOTHORACIC VASCULAR SURGERY)

## 2021-01-27 PROCEDURE — 25010000002 PROPOFOL 10 MG/ML EMULSION: Performed by: NURSE ANESTHETIST, CERTIFIED REGISTERED

## 2021-01-27 PROCEDURE — 86923 COMPATIBILITY TEST ELECTRIC: CPT

## 2021-01-27 PROCEDURE — 86901 BLOOD TYPING SEROLOGIC RH(D): CPT | Performed by: PHYSICIAN ASSISTANT

## 2021-01-27 PROCEDURE — 25010000002 DEXAMETHASONE PER 1 MG: Performed by: NURSE ANESTHETIST, CERTIFIED REGISTERED

## 2021-01-27 DEVICE — IMPLANTABLE DEVICE: Type: IMPLANTABLE DEVICE | Site: ARTERY FEMORAL | Status: FUNCTIONAL

## 2021-01-27 DEVICE — CLIP LIGAT VASC HORIZON TI LG ORNG 6CT: Type: IMPLANTABLE DEVICE | Site: ARTERY FEMORAL | Status: FUNCTIONAL

## 2021-01-27 RX ORDER — ONDANSETRON 2 MG/ML
INJECTION INTRAMUSCULAR; INTRAVENOUS AS NEEDED
Status: DISCONTINUED | OUTPATIENT
Start: 2021-01-27 | End: 2021-01-27 | Stop reason: SURG

## 2021-01-27 RX ORDER — ALBUMIN, HUMAN INJ 5% 5 %
12.5 SOLUTION INTRAVENOUS ONCE
Status: COMPLETED | OUTPATIENT
Start: 2021-01-27 | End: 2021-01-27

## 2021-01-27 RX ORDER — EPHEDRINE SULFATE 50 MG/ML
INJECTION, SOLUTION INTRAVENOUS AS NEEDED
Status: DISCONTINUED | OUTPATIENT
Start: 2021-01-27 | End: 2021-01-27 | Stop reason: SURG

## 2021-01-27 RX ORDER — FENTANYL CITRATE 50 UG/ML
50 INJECTION, SOLUTION INTRAMUSCULAR; INTRAVENOUS
Status: DISCONTINUED | OUTPATIENT
Start: 2021-01-27 | End: 2021-01-27 | Stop reason: HOSPADM

## 2021-01-27 RX ORDER — SODIUM CHLORIDE, SODIUM LACTATE, POTASSIUM CHLORIDE, CALCIUM CHLORIDE 600; 310; 30; 20 MG/100ML; MG/100ML; MG/100ML; MG/100ML
9 INJECTION, SOLUTION INTRAVENOUS CONTINUOUS
Status: DISCONTINUED | OUTPATIENT
Start: 2021-01-27 | End: 2021-01-28

## 2021-01-27 RX ORDER — FENTANYL CITRATE 50 UG/ML
INJECTION, SOLUTION INTRAMUSCULAR; INTRAVENOUS AS NEEDED
Status: DISCONTINUED | OUTPATIENT
Start: 2021-01-27 | End: 2021-01-27 | Stop reason: SURG

## 2021-01-27 RX ORDER — BUPIVACAINE HCL/0.9 % NACL/PF 0.125 %
PLASTIC BAG, INJECTION (ML) EPIDURAL AS NEEDED
Status: DISCONTINUED | OUTPATIENT
Start: 2021-01-27 | End: 2021-01-27 | Stop reason: SURG

## 2021-01-27 RX ORDER — BUDESONIDE AND FORMOTEROL FUMARATE DIHYDRATE 160; 4.5 UG/1; UG/1
2 AEROSOL RESPIRATORY (INHALATION)
Status: DISCONTINUED | OUTPATIENT
Start: 2021-01-27 | End: 2021-02-01 | Stop reason: HOSPADM

## 2021-01-27 RX ORDER — ALBUMIN (HUMAN) 12.5 G/50ML
12.5 SOLUTION INTRAVENOUS ONCE
Status: COMPLETED | OUTPATIENT
Start: 2021-01-27 | End: 2021-01-27

## 2021-01-27 RX ORDER — 0.9 % SODIUM CHLORIDE 0.9 %
10 VIAL (ML) INJECTION ONCE
Status: COMPLETED | OUTPATIENT
Start: 2021-01-27 | End: 2021-01-27

## 2021-01-27 RX ORDER — GLYCOPYRROLATE 0.2 MG/ML
INJECTION INTRAMUSCULAR; INTRAVENOUS AS NEEDED
Status: DISCONTINUED | OUTPATIENT
Start: 2021-01-27 | End: 2021-01-27 | Stop reason: SURG

## 2021-01-27 RX ORDER — PROPOFOL 10 MG/ML
VIAL (ML) INTRAVENOUS AS NEEDED
Status: DISCONTINUED | OUTPATIENT
Start: 2021-01-27 | End: 2021-01-27 | Stop reason: SURG

## 2021-01-27 RX ORDER — SODIUM CHLORIDE 450 MG/100ML
75 INJECTION, SOLUTION INTRAVENOUS CONTINUOUS
Status: DISCONTINUED | OUTPATIENT
Start: 2021-01-27 | End: 2021-01-30

## 2021-01-27 RX ORDER — DEXAMETHASONE SODIUM PHOSPHATE 4 MG/ML
INJECTION, SOLUTION INTRA-ARTICULAR; INTRALESIONAL; INTRAMUSCULAR; INTRAVENOUS; SOFT TISSUE AS NEEDED
Status: DISCONTINUED | OUTPATIENT
Start: 2021-01-27 | End: 2021-01-27 | Stop reason: SURG

## 2021-01-27 RX ORDER — PROMETHAZINE HYDROCHLORIDE 25 MG/1
25 TABLET ORAL ONCE AS NEEDED
Status: DISCONTINUED | OUTPATIENT
Start: 2021-01-27 | End: 2021-01-27 | Stop reason: HOSPADM

## 2021-01-27 RX ORDER — ALPRAZOLAM 0.5 MG/1
0.5 TABLET ORAL 4 TIMES DAILY PRN
Status: DISCONTINUED | OUTPATIENT
Start: 2021-01-27 | End: 2021-02-01 | Stop reason: HOSPADM

## 2021-01-27 RX ORDER — ROCURONIUM BROMIDE 10 MG/ML
INJECTION, SOLUTION INTRAVENOUS AS NEEDED
Status: DISCONTINUED | OUTPATIENT
Start: 2021-01-27 | End: 2021-01-27 | Stop reason: SURG

## 2021-01-27 RX ORDER — PROMETHAZINE HYDROCHLORIDE 25 MG/1
25 SUPPOSITORY RECTAL ONCE AS NEEDED
Status: DISCONTINUED | OUTPATIENT
Start: 2021-01-27 | End: 2021-01-27 | Stop reason: HOSPADM

## 2021-01-27 RX ORDER — CEFAZOLIN SODIUM 2 G/100ML
2 INJECTION, SOLUTION INTRAVENOUS EVERY 8 HOURS
Status: COMPLETED | OUTPATIENT
Start: 2021-01-27 | End: 2021-01-28

## 2021-01-27 RX ORDER — CLOPIDOGREL BISULFATE 75 MG/1
75 TABLET ORAL DAILY
Status: DISCONTINUED | OUTPATIENT
Start: 2021-01-28 | End: 2021-02-01 | Stop reason: HOSPADM

## 2021-01-27 RX ORDER — IPRATROPIUM BROMIDE AND ALBUTEROL SULFATE 2.5; .5 MG/3ML; MG/3ML
3 SOLUTION RESPIRATORY (INHALATION) EVERY 6 HOURS PRN
Status: DISCONTINUED | OUTPATIENT
Start: 2021-01-27 | End: 2021-02-01 | Stop reason: HOSPADM

## 2021-01-27 RX ORDER — PROTAMINE SULFATE 10 MG/ML
INJECTION, SOLUTION INTRAVENOUS AS NEEDED
Status: DISCONTINUED | OUTPATIENT
Start: 2021-01-27 | End: 2021-01-27 | Stop reason: SURG

## 2021-01-27 RX ORDER — HYDROMORPHONE HYDROCHLORIDE 1 MG/ML
0.5 INJECTION, SOLUTION INTRAMUSCULAR; INTRAVENOUS; SUBCUTANEOUS
Status: COMPLETED | OUTPATIENT
Start: 2021-01-27 | End: 2021-01-27

## 2021-01-27 RX ORDER — LIDOCAINE HYDROCHLORIDE 10 MG/ML
INJECTION, SOLUTION EPIDURAL; INFILTRATION; INTRACAUDAL; PERINEURAL AS NEEDED
Status: DISCONTINUED | OUTPATIENT
Start: 2021-01-27 | End: 2021-01-27 | Stop reason: SURG

## 2021-01-27 RX ORDER — DEXTROSE, SODIUM CHLORIDE, SODIUM LACTATE, POTASSIUM CHLORIDE, AND CALCIUM CHLORIDE 5; .6; .31; .03; .02 G/100ML; G/100ML; G/100ML; G/100ML; G/100ML
INJECTION, SOLUTION INTRAVENOUS CONTINUOUS PRN
Status: COMPLETED | OUTPATIENT
Start: 2021-01-27 | End: 2021-01-27

## 2021-01-27 RX ORDER — NEOSTIGMINE METHYLSULFATE 1 MG/ML
INJECTION, SOLUTION INTRAVENOUS AS NEEDED
Status: DISCONTINUED | OUTPATIENT
Start: 2021-01-27 | End: 2021-01-27 | Stop reason: SURG

## 2021-01-27 RX ORDER — LABETALOL HYDROCHLORIDE 5 MG/ML
5 INJECTION, SOLUTION INTRAVENOUS
Status: DISCONTINUED | OUTPATIENT
Start: 2021-01-27 | End: 2021-01-27 | Stop reason: HOSPADM

## 2021-01-27 RX ORDER — ONDANSETRON 2 MG/ML
4 INJECTION INTRAMUSCULAR; INTRAVENOUS ONCE AS NEEDED
Status: DISCONTINUED | OUTPATIENT
Start: 2021-01-27 | End: 2021-01-27 | Stop reason: HOSPADM

## 2021-01-27 RX ORDER — KETOROLAC TROMETHAMINE 30 MG/ML
30 INJECTION, SOLUTION INTRAMUSCULAR; INTRAVENOUS ONCE
Status: COMPLETED | OUTPATIENT
Start: 2021-01-27 | End: 2021-01-27

## 2021-01-27 RX ORDER — HEPARIN SODIUM 1000 [USP'U]/ML
INJECTION, SOLUTION INTRAVENOUS; SUBCUTANEOUS AS NEEDED
Status: DISCONTINUED | OUTPATIENT
Start: 2021-01-27 | End: 2021-01-27 | Stop reason: SURG

## 2021-01-27 RX ORDER — MIDAZOLAM HYDROCHLORIDE 1 MG/ML
INJECTION INTRAMUSCULAR; INTRAVENOUS AS NEEDED
Status: DISCONTINUED | OUTPATIENT
Start: 2021-01-27 | End: 2021-01-27 | Stop reason: SURG

## 2021-01-27 RX ADMIN — FENTANYL CITRATE 100 MCG: 50 INJECTION, SOLUTION INTRAMUSCULAR; INTRAVENOUS at 13:08

## 2021-01-27 RX ADMIN — FENTANYL CITRATE 50 MCG: 50 INJECTION, SOLUTION INTRAMUSCULAR; INTRAVENOUS at 16:01

## 2021-01-27 RX ADMIN — ALBUMIN HUMAN 12.5 G: 0.25 SOLUTION INTRAVENOUS at 19:43

## 2021-01-27 RX ADMIN — Medication 80 MCG: at 13:23

## 2021-01-27 RX ADMIN — ASPIRIN 81 MG CHEWABLE TABLET 81 MG: 81 TABLET CHEWABLE at 08:57

## 2021-01-27 RX ADMIN — ALPRAZOLAM 0.5 MG: 0.5 TABLET ORAL at 20:48

## 2021-01-27 RX ADMIN — DULOXETINE 60 MG: 60 CAPSULE, DELAYED RELEASE ORAL at 20:47

## 2021-01-27 RX ADMIN — METFORMIN HYDROCHLORIDE 1000 MG: 1000 TABLET ORAL at 08:56

## 2021-01-27 RX ADMIN — GLYCOPYRROLATE 0.4 MG: 0.4 INJECTION INTRAMUSCULAR; INTRAVENOUS at 15:11

## 2021-01-27 RX ADMIN — Medication 80 MCG: at 13:12

## 2021-01-27 RX ADMIN — ALBUMIN HUMAN 12.5 G: 0.05 INJECTION, SOLUTION INTRAVENOUS at 17:08

## 2021-01-27 RX ADMIN — DEXAMETHASONE SODIUM PHOSPHATE 4 MG: 4 INJECTION, SOLUTION INTRA-ARTICULAR; INTRALESIONAL; INTRAMUSCULAR; INTRAVENOUS; SOFT TISSUE at 13:15

## 2021-01-27 RX ADMIN — INSULIN LISPRO 8 UNITS: 100 INJECTION, SOLUTION INTRAVENOUS; SUBCUTANEOUS at 21:46

## 2021-01-27 RX ADMIN — OXYCODONE HYDROCHLORIDE AND ACETAMINOPHEN 1 TABLET: 7.5; 325 TABLET ORAL at 04:12

## 2021-01-27 RX ADMIN — PREGABALIN 200 MG: 100 CAPSULE ORAL at 20:47

## 2021-01-27 RX ADMIN — KETOROLAC TROMETHAMINE 30 MG: 30 INJECTION, SOLUTION INTRAMUSCULAR; INTRAVENOUS at 20:47

## 2021-01-27 RX ADMIN — METFORMIN HYDROCHLORIDE 1000 MG: 1000 TABLET ORAL at 17:58

## 2021-01-27 RX ADMIN — SODIUM CHLORIDE, POTASSIUM CHLORIDE, SODIUM LACTATE AND CALCIUM CHLORIDE 9 ML/HR: 600; 310; 30; 20 INJECTION, SOLUTION INTRAVENOUS at 11:22

## 2021-01-27 RX ADMIN — MIDAZOLAM HYDROCHLORIDE 2 MG: 1 INJECTION, SOLUTION INTRAMUSCULAR; INTRAVENOUS at 13:08

## 2021-01-27 RX ADMIN — METOPROLOL TARTRATE 50 MG: 50 TABLET, FILM COATED ORAL at 08:57

## 2021-01-27 RX ADMIN — FENTANYL CITRATE 50 MCG: 50 INJECTION, SOLUTION INTRAMUSCULAR; INTRAVENOUS at 17:08

## 2021-01-27 RX ADMIN — ROCURONIUM BROMIDE 10 MG: 10 INJECTION INTRAVENOUS at 14:48

## 2021-01-27 RX ADMIN — PROPOFOL 200 MG: 10 INJECTION, EMULSION INTRAVENOUS at 13:08

## 2021-01-27 RX ADMIN — LISINOPRIL 10 MG: 10 TABLET ORAL at 08:57

## 2021-01-27 RX ADMIN — PROPOFOL 30 MG: 10 INJECTION, EMULSION INTRAVENOUS at 14:12

## 2021-01-27 RX ADMIN — PROPOFOL 30 MG: 10 INJECTION, EMULSION INTRAVENOUS at 14:48

## 2021-01-27 RX ADMIN — NEOSTIGMINE 4 MG: 1 INJECTION INTRAVENOUS at 15:11

## 2021-01-27 RX ADMIN — OXYCODONE HYDROCHLORIDE AND ACETAMINOPHEN 1 TABLET: 7.5; 325 TABLET ORAL at 17:58

## 2021-01-27 RX ADMIN — CEFUROXIME 1.5 G: 1.5 INJECTION, POWDER, FOR SOLUTION INTRAVENOUS at 13:18

## 2021-01-27 RX ADMIN — OXYCODONE HYDROCHLORIDE AND ACETAMINOPHEN 1 TABLET: 7.5; 325 TABLET ORAL at 10:07

## 2021-01-27 RX ADMIN — FENTANYL CITRATE 50 MCG: 50 INJECTION, SOLUTION INTRAMUSCULAR; INTRAVENOUS at 16:18

## 2021-01-27 RX ADMIN — FUROSEMIDE 20 MG: 20 TABLET ORAL at 08:56

## 2021-01-27 RX ADMIN — ISOSORBIDE MONONITRATE 30 MG: 30 TABLET, EXTENDED RELEASE ORAL at 09:00

## 2021-01-27 RX ADMIN — ROCURONIUM BROMIDE 50 MG: 10 INJECTION INTRAVENOUS at 13:08

## 2021-01-27 RX ADMIN — ALBUMIN HUMAN 12.5 G: 0.05 INJECTION, SOLUTION INTRAVENOUS at 16:59

## 2021-01-27 RX ADMIN — ATORVASTATIN CALCIUM 40 MG: 40 TABLET, FILM COATED ORAL at 20:48

## 2021-01-27 RX ADMIN — DULOXETINE 60 MG: 60 CAPSULE, DELAYED RELEASE ORAL at 08:57

## 2021-01-27 RX ADMIN — PANTOPRAZOLE SODIUM 40 MG: 40 TABLET, DELAYED RELEASE ORAL at 08:57

## 2021-01-27 RX ADMIN — PHENYLEPHRINE HYDROCHLORIDE 0.5 MCG/KG/MIN: 10 INJECTION INTRAVENOUS at 13:23

## 2021-01-27 RX ADMIN — ROPINIROLE HYDROCHLORIDE 4 MG: 2 TABLET, FILM COATED ORAL at 20:47

## 2021-01-27 RX ADMIN — POTASSIUM CHLORIDE 10 MEQ: 10 CAPSULE, COATED, EXTENDED RELEASE ORAL at 08:57

## 2021-01-27 RX ADMIN — EPHEDRINE SULFATE 10 MG: 50 INJECTION, SOLUTION INTRAVENOUS at 13:15

## 2021-01-27 RX ADMIN — Medication 80 MCG: at 13:15

## 2021-01-27 RX ADMIN — ROCURONIUM BROMIDE 10 MG: 10 INJECTION INTRAVENOUS at 14:12

## 2021-01-27 RX ADMIN — FENTANYL CITRATE 50 MCG: 50 INJECTION, SOLUTION INTRAMUSCULAR; INTRAVENOUS at 16:25

## 2021-01-27 RX ADMIN — MULTIVITAMIN TABLET 1 TABLET: TABLET at 08:57

## 2021-01-27 RX ADMIN — SODIUM CHLORIDE 75 ML/HR: 4.5 INJECTION, SOLUTION INTRAVENOUS at 15:43

## 2021-01-27 RX ADMIN — HYDROMORPHONE HYDROCHLORIDE 0.5 MG: 1 INJECTION, SOLUTION INTRAMUSCULAR; INTRAVENOUS; SUBCUTANEOUS at 15:57

## 2021-01-27 RX ADMIN — CEFAZOLIN 2 G: 10 INJECTION, POWDER, FOR SOLUTION INTRAVENOUS at 20:51

## 2021-01-27 RX ADMIN — SODIUM CHLORIDE, PRESERVATIVE FREE 10 ML: 5 INJECTION INTRAVENOUS at 11:22

## 2021-01-27 RX ADMIN — HYDROMORPHONE HYDROCHLORIDE 0.5 MG: 1 INJECTION, SOLUTION INTRAMUSCULAR; INTRAVENOUS; SUBCUTANEOUS at 15:40

## 2021-01-27 RX ADMIN — HEPARIN SODIUM 5000 UNITS: 1000 INJECTION, SOLUTION INTRAVENOUS; SUBCUTANEOUS at 13:44

## 2021-01-27 RX ADMIN — PREGABALIN 200 MG: 100 CAPSULE ORAL at 04:12

## 2021-01-27 RX ADMIN — HYDROMORPHONE HYDROCHLORIDE 0.5 MG: 1 INJECTION, SOLUTION INTRAMUSCULAR; INTRAVENOUS; SUBCUTANEOUS at 15:29

## 2021-01-27 RX ADMIN — ONDANSETRON 4 MG: 2 INJECTION INTRAMUSCULAR; INTRAVENOUS at 15:07

## 2021-01-27 RX ADMIN — HYDROMORPHONE HYDROCHLORIDE 0.5 MG: 1 INJECTION, SOLUTION INTRAMUSCULAR; INTRAVENOUS; SUBCUTANEOUS at 15:48

## 2021-01-27 RX ADMIN — PROTAMINE SULFATE 50 MG: 10 INJECTION, SOLUTION INTRAVENOUS at 14:52

## 2021-01-27 RX ADMIN — NICARDIPINE HYDROCHLORIDE 10 MG/HR: 0.1 INJECTION, SOLUTION INTRAVENOUS at 15:07

## 2021-01-27 RX ADMIN — LIDOCAINE HYDROCHLORIDE 100 MG: 10 INJECTION, SOLUTION EPIDURAL; INFILTRATION; INTRACAUDAL; PERINEURAL at 13:08

## 2021-01-27 NOTE — ANESTHESIA POSTPROCEDURE EVALUATION
Patient: Reg Sanchez    Procedure Summary     Date: 01/27/21 Room / Location:  [facility]  /  [facility]    Anesthesia Start: 1302 Anesthesia Stop: 1524    Procedure: FEMORAL DISTAL BYPASS (N/A Thigh) Diagnosis:       Peripheral vascular disease (CMS/HCC)      (Peripheral vascular disease (CMS/HCC) [I73.9])    Surgeon: Jeremy Trevizo MD Provider: Kei Sprague MD    Anesthesia Type: general ASA Status: 3          Anesthesia Type: general    Vitals  Vitals Value Taken Time   /50 01/27/21 1523   Temp 99.3 °F (37.4 °C) 01/27/21 1520   Pulse 68 01/27/21 1522   Resp 16 01/27/21 1520   SpO2 95 % 01/27/21 1523   Vitals shown include unvalidated device data.        Post Anesthesia Care and Evaluation    Patient location during evaluation: PACU  Patient participation: waiting for patient participation  Level of consciousness: responsive to physical stimuli  Pain management: adequate  Airway patency: patent  Anesthetic complications: No anesthetic complications  PONV Status: none  Cardiovascular status: hemodynamically stable and acceptable  Respiratory status: nonlabored ventilation, acceptable and nasal cannula  Hydration status: acceptable

## 2021-01-27 NOTE — ANESTHESIA PROCEDURE NOTES
Airway  Urgency: elective    Date/Time: 1/27/2021 1:10 PM  Airway not difficult    General Information and Staff    Patient location during procedure: OR  CRNA: Afua Portillo CRNA    Indications and Patient Condition  Indications for airway management: airway protection    Preoxygenated: yes  MILS not maintained throughout  Mask difficulty assessment: 1 - vent by mask    Final Airway Details  Final airway type: endotracheal airway      Successful airway: ETT  Cuffed: yes   Successful intubation technique: direct laryngoscopy  Endotracheal tube insertion site: oral  Blade: Elana  Blade size: 4  ETT size (mm): 7.5  Cormack-Lehane Classification: grade I - full view of glottis  Placement verified by: chest auscultation and capnometry   Measured from: lips  ETT/EBT  to lips (cm): 20  Number of attempts at approach: 1  Assessment: lips, teeth, and gum same as pre-op and atraumatic intubation    Additional Comments  Negative epigastric sounds, Breath sound equal bilaterally with symmetric chest rise and fall

## 2021-01-27 NOTE — ANESTHESIA PREPROCEDURE EVALUATION
Anesthesia Evaluation     Patient summary reviewed and Nursing notes reviewed   NPO Solid Status: > 8 hours  NPO Liquid Status: > 8 hours           Airway   Mallampati: I  TM distance: >3 FB  Neck ROM: full  No difficulty expected  Dental    (+) upper dentures    Pulmonary    (+) a smoker Current, COPD (MDI) moderate, sleep apnea,   (-) shortness of breath  Cardiovascular     ECG reviewed  Patient on routine beta blocker    (+) hypertension, CAD, CABG (2011), cardiac stents within the past 12 months CHF Diastolic >=55%, PVD, hyperlipidemia,   (-) dysrhythmias, angina    ROS comment: ECG NSR   ECHO EF>66 % RV cavity is borderline dilated.  Stress EF  69% -no focal wall motion abnormalities.  No evidence of  ischemic dilation or of increased lung uptake of radiopharmaceutical.    Neuro/Psych  GI/Hepatic/Renal/Endo    (+) obesity, morbid obesity,  hepatitis C, diabetes mellitus (aic >7) type 2 poorly controlled,   (-) liver disease    Musculoskeletal     Abdominal    Substance History      OB/GYN          Other        ROS/Med Hx Other: BSL: 1321      Phys Exam Other: Beard     Left Leg amuptee trauma               Anesthesia Plan    ASA 3     general     intravenous induction     Anesthetic plan, all risks, benefits, and alternatives have been provided, discussed and informed consent has been obtained with: patient.    Plan discussed with CRNA.

## 2021-01-28 LAB
ANION GAP SERPL CALCULATED.3IONS-SCNC: 10 MMOL/L (ref 5–15)
BASOPHILS # BLD AUTO: 0.01 10*3/MM3 (ref 0–0.2)
BASOPHILS NFR BLD AUTO: 0.1 % (ref 0–1.5)
BUN SERPL-MCNC: 21 MG/DL (ref 6–20)
BUN/CREAT SERPL: 20.8 (ref 7–25)
CALCIUM SPEC-SCNC: 8.3 MG/DL (ref 8.6–10.5)
CHLORIDE SERPL-SCNC: 94 MMOL/L (ref 98–107)
CO2 SERPL-SCNC: 25 MMOL/L (ref 22–29)
CREAT SERPL-MCNC: 1.01 MG/DL (ref 0.76–1.27)
DEPRECATED RDW RBC AUTO: 46.5 FL (ref 37–54)
EOSINOPHIL # BLD AUTO: 0 10*3/MM3 (ref 0–0.4)
EOSINOPHIL NFR BLD AUTO: 0 % (ref 0.3–6.2)
ERYTHROCYTE [DISTWIDTH] IN BLOOD BY AUTOMATED COUNT: 13.1 % (ref 12.3–15.4)
GFR SERPL CREATININE-BSD FRML MDRD: 77 ML/MIN/1.73
GLUCOSE BLDC GLUCOMTR-MCNC: 203 MG/DL (ref 70–130)
GLUCOSE BLDC GLUCOMTR-MCNC: 251 MG/DL (ref 70–130)
GLUCOSE BLDC GLUCOMTR-MCNC: 263 MG/DL (ref 70–130)
GLUCOSE BLDC GLUCOMTR-MCNC: 298 MG/DL (ref 70–130)
GLUCOSE BLDC GLUCOMTR-MCNC: 305 MG/DL (ref 70–130)
GLUCOSE SERPL-MCNC: 228 MG/DL (ref 65–99)
HCT VFR BLD AUTO: 34 % (ref 37.5–51)
HGB BLD-MCNC: 11.2 G/DL (ref 13–17.7)
IMM GRANULOCYTES # BLD AUTO: 0.17 10*3/MM3 (ref 0–0.05)
IMM GRANULOCYTES NFR BLD AUTO: 1.5 % (ref 0–0.5)
LYMPHOCYTES # BLD AUTO: 1.42 10*3/MM3 (ref 0.7–3.1)
LYMPHOCYTES NFR BLD AUTO: 12.5 % (ref 19.6–45.3)
MCH RBC QN AUTO: 32.5 PG (ref 26.6–33)
MCHC RBC AUTO-ENTMCNC: 32.9 G/DL (ref 31.5–35.7)
MCV RBC AUTO: 98.6 FL (ref 79–97)
MONOCYTES # BLD AUTO: 0.59 10*3/MM3 (ref 0.1–0.9)
MONOCYTES NFR BLD AUTO: 5.2 % (ref 5–12)
NEUTROPHILS NFR BLD AUTO: 80.7 % (ref 42.7–76)
NEUTROPHILS NFR BLD AUTO: 9.17 10*3/MM3 (ref 1.7–7)
NRBC BLD AUTO-RTO: 0 /100 WBC (ref 0–0.2)
PLATELET # BLD AUTO: 158 10*3/MM3 (ref 140–450)
PMV BLD AUTO: 12.7 FL (ref 6–12)
POTASSIUM SERPL-SCNC: 4.7 MMOL/L (ref 3.5–5.2)
RBC # BLD AUTO: 3.45 10*6/MM3 (ref 4.14–5.8)
SODIUM SERPL-SCNC: 129 MMOL/L (ref 136–145)
WBC # BLD AUTO: 11.36 10*3/MM3 (ref 3.4–10.8)

## 2021-01-28 PROCEDURE — 82962 GLUCOSE BLOOD TEST: CPT

## 2021-01-28 PROCEDURE — 85025 COMPLETE CBC W/AUTO DIFF WBC: CPT | Performed by: PHYSICIAN ASSISTANT

## 2021-01-28 PROCEDURE — 25010000002 CEFAZOLIN PER 500 MG: Performed by: PHYSICIAN ASSISTANT

## 2021-01-28 PROCEDURE — 99232 SBSQ HOSP IP/OBS MODERATE 35: CPT | Performed by: INTERNAL MEDICINE

## 2021-01-28 PROCEDURE — 25010000002 KETOROLAC TROMETHAMINE PER 15 MG: Performed by: NURSE PRACTITIONER

## 2021-01-28 PROCEDURE — 94799 UNLISTED PULMONARY SVC/PX: CPT

## 2021-01-28 PROCEDURE — 63710000001 INSULIN LISPRO (HUMAN) PER 5 UNITS: Performed by: NURSE PRACTITIONER

## 2021-01-28 PROCEDURE — 99024 POSTOP FOLLOW-UP VISIT: CPT | Performed by: THORACIC SURGERY (CARDIOTHORACIC VASCULAR SURGERY)

## 2021-01-28 PROCEDURE — 94640 AIRWAY INHALATION TREATMENT: CPT

## 2021-01-28 PROCEDURE — 80048 BASIC METABOLIC PNL TOTAL CA: CPT | Performed by: PHYSICIAN ASSISTANT

## 2021-01-28 RX ORDER — KETOROLAC TROMETHAMINE 30 MG/ML
30 INJECTION, SOLUTION INTRAMUSCULAR; INTRAVENOUS EVERY 6 HOURS PRN
Status: DISCONTINUED | OUTPATIENT
Start: 2021-01-28 | End: 2021-02-01 | Stop reason: HOSPADM

## 2021-01-28 RX ORDER — NICOTINE 21 MG/24HR
1 PATCH, TRANSDERMAL 24 HOURS TRANSDERMAL DAILY PRN
Status: DISCONTINUED | OUTPATIENT
Start: 2021-01-28 | End: 2021-01-28 | Stop reason: SDUPTHER

## 2021-01-28 RX ADMIN — BUDESONIDE AND FORMOTEROL FUMARATE DIHYDRATE 2 PUFF: 160; 4.5 AEROSOL RESPIRATORY (INHALATION) at 21:40

## 2021-01-28 RX ADMIN — DULOXETINE 60 MG: 60 CAPSULE, DELAYED RELEASE ORAL at 08:09

## 2021-01-28 RX ADMIN — ATORVASTATIN CALCIUM 40 MG: 40 TABLET, FILM COATED ORAL at 20:58

## 2021-01-28 RX ADMIN — PANTOPRAZOLE SODIUM 40 MG: 40 TABLET, DELAYED RELEASE ORAL at 05:44

## 2021-01-28 RX ADMIN — OXYCODONE HYDROCHLORIDE AND ACETAMINOPHEN 1 TABLET: 7.5; 325 TABLET ORAL at 23:20

## 2021-01-28 RX ADMIN — INSULIN LISPRO 6 UNITS: 100 INJECTION, SOLUTION INTRAVENOUS; SUBCUTANEOUS at 20:57

## 2021-01-28 RX ADMIN — Medication 1 PATCH: at 11:14

## 2021-01-28 RX ADMIN — POTASSIUM CHLORIDE 10 MEQ: 10 CAPSULE, COATED, EXTENDED RELEASE ORAL at 08:09

## 2021-01-28 RX ADMIN — SODIUM CHLORIDE 75 ML/HR: 4.5 INJECTION, SOLUTION INTRAVENOUS at 01:11

## 2021-01-28 RX ADMIN — CLOPIDOGREL BISULFATE 75 MG: 75 TABLET ORAL at 08:09

## 2021-01-28 RX ADMIN — ALPRAZOLAM 0.5 MG: 0.5 TABLET ORAL at 15:25

## 2021-01-28 RX ADMIN — INSULIN LISPRO 6 UNITS: 100 INJECTION, SOLUTION INTRAVENOUS; SUBCUTANEOUS at 08:08

## 2021-01-28 RX ADMIN — OXYCODONE HYDROCHLORIDE AND ACETAMINOPHEN 1 TABLET: 7.5; 325 TABLET ORAL at 16:30

## 2021-01-28 RX ADMIN — ALPRAZOLAM 0.5 MG: 0.5 TABLET ORAL at 08:50

## 2021-01-28 RX ADMIN — OXYCODONE HYDROCHLORIDE AND ACETAMINOPHEN 1 TABLET: 7.5; 325 TABLET ORAL at 11:14

## 2021-01-28 RX ADMIN — PREGABALIN 200 MG: 100 CAPSULE ORAL at 14:08

## 2021-01-28 RX ADMIN — PREGABALIN 200 MG: 100 CAPSULE ORAL at 05:44

## 2021-01-28 RX ADMIN — ASPIRIN 81 MG CHEWABLE TABLET 81 MG: 81 TABLET CHEWABLE at 08:09

## 2021-01-28 RX ADMIN — SODIUM CHLORIDE 75 ML/HR: 4.5 INJECTION, SOLUTION INTRAVENOUS at 18:50

## 2021-01-28 RX ADMIN — ALPRAZOLAM 0.5 MG: 0.5 TABLET ORAL at 21:00

## 2021-01-28 RX ADMIN — OXYCODONE HYDROCHLORIDE AND ACETAMINOPHEN 1 TABLET: 7.5; 325 TABLET ORAL at 05:44

## 2021-01-28 RX ADMIN — PREGABALIN 200 MG: 100 CAPSULE ORAL at 21:00

## 2021-01-28 RX ADMIN — KETOROLAC TROMETHAMINE 30 MG: 30 INJECTION, SOLUTION INTRAMUSCULAR; INTRAVENOUS at 10:41

## 2021-01-28 RX ADMIN — INSULIN LISPRO 7 UNITS: 100 INJECTION, SOLUTION INTRAVENOUS; SUBCUTANEOUS at 14:11

## 2021-01-28 RX ADMIN — KETOROLAC TROMETHAMINE 30 MG: 30 INJECTION, SOLUTION INTRAMUSCULAR; INTRAVENOUS at 20:22

## 2021-01-28 RX ADMIN — DULOXETINE 60 MG: 60 CAPSULE, DELAYED RELEASE ORAL at 20:58

## 2021-01-28 RX ADMIN — CEFAZOLIN 2 G: 10 INJECTION, POWDER, FOR SOLUTION INTRAVENOUS at 03:58

## 2021-01-28 RX ADMIN — MULTIVITAMIN TABLET 1 TABLET: TABLET at 08:09

## 2021-01-28 RX ADMIN — ROPINIROLE HYDROCHLORIDE 4 MG: 2 TABLET, FILM COATED ORAL at 20:58

## 2021-01-28 RX ADMIN — INSULIN LISPRO 6 UNITS: 100 INJECTION, SOLUTION INTRAVENOUS; SUBCUTANEOUS at 17:05

## 2021-01-28 RX ADMIN — BUDESONIDE AND FORMOTEROL FUMARATE DIHYDRATE 2 PUFF: 160; 4.5 AEROSOL RESPIRATORY (INHALATION) at 08:47

## 2021-01-28 RX ADMIN — ALPRAZOLAM 0.5 MG: 0.5 TABLET ORAL at 03:58

## 2021-01-28 RX ADMIN — KETOROLAC TROMETHAMINE 30 MG: 30 INJECTION, SOLUTION INTRAMUSCULAR; INTRAVENOUS at 03:58

## 2021-01-28 RX ADMIN — OXYCODONE HYDROCHLORIDE AND ACETAMINOPHEN 1 TABLET: 7.5; 325 TABLET ORAL at 00:08

## 2021-01-29 LAB
ANION GAP SERPL CALCULATED.3IONS-SCNC: 7 MMOL/L (ref 5–15)
BUN SERPL-MCNC: 18 MG/DL (ref 6–20)
BUN/CREAT SERPL: 26.5 (ref 7–25)
CALCIUM SPEC-SCNC: 8.4 MG/DL (ref 8.6–10.5)
CHLORIDE SERPL-SCNC: 99 MMOL/L (ref 98–107)
CO2 SERPL-SCNC: 25 MMOL/L (ref 22–29)
CREAT SERPL-MCNC: 0.68 MG/DL (ref 0.76–1.27)
DEPRECATED RDW RBC AUTO: 50.4 FL (ref 37–54)
ERYTHROCYTE [DISTWIDTH] IN BLOOD BY AUTOMATED COUNT: 13.2 % (ref 12.3–15.4)
GFR SERPL CREATININE-BSD FRML MDRD: 121 ML/MIN/1.73
GLUCOSE BLDC GLUCOMTR-MCNC: 201 MG/DL (ref 70–130)
GLUCOSE BLDC GLUCOMTR-MCNC: 206 MG/DL (ref 70–130)
GLUCOSE BLDC GLUCOMTR-MCNC: 292 MG/DL (ref 70–130)
GLUCOSE BLDC GLUCOMTR-MCNC: 349 MG/DL (ref 70–130)
GLUCOSE SERPL-MCNC: 208 MG/DL (ref 65–99)
HCT VFR BLD AUTO: 34.7 % (ref 37.5–51)
HGB BLD-MCNC: 11.4 G/DL (ref 13–17.7)
MCH RBC QN AUTO: 34 PG (ref 26.6–33)
MCHC RBC AUTO-ENTMCNC: 32.9 G/DL (ref 31.5–35.7)
MCV RBC AUTO: 103.6 FL (ref 79–97)
PLATELET # BLD AUTO: 147 10*3/MM3 (ref 140–450)
PMV BLD AUTO: 12.4 FL (ref 6–12)
POTASSIUM SERPL-SCNC: 4.6 MMOL/L (ref 3.5–5.2)
RBC # BLD AUTO: 3.35 10*6/MM3 (ref 4.14–5.8)
SODIUM SERPL-SCNC: 131 MMOL/L (ref 136–145)
WBC # BLD AUTO: 8.01 10*3/MM3 (ref 3.4–10.8)

## 2021-01-29 PROCEDURE — 94799 UNLISTED PULMONARY SVC/PX: CPT

## 2021-01-29 PROCEDURE — 63710000001 INSULIN DETEMIR PER 5 UNITS: Performed by: NURSE PRACTITIONER

## 2021-01-29 PROCEDURE — 85027 COMPLETE CBC AUTOMATED: CPT | Performed by: NURSE PRACTITIONER

## 2021-01-29 PROCEDURE — G0108 DIAB MANAGE TRN  PER INDIV: HCPCS

## 2021-01-29 PROCEDURE — 25010000002 KETOROLAC TROMETHAMINE PER 15 MG: Performed by: PHYSICIAN ASSISTANT

## 2021-01-29 PROCEDURE — 82962 GLUCOSE BLOOD TEST: CPT

## 2021-01-29 PROCEDURE — 63710000001 INSULIN LISPRO (HUMAN) PER 5 UNITS: Performed by: NURSE PRACTITIONER

## 2021-01-29 PROCEDURE — 99024 POSTOP FOLLOW-UP VISIT: CPT | Performed by: THORACIC SURGERY (CARDIOTHORACIC VASCULAR SURGERY)

## 2021-01-29 PROCEDURE — 99232 SBSQ HOSP IP/OBS MODERATE 35: CPT | Performed by: NURSE PRACTITIONER

## 2021-01-29 PROCEDURE — 80048 BASIC METABOLIC PNL TOTAL CA: CPT | Performed by: NURSE PRACTITIONER

## 2021-01-29 PROCEDURE — 63710000001 INSULIN LISPRO (HUMAN) PER 5 UNITS: Performed by: PHYSICIAN ASSISTANT

## 2021-01-29 RX ORDER — AMOXICILLIN 250 MG
1 CAPSULE ORAL 2 TIMES DAILY
Status: DISCONTINUED | OUTPATIENT
Start: 2021-01-29 | End: 2021-02-01 | Stop reason: HOSPADM

## 2021-01-29 RX ORDER — BISACODYL 10 MG
10 SUPPOSITORY, RECTAL RECTAL DAILY PRN
Status: DISCONTINUED | OUTPATIENT
Start: 2021-01-29 | End: 2021-02-01 | Stop reason: HOSPADM

## 2021-01-29 RX ORDER — LABETALOL HYDROCHLORIDE 5 MG/ML
10 INJECTION, SOLUTION INTRAVENOUS EVERY 4 HOURS PRN
Status: DISCONTINUED | OUTPATIENT
Start: 2021-01-29 | End: 2021-02-01 | Stop reason: HOSPADM

## 2021-01-29 RX ORDER — BISACODYL 5 MG/1
10 TABLET, DELAYED RELEASE ORAL DAILY PRN
Status: DISCONTINUED | OUTPATIENT
Start: 2021-01-29 | End: 2021-02-01 | Stop reason: HOSPADM

## 2021-01-29 RX ADMIN — Medication 1 PATCH: at 11:58

## 2021-01-29 RX ADMIN — INSULIN LISPRO 2 UNITS: 100 INJECTION, SOLUTION INTRAVENOUS; SUBCUTANEOUS at 08:34

## 2021-01-29 RX ADMIN — DOCUSATE SODIUM 50MG AND SENNOSIDES 8.6MG 1 TABLET: 8.6; 5 TABLET, FILM COATED ORAL at 12:29

## 2021-01-29 RX ADMIN — PREGABALIN 200 MG: 100 CAPSULE ORAL at 14:37

## 2021-01-29 RX ADMIN — PREGABALIN 200 MG: 100 CAPSULE ORAL at 22:03

## 2021-01-29 RX ADMIN — OXYCODONE HYDROCHLORIDE AND ACETAMINOPHEN 1 TABLET: 7.5; 325 TABLET ORAL at 12:36

## 2021-01-29 RX ADMIN — INSULIN LISPRO 6 UNITS: 100 INJECTION, SOLUTION INTRAVENOUS; SUBCUTANEOUS at 17:27

## 2021-01-29 RX ADMIN — KETOROLAC TROMETHAMINE 30 MG: 30 INJECTION, SOLUTION INTRAMUSCULAR; INTRAVENOUS at 03:49

## 2021-01-29 RX ADMIN — OXYCODONE HYDROCHLORIDE AND ACETAMINOPHEN 1 TABLET: 7.5; 325 TABLET ORAL at 07:00

## 2021-01-29 RX ADMIN — ALPRAZOLAM 0.5 MG: 0.5 TABLET ORAL at 03:49

## 2021-01-29 RX ADMIN — SODIUM CHLORIDE 75 ML/HR: 4.5 INJECTION, SOLUTION INTRAVENOUS at 07:00

## 2021-01-29 RX ADMIN — PANTOPRAZOLE SODIUM 40 MG: 40 TABLET, DELAYED RELEASE ORAL at 08:31

## 2021-01-29 RX ADMIN — ROPINIROLE HYDROCHLORIDE 4 MG: 2 TABLET, FILM COATED ORAL at 22:03

## 2021-01-29 RX ADMIN — ALPRAZOLAM 0.5 MG: 0.5 TABLET ORAL at 14:37

## 2021-01-29 RX ADMIN — DULOXETINE 60 MG: 60 CAPSULE, DELAYED RELEASE ORAL at 08:31

## 2021-01-29 RX ADMIN — INSULIN DETEMIR 10 UNITS: 100 INJECTION, SOLUTION SUBCUTANEOUS at 22:04

## 2021-01-29 RX ADMIN — MULTIVITAMIN TABLET 1 TABLET: TABLET at 08:31

## 2021-01-29 RX ADMIN — CLOPIDOGREL BISULFATE 75 MG: 75 TABLET ORAL at 08:31

## 2021-01-29 RX ADMIN — INSULIN LISPRO 2 UNITS: 100 INJECTION, SOLUTION INTRAVENOUS; SUBCUTANEOUS at 11:55

## 2021-01-29 RX ADMIN — ASPIRIN 81 MG CHEWABLE TABLET 81 MG: 81 TABLET CHEWABLE at 08:31

## 2021-01-29 RX ADMIN — BUDESONIDE AND FORMOTEROL FUMARATE DIHYDRATE 2 PUFF: 160; 4.5 AEROSOL RESPIRATORY (INHALATION) at 08:54

## 2021-01-29 RX ADMIN — KETOROLAC TROMETHAMINE 30 MG: 30 INJECTION, SOLUTION INTRAMUSCULAR; INTRAVENOUS at 17:32

## 2021-01-29 RX ADMIN — ALPRAZOLAM 0.5 MG: 0.5 TABLET ORAL at 09:51

## 2021-01-29 RX ADMIN — POTASSIUM CHLORIDE 10 MEQ: 10 CAPSULE, COATED, EXTENDED RELEASE ORAL at 08:30

## 2021-01-29 RX ADMIN — INSULIN LISPRO 4 UNITS: 100 INJECTION, SOLUTION INTRAVENOUS; SUBCUTANEOUS at 11:54

## 2021-01-29 RX ADMIN — INSULIN LISPRO 4 UNITS: 100 INJECTION, SOLUTION INTRAVENOUS; SUBCUTANEOUS at 08:32

## 2021-01-29 RX ADMIN — BUDESONIDE AND FORMOTEROL FUMARATE DIHYDRATE 2 PUFF: 160; 4.5 AEROSOL RESPIRATORY (INHALATION) at 19:50

## 2021-01-29 RX ADMIN — INSULIN LISPRO 7 UNITS: 100 INJECTION, SOLUTION INTRAVENOUS; SUBCUTANEOUS at 22:02

## 2021-01-29 RX ADMIN — DOCUSATE SODIUM 50MG AND SENNOSIDES 8.6MG 1 TABLET: 8.6; 5 TABLET, FILM COATED ORAL at 22:03

## 2021-01-29 RX ADMIN — ALPRAZOLAM 0.5 MG: 0.5 TABLET ORAL at 18:32

## 2021-01-29 RX ADMIN — DULOXETINE 60 MG: 60 CAPSULE, DELAYED RELEASE ORAL at 22:03

## 2021-01-29 RX ADMIN — INSULIN LISPRO 2 UNITS: 100 INJECTION, SOLUTION INTRAVENOUS; SUBCUTANEOUS at 17:27

## 2021-01-29 RX ADMIN — PREGABALIN 200 MG: 100 CAPSULE ORAL at 08:31

## 2021-01-29 RX ADMIN — ATORVASTATIN CALCIUM 40 MG: 40 TABLET, FILM COATED ORAL at 22:02

## 2021-01-29 RX ADMIN — OXYCODONE HYDROCHLORIDE AND ACETAMINOPHEN 1 TABLET: 7.5; 325 TABLET ORAL at 18:32

## 2021-01-30 LAB
GLUCOSE BLDC GLUCOMTR-MCNC: 126 MG/DL (ref 70–130)
GLUCOSE BLDC GLUCOMTR-MCNC: 166 MG/DL (ref 70–130)
GLUCOSE BLDC GLUCOMTR-MCNC: 212 MG/DL (ref 70–130)
GLUCOSE BLDC GLUCOMTR-MCNC: 223 MG/DL (ref 70–130)

## 2021-01-30 PROCEDURE — 25010000002 KETOROLAC TROMETHAMINE PER 15 MG: Performed by: PHYSICIAN ASSISTANT

## 2021-01-30 PROCEDURE — 63710000001 INSULIN LISPRO (HUMAN) PER 5 UNITS: Performed by: PHYSICIAN ASSISTANT

## 2021-01-30 PROCEDURE — 99232 SBSQ HOSP IP/OBS MODERATE 35: CPT | Performed by: NURSE PRACTITIONER

## 2021-01-30 PROCEDURE — 82962 GLUCOSE BLOOD TEST: CPT

## 2021-01-30 PROCEDURE — 63710000001 INSULIN DETEMIR PER 5 UNITS: Performed by: NURSE PRACTITIONER

## 2021-01-30 PROCEDURE — 94799 UNLISTED PULMONARY SVC/PX: CPT

## 2021-01-30 PROCEDURE — 63710000001 INSULIN LISPRO (HUMAN) PER 5 UNITS: Performed by: NURSE PRACTITIONER

## 2021-01-30 PROCEDURE — 99024 POSTOP FOLLOW-UP VISIT: CPT | Performed by: THORACIC SURGERY (CARDIOTHORACIC VASCULAR SURGERY)

## 2021-01-30 RX ORDER — METOPROLOL TARTRATE 50 MG/1
50 TABLET, FILM COATED ORAL EVERY 12 HOURS SCHEDULED
Status: DISCONTINUED | OUTPATIENT
Start: 2021-01-30 | End: 2021-02-01 | Stop reason: HOSPADM

## 2021-01-30 RX ORDER — LISINOPRIL 10 MG/1
10 TABLET ORAL
Status: DISCONTINUED | OUTPATIENT
Start: 2021-01-30 | End: 2021-02-01 | Stop reason: HOSPADM

## 2021-01-30 RX ADMIN — INSULIN LISPRO 4 UNITS: 100 INJECTION, SOLUTION INTRAVENOUS; SUBCUTANEOUS at 07:43

## 2021-01-30 RX ADMIN — POTASSIUM CHLORIDE 10 MEQ: 10 CAPSULE, COATED, EXTENDED RELEASE ORAL at 07:45

## 2021-01-30 RX ADMIN — INSULIN LISPRO 2 UNITS: 100 INJECTION, SOLUTION INTRAVENOUS; SUBCUTANEOUS at 20:02

## 2021-01-30 RX ADMIN — ROPINIROLE HYDROCHLORIDE 4 MG: 2 TABLET, FILM COATED ORAL at 20:03

## 2021-01-30 RX ADMIN — INSULIN LISPRO 2 UNITS: 100 INJECTION, SOLUTION INTRAVENOUS; SUBCUTANEOUS at 12:00

## 2021-01-30 RX ADMIN — ALPRAZOLAM 0.5 MG: 0.5 TABLET ORAL at 13:40

## 2021-01-30 RX ADMIN — ALPRAZOLAM 0.5 MG: 0.5 TABLET ORAL at 20:02

## 2021-01-30 RX ADMIN — LABETALOL 20 MG/4 ML (5 MG/ML) INTRAVENOUS SYRINGE 10 MG: at 12:00

## 2021-01-30 RX ADMIN — METOPROLOL TARTRATE 50 MG: 50 TABLET, FILM COATED ORAL at 09:20

## 2021-01-30 RX ADMIN — OXYCODONE HYDROCHLORIDE AND ACETAMINOPHEN 1 TABLET: 7.5; 325 TABLET ORAL at 13:35

## 2021-01-30 RX ADMIN — PREGABALIN 200 MG: 100 CAPSULE ORAL at 05:20

## 2021-01-30 RX ADMIN — LABETALOL 20 MG/4 ML (5 MG/ML) INTRAVENOUS SYRINGE 10 MG: at 05:20

## 2021-01-30 RX ADMIN — ASPIRIN 81 MG CHEWABLE TABLET 81 MG: 81 TABLET CHEWABLE at 07:44

## 2021-01-30 RX ADMIN — KETOROLAC TROMETHAMINE 30 MG: 30 INJECTION, SOLUTION INTRAMUSCULAR; INTRAVENOUS at 23:54

## 2021-01-30 RX ADMIN — INSULIN LISPRO 4 UNITS: 100 INJECTION, SOLUTION INTRAVENOUS; SUBCUTANEOUS at 12:01

## 2021-01-30 RX ADMIN — PANTOPRAZOLE SODIUM 40 MG: 40 TABLET, DELAYED RELEASE ORAL at 05:19

## 2021-01-30 RX ADMIN — MULTIVITAMIN TABLET 1 TABLET: TABLET at 07:45

## 2021-01-30 RX ADMIN — DOCUSATE SODIUM 50MG AND SENNOSIDES 8.6MG 1 TABLET: 8.6; 5 TABLET, FILM COATED ORAL at 07:44

## 2021-01-30 RX ADMIN — LISINOPRIL 10 MG: 10 TABLET ORAL at 09:18

## 2021-01-30 RX ADMIN — DULOXETINE 60 MG: 60 CAPSULE, DELAYED RELEASE ORAL at 07:44

## 2021-01-30 RX ADMIN — INSULIN DETEMIR 10 UNITS: 100 INJECTION, SOLUTION SUBCUTANEOUS at 09:18

## 2021-01-30 RX ADMIN — OXYCODONE HYDROCHLORIDE AND ACETAMINOPHEN 1 TABLET: 7.5; 325 TABLET ORAL at 07:45

## 2021-01-30 RX ADMIN — KETOROLAC TROMETHAMINE 30 MG: 30 INJECTION, SOLUTION INTRAMUSCULAR; INTRAVENOUS at 12:01

## 2021-01-30 RX ADMIN — PREGABALIN 200 MG: 100 CAPSULE ORAL at 12:00

## 2021-01-30 RX ADMIN — OXYCODONE HYDROCHLORIDE AND ACETAMINOPHEN 1 TABLET: 7.5; 325 TABLET ORAL at 20:02

## 2021-01-30 RX ADMIN — ATORVASTATIN CALCIUM 40 MG: 40 TABLET, FILM COATED ORAL at 20:03

## 2021-01-30 RX ADMIN — BUDESONIDE AND FORMOTEROL FUMARATE DIHYDRATE 2 PUFF: 160; 4.5 AEROSOL RESPIRATORY (INHALATION) at 09:34

## 2021-01-30 RX ADMIN — Medication 1 PATCH: at 12:02

## 2021-01-30 RX ADMIN — DULOXETINE 60 MG: 60 CAPSULE, DELAYED RELEASE ORAL at 20:02

## 2021-01-30 RX ADMIN — CLOPIDOGREL BISULFATE 75 MG: 75 TABLET ORAL at 07:45

## 2021-01-30 RX ADMIN — BUDESONIDE AND FORMOTEROL FUMARATE DIHYDRATE 2 PUFF: 160; 4.5 AEROSOL RESPIRATORY (INHALATION) at 20:15

## 2021-01-30 RX ADMIN — INSULIN DETEMIR 10 UNITS: 100 INJECTION, SOLUTION SUBCUTANEOUS at 20:04

## 2021-01-30 RX ADMIN — METOPROLOL TARTRATE 50 MG: 50 TABLET, FILM COATED ORAL at 20:03

## 2021-01-30 RX ADMIN — ALPRAZOLAM 0.5 MG: 0.5 TABLET ORAL at 07:45

## 2021-01-30 RX ADMIN — INSULIN LISPRO 3 UNITS: 100 INJECTION, SOLUTION INTRAVENOUS; SUBCUTANEOUS at 16:52

## 2021-01-30 RX ADMIN — INSULIN LISPRO 2 UNITS: 100 INJECTION, SOLUTION INTRAVENOUS; SUBCUTANEOUS at 07:44

## 2021-01-30 RX ADMIN — ALPRAZOLAM 0.5 MG: 0.5 TABLET ORAL at 00:55

## 2021-01-30 RX ADMIN — PREGABALIN 200 MG: 100 CAPSULE ORAL at 20:03

## 2021-01-30 RX ADMIN — OXYCODONE HYDROCHLORIDE AND ACETAMINOPHEN 1 TABLET: 7.5; 325 TABLET ORAL at 00:54

## 2021-01-30 RX ADMIN — LABETALOL 20 MG/4 ML (5 MG/ML) INTRAVENOUS SYRINGE 10 MG: at 00:55

## 2021-01-31 LAB
BH BB BLOOD EXPIRATION DATE: NORMAL
BH BB BLOOD TYPE BARCODE: 600
BH BB DISPENSE STATUS: NORMAL
BH BB PRODUCT CODE: NORMAL
BH BB UNIT NUMBER: NORMAL
CROSSMATCH INTERPRETATION: NORMAL
GLUCOSE BLDC GLUCOMTR-MCNC: 131 MG/DL (ref 70–130)
GLUCOSE BLDC GLUCOMTR-MCNC: 149 MG/DL (ref 70–130)
GLUCOSE BLDC GLUCOMTR-MCNC: 155 MG/DL (ref 70–130)
GLUCOSE BLDC GLUCOMTR-MCNC: 159 MG/DL (ref 70–130)
GLUCOSE BLDC GLUCOMTR-MCNC: 98 MG/DL (ref 70–130)
UNIT  ABO: NORMAL
UNIT  RH: NORMAL

## 2021-01-31 PROCEDURE — 63710000001 INSULIN LISPRO (HUMAN) PER 5 UNITS: Performed by: NURSE PRACTITIONER

## 2021-01-31 PROCEDURE — 63710000001 INSULIN DETEMIR PER 5 UNITS: Performed by: NURSE PRACTITIONER

## 2021-01-31 PROCEDURE — 99233 SBSQ HOSP IP/OBS HIGH 50: CPT | Performed by: INTERNAL MEDICINE

## 2021-01-31 PROCEDURE — 94799 UNLISTED PULMONARY SVC/PX: CPT

## 2021-01-31 PROCEDURE — 97165 OT EVAL LOW COMPLEX 30 MIN: CPT

## 2021-01-31 PROCEDURE — 97530 THERAPEUTIC ACTIVITIES: CPT

## 2021-01-31 PROCEDURE — 63710000001 INSULIN LISPRO (HUMAN) PER 5 UNITS: Performed by: PHYSICIAN ASSISTANT

## 2021-01-31 PROCEDURE — 99024 POSTOP FOLLOW-UP VISIT: CPT | Performed by: THORACIC SURGERY (CARDIOTHORACIC VASCULAR SURGERY)

## 2021-01-31 PROCEDURE — 82962 GLUCOSE BLOOD TEST: CPT

## 2021-01-31 RX ORDER — NICOTINE 21 MG/24HR
1 PATCH, TRANSDERMAL 24 HOURS TRANSDERMAL EVERY 24 HOURS
Qty: 30 PATCH | Refills: 0 | OUTPATIENT
Start: 2021-01-31

## 2021-01-31 RX ORDER — CLOPIDOGREL BISULFATE 75 MG/1
75 TABLET ORAL DAILY
Qty: 30 TABLET | Refills: 5
Start: 2021-02-01

## 2021-01-31 RX ADMIN — BUDESONIDE AND FORMOTEROL FUMARATE DIHYDRATE 2 PUFF: 160; 4.5 AEROSOL RESPIRATORY (INHALATION) at 20:35

## 2021-01-31 RX ADMIN — ATORVASTATIN CALCIUM 40 MG: 40 TABLET, FILM COATED ORAL at 21:07

## 2021-01-31 RX ADMIN — DOCUSATE SODIUM 50MG AND SENNOSIDES 8.6MG 1 TABLET: 8.6; 5 TABLET, FILM COATED ORAL at 08:03

## 2021-01-31 RX ADMIN — PREGABALIN 200 MG: 100 CAPSULE ORAL at 13:39

## 2021-01-31 RX ADMIN — ALPRAZOLAM 0.5 MG: 0.5 TABLET ORAL at 21:07

## 2021-01-31 RX ADMIN — IPRATROPIUM BROMIDE AND ALBUTEROL SULFATE 3 ML: 2.5; .5 SOLUTION RESPIRATORY (INHALATION) at 15:53

## 2021-01-31 RX ADMIN — PANTOPRAZOLE SODIUM 40 MG: 40 TABLET, DELAYED RELEASE ORAL at 05:01

## 2021-01-31 RX ADMIN — OXYCODONE HYDROCHLORIDE AND ACETAMINOPHEN 1 TABLET: 7.5; 325 TABLET ORAL at 13:39

## 2021-01-31 RX ADMIN — OXYCODONE HYDROCHLORIDE AND ACETAMINOPHEN 1 TABLET: 7.5; 325 TABLET ORAL at 21:07

## 2021-01-31 RX ADMIN — BUDESONIDE AND FORMOTEROL FUMARATE DIHYDRATE 2 PUFF: 160; 4.5 AEROSOL RESPIRATORY (INHALATION) at 10:12

## 2021-01-31 RX ADMIN — INSULIN LISPRO 2 UNITS: 100 INJECTION, SOLUTION INTRAVENOUS; SUBCUTANEOUS at 17:23

## 2021-01-31 RX ADMIN — POTASSIUM CHLORIDE 10 MEQ: 10 CAPSULE, COATED, EXTENDED RELEASE ORAL at 08:04

## 2021-01-31 RX ADMIN — PREGABALIN 200 MG: 100 CAPSULE ORAL at 05:01

## 2021-01-31 RX ADMIN — DOCUSATE SODIUM 50MG AND SENNOSIDES 8.6MG 1 TABLET: 8.6; 5 TABLET, FILM COATED ORAL at 21:06

## 2021-01-31 RX ADMIN — CLOPIDOGREL BISULFATE 75 MG: 75 TABLET ORAL at 08:03

## 2021-01-31 RX ADMIN — PREGABALIN 200 MG: 100 CAPSULE ORAL at 21:06

## 2021-01-31 RX ADMIN — METOPROLOL TARTRATE 50 MG: 50 TABLET, FILM COATED ORAL at 21:06

## 2021-01-31 RX ADMIN — INSULIN LISPRO 3 UNITS: 100 INJECTION, SOLUTION INTRAVENOUS; SUBCUTANEOUS at 12:05

## 2021-01-31 RX ADMIN — ASPIRIN 81 MG CHEWABLE TABLET 81 MG: 81 TABLET CHEWABLE at 08:03

## 2021-01-31 RX ADMIN — ALPRAZOLAM 0.5 MG: 0.5 TABLET ORAL at 13:39

## 2021-01-31 RX ADMIN — ALPRAZOLAM 0.5 MG: 0.5 TABLET ORAL at 02:23

## 2021-01-31 RX ADMIN — ALPRAZOLAM 0.5 MG: 0.5 TABLET ORAL at 08:05

## 2021-01-31 RX ADMIN — INSULIN DETEMIR 10 UNITS: 100 INJECTION, SOLUTION SUBCUTANEOUS at 08:05

## 2021-01-31 RX ADMIN — INSULIN LISPRO 3 UNITS: 100 INJECTION, SOLUTION INTRAVENOUS; SUBCUTANEOUS at 17:23

## 2021-01-31 RX ADMIN — ROPINIROLE HYDROCHLORIDE 4 MG: 2 TABLET, FILM COATED ORAL at 21:07

## 2021-01-31 RX ADMIN — DULOXETINE 60 MG: 60 CAPSULE, DELAYED RELEASE ORAL at 08:04

## 2021-01-31 RX ADMIN — OXYCODONE HYDROCHLORIDE AND ACETAMINOPHEN 1 TABLET: 7.5; 325 TABLET ORAL at 08:04

## 2021-01-31 RX ADMIN — DULOXETINE 60 MG: 60 CAPSULE, DELAYED RELEASE ORAL at 21:06

## 2021-01-31 RX ADMIN — OXYCODONE HYDROCHLORIDE AND ACETAMINOPHEN 1 TABLET: 7.5; 325 TABLET ORAL at 02:23

## 2021-01-31 RX ADMIN — Medication 1 PATCH: at 12:05

## 2021-01-31 RX ADMIN — LISINOPRIL 10 MG: 10 TABLET ORAL at 08:03

## 2021-01-31 RX ADMIN — INSULIN LISPRO 3 UNITS: 100 INJECTION, SOLUTION INTRAVENOUS; SUBCUTANEOUS at 08:03

## 2021-01-31 RX ADMIN — METOPROLOL TARTRATE 50 MG: 50 TABLET, FILM COATED ORAL at 08:04

## 2021-01-31 RX ADMIN — MULTIVITAMIN TABLET 1 TABLET: TABLET at 08:04

## 2021-01-31 RX ADMIN — INSULIN DETEMIR 10 UNITS: 100 INJECTION, SOLUTION SUBCUTANEOUS at 21:07

## 2021-02-01 VITALS
HEART RATE: 86 BPM | RESPIRATION RATE: 16 BRPM | DIASTOLIC BLOOD PRESSURE: 73 MMHG | TEMPERATURE: 98 F | SYSTOLIC BLOOD PRESSURE: 138 MMHG | WEIGHT: 232.81 LBS | HEIGHT: 67 IN | OXYGEN SATURATION: 97 % | BODY MASS INDEX: 36.54 KG/M2

## 2021-02-01 LAB
GLUCOSE BLDC GLUCOMTR-MCNC: 144 MG/DL (ref 70–130)
GLUCOSE BLDC GLUCOMTR-MCNC: 146 MG/DL (ref 70–130)

## 2021-02-01 PROCEDURE — 99232 SBSQ HOSP IP/OBS MODERATE 35: CPT | Performed by: NURSE PRACTITIONER

## 2021-02-01 PROCEDURE — 63710000001 INSULIN DETEMIR PER 5 UNITS: Performed by: NURSE PRACTITIONER

## 2021-02-01 PROCEDURE — 97162 PT EVAL MOD COMPLEX 30 MIN: CPT

## 2021-02-01 PROCEDURE — 82962 GLUCOSE BLOOD TEST: CPT

## 2021-02-01 PROCEDURE — 99024 POSTOP FOLLOW-UP VISIT: CPT | Performed by: THORACIC SURGERY (CARDIOTHORACIC VASCULAR SURGERY)

## 2021-02-01 PROCEDURE — 97161 PT EVAL LOW COMPLEX 20 MIN: CPT

## 2021-02-01 PROCEDURE — 63710000001 INSULIN LISPRO (HUMAN) PER 5 UNITS: Performed by: NURSE PRACTITIONER

## 2021-02-01 PROCEDURE — 94799 UNLISTED PULMONARY SVC/PX: CPT

## 2021-02-01 RX ORDER — CLOPIDOGREL BISULFATE 75 MG/1
75 TABLET ORAL DAILY
Qty: 30 TABLET | Refills: 6 | Status: SHIPPED | OUTPATIENT
Start: 2021-02-01

## 2021-02-01 RX ORDER — OXYCODONE AND ACETAMINOPHEN 7.5; 325 MG/1; MG/1
1 TABLET ORAL EVERY 6 HOURS PRN
Qty: 24 TABLET | Refills: 0 | Status: SHIPPED | OUTPATIENT
Start: 2021-02-01 | End: 2021-02-17 | Stop reason: HOSPADM

## 2021-02-01 RX ORDER — NICOTINE 21 MG/24HR
1 PATCH, TRANSDERMAL 24 HOURS TRANSDERMAL EVERY 24 HOURS
Qty: 30 PATCH | Refills: 0 | Status: SHIPPED | OUTPATIENT
Start: 2021-02-01 | End: 2021-02-05

## 2021-02-01 RX ORDER — HYDROXYZINE HYDROCHLORIDE 25 MG/1
25 TABLET, FILM COATED ORAL 3 TIMES DAILY PRN
Qty: 30 TABLET | Refills: 0 | Status: SHIPPED | OUTPATIENT
Start: 2021-02-01

## 2021-02-01 RX ADMIN — ASPIRIN 81 MG CHEWABLE TABLET 81 MG: 81 TABLET CHEWABLE at 08:35

## 2021-02-01 RX ADMIN — DOCUSATE SODIUM 50MG AND SENNOSIDES 8.6MG 1 TABLET: 8.6; 5 TABLET, FILM COATED ORAL at 08:35

## 2021-02-01 RX ADMIN — POTASSIUM CHLORIDE 10 MEQ: 10 CAPSULE, COATED, EXTENDED RELEASE ORAL at 08:35

## 2021-02-01 RX ADMIN — CLOPIDOGREL BISULFATE 75 MG: 75 TABLET ORAL at 08:35

## 2021-02-01 RX ADMIN — PANTOPRAZOLE SODIUM 40 MG: 40 TABLET, DELAYED RELEASE ORAL at 05:29

## 2021-02-01 RX ADMIN — ALPRAZOLAM 0.5 MG: 0.5 TABLET ORAL at 03:14

## 2021-02-01 RX ADMIN — BUDESONIDE AND FORMOTEROL FUMARATE DIHYDRATE 2 PUFF: 160; 4.5 AEROSOL RESPIRATORY (INHALATION) at 08:57

## 2021-02-01 RX ADMIN — LISINOPRIL 10 MG: 10 TABLET ORAL at 08:34

## 2021-02-01 RX ADMIN — OXYCODONE HYDROCHLORIDE AND ACETAMINOPHEN 1 TABLET: 7.5; 325 TABLET ORAL at 03:15

## 2021-02-01 RX ADMIN — PREGABALIN 200 MG: 100 CAPSULE ORAL at 14:00

## 2021-02-01 RX ADMIN — DULOXETINE 60 MG: 60 CAPSULE, DELAYED RELEASE ORAL at 08:36

## 2021-02-01 RX ADMIN — MULTIVITAMIN TABLET 1 TABLET: TABLET at 08:35

## 2021-02-01 RX ADMIN — INSULIN LISPRO 3 UNITS: 100 INJECTION, SOLUTION INTRAVENOUS; SUBCUTANEOUS at 08:36

## 2021-02-01 RX ADMIN — METOPROLOL TARTRATE 50 MG: 50 TABLET, FILM COATED ORAL at 08:35

## 2021-02-01 RX ADMIN — ALPRAZOLAM 0.5 MG: 0.5 TABLET ORAL at 14:00

## 2021-02-01 RX ADMIN — OXYCODONE HYDROCHLORIDE AND ACETAMINOPHEN 1 TABLET: 7.5; 325 TABLET ORAL at 14:00

## 2021-02-01 RX ADMIN — PREGABALIN 200 MG: 100 CAPSULE ORAL at 05:29

## 2021-02-01 RX ADMIN — ALPRAZOLAM 0.5 MG: 0.5 TABLET ORAL at 08:35

## 2021-02-01 RX ADMIN — OXYCODONE HYDROCHLORIDE AND ACETAMINOPHEN 1 TABLET: 7.5; 325 TABLET ORAL at 08:35

## 2021-02-01 RX ADMIN — INSULIN DETEMIR 10 UNITS: 100 INJECTION, SOLUTION SUBCUTANEOUS at 08:39

## 2021-02-01 NOTE — PLAN OF CARE
Goal Outcome Evaluation:  Plan of Care Reviewed With: patient  Progress: improving  Outcome Summary: VSS, ambulating, home with HH tomorrow.

## 2021-02-01 NOTE — PROGRESS NOTES
CTS Progress Note       LOS: 7 days   Patient Care Team:  Sheila Sheikh APRN as PCP - General (Nurse Practitioner)    Chief Complaint: Peripheral vascular disease (CMS/HCC)    Vital Signs:  Temp:  [98 °F (36.7 °C)] 98 °F (36.7 °C)  Heart Rate:  [60-82] 63  Resp:  [16-18] 18  BP: (166-170)/(57-85) 170/57    Physical Exam: Right foot is warm and viable       Results:   Results from last 7 days   Lab Units 01/29/21  1223   WBC 10*3/mm3 8.01   HEMOGLOBIN g/dL 11.4*   HEMATOCRIT % 34.7*   PLATELETS 10*3/mm3 147     Results from last 7 days   Lab Units 01/29/21  1223   SODIUM mmol/L 131*   POTASSIUM mmol/L 4.6   CHLORIDE mmol/L 99   CO2 mmol/L 25.0   BUN mg/dL 18   CREATININE mg/dL 0.68*   GLUCOSE mg/dL 208*   CALCIUM mg/dL 8.4*           Imaging Results (Last 24 Hours)     ** No results found for the last 24 hours. **          Assessment      PVD     100% R SFA occlusion     CAD s/p stents and CABG X5     S/P L leg amputation     Smoker    Daily marijuana use    Suspected ETOH withdrawal     T2DM on metformin     Class 1 obesity in adult    Macrocytosis without anemia        Plan   Discharge home today. Patient instructed to remove dressings on Wednesday, February 03. Follow-up in our office in 10 days. Patient needs Plavix prescription at discharge. I placed a narcotic prescription in the electronic record    Please note that portions of this note were completed with a voice recognition program. Efforts were made to edit the dictations, but occasionally words are mistranscribed.    Jeremy Trevizo MD  02/01/21  07:06 EST

## 2021-02-01 NOTE — DISCHARGE PLACEMENT REQUEST
"Betty Potter (55 y.o. Male)     Case Management   [phone]      Date of Birth Social Security Number Address Home Phone MRN    1965  [address] [facility] [address] [phone] [id number]    [place] Marital Status          Unknown Legally        Admission Date Admission Type Admitting Provider Attending Provider Department, Room/Bed    1/25/21 Urgent Jeremy Trevizo MD  [facility] 4G, S461/1    Discharge Date Discharge Disposition Discharge Destination        2/1/2021 Home or Self Care              Attending Provider: (none)   Allergies: No Known Allergies    Isolation: None   Infection: None   Code Status: CPR    Ht: 170.2 cm (67\")   Wt: 106 kg (232 lb 12.9 oz)    Admission Cmt: None   Principal Problem: PVD  [I73.9]                 Active Insurance as of 1/25/2021     Primary Coverage     Payor Plan Insurance Group Employer/Plan Group    ANTHEM MEDICARE REPLACEMENT ANTHEM MEDICARE ADVANTAGE [id number]     Payor Plan Address Payor Plan Phone Number Payor Plan Fax Number Effective Dates    [address] [phone]  2/1/2020 - None Entered    [facility] [address]       Subscriber Name Subscriber Birth Date Member ID       BETTY POTTER 1965 [id number]           Secondary Coverage     Payor Plan Insurance Group Employer/Plan Group    [place]      Payor Plan Address Payor Plan Phone Number Payor Plan Fax Number Effective Dates    [address] [phone]  2/10/2020 - None Entered    [facility] [address]       Subscriber Name Subscriber Birth Date Member ID       BETTY POTTER 1965 [id number]                 Emergency Contacts      (Rel.) Home Phone Work Phone Mobile Phone    CARMEN HUSSEIN (Daughter) [phone] -- --    VISHNU SEGOVIA (Significant Other) -- -- [phone]        [address] [address]  Phone:  [phone]  Fax:  [phone] Date: " 2021      Ambulatory Referral to Home Health     Patient:  Reg Sanchez MRN:  [id number]   [address] [facility] [address] :  1965  SSN:    Phone: [phone] Sex:  M      INSURANCE PAYOR PLAN GROUP # SUBSCRIBER ID   Primary:  Secondary:    ANTHEM MEDICARE REPLACEMENT  [place] [id number]  [id number]    [id number]  [id number]      Referring Provider Information:  MONTY MADRID Phone: [phone] Fax: [phone]      Referral Information:   # Visits:  1 Referral Type: Home Health [42]   Urgency:  Routine Referral Reason: Specialty Services Required   Start Date: 2021 End Date:  To be determined by Insurer   Diagnosis: Peripheral vascular disease (CMS/HCC) (I73.9 [ICD-10-CM] 443.9 [ICD-9-CM])      Refer to Dept:   Refer to Provider:   Refer to Facility:       Face to Face Visit Date: 2021  Follow-up provider for Plan of Care? I treated the patient in an acute care facility and will not continue treatment after discharge.  Follow-up provider: MARIAA VELASCO [[id number]]  Reason/Clinical Findings: right femoral bypass, PVD  Describe mobility limitations that make leaving home difficult: impaired functional mobility, balance, gait and endurance  Nursing/Therapeutic Services Requested: Physical Therapy  Nursing/Therapeutic Services Requested: Occupational Therapy  PT orders: Therapeutic exercise  PT orders: Gait Training  PT orders: Strengthening  PT orders: Home safety assessment  Weight Bearing Status: As Tolerated  Occupational orders: Activities of daily living  Occupational orders: Energy conservation  Occupational orders: Strengthening  Occupational orders: Home safety assessment  Frequency: 1 Week 1     This document serves as a request of services and does not constitute Insurance authorization or approval of services.  To determine eligibility, please contact the members Insurance carrier to verify and review coverage.     If you have  medical questions regarding this request for services. Please contact [address] at [phone] during normal business hours.       Verbal Order Mode: Telephone with readback   Authorizing Provider: Marcelle Terry MD  Authorizing Provider's NPI: [id number]     Order Entered By: Leigh Presley RN 2/1/2021  3:04 PM     Electronically signed by: Marcelle Terry MD                History & Physical      Jeremy Trevizo MD at 01/25/21 1108          CTS History and Physical    Patient Care Team:  Sheila Sheikh APRN as PCP - General (Nurse Practitioner)      Chief Complaint:  Coronary artery disease    HPI  Patient is a 55 y.o. male presented be evaluated for severe peripheral arterial vascular disease.  Patient states he has rest pain in the right foot and he essentially says it keeps him up all night and because he cannot sleep secondary to the pain.  He saw a vascular surgeon in [place] approximately 1 year ago who indicated that there was not good options for repair gave him some pain medications and blood thinners.  Patient does not walk fast because he has a previous left leg amputation secondary to trauma in 2013.  But the patient is also had a 5 vessel coronary bypass grafting surgery as well as a 70% right-sided carotid artery stenosis.  Recently this patient underwent attempted catheter-based intervention in [facility].  They were unable to cross the chronically occluded distal superficial femoral and popliteal artery and there is also trifurcation disease.  He does have single-vessel runoff through the posterior tibial artery.  Patient also has some ulcerations in the ankle area which have not healed      Review of Systems  Constitution: Negative for chills, fever, malaise/fatigue, night sweats and weight loss.   HENT: Negative for hearing loss, odynophagia and sore throat.    Cardiovascular: Positive for chest pain, leg swelling and palpitations. Negative for  dyspnea on exertion and orthopnea.   Respiratory: Positive for cough, shortness of breath and wheezing.    Hematologic/Lymphatic: Bruises/bleeds easily.   Skin: Negative for itching and rash.   Musculoskeletal: Positive for arthritis and back pain. Negative for joint pain, joint swelling and myalgias.   Gastrointestinal: Positive for abdominal pain and constipation. Negative for diarrhea, hematemesis, hematochezia, nausea and vomiting.   Genitourinary: Negative for dysuria, frequency and hematuria.   Neurological: Positive for dizziness, light-headedness, loss of balance and numbness. Negative for focal weakness, headaches and seizures.   Psychiatric/Behavioral: Positive for depression. Negative for suicidal ideas. The patient has insomnia.    All other systems are reviewed and are negative.    History  Past Medical History:   Diagnosis Date   • Anxiety    • CHF (congestive heart failure) (CMS/HCC)    • COPD (chronic obstructive pulmonary disease) (CMS/HCC)    • Diabetes mellitus (CMS/HCC)    • Hepatitis C    • Hyperlipidemia    • Hypertension    • Sleep apnea      Past Surgical History:   Procedure Laterality Date   • CARDIAC CATHETERIZATION  11/20/2020    x 2 stents   • CARDIAC CATHETERIZATION Right 1/20/2021    Procedure: Peripheral angiography;  Surgeon: Wesly Lund MD;  Location: [facility] INVASIVE LOCATION;  Service: Cardiovascular;  Laterality: Right;   • CORONARY ARTERY BYPASS GRAFT      x 5   • CORONARY STENT PLACEMENT  11/20/2020    Dr Mortensen x 2 stents   • FINGER SURGERY      left   • LEG AMPUTATION Left     left lower leg   • SHOULDER SURGERY Bilateral      Family History   Problem Relation Age of Onset   • Heart disease Mother    • Anuerysm Father    • Heart disease Father    • Heart disease Sister    • Heart disease Sister      Social History     Tobacco Use   • Smoking status: Current Every Day Smoker     Packs/day: 1.00     Years: 40.00     Pack years: 40.00     Types: Cigarettes   • Smokeless  tobacco: Never Used   Substance Use Topics   • Alcohol use: Yes     Alcohol/week: 3.0 standard drinks     Types: 2 Cans of beer, 1 Shots of liquor per week     Comment: weekly   • Drug use: Yes     Types: Marijuana     Comment: smoke Marijuana everyday     Medications Prior to Admission   Medication Sig Dispense Refill Last Dose   • albuterol (PROVENTIL) (2.5 MG/3ML) 0.083% nebulizer solution Take 2.5 mg by nebulization Every 4 (Four) Hours As Needed for Wheezing.   Past Week at Unknown time   • albuterol sulfate  (90 Base) MCG/ACT inhaler Inhale 1 puff Every 6 (Six) Hours As Needed for Wheezing. Prior to [facility] Admission, Patient was on: 1 to 2 puffs every 4 to 6 hours as needed   Past Week at Unknown time   • amoxicillin-clavulanate (Augmentin) 875-125 MG per tablet Take 1 tablet by mouth 2 (Two) Times a Day. (Patient taking differently: Take 1 tablet by mouth 2 (Two) Times a Day. Prior to [facility] Admission, Patient was on: 10 day supply started on 01/14, has 4 days left for dog bite/infection on hand.) 20 tablet 0 1/24/2021 at Unknown time   • aspirin 81 MG chewable tablet Chew 81 mg Daily.   1/25/2021 at Unknown time   • atorvastatin (LIPITOR) 40 MG tablet Take 40 mg by mouth Daily.   1/25/2021 at Unknown time   • budesonide-formoterol (SYMBICORT) 160-4.5 MCG/ACT inhaler Inhale 2 puffs 2 (Two) Times a Day As Needed.   Past Week at Unknown time   • Diclofenac Sodium (VOLTAREN) 1 % gel gel Apply 2 g topically to the appropriate area as directed Every 6 (Six) Hours As Needed. Prior to [facility] Admission, Patient was on: apply 2G to upper body & 4G to lower body every 6 hours as needed   Past Week at Unknown time   • Diclofenac Sodium (VOLTAREN) 1 % gel gel Apply 4 g topically to the appropriate area as directed Every 6 (Six) Hours As Needed. Prior to [facility] Admission, Patient was on: apply 2G to upper body & 4G to lower body every 6 hours as needed   Past Week at Unknown time   • DULoxetine (CYMBALTA) 60 MG  capsule Take 60 mg by mouth 2 (Two) Times a Day.   1/25/2021 at Unknown time   • furosemide (LASIX) 20 MG tablet Take 1 tablet by mouth Daily. 30 tablet 11 1/25/2021 at Unknown time   • Insulin Glargine (BASAGLAR KWIKPEN SC) Inject 20 Units under the skin into the appropriate area as directed 2 (Two) Times a Day As Needed.   1/24/2021 at Unknown time   • isosorbide mononitrate (IMDUR) 30 MG 24 hr tablet Take 1 tablet by mouth Daily. 30 tablet 11 1/25/2021 at Unknown time   • lisinopril (PRINIVIL,ZESTRIL) 10 MG tablet Take 10 mg by mouth Daily.   1/25/2021 at Unknown time   • metFORMIN (GLUCOPHAGE) 1000 MG tablet Take 1,000 mg by mouth 2 (Two) Times a Day With Meals.   1/25/2021 at Unknown time   • metoprolol tartrate (LOPRESSOR) 50 MG tablet Take 50 mg by mouth 2 (Two) Times a Day.   1/25/2021 at Unknown time   • multivitamin (multivitamin) tablet tablet Take 1 tablet by mouth Daily.   1/25/2021 at Unknown time   • nicotine (NICODERM CQ) 21 MG/24HR patch Place 1 patch on the skin daily as directed by provider 30 patch 3 Past Week at Unknown time   • omeprazole (priLOSEC) 20 MG capsule Take 20 mg by mouth Daily.   1/25/2021 at Unknown time   • potassium chloride 10 MEQ CR tablet Take 1 tablet by mouth Daily. 30 tablet 11 1/25/2021 at Unknown time   • pregabalin (LYRICA) 200 MG capsule Take 200 mg by mouth 3 (Three) Times a Day.   1/25/2021 at Unknown time   • rOPINIRole (REQUIP) 4 MG tablet Take 4 mg by mouth Every Night.   1/24/2021 at Unknown time   • sulindac (CLINORIL) 200 MG tablet Take 200 mg by mouth 2 (Two) Times a Day.   1/25/2021 at Unknown time   • nitroglycerin (NITROSTAT) 0.4 MG SL tablet 1 under the tongue as needed for angina, may repeat q5mins for up three doses 30 tablet 5 Unknown at Unknown time     Allergies:  Patient has no known allergies.    Objective    Vital Signs  Temp:  [97.3 °F (36.3 °C)-98.1 °F (36.7 °C)] 98.1 °F (36.7 °C)  Heart Rate:  [84] 84  Resp:  [16] 16  BP: (131-148)/(75-80)  131/75    Physical Exam:  CONSTITUTIONAL: Alert and conversant, Well dressed,  no acute distress obese  EYES: Sclera clean, Anicteric, Pupils equal  ENT: No nasal deviation, Trachea midline  NECK: No neck masses, Supple  LUNGS: No wheezing, Cough, non-congested  HEART: No rubs, No murmurs  GASTROINTESTINAL: Soft, non-distended, No masses, Non tender  to palpation, normal bowel sounds  NEURO: No motor deficits, No sensory deficits, Cranial Nerves 2 through 12 grossly intact  PSYCHIATRIC: Oriented to person, place and time, No memory deficits, Mood appropriate  VASCULAR: No carotid bruits, there is been a left leg amputation.  Right femoral pulses palpable.  The right foot is extremely hyperemic with thin hairless skin.  The Doppler signal in the posterior tibial artery is extremely weak.  I cannot detect a Doppler signal in the dorsalis pedis  MUSKULOSKELETAL: Left leg amputation      Data Review:  Results from last 7 days   Lab Units 01/21/21  0438   WBC 10*3/mm3 7.76   HEMOGLOBIN g/dL 14.4   HEMATOCRIT % 42.7   PLATELETS 10*3/mm3 176     Results from last 7 days   Lab Units 01/21/21  0438   SODIUM mmol/L 130*   POTASSIUM mmol/L 4.3   CHLORIDE mmol/L 94*   CO2 mmol/L 28.2   BUN mg/dL 16   CREATININE mg/dL 0.84   GLUCOSE mg/dL 276*   CALCIUM mg/dL 9.4     Coagulation: No results found for: INR, APTT  Cardiac markers:   Results from last 7 days   Lab Units 01/21/21  0731   TROPONIN T ng/mL <0.010     ABGs:       Invalid input(s): PO2      Imaging Results (Last 72 Hours)     ** No results found for the last 72 hours. **          Labs/Imaging: I reviewed the outside CT scan as well as the formal arteriogram from [place] of the reviewed the actual images      Assessment/Plan:        * No active hospital problems. *  This patient has a severe problem and that he has a left leg amputation and has severe peripheral vascular disease.  Unfortunately he has obesity, diabetes, and ongoing tobacco abuse.  He has almost continuous  rest pain in the right foot and foot is hyperemic with thin hairless skin and has tenuous viability.  This patient will probably not do well with an above-knee to posterior tibial bypass because of the extensive calcium burden in the distal third of the superficial femoral artery.  Will probably require a right femoral to posterior tibial bypass with vein graft.  I have explained that the risk of failure are high and there is a risk of limb loss bleeding infection and death.  The patient does have threatened limb loss and I will admit him to the hospital today.  We will obtain an echocardiogram because of his heart history and he will be scheduled for surgery Wednesday morning  1 admit to the hospital  2 saphenous vein mapping of the right great saphenous vein  3 echocardiogram  4 anticipate right femoral to posterior tibial bypass Wednesday morning    Flavia June PA-C  01/25/21  11:08 EST        Electronically signed by Jeremy Trevizo MD at 01/26/21 0660

## 2021-02-01 NOTE — PROGRESS NOTES
Continued Stay Note  [facility]     Patient Name: Reg Sanchez  MRN: [id number]  Today's Date: 2/1/2021    Admit Date: 1/25/2021    Discharge Plan     Row Name 02/01/21 1225       Plan    Plan Comments  SW was notified by CM that pt had reported he has mold in his apartment and requested some resources. SW gathered resources for mold removal in [place] as well as the housing authority for other living places and community and financial resources in [place]. SW provided and explained these resources to pt at bedside. Pt had no further questions or concerns at this time.    Row Name 02/01/21 1058       Plan    Plan  Home        Discharge Codes    No documentation.       Expected Discharge Date and Time     Expected Discharge Date Expected Discharge Time    Feb 1, 2021             DEREK Agrawal

## 2021-02-01 NOTE — THERAPY EVALUATION
Patient Name: Reg Sanchez  : 1965    MRN: [id number]                              Today's Date: 2021       Admit Date: 2021    Visit Dx:     ICD-10-CM ICD-9-CM   1. PVD   I73.9 443.9   2. Peripheral vascular disease (CMS/[facility])  I73.9 443.9   3. Tobacco use  Z72.0 305.1     Patient Active Problem List   Diagnosis   • PAD (peripheral artery disease) (CMS/[facility])   • 100% R SFA occlusion    • Atherosclerosis of native artery of right lower extremity with intermittent claudication (CMS/[facility])   • Tobacco use   • PVD    • CAD s/p stents and CABG X5    • S/P L leg amputation    • Smoker   • Daily marijuana use   • Suspected ETOH withdrawal    • T2DM on metformin    • Class 1 obesity in adult   • Macrocytosis without anemia     Past Medical History:   Diagnosis Date   • Anxiety    • CHF (congestive heart failure) (CMS/[facility])    • COPD (chronic obstructive pulmonary disease) (CMS/[facility])    • Coronary artery disease involving native coronary artery of native heart without angina pectoris 2021    Added automatically from request for surgery [id number]   • Diabetes mellitus (CMS/[facility])    • Hepatitis C    • Hyperlipidemia    • Hypertension    • Sleep apnea    • T2DM on metformin  2021     Past Surgical History:   Procedure Laterality Date   • CARDIAC CATHETERIZATION  11/20/2020    x 2 stents   • CARDIAC CATHETERIZATION Right 2021    Procedure: Peripheral angiography;  Surgeon: Wesly Lund MD;  Location: [facility] CATH INVASIVE LOCATION;  Service: Cardiovascular;  Laterality: Right;   • CORONARY ARTERY BYPASS GRAFT      x 5   • CORONARY STENT PLACEMENT  2020    Dr Mortensen x 2 stents   • FEMORAL DISTAL BYPASS N/A 2021    Procedure: FEMORAL DISTAL BYPASS;  Surgeon: Jeremy Trevizo MD;  Location: [facility] OR;  Service: Vascular;  Laterality: N/A;   • FINGER SURGERY      left   • LEG AMPUTATION Left     left lower leg   • SHOULDER SURGERY Bilateral      General Information     Row Name 21 1119           Physical Therapy Time and Intention    Document Type  evaluation  -AY     Mode of Treatment  physical therapy  -AY     Row Name 02/01/21 1119          General Information    Patient Profile Reviewed  yes  -AY     Prior Level of Function  independent:;all household mobility;community mobility;gait;transfer;w/c or scooter Pt has L prostetic and is ambulatory indep fro short distances. Pt uses power w/c for community ambultion. pt S.O. available for assist as needed. Indep for all transfers without AD per pt report.  -AY     Existing Precautions/Restrictions  fall;other (see comments) bed rest w/ exceptions including bathroom priv, UIC as tolerated; remote L leg ampuation, prosthetic for LLE, cardiac hx, B shoulder deficits w/ mult sx hx  -AY     Barriers to Rehab  medically complex  -AY     Row Name 02/01/21 1119          Living Environment    Lives With  significant other  -AY     Row Name 02/01/21 1119          Home Main Entrance    Number of Stairs, Main Entrance  none  -AY     Stair Railings, Main Entrance  none  -AY     Row Name 02/01/21 1119          Stairs Within Home, Primary    Number of Stairs, Within Home, Primary  none  -AY     Row Name 02/01/21 1119          Cognition    Orientation Status (Cognition)  oriented x 4  -AY     Row Name 02/01/21 1119          Safety Issues, Functional Mobility    Impairments Affecting Function (Mobility)  balance;endurance/activity tolerance;pain;sensation/sensory awareness;strength  -AY       User Key  (r) = Recorded By, (t) = Taken By, (c) = Cosigned By    Initials Name Provider Type    AY Vivienne Gautam, PT Physical Therapist        Mobility     Row Name 02/01/21 1121          Bed Mobility    Comment (Bed Mobility)  pt recieved and left sitting EOB.  -AY     Row Name 02/01/21 1125 02/01/21 1121       Sit-Stand Transfer    Sit-Stand [place] (Transfers)  contact guard;verbal cues  -AY  contact guard;1 person assist  -AY    Row Name 02/01/21 1121           Gait/Stairs (Locomotion)    [place] Level (Gait)  contact guard;1 person assist  -AY     Distance in Feet (Gait)  45  -AY     Deviations/Abnormal Patterns (Gait)  dasia decreased;gait speed decreased;right sided deviations  -AY     Right Sided Gait Deviations  heel strike decreased;weight shift ability decreased  -AY     Comment (Gait/Stairs)  Pt amb 45ft with CGA without AD demonstrating step through gait pattern with decreased weight bearing on the RLE. pt demonstrated good balance with no LOB during mobility. Gait distance limited by fatigue and RLE pain.  -AY       User Key  (r) = Recorded By, (t) = Taken By, (c) = Cosigned By    Initials Name Provider Type    Vivienne Paniagua PT Physical Therapist        Obj/Interventions     Row Name 02/01/21 1123          Range of Motion Comprehensive    General Range of Motion  bilateral lower extremity ROM WFL  -AY     Comment, General Range of Motion  R knee not tested d/t bandaging.  -AY     Row Name 02/01/21 1123          Strength Comprehensive (MMT)    Comment, General Manual Muscle Testing (MMT) Assessment  BLE grossly 4-/5  -AY     Row Name 02/01/21 1123          Balance    Balance Assessment  sitting static balance;sitting dynamic balance;standing static balance;standing dynamic balance  -AY     Static Sitting Balance  WFL;sitting, edge of bed  -AY     Dynamic Sitting Balance  WFL;sitting, edge of bed  -AY     Static Standing Balance  WFL;supported;standing  -AY     Dynamic Standing Balance  mild impairment;supported;standing  -AY     Row Name 02/01/21 1123          Sensory Assessment (Somatosensory)    Sensory Assessment (Somatosensory)  LE sensation intact;other (see comments) pt reported h/o n/t in RLE with prolonged ambulation  -AY       User Key  (r) = Recorded By, (t) = Taken By, (c) = Cosigned By    Initials Name Provider Type    Vivienne Paniagua PT Physical Therapist        Goals/Plan     Row Name 02/01/21 1122          Bed Mobility Goal 1 (PT)     Activity/Assistive Device (Bed Mobility Goal 1, PT)  supine to sit  -AY     [place] Level/Cues Needed (Bed Mobility Goal 1, PT)  independent  -AY     Time Frame (Bed Mobility Goal 1, PT)  2 weeks  -AY     Progress/Outcomes (Bed Mobility Goal 1, PT)  goal ongoing  -AY     Row Name 02/01/21 1122          Transfer Goal 1 (PT)    Activity/Assistive Device (Transfer Goal 1, PT)  sit-to-stand/stand-to-sit;bed-to-chair/chair-to-bed  -AY     [place] Level/Cues Needed (Transfer Goal 1, PT)  independent  -AY     Time Frame (Transfer Goal 1, PT)  2 weeks  -AY     Progress/Outcome (Transfer Goal 1, PT)  goal ongoing  -AY     Row Name 02/01/21 1122          Gait Training Goal 1 (PT)    Activity/Assistive Device (Gait Training Goal 1, PT)  gait (walking locomotion)  -AY     [place] Level (Gait Training Goal 1, PT)  modified independence  -AY     Distance (Gait Training Goal 1, PT)  25  -AY     Time Frame (Gait Training Goal 1, PT)  2 weeks  -AY     Progress/Outcome (Gait Training Goal 1, PT)  goal ongoing  -AY       User Key  (r) = Recorded By, (t) = Taken By, (c) = Cosigned By    Initials Name Provider Type    AY Vivienne Gautam, PT Physical Therapist        Clinical Impression     Row Name 02/01/21 1126          Pain    Additional Documentation  Pain Scale: Numbers Pre/Post-Treatment (Group)  -AY     Row Name 02/01/21 1126          Pain Scale: Numbers Pre/Post-Treatment    Pretreatment Pain Rating  5/10  -AY     Posttreatment Pain Rating  5/10  -AY     Pain Location - Side  Right  -AY     Pain Location - Orientation  lower  -AY     Pain Location  extremity  -AY     Pre/Posttreatment Pain Comment  tolerated. RN aware.  -AY     Pain Intervention(s)  Ambulation/increased activity;Repositioned  -AY     Row Name 02/01/21 1126          Plan of Care Review    Plan of Care Reviewed With  patient  -AY     Progress  no change  -AY     Outcome Summary  PT initial evaluation completed s/p fem bypass. Pt requried CGA for STS  and ambulated 45ft. Pt educated about foot care/skin inspection, pacing, home safety, and body mechanics with all moiblity. Pt limited by RLE pain, decreased functional endurance, and generalized weakness. Pt could benefit from continued skilled IP PT to improve functional mobility and increase independence. D/c rec HWA and HHPT.  -AY     Row Name 02/01/21 1126          Therapy Assessment/Plan (PT)    Rehab Potential (PT)  good, to achieve stated therapy goals  -AY     Criteria for Skilled Interventions Met (PT)  yes;skilled treatment is necessary;meets criteria  -AY     Row Name 02/01/21 1126          Vital Signs    Pre Systolic BP Rehab  156 VSS  -AY     Pre Treatment Diastolic BP  81  -AY     Pretreatment Heart Rate (beats/min)  68  -AY     Posttreatment Heart Rate (beats/min)  64  -AY     Pre SpO2 (%)  98  -AY     O2 Delivery Pre Treatment  room air  -AY     Intra SpO2 (%)  98  -AY     O2 Delivery Intra Treatment  room air  -AY     Post SpO2 (%)  98  -AY     O2 Delivery Post Treatment  room air  -AY     Pre Patient Position  Sitting  -AY     Intra Patient Position  Standing  -AY     Post Patient Position  Sitting  -AY     Row Name 02/01/21 1126          Positioning and Restraints    Pre-Treatment Position  other (comment) sitting EOB  -AY     Post Treatment Position  other sitting EOB. Cleared with RN  -AY       User Key  (r) = Recorded By, (t) = Taken By, (c) = Cosigned By    Initials Name Provider Type    Vivienne Paniagua, PT Physical Therapist        Outcome Measures     Row Name 02/01/21 1122          How much help from another person do you currently need...    Turning from your back to your side while in flat bed without using bedrails?  4  -AY     Moving from lying on back to sitting on the side of a flat bed without bedrails?  4  -AY     Moving to and from a bed to a chair (including a wheelchair)?  4  -AY     Standing up from a chair using your arms (e.g., wheelchair, bedside chair)?  4  -AY      Climbing 3-5 steps with a railing?  3  -AY     To walk in hospital room?  4  -AY     AM-PAC 6 Clicks Score (PT)  23  -AY     Row Name 02/01/21 1122          Functional Assessment    Outcome Measure Options  AM-PAC 6 Clicks Basic Mobility (PT)  -AY       User Key  (r) = Recorded By, (t) = Taken By, (c) = Cosigned By    Initials Name Provider Type    AY Vivienne Gautam PT Physical Therapist        Physical Therapy Education                 Title: PT OT SLP Therapies (Resolved)     Topic: Physical Therapy (Resolved)     Point: Mobility training (Resolved)     Learning Progress Summary           Patient Acceptance, E,TB, VU,NR by AY at 2/1/2021 1122                   Point: Home exercise program (Resolved)     Learning Progress Summary           Patient Acceptance, E,TB, VU,NR by AY at 2/1/2021 1122                   Point: Body mechanics (Resolved)     Learning Progress Summary           Patient Acceptance, E,TB, VU,NR by AY at 2/1/2021 1122                   Point: Precautions (Resolved)     Learning Progress Summary           Patient Acceptance, E,TB, VU,NR by AY at 2/1/2021 1122                               User Key     Initials Effective Dates Name Provider Type Discipline    AY 11/10/20 -  Vivienne Gautam, KRUNAL Physical Therapist PT              PT Recommendation and Plan  Planned Therapy Interventions (PT): balance training, bed mobility training, gait training, home exercise program, patient/family education, neuromuscular re-education, postural re-education, ROM (range of motion), stair training, strengthening, stretching, transfer training  Plan of Care Reviewed With: patient  Progress: no change  Outcome Summary: PT initial evaluation completed s/p fem bypass. Pt requried CGA for STS and ambulated 45ft. Pt educated about foot care/skin inspection, pacing, home safety, and body mechanics with all moiblity. Pt limited by RLE pain, decreased functional endurance, and generalized weakness. Pt could benefit from  continued skilled IP PT to improve functional mobility and increase independence. D/c rec HWA and HHPT.     Time Calculation:   PT Charges     Row Name 02/01/21 1149             Time Calculation    Start Time  1120  -AY      PT Received On  02/01/21  -AY      PT Goal Re-Cert Due Date  02/11/21  -AY        User Key  (r) = Recorded By, (t) = Taken By, (c) = Cosigned By    Initials Name Provider Type    AY Vivienne Gautam PT Physical Therapist        Therapy Charges for Today     Code Description Service Date Service Provider Modifiers Qty    85151283301 HC PT EVAL MOD COMPLEXITY 3 2/1/2021 Vivienne Gautam, KRUNAL GP 1          PT G-Codes  Outcome Measure Options: AM-PAC 6 Clicks Basic Mobility (PT)  AM-PAC 6 Clicks Score (PT): 23  AM-PAC 6 Clicks Score (OT): 21    Vivienne Gautam PT  2/1/2021

## 2021-02-01 NOTE — PROGRESS NOTES
[facility] Medicine Services  PROGRESS NOTE    Patient Name: Reg Sanchez  : 1965  MRN: [id number]    Date of Admission: 2021  Primary Care Physician: Sheila Sheikh APRN    Subjective   Subjective     CC:  F/u right femoral bypass     HPI:  Patient sitting up in bed states he's feeling well and ready to leave. Many questions about continuing anxiety meds at discharge    ROS:  General: denies fevers or chills  CV: denies chest pain  Resp: denies shortness of breath  Abd: denies abd pain, nausea         Objective   Objective     Vital Signs:   Temp:  [98 °F (36.7 °C)] 98 °F (36.7 °C)  Heart Rate:  [63-86] 86  Resp:  [16-18] 16  BP: (138-170)/(57-73) 138/73        Physical Exam:  Constitutional: No acute distress, awake, alert, sitting on bedside  Respiratory: Clear to auscultation bilaterally, respiratory effort normal   Cardiovascular: RRR, no murmurs, rubs, or gallops  Gastrointestinal: Positive bowel sounds, soft, nontender, nondistended  Musculoskeletal: Left BKA with prosthesis in place. Bandage RLE intact  Psychiatric: Appropriate affect, cooperative  Neurologic: Oriented x 3, no focal neurological deficits  Skin: No rashes        Results Reviewed:  Results from last 7 days   Lab Units 21  1223 21  0533 21  0820   WBC 10*3/mm3 8.01 11.36* 6.66   HEMOGLOBIN g/dL 11.4* 11.2* 14.3   HEMATOCRIT % 34.7* 34.0* 44.0   PLATELETS 10*3/mm3 147 158 188     Results from last 7 days   Lab Units 21  1223 21  0533 21  0820   SODIUM mmol/L 131* 129* 133*   POTASSIUM mmol/L 4.6 4.7 4.3   CHLORIDE mmol/L 99 94* 98   CO2 mmol/L 25.0 25.0 25.0   BUN mg/dL 18 21* 14   CREATININE mg/dL 0.68* 1.01 0.80   GLUCOSE mg/dL 208* 228* 294*   CALCIUM mg/dL 8.4* 8.3* 9.1     Estimated Creatinine Clearance: 142.5 mL/min (A) (by C-G formula based on SCr of 0.68 mg/dL (L)).    Microbiology Results Abnormal     Procedure Component Value - Date/Time    COVID-19 PCR,  [facility], NP SWAB IN [facility] VIRAL TRANSPORT MEDIA 24-30 HR TAT - Swab, Nasopharynx [[id number]] Collected: 01/25/21 1553    Lab Status: Final result Specimen: Swab from Nasopharynx Updated: 01/26/21 1529     SARS-CoV-2 MIQUEL Not Detected          Imaging Results (Last 24 Hours)     ** No results found for the last 24 hours. **          Results for orders placed during the hospital encounter of 01/25/21   Adult Transthoracic Echo Complete W/ Cont if Necessary Per Protocol    Narrative · Left ventricular ejection fraction appears to be 66 - 70%. Left   ventricular systolic function is normal.  · The right ventricular cavity is borderline dilated.  · Left ventricular diastolic function was normal.  · No significant structural or functional valvular disease.          I have reviewed the medications:  Scheduled Meds:aspirin, 81 mg, Oral, Daily  atorvastatin, 40 mg, Oral, Nightly  budesonide-formoterol, 2 puff, Inhalation, BID - RT  clopidogrel, 75 mg, Oral, Daily  DULoxetine, 60 mg, Oral, BID  insulin detemir, 10 Units, Subcutaneous, Q12H  insulin lispro, 0-9 Units, Subcutaneous, 4x Daily With Meals & Nightly  insulin lispro, 3 Units, Subcutaneous, TID With Meals  lisinopril, 10 mg, Oral, Q24H  metoprolol tartrate, 50 mg, Oral, Q12H  multivitamin, 1 tablet, Oral, Daily  nicotine, 1 patch, Transdermal, Q24H  pantoprazole, 40 mg, Oral, QAM  potassium chloride, 10 mEq, Oral, Daily  pregabalin, 200 mg, Oral, Q8H  rOPINIRole, 4 mg, Oral, Nightly  senna-docusate sodium, 1 tablet, Oral, BID      Continuous Infusions:   PRN Meds:.•  albuterol  •  albuterol  •  ALPRAZolam  •  bisacodyl  •  bisacodyl  •  dextrose  •  dextrose  •  glucagon (human recombinant)  •  ipratropium-albuterol  •  ketorolac  •  labetalol  •  nitroglycerin  •  oxyCODONE-acetaminophen    Assessment/Plan   Assessment & Plan     Active Hospital Problems    Diagnosis  POA   • **PVD  [I73.9]  Yes   • S/P L leg amputation  [K29.482Y]  Yes   • Smoker [F17.200]  Yes    • Daily marijuana use [F12.90]  Yes   • Suspected ETOH withdrawal  [F10.239]  Yes   • T2DM on metformin  [E11.9]  Yes   • Class 1 obesity in adult [E66.9]  Yes   • Macrocytosis without anemia [D75.89]  Yes   • CAD s/p stents and CABG X5  [I25.10]  Yes   • 100% R SFA occlusion  [I70.209]  Yes      Resolved Hospital Problems   No resolved problems to display.        Brief Hospital Course to date:  Reg Sanchez is a 55 y.o. male admitted to [facility] on 1/25 for surgical management of severe PAD. the patient is an ongoing smoker with a history of CAD s/p stents and CABG X5, COPD, T2DM, obesity, and PAD s/p left leg amputation for osteomyelitis,  previously seen by a vascular surgeon at the [facility] treated medically for his right lower extremity disease. He was evaluated by Dr. Trevizo in the office with complaints of right-sided claudication leg pain found to have a chronically occluded distal R SFA and popliteal artery not amenable to intervention at [facility]. He was noted to have single-vessel runoff through the posterior tibial artery on angiogram. As there was concern for limb loss he was directly admitted to the hospital and underwent pre-operative workup for anticipated right femoral to posterior tibial bypass. He has been maintained on ASA with last dose of Plavix on 1/21.      Severe PVD  -- right femoral bypass on 1/27  --  set up for NV. Follow up CTS 10 days  -- ASA/statin/plavix   -- bowel regimen     Hx CAD with cabg x 5   Hx of Left leg amputation due to osteomyelitis     DM  -- A1c 7.2  -- follows with pcp  -- he takes metformin at home and takes long acting insulin prn - will continue home regimen at [place]    Alcohol use  -- per patient last drink was Saturday  -- states he doesn't drink everyday  -- xanax prn here. Discussed at length to follow up with behavioral health/ pcp for long term/ continued Rx. Will try atarax prn at [place]  -- baseline fine tremor prior to admit     HTN  -- restart home  meds     COPD  Tobacco abuse  -- cont Symbicort   -- nebs prn  - COPD navigator consult inpatient  -Patient requests nicotine patches on discharge.  I have prescribed these in the med rec    Hyponatremia  --been low since admit 129-133, asymptomatic  -Can follow-up with his PCP for further monitoring    DVT Prophylaxis:  [facility]      Disposition: I expect the patient to be discharged today    CODE STATUS:   Code Status and Medical Interventions:   Ordered at: 01/25/21 1408     Code Status:    CPR     Medical Interventions (Level of Support Prior to Arrest):    Full     This patient's problems and plans were partially entered by my partner and updated as appropriate by me 02/01/21.          Estrella Carney, APRN  02/01/21

## 2021-02-01 NOTE — PLAN OF CARE
Goal Outcome Evaluation:  Plan of Care Reviewed With: patient  Progress: improving   VSS, on RA. Frequent requests for pain medicine. Pt removed IV and refused to have another placed, hospitalist notified. Pt to be discharged this AM with  services. Will continue to monitor.

## 2021-02-01 NOTE — PLAN OF CARE
Problem: Adult Inpatient Plan of Care  Goal: Plan of Care Review  Recent Flowsheet Documentation  Taken 2/1/2021 1126 by Vivienne Gautam, PT  Progress: no change  Plan of Care Reviewed With: patient  Outcome Summary: PT initial evaluation completed s/p fem bypass. Pt requried CGA for STS and ambulated 45ft. Pt educated about foot care/skin inspection, pacing, home safety, and body mechanics with all moiblity. Pt limited by RLE pain, decreased functional endurance, and generalized weakness. Pt could benefit from continued skilled IP PT to improve functional mobility and increase independence. D/c rec HWA and HHPT.

## 2021-02-01 NOTE — PROGRESS NOTES
NN spoke with patient at .  Patient alert and able to answer questions appropriately.  NN initially encountered patient in hallway this morning.  He is on RA, home baseline.  COPD action plan, deep breathing exercises, and smoking cessation reviewed.  No new questions or concerns voiced at this time.  NN continues to follow.        Per current GOLD Standards, please consider: No LAMA in place (although patient states he has been prescribed Trelegy), Outpatient PFT, Pulmonary rehab as appropriate, NRT at [place]

## 2021-02-01 NOTE — PROGRESS NOTES
Case Management Discharge Note      Final Note: Patient's plan is home with [facility] for PT, OT.  Referral called and faxed.  Leigh hedrick RN [phone]    Provided Post Acute Provider List?: Yes  Provided Post Acute Provider Quality & Resource List?: Yes  Delivered To: Patient  Method of Delivery: In person    Selected Continued Care - Discharged on 2/1/2021 Admission date: 1/25/2021 - Discharge disposition: Home or Self Care    Destination    No services have been selected for the patient.              Durable Medical Equipment    No services have been selected for the patient.              Dialysis/Infusion    No services have been selected for the patient.              Home Medical Care     Service Provider Selected Services Address Phone Fax Patient Preferred    [facility] [address] [facility] [phone] -- --          Therapy    No services have been selected for the patient.              Community Resources    No services have been selected for the patient.                  Transportation Services  Private: Car    Final Discharge Disposition Code: 06 - home with home health care

## 2021-02-02 ENCOUNTER — TRANSCRIBE ORDERS (OUTPATIENT)
Dept: DIABETES SERVICES | Facility: HOSPITAL | Age: 56
End: 2021-02-02

## 2021-02-02 ENCOUNTER — READMISSION MANAGEMENT (OUTPATIENT)
Dept: CALL CENTER | Facility: HOSPITAL | Age: 56
End: 2021-02-02

## 2021-02-02 DIAGNOSIS — E11.9 DIABETES MELLITUS WITH NO COMPLICATION (HCC): Primary | ICD-10-CM

## 2021-02-02 NOTE — OUTREACH NOTE
Prep Survey      Responses   [facility] facility patient discharged from?  [facility]   Is LACE score < 7 ?  No   Emergency Room discharge w/ pulse ox?  No   Eligibility  Readm Mgmt   Discharge diagnosis  RIGHT FEMORAL DISTAL BYPASS WITH EVH,  PVD   Does the patient have one of the following disease processes/diagnoses(primary or secondary)?  General Surgery   Does the patient have Home health ordered?  Yes   What is the Home health agency?   [facility]   Is there a DME ordered?  No   Prep survey completed?  Yes          Afua Urena RN

## 2021-02-04 ENCOUNTER — APPOINTMENT (OUTPATIENT)
Dept: CT IMAGING | Facility: HOSPITAL | Age: 56
End: 2021-02-04

## 2021-02-04 ENCOUNTER — READMISSION MANAGEMENT (OUTPATIENT)
Dept: CALL CENTER | Facility: HOSPITAL | Age: 56
End: 2021-02-04

## 2021-02-04 ENCOUNTER — TELEPHONE (OUTPATIENT)
Dept: CARDIAC SURGERY | Facility: CLINIC | Age: 56
End: 2021-02-04

## 2021-02-04 ENCOUNTER — NURSE TRIAGE (OUTPATIENT)
Dept: CALL CENTER | Facility: HOSPITAL | Age: 56
End: 2021-02-04

## 2021-02-04 ENCOUNTER — HOSPITAL ENCOUNTER (EMERGENCY)
Facility: HOSPITAL | Age: 56
Discharge: ANOTHER HOSPITAL | End: 2021-02-05
Attending: EMERGENCY MEDICINE | Admitting: EMERGENCY MEDICINE

## 2021-02-04 DIAGNOSIS — T81.40XA POSTOPERATIVE INFECTION, UNSPECIFIED TYPE, INITIAL ENCOUNTER: Primary | ICD-10-CM

## 2021-02-04 DIAGNOSIS — Z98.890 S/P FEMORAL-TIBIAL BYPASS: ICD-10-CM

## 2021-02-04 LAB
ALBUMIN SERPL-MCNC: 3.93 G/DL (ref 3.5–5.2)
ALBUMIN/GLOB SERPL: 1 G/DL
ALP SERPL-CCNC: 89 U/L (ref 39–117)
ALT SERPL W P-5'-P-CCNC: 79 U/L (ref 1–41)
ANION GAP SERPL CALCULATED.3IONS-SCNC: 8.6 MMOL/L (ref 5–15)
APTT PPP: 28.8 SECONDS (ref 25.6–35.3)
AST SERPL-CCNC: 50 U/L (ref 1–40)
BASOPHILS # BLD AUTO: 0.06 10*3/MM3 (ref 0–0.2)
BASOPHILS NFR BLD AUTO: 0.3 % (ref 0–1.5)
BILIRUB SERPL-MCNC: 0.7 MG/DL (ref 0–1.2)
BUN SERPL-MCNC: 12 MG/DL (ref 6–20)
BUN/CREAT SERPL: 13.5 (ref 7–25)
CA-I SERPL ISE-MCNC: 1.17 MMOL/L (ref 1.12–1.32)
CALCIUM SPEC-SCNC: 8.9 MG/DL (ref 8.6–10.5)
CHLORIDE SERPL-SCNC: 95 MMOL/L (ref 98–107)
CO2 SERPL-SCNC: 24.4 MMOL/L (ref 22–29)
CREAT SERPL-MCNC: 0.89 MG/DL (ref 0.76–1.27)
CRP SERPL-MCNC: 2.29 MG/DL (ref 0–0.5)
D-LACTATE SERPL-SCNC: 2.1 MMOL/L (ref 0.5–2)
D-LACTATE SERPL-SCNC: 2.6 MMOL/L (ref 0.5–2)
DEPRECATED RDW RBC AUTO: 48.6 FL (ref 37–54)
EOSINOPHIL # BLD AUTO: 0.01 10*3/MM3 (ref 0–0.4)
EOSINOPHIL NFR BLD AUTO: 0.1 % (ref 0.3–6.2)
ERYTHROCYTE [DISTWIDTH] IN BLOOD BY AUTOMATED COUNT: 13.2 % (ref 12.3–15.4)
FLUAV RNA RESP QL NAA+PROBE: NOT DETECTED
FLUBV RNA RESP QL NAA+PROBE: NOT DETECTED
GFR SERPL CREATININE-BSD FRML MDRD: 89 ML/MIN/1.73
GLOBULIN UR ELPH-MCNC: 4 GM/DL
GLUCOSE SERPL-MCNC: 225 MG/DL (ref 65–99)
HCT VFR BLD AUTO: 40.5 % (ref 37.5–51)
HGB BLD-MCNC: 13.7 G/DL (ref 13–17.7)
HOLD SPECIMEN: NORMAL
IMM GRANULOCYTES # BLD AUTO: 0.15 10*3/MM3 (ref 0–0.05)
IMM GRANULOCYTES NFR BLD AUTO: 0.8 % (ref 0–0.5)
INR PPP: 1.14 (ref 0.9–1.1)
LACTATE HOLD SPECIMEN: NORMAL
LYMPHOCYTES # BLD AUTO: 1.64 10*3/MM3 (ref 0.7–3.1)
LYMPHOCYTES NFR BLD AUTO: 8.8 % (ref 19.6–45.3)
MAGNESIUM SERPL-MCNC: 1.6 MG/DL (ref 1.6–2.6)
MCH RBC QN AUTO: 33.9 PG (ref 26.6–33)
MCHC RBC AUTO-ENTMCNC: 33.8 G/DL (ref 31.5–35.7)
MCV RBC AUTO: 100.2 FL (ref 79–97)
MONOCYTES # BLD AUTO: 1.6 10*3/MM3 (ref 0.1–0.9)
MONOCYTES NFR BLD AUTO: 8.6 % (ref 5–12)
NEUTROPHILS NFR BLD AUTO: 15.23 10*3/MM3 (ref 1.7–7)
NEUTROPHILS NFR BLD AUTO: 81.4 % (ref 42.7–76)
NRBC BLD AUTO-RTO: 0 /100 WBC (ref 0–0.2)
PHOSPHATE SERPL-MCNC: 2.4 MG/DL (ref 2.5–4.5)
PLATELET # BLD AUTO: 207 10*3/MM3 (ref 140–450)
PMV BLD AUTO: 12.7 FL (ref 6–12)
POTASSIUM SERPL-SCNC: 4 MMOL/L (ref 3.5–5.2)
PROT SERPL-MCNC: 7.9 G/DL (ref 6–8.5)
PROTHROMBIN TIME: 14.5 SECONDS (ref 11.9–14.1)
RBC # BLD AUTO: 4.04 10*6/MM3 (ref 4.14–5.8)
SARS-COV-2 RNA RESP QL NAA+PROBE: NOT DETECTED
SODIUM SERPL-SCNC: 128 MMOL/L (ref 136–145)
WBC # BLD AUTO: 18.69 10*3/MM3 (ref 3.4–10.8)
WHOLE BLOOD HOLD SPECIMEN: NORMAL
WHOLE BLOOD HOLD SPECIMEN: NORMAL

## 2021-02-04 PROCEDURE — 87040 BLOOD CULTURE FOR BACTERIA: CPT | Performed by: EMERGENCY MEDICINE

## 2021-02-04 PROCEDURE — 86140 C-REACTIVE PROTEIN: CPT | Performed by: EMERGENCY MEDICINE

## 2021-02-04 PROCEDURE — 96367 TX/PROPH/DG ADDL SEQ IV INF: CPT

## 2021-02-04 PROCEDURE — 80053 COMPREHEN METABOLIC PANEL: CPT | Performed by: EMERGENCY MEDICINE

## 2021-02-04 PROCEDURE — 25010000002 VANCOMYCIN: Performed by: EMERGENCY MEDICINE

## 2021-02-04 PROCEDURE — 99284 EMERGENCY DEPT VISIT MOD MDM: CPT

## 2021-02-04 PROCEDURE — 0 IOPAMIDOL PER 1 ML: Performed by: EMERGENCY MEDICINE

## 2021-02-04 PROCEDURE — 85610 PROTHROMBIN TIME: CPT | Performed by: EMERGENCY MEDICINE

## 2021-02-04 PROCEDURE — 83605 ASSAY OF LACTIC ACID: CPT | Performed by: EMERGENCY MEDICINE

## 2021-02-04 PROCEDURE — 25010000002 PIPERACILLIN SOD-TAZOBACTAM PER 1 G: Performed by: EMERGENCY MEDICINE

## 2021-02-04 PROCEDURE — 25010000002 HYDROMORPHONE 1 MG/ML SOLUTION: Performed by: EMERGENCY MEDICINE

## 2021-02-04 PROCEDURE — 87636 SARSCOV2 & INF A&B AMP PRB: CPT | Performed by: EMERGENCY MEDICINE

## 2021-02-04 PROCEDURE — 75635 CT ANGIO ABDOMINAL ARTERIES: CPT

## 2021-02-04 PROCEDURE — 83735 ASSAY OF MAGNESIUM: CPT | Performed by: EMERGENCY MEDICINE

## 2021-02-04 PROCEDURE — 84100 ASSAY OF PHOSPHORUS: CPT | Performed by: EMERGENCY MEDICINE

## 2021-02-04 PROCEDURE — 96366 THER/PROPH/DIAG IV INF ADDON: CPT

## 2021-02-04 PROCEDURE — 85025 COMPLETE CBC W/AUTO DIFF WBC: CPT | Performed by: EMERGENCY MEDICINE

## 2021-02-04 PROCEDURE — 96375 TX/PRO/DX INJ NEW DRUG ADDON: CPT

## 2021-02-04 PROCEDURE — 96365 THER/PROPH/DIAG IV INF INIT: CPT

## 2021-02-04 PROCEDURE — C9803 HOPD COVID-19 SPEC COLLECT: HCPCS

## 2021-02-04 PROCEDURE — 85730 THROMBOPLASTIN TIME PARTIAL: CPT | Performed by: EMERGENCY MEDICINE

## 2021-02-04 PROCEDURE — 82330 ASSAY OF CALCIUM: CPT | Performed by: EMERGENCY MEDICINE

## 2021-02-04 PROCEDURE — 25010000002 ONDANSETRON PER 1 MG: Performed by: EMERGENCY MEDICINE

## 2021-02-04 PROCEDURE — 25010000002 MORPHINE PER 10 MG: Performed by: EMERGENCY MEDICINE

## 2021-02-04 RX ORDER — ONDANSETRON 2 MG/ML
4 INJECTION INTRAMUSCULAR; INTRAVENOUS ONCE
Status: DISCONTINUED | OUTPATIENT
Start: 2021-02-04 | End: 2021-02-04

## 2021-02-04 RX ORDER — ONDANSETRON 2 MG/ML
4 INJECTION INTRAMUSCULAR; INTRAVENOUS ONCE
Status: COMPLETED | OUTPATIENT
Start: 2021-02-04 | End: 2021-02-04

## 2021-02-04 RX ORDER — ACETAMINOPHEN 325 MG/1
650 TABLET ORAL ONCE
Status: COMPLETED | OUTPATIENT
Start: 2021-02-04 | End: 2021-02-04

## 2021-02-04 RX ORDER — ACETAMINOPHEN 500 MG
1000 TABLET ORAL ONCE
Status: COMPLETED | OUTPATIENT
Start: 2021-02-04 | End: 2021-02-04

## 2021-02-04 RX ORDER — SODIUM CHLORIDE 9 MG/ML
125 INJECTION, SOLUTION INTRAVENOUS CONTINUOUS
Status: DISCONTINUED | OUTPATIENT
Start: 2021-02-04 | End: 2021-02-05 | Stop reason: HOSPADM

## 2021-02-04 RX ADMIN — HYDROMORPHONE HYDROCHLORIDE 1 MG: 1 INJECTION, SOLUTION INTRAMUSCULAR; INTRAVENOUS; SUBCUTANEOUS at 23:20

## 2021-02-04 RX ADMIN — ONDANSETRON 4 MG: 2 INJECTION INTRAMUSCULAR; INTRAVENOUS at 20:26

## 2021-02-04 RX ADMIN — PIPERACILLIN SODIUM AND TAZOBACTAM SODIUM 3.38 G: 3; .375 INJECTION, POWDER, LYOPHILIZED, FOR SOLUTION INTRAVENOUS at 20:29

## 2021-02-04 RX ADMIN — IOPAMIDOL 100 ML: 755 INJECTION, SOLUTION INTRAVENOUS at 21:18

## 2021-02-04 RX ADMIN — ACETAMINOPHEN 650 MG: 325 TABLET ORAL at 13:40

## 2021-02-04 RX ADMIN — MORPHINE SULFATE 4 MG: 4 INJECTION, SOLUTION INTRAMUSCULAR; INTRAVENOUS at 20:26

## 2021-02-04 RX ADMIN — ACETAMINOPHEN 1000 MG: 500 TABLET ORAL at 20:47

## 2021-02-04 RX ADMIN — VANCOMYCIN HYDROCHLORIDE 2000 MG: 1 INJECTION, POWDER, LYOPHILIZED, FOR SOLUTION INTRAVENOUS at 21:52

## 2021-02-04 RX ADMIN — SODIUM CHLORIDE 125 ML/HR: 9 INJECTION, SOLUTION INTRAVENOUS at 20:47

## 2021-02-04 NOTE — TELEPHONE ENCOUNTER
Patient called to report that he has been running a fever of 103.5, and no less than 101.8, since early this morning. He states that he the incision area is red and warm to the touch.     I put patient on hold so I could call Dr. Trevizo.     Spoke with Dr. Trevizo. He said if patient is truly concerned and thinks that the incision is infected that he needs to report to [facility] ED today for evaluation and antibiotics, or we can have patient come into the office first thing in the morning to see YOUNG Pope.     I let the patient know what Dr. Trevizo said and the patient said that he was not sure if the incision was infected. He said that he would wait until his HH RN stopped by today and have her evaluate the incision area and let him know if she believes it may be infected. He said that he will report to the ED depending on her advice. I told patient once again, if believes the incision is infected, he needs to report to the ED today and if he cannot make it today due to any transportation issues, tomorrow morning at the latest. Patient verbalized understanding and agreed. He knows to call with any further questions or concerns.

## 2021-02-04 NOTE — TELEPHONE ENCOUNTER
"    Reason for Disposition  • Fever > 100.4 F (38.0 C)    Additional Information  • Negative: [1] Major abdominal surgical incision AND [2] wound gaping open AND [3] visible internal organs  • Negative: Sounds like a life-threatening emergency to the triager  • Negative: [1] Bleeding from incision AND [2] won't stop after 10 minutes of direct pressure  • Negative: [1] Widespread rash AND [2] bright red, sunburn-like  • Negative: Severe pain in the incision  • Negative: [1] Incision gaping open AND [2] < 48 hours since wound re-opened  • Negative: [1] Incision gaping open AND [2] length of opening > 2 inches (5 cm)  • Negative: Patient sounds very sick or weak to the triager  • Negative: Sounds like a serious complication to the triager    Answer Assessment - Initial Assessment Questions  1. SYMPTOM: \"What's the main symptom you're concerned about?\" (e.g., redness, pain, drainage)      Drainage and redness  2. ONSET: \"When did drainage  start?\"      Since surgery  3. SURGERY: \"What surgery was performed?\"      Femoral bypass  4. DATE of SURGERY: \"When was surgery performed?\"       Last Wednesday  5. INCISION SITE: \"Where is the incision located?\"       leg  6. REDNESS: \"Is there any redness at the incision site?\" If yes, ask: \"How wide across is the redness?\" (Inches, centimeters)       Yes; unable to determine, caller kept saying the whole leg is a variety of colors  7. PAIN: \"Is there any pain?\" If so, ask: \"How bad is it?\"  (Scale 1-10; or mild, moderate, severe)      Mild to moderate  8. BLEEDING: \"Is there any bleeding?\" If so, ask: \"How much?\" and \"Where?\"      Yes; more like drainage  9. DRAINAGE: \"Is there any drainage from the incision site?\" If yes, ask: \"What color and how much?\" (e.g., red, cloudy, pus; drops, teaspoon)      Yes, red  10. FEVER: \"Do you have a fever?\" If so, ask: \"What is your temperature, how was it measured, and when did it start?\"        Yes - 101.2 started tonight  11. OTHER SYMPTOMS: " "\"Do you have any other symptoms?\" (e.g., shaking chills, weakness, rash elsewhere on body)        denies    Protocols used: POST-OP INCISION SYMPTOMS-ADULT-AH      "

## 2021-02-04 NOTE — OUTREACH NOTE
General Surgery Week 1 Survey      Responses   [facility] patient discharged from?  [place]   Does the patient have one of the following disease processes/diagnoses(primary or secondary)?  General Surgery   Week 1 attempt successful?  No [Patient currently in ER.]   Unsuccessful attempts  Attempt 1          Nohemi Wang RN

## 2021-02-05 ENCOUNTER — HOSPITAL ENCOUNTER (INPATIENT)
Facility: HOSPITAL | Age: 56
LOS: 12 days | Discharge: HOME | End: 2021-02-17
Attending: INTERNAL MEDICINE | Admitting: THORACIC SURGERY (CARDIOTHORACIC VASCULAR SURGERY)

## 2021-02-05 ENCOUNTER — READMISSION MANAGEMENT (OUTPATIENT)
Dept: CALL CENTER | Facility: HOSPITAL | Age: 56
End: 2021-02-05

## 2021-02-05 VITALS
BODY MASS INDEX: 35 KG/M2 | WEIGHT: 223 LBS | RESPIRATION RATE: 18 BRPM | HEART RATE: 110 BPM | DIASTOLIC BLOOD PRESSURE: 80 MMHG | TEMPERATURE: 99.1 F | SYSTOLIC BLOOD PRESSURE: 123 MMHG | HEIGHT: 67 IN | OXYGEN SATURATION: 93 %

## 2021-02-05 DIAGNOSIS — I73.9 PAD (PERIPHERAL ARTERY DISEASE) (HCC): ICD-10-CM

## 2021-02-05 DIAGNOSIS — Z79.4 TYPE 2 DIABETES MELLITUS WITH DIABETIC PERIPHERAL ANGIOPATHY WITHOUT GANGRENE, WITH LONG-TERM CURRENT USE OF INSULIN (HCC): Chronic | ICD-10-CM

## 2021-02-05 DIAGNOSIS — T81.40XA POSTOPERATIVE INFECTION, UNSPECIFIED TYPE, INITIAL ENCOUNTER: Primary | ICD-10-CM

## 2021-02-05 DIAGNOSIS — E11.51 TYPE 2 DIABETES MELLITUS WITH DIABETIC PERIPHERAL ANGIOPATHY WITHOUT GANGRENE, WITH LONG-TERM CURRENT USE OF INSULIN (HCC): Chronic | ICD-10-CM

## 2021-02-05 PROBLEM — T81.49XA SURGICAL WOUND INFECTION: Status: ACTIVE | Noted: 2021-02-05

## 2021-02-05 LAB
ALBUMIN SERPL-MCNC: 3.4 G/DL (ref 3.5–5.2)
ALBUMIN/GLOB SERPL: 0.9 G/DL
ALP SERPL-CCNC: 98 U/L (ref 39–117)
ALT SERPL W P-5'-P-CCNC: 52 U/L (ref 1–41)
ANION GAP SERPL CALCULATED.3IONS-SCNC: 8 MMOL/L (ref 5–15)
AST SERPL-CCNC: 27 U/L (ref 1–40)
BASOPHILS # BLD AUTO: 0.07 10*3/MM3 (ref 0–0.2)
BASOPHILS NFR BLD AUTO: 0.3 % (ref 0–1.5)
BILIRUB SERPL-MCNC: 1.2 MG/DL (ref 0–1.2)
BUN SERPL-MCNC: 11 MG/DL (ref 6–20)
BUN/CREAT SERPL: 15.1 (ref 7–25)
CALCIUM SPEC-SCNC: 8.3 MG/DL (ref 8.6–10.5)
CHLORIDE SERPL-SCNC: 103 MMOL/L (ref 98–107)
CO2 SERPL-SCNC: 25 MMOL/L (ref 22–29)
CREAT SERPL-MCNC: 0.73 MG/DL (ref 0.76–1.27)
D-LACTATE SERPL-SCNC: 2.5 MMOL/L (ref 0.5–2)
D-LACTATE SERPL-SCNC: 3.7 MMOL/L (ref 0.5–2)
DEPRECATED RDW RBC AUTO: 49.9 FL (ref 37–54)
EOSINOPHIL # BLD AUTO: 0.06 10*3/MM3 (ref 0–0.4)
EOSINOPHIL NFR BLD AUTO: 0.3 % (ref 0.3–6.2)
ERYTHROCYTE [DISTWIDTH] IN BLOOD BY AUTOMATED COUNT: 13.4 % (ref 12.3–15.4)
GFR SERPL CREATININE-BSD FRML MDRD: 112 ML/MIN/1.73
GLOBULIN UR ELPH-MCNC: 3.8 GM/DL
GLUCOSE BLDC GLUCOMTR-MCNC: 264 MG/DL (ref 70–130)
GLUCOSE BLDC GLUCOMTR-MCNC: 446 MG/DL (ref 70–130)
GLUCOSE SERPL-MCNC: 218 MG/DL (ref 65–99)
HCT VFR BLD AUTO: 36.5 % (ref 37.5–51)
HGB BLD-MCNC: 12 G/DL (ref 13–17.7)
IMM GRANULOCYTES # BLD AUTO: 0.17 10*3/MM3 (ref 0–0.05)
IMM GRANULOCYTES NFR BLD AUTO: 0.8 % (ref 0–0.5)
LYMPHOCYTES # BLD AUTO: 2.22 10*3/MM3 (ref 0.7–3.1)
LYMPHOCYTES NFR BLD AUTO: 11 % (ref 19.6–45.3)
MAGNESIUM SERPL-MCNC: 1.8 MG/DL (ref 1.6–2.6)
MCH RBC QN AUTO: 33.2 PG (ref 26.6–33)
MCHC RBC AUTO-ENTMCNC: 32.9 G/DL (ref 31.5–35.7)
MCV RBC AUTO: 101.1 FL (ref 79–97)
MONOCYTES # BLD AUTO: 1.5 10*3/MM3 (ref 0.1–0.9)
MONOCYTES NFR BLD AUTO: 7.5 % (ref 5–12)
NEUTROPHILS NFR BLD AUTO: 16.09 10*3/MM3 (ref 1.7–7)
NEUTROPHILS NFR BLD AUTO: 80.1 % (ref 42.7–76)
NRBC BLD AUTO-RTO: 0 /100 WBC (ref 0–0.2)
PHOSPHATE SERPL-MCNC: 1.8 MG/DL (ref 2.5–4.5)
PLATELET # BLD AUTO: 179 10*3/MM3 (ref 140–450)
PMV BLD AUTO: 12.6 FL (ref 6–12)
POTASSIUM SERPL-SCNC: 3.9 MMOL/L (ref 3.5–5.2)
PROCALCITONIN SERPL-MCNC: 0.41 NG/ML (ref 0–0.25)
PROT SERPL-MCNC: 7.2 G/DL (ref 6–8.5)
RBC # BLD AUTO: 3.61 10*6/MM3 (ref 4.14–5.8)
SODIUM SERPL-SCNC: 136 MMOL/L (ref 136–145)
WBC # BLD AUTO: 20.11 10*3/MM3 (ref 3.4–10.8)

## 2021-02-05 PROCEDURE — 85025 COMPLETE CBC W/AUTO DIFF WBC: CPT | Performed by: FAMILY MEDICINE

## 2021-02-05 PROCEDURE — 63710000001 INSULIN LISPRO (HUMAN) PER 5 UNITS: Performed by: NURSE PRACTITIONER

## 2021-02-05 PROCEDURE — 25010000002 VANCOMYCIN: Performed by: EMERGENCY MEDICINE

## 2021-02-05 PROCEDURE — 94640 AIRWAY INHALATION TREATMENT: CPT

## 2021-02-05 PROCEDURE — 83735 ASSAY OF MAGNESIUM: CPT | Performed by: FAMILY MEDICINE

## 2021-02-05 PROCEDURE — 87040 BLOOD CULTURE FOR BACTERIA: CPT | Performed by: PHYSICIAN ASSISTANT

## 2021-02-05 PROCEDURE — 63710000001 INSULIN LISPRO (HUMAN) PER 5 UNITS: Performed by: PHYSICIAN ASSISTANT

## 2021-02-05 PROCEDURE — 99223 1ST HOSP IP/OBS HIGH 75: CPT | Performed by: FAMILY MEDICINE

## 2021-02-05 PROCEDURE — 25010000003 MAGNESIUM SULFATE 4 GM/100ML SOLUTION: Performed by: PHYSICIAN ASSISTANT

## 2021-02-05 PROCEDURE — 25010000002 MORPHINE PER 10 MG: Performed by: EMERGENCY MEDICINE

## 2021-02-05 PROCEDURE — 83605 ASSAY OF LACTIC ACID: CPT | Performed by: NURSE PRACTITIONER

## 2021-02-05 PROCEDURE — 80053 COMPREHEN METABOLIC PANEL: CPT | Performed by: FAMILY MEDICINE

## 2021-02-05 PROCEDURE — 84100 ASSAY OF PHOSPHORUS: CPT | Performed by: FAMILY MEDICINE

## 2021-02-05 PROCEDURE — 25010000002 PIPERACILLIN SOD-TAZOBACTAM PER 1 G: Performed by: FAMILY MEDICINE

## 2021-02-05 PROCEDURE — 25010000002 PIPERACILLIN SOD-TAZOBACTAM PER 1 G: Performed by: EMERGENCY MEDICINE

## 2021-02-05 PROCEDURE — 96376 TX/PRO/DX INJ SAME DRUG ADON: CPT

## 2021-02-05 PROCEDURE — 83605 ASSAY OF LACTIC ACID: CPT | Performed by: FAMILY MEDICINE

## 2021-02-05 PROCEDURE — 25010000002 HYDROMORPHONE 1 MG/ML SOLUTION: Performed by: EMERGENCY MEDICINE

## 2021-02-05 PROCEDURE — 99024 POSTOP FOLLOW-UP VISIT: CPT | Performed by: PHYSICIAN ASSISTANT

## 2021-02-05 PROCEDURE — 25010000002 HEPARIN (PORCINE) PER 1000 UNITS: Performed by: FAMILY MEDICINE

## 2021-02-05 PROCEDURE — 25010000002 MORPHINE PER 10 MG: Performed by: FAMILY MEDICINE

## 2021-02-05 PROCEDURE — 84145 PROCALCITONIN (PCT): CPT | Performed by: PHYSICIAN ASSISTANT

## 2021-02-05 PROCEDURE — 82962 GLUCOSE BLOOD TEST: CPT

## 2021-02-05 PROCEDURE — 94799 UNLISTED PULMONARY SVC/PX: CPT

## 2021-02-05 PROCEDURE — 25010000002 ONDANSETRON PER 1 MG: Performed by: EMERGENCY MEDICINE

## 2021-02-05 RX ORDER — OXYCODONE AND ACETAMINOPHEN 7.5; 325 MG/1; MG/1
1 TABLET ORAL EVERY 6 HOURS PRN
Status: CANCELLED | OUTPATIENT
Start: 2021-02-05

## 2021-02-05 RX ORDER — NALOXONE HCL 0.4 MG/ML
0.2 VIAL (ML) INJECTION
Status: DISCONTINUED | OUTPATIENT
Start: 2021-02-05 | End: 2021-02-16

## 2021-02-05 RX ORDER — METOPROLOL TARTRATE 50 MG/1
50 TABLET, FILM COATED ORAL 2 TIMES DAILY
Status: CANCELLED | OUTPATIENT
Start: 2021-02-05

## 2021-02-05 RX ORDER — ASPIRIN 81 MG/1
81 TABLET, CHEWABLE ORAL DAILY
Status: DISCONTINUED | OUTPATIENT
Start: 2021-02-05 | End: 2021-02-17 | Stop reason: HOSPADM

## 2021-02-05 RX ORDER — ASPIRIN 81 MG/1
81 TABLET, CHEWABLE ORAL DAILY
Status: CANCELLED | OUTPATIENT
Start: 2021-02-05

## 2021-02-05 RX ORDER — BUDESONIDE 0.5 MG/2ML
0.5 INHALANT ORAL
Status: DISCONTINUED | OUTPATIENT
Start: 2021-02-05 | End: 2021-02-17 | Stop reason: HOSPADM

## 2021-02-05 RX ORDER — NITROGLYCERIN 0.4 MG/1
0.4 TABLET SUBLINGUAL
Status: CANCELLED | OUTPATIENT
Start: 2021-02-05

## 2021-02-05 RX ORDER — NICOTINE POLACRILEX 4 MG
15 LOZENGE BUCCAL
Status: DISCONTINUED | OUTPATIENT
Start: 2021-02-05 | End: 2021-02-17 | Stop reason: HOSPADM

## 2021-02-05 RX ORDER — ISOSORBIDE MONONITRATE 30 MG/1
30 TABLET, EXTENDED RELEASE ORAL DAILY
Status: DISCONTINUED | OUTPATIENT
Start: 2021-02-05 | End: 2021-02-17 | Stop reason: HOSPADM

## 2021-02-05 RX ORDER — DIPHENOXYLATE HYDROCHLORIDE AND ATROPINE SULFATE 2.5; .025 MG/1; MG/1
1 TABLET ORAL DAILY
Status: CANCELLED | OUTPATIENT
Start: 2021-02-05

## 2021-02-05 RX ORDER — FUROSEMIDE 40 MG/1
20 TABLET ORAL DAILY
Status: CANCELLED | OUTPATIENT
Start: 2021-02-05

## 2021-02-05 RX ORDER — CLOPIDOGREL BISULFATE 75 MG/1
75 TABLET ORAL DAILY
Status: DISCONTINUED | OUTPATIENT
Start: 2021-02-05 | End: 2021-02-05

## 2021-02-05 RX ORDER — FUROSEMIDE 20 MG/1
20 TABLET ORAL DAILY
Status: DISCONTINUED | OUTPATIENT
Start: 2021-02-05 | End: 2021-02-17 | Stop reason: HOSPADM

## 2021-02-05 RX ORDER — OXYCODONE HYDROCHLORIDE AND ACETAMINOPHEN 5; 325 MG/1; MG/1
1 TABLET ORAL EVERY 4 HOURS PRN
Status: DISCONTINUED | OUTPATIENT
Start: 2021-02-05 | End: 2021-02-16

## 2021-02-05 RX ORDER — ISOSORBIDE MONONITRATE 30 MG/1
30 TABLET, EXTENDED RELEASE ORAL DAILY
Status: CANCELLED | OUTPATIENT
Start: 2021-02-05

## 2021-02-05 RX ORDER — BUDESONIDE AND FORMOTEROL FUMARATE DIHYDRATE 160; 4.5 UG/1; UG/1
2 AEROSOL RESPIRATORY (INHALATION) 2 TIMES DAILY PRN
Status: CANCELLED | OUTPATIENT
Start: 2021-02-05

## 2021-02-05 RX ORDER — NAPROXEN 250 MG/1
500 TABLET ORAL 2 TIMES DAILY WITH MEALS
Status: CANCELLED | OUTPATIENT
Start: 2021-02-05

## 2021-02-05 RX ORDER — ROPINIROLE 1 MG/1
2 TABLET, FILM COATED ORAL 3 TIMES DAILY
Status: DISCONTINUED | OUTPATIENT
Start: 2021-02-05 | End: 2021-02-05

## 2021-02-05 RX ORDER — PANTOPRAZOLE SODIUM 40 MG/1
40 TABLET, DELAYED RELEASE ORAL EVERY MORNING
Status: DISCONTINUED | OUTPATIENT
Start: 2021-02-06 | End: 2021-02-17 | Stop reason: HOSPADM

## 2021-02-05 RX ORDER — ROPINIROLE 1 MG/1
4 TABLET, FILM COATED ORAL NIGHTLY
Status: DISCONTINUED | OUTPATIENT
Start: 2021-02-05 | End: 2021-02-05 | Stop reason: HOSPADM

## 2021-02-05 RX ORDER — ARFORMOTEROL TARTRATE 15 UG/2ML
15 SOLUTION RESPIRATORY (INHALATION)
Status: DISCONTINUED | OUTPATIENT
Start: 2021-02-05 | End: 2021-02-17 | Stop reason: HOSPADM

## 2021-02-05 RX ORDER — ACETAMINOPHEN 325 MG/1
650 TABLET ORAL EVERY 4 HOURS PRN
Status: DISCONTINUED | OUTPATIENT
Start: 2021-02-05 | End: 2021-02-17 | Stop reason: HOSPADM

## 2021-02-05 RX ORDER — ATORVASTATIN CALCIUM 40 MG/1
40 TABLET, FILM COATED ORAL DAILY
Status: CANCELLED | OUTPATIENT
Start: 2021-02-05

## 2021-02-05 RX ORDER — PANTOPRAZOLE SODIUM 40 MG/1
40 TABLET, DELAYED RELEASE ORAL EVERY MORNING
Status: CANCELLED | OUTPATIENT
Start: 2021-02-05

## 2021-02-05 RX ORDER — LISINOPRIL 10 MG/1
10 TABLET ORAL DAILY
Status: DISCONTINUED | OUTPATIENT
Start: 2021-02-05 | End: 2021-02-17 | Stop reason: HOSPADM

## 2021-02-05 RX ORDER — DULOXETIN HYDROCHLORIDE 60 MG/1
60 CAPSULE, DELAYED RELEASE ORAL 2 TIMES DAILY
Status: DISCONTINUED | OUTPATIENT
Start: 2021-02-05 | End: 2021-02-17 | Stop reason: HOSPADM

## 2021-02-05 RX ORDER — DEXTROSE MONOHYDRATE 25 G/50ML
25 INJECTION, SOLUTION INTRAVENOUS
Status: DISCONTINUED | OUTPATIENT
Start: 2021-02-05 | End: 2021-02-17 | Stop reason: HOSPADM

## 2021-02-05 RX ORDER — ALBUTEROL SULFATE 2.5 MG/3ML
2.5 SOLUTION RESPIRATORY (INHALATION) EVERY 4 HOURS PRN
Status: CANCELLED | OUTPATIENT
Start: 2021-02-05

## 2021-02-05 RX ORDER — LISINOPRIL 10 MG/1
10 TABLET ORAL DAILY
Status: CANCELLED | OUTPATIENT
Start: 2021-02-05

## 2021-02-05 RX ORDER — POTASSIUM CHLORIDE 750 MG/1
10 CAPSULE, EXTENDED RELEASE ORAL DAILY
Status: DISCONTINUED | OUTPATIENT
Start: 2021-02-05 | End: 2021-02-09

## 2021-02-05 RX ORDER — SODIUM CHLORIDE, SODIUM LACTATE, POTASSIUM CHLORIDE, CALCIUM CHLORIDE 600; 310; 30; 20 MG/100ML; MG/100ML; MG/100ML; MG/100ML
100 INJECTION, SOLUTION INTRAVENOUS CONTINUOUS
Status: ACTIVE | OUTPATIENT
Start: 2021-02-05 | End: 2021-02-06

## 2021-02-05 RX ORDER — ROPINIROLE 1 MG/1
4 TABLET, FILM COATED ORAL NIGHTLY
Status: DISCONTINUED | OUTPATIENT
Start: 2021-02-05 | End: 2021-02-17 | Stop reason: HOSPADM

## 2021-02-05 RX ORDER — ROPINIROLE 1 MG/1
4 TABLET, FILM COATED ORAL NIGHTLY
Status: CANCELLED | OUTPATIENT
Start: 2021-02-05

## 2021-02-05 RX ORDER — HYDROXYZINE HYDROCHLORIDE 25 MG/1
25 TABLET, FILM COATED ORAL 3 TIMES DAILY PRN
Status: CANCELLED | OUTPATIENT
Start: 2021-02-05

## 2021-02-05 RX ORDER — ALBUTEROL SULFATE 2.5 MG/3ML
2.5 SOLUTION RESPIRATORY (INHALATION) EVERY 4 HOURS PRN
Status: DISCONTINUED | OUTPATIENT
Start: 2021-02-05 | End: 2021-02-17 | Stop reason: HOSPADM

## 2021-02-05 RX ORDER — DULOXETIN HYDROCHLORIDE 60 MG/1
60 CAPSULE, DELAYED RELEASE ORAL 2 TIMES DAILY
Status: CANCELLED | OUTPATIENT
Start: 2021-02-05

## 2021-02-05 RX ORDER — CLOPIDOGREL BISULFATE 75 MG/1
75 TABLET ORAL DAILY
Status: CANCELLED | OUTPATIENT
Start: 2021-02-05

## 2021-02-05 RX ORDER — SODIUM CHLORIDE 0.9 % (FLUSH) 0.9 %
10 SYRINGE (ML) INJECTION EVERY 12 HOURS SCHEDULED
Status: DISCONTINUED | OUTPATIENT
Start: 2021-02-05 | End: 2021-02-17 | Stop reason: HOSPADM

## 2021-02-05 RX ORDER — POTASSIUM CHLORIDE 20 MEQ/1
10 TABLET, EXTENDED RELEASE ORAL DAILY
Status: CANCELLED | OUTPATIENT
Start: 2021-02-05

## 2021-02-05 RX ORDER — MORPHINE SULFATE 2 MG/ML
2 INJECTION, SOLUTION INTRAMUSCULAR; INTRAVENOUS EVERY 4 HOURS PRN
Status: DISCONTINUED | OUTPATIENT
Start: 2021-02-05 | End: 2021-02-16

## 2021-02-05 RX ORDER — ONDANSETRON 2 MG/ML
4 INJECTION INTRAMUSCULAR; INTRAVENOUS EVERY 6 HOURS PRN
Status: DISCONTINUED | OUTPATIENT
Start: 2021-02-05 | End: 2021-02-17 | Stop reason: HOSPADM

## 2021-02-05 RX ORDER — SODIUM CHLORIDE 0.9 % (FLUSH) 0.9 %
10 SYRINGE (ML) INJECTION AS NEEDED
Status: DISCONTINUED | OUTPATIENT
Start: 2021-02-05 | End: 2021-02-17 | Stop reason: HOSPADM

## 2021-02-05 RX ORDER — ONDANSETRON 4 MG/1
4 TABLET, FILM COATED ORAL EVERY 6 HOURS PRN
Status: DISCONTINUED | OUTPATIENT
Start: 2021-02-05 | End: 2021-02-17 | Stop reason: HOSPADM

## 2021-02-05 RX ORDER — ATORVASTATIN CALCIUM 40 MG/1
40 TABLET, FILM COATED ORAL NIGHTLY
Status: DISCONTINUED | OUTPATIENT
Start: 2021-02-05 | End: 2021-02-17 | Stop reason: HOSPADM

## 2021-02-05 RX ORDER — PREGABALIN 100 MG/1
200 CAPSULE ORAL 3 TIMES DAILY
Status: CANCELLED | OUTPATIENT
Start: 2021-02-05

## 2021-02-05 RX ORDER — ALBUTEROL SULFATE 90 UG/1
1 AEROSOL, METERED RESPIRATORY (INHALATION) EVERY 6 HOURS PRN
Status: CANCELLED | OUTPATIENT
Start: 2021-02-05

## 2021-02-05 RX ORDER — BUDESONIDE AND FORMOTEROL FUMARATE DIHYDRATE 160; 4.5 UG/1; UG/1
2 AEROSOL RESPIRATORY (INHALATION)
Status: DISCONTINUED | OUTPATIENT
Start: 2021-02-05 | End: 2021-02-05

## 2021-02-05 RX ORDER — METOPROLOL TARTRATE 50 MG/1
50 TABLET, FILM COATED ORAL 2 TIMES DAILY
Status: DISCONTINUED | OUTPATIENT
Start: 2021-02-05 | End: 2021-02-17 | Stop reason: HOSPADM

## 2021-02-05 RX ORDER — PREGABALIN 100 MG/1
100 CAPSULE ORAL ONCE
Status: COMPLETED | OUTPATIENT
Start: 2021-02-05 | End: 2021-02-05

## 2021-02-05 RX ORDER — BUDESONIDE AND FORMOTEROL FUMARATE DIHYDRATE 160; 4.5 UG/1; UG/1
2 AEROSOL RESPIRATORY (INHALATION)
Status: CANCELLED | OUTPATIENT
Start: 2021-02-05

## 2021-02-05 RX ORDER — ACETAMINOPHEN 650 MG/1
650 SUPPOSITORY RECTAL EVERY 4 HOURS PRN
Status: DISCONTINUED | OUTPATIENT
Start: 2021-02-05 | End: 2021-02-17 | Stop reason: HOSPADM

## 2021-02-05 RX ORDER — MAGNESIUM SULFATE HEPTAHYDRATE 40 MG/ML
4 INJECTION, SOLUTION INTRAVENOUS AS NEEDED
Status: DISCONTINUED | OUTPATIENT
Start: 2021-02-05 | End: 2021-02-17 | Stop reason: HOSPADM

## 2021-02-05 RX ORDER — HYDROXYZINE HYDROCHLORIDE 25 MG/1
25 TABLET, FILM COATED ORAL 3 TIMES DAILY PRN
Status: DISCONTINUED | OUTPATIENT
Start: 2021-02-05 | End: 2021-02-17 | Stop reason: HOSPADM

## 2021-02-05 RX ORDER — NICOTINE 21 MG/24HR
1 PATCH, TRANSDERMAL 24 HOURS TRANSDERMAL
Status: DISCONTINUED | OUTPATIENT
Start: 2021-02-05 | End: 2021-02-17 | Stop reason: HOSPADM

## 2021-02-05 RX ORDER — HEPARIN SODIUM 5000 [USP'U]/ML
5000 INJECTION, SOLUTION INTRAVENOUS; SUBCUTANEOUS EVERY 8 HOURS
Status: DISCONTINUED | OUTPATIENT
Start: 2021-02-05 | End: 2021-02-09

## 2021-02-05 RX ORDER — MAGNESIUM SULFATE HEPTAHYDRATE 40 MG/ML
2 INJECTION, SOLUTION INTRAVENOUS AS NEEDED
Status: DISCONTINUED | OUTPATIENT
Start: 2021-02-05 | End: 2021-02-17 | Stop reason: HOSPADM

## 2021-02-05 RX ORDER — PREGABALIN 100 MG/1
200 CAPSULE ORAL 3 TIMES DAILY
Status: DISCONTINUED | OUTPATIENT
Start: 2021-02-05 | End: 2021-02-17 | Stop reason: HOSPADM

## 2021-02-05 RX ORDER — ACETAMINOPHEN 160 MG/5ML
650 SOLUTION ORAL EVERY 4 HOURS PRN
Status: DISCONTINUED | OUTPATIENT
Start: 2021-02-05 | End: 2021-02-17 | Stop reason: HOSPADM

## 2021-02-05 RX ORDER — ONDANSETRON 2 MG/ML
4 INJECTION INTRAMUSCULAR; INTRAVENOUS ONCE
Status: COMPLETED | OUTPATIENT
Start: 2021-02-05 | End: 2021-02-05

## 2021-02-05 RX ADMIN — HEPARIN SODIUM 5000 UNITS: 5000 INJECTION, SOLUTION INTRAVENOUS; SUBCUTANEOUS at 16:53

## 2021-02-05 RX ADMIN — OXYCODONE AND ACETAMINOPHEN 1 TABLET: 5; 325 TABLET ORAL at 23:35

## 2021-02-05 RX ADMIN — DULOXETINE HYDROCHLORIDE 60 MG: 60 CAPSULE, DELAYED RELEASE ORAL at 21:13

## 2021-02-05 RX ADMIN — FUROSEMIDE 20 MG: 20 TABLET ORAL at 16:54

## 2021-02-05 RX ADMIN — MORPHINE SULFATE 4 MG: 4 INJECTION, SOLUTION INTRAMUSCULAR; INTRAVENOUS at 10:17

## 2021-02-05 RX ADMIN — ARFORMOTEROL TARTRATE 15 MCG: 15 SOLUTION RESPIRATORY (INHALATION) at 19:51

## 2021-02-05 RX ADMIN — MAGNESIUM SULFATE 4 G: 4 INJECTION INTRAVENOUS at 17:58

## 2021-02-05 RX ADMIN — ATORVASTATIN CALCIUM 40 MG: 40 TABLET, FILM COATED ORAL at 21:13

## 2021-02-05 RX ADMIN — HEPARIN SODIUM 5000 UNITS: 5000 INJECTION, SOLUTION INTRAVENOUS; SUBCUTANEOUS at 23:36

## 2021-02-05 RX ADMIN — PREGABALIN 100 MG: 100 CAPSULE ORAL at 03:06

## 2021-02-05 RX ADMIN — VANCOMYCIN HYDROCHLORIDE 1250 MG: 100 INJECTION, POWDER, LYOPHILIZED, FOR SOLUTION INTRAVENOUS at 23:35

## 2021-02-05 RX ADMIN — POTASSIUM CHLORIDE 10 MEQ: 10 CAPSULE, COATED, EXTENDED RELEASE ORAL at 16:54

## 2021-02-05 RX ADMIN — METOPROLOL TARTRATE 50 MG: 50 TABLET, FILM COATED ORAL at 21:13

## 2021-02-05 RX ADMIN — ONDANSETRON 4 MG: 2 INJECTION INTRAMUSCULAR; INTRAVENOUS at 10:16

## 2021-02-05 RX ADMIN — MORPHINE SULFATE 2 MG: 2 INJECTION, SOLUTION INTRAMUSCULAR; INTRAVENOUS at 17:25

## 2021-02-05 RX ADMIN — ISOSORBIDE MONONITRATE 30 MG: 30 TABLET, EXTENDED RELEASE ORAL at 16:54

## 2021-02-05 RX ADMIN — BUDESONIDE 0.5 MG: 0.5 INHALANT ORAL at 19:49

## 2021-02-05 RX ADMIN — SODIUM CHLORIDE, POTASSIUM CHLORIDE, SODIUM LACTATE AND CALCIUM CHLORIDE 100 ML/HR: 600; 310; 30; 20 INJECTION, SOLUTION INTRAVENOUS at 18:20

## 2021-02-05 RX ADMIN — OXYCODONE AND ACETAMINOPHEN 1 TABLET: 5; 325 TABLET ORAL at 18:59

## 2021-02-05 RX ADMIN — VANCOMYCIN HYDROCHLORIDE 1250 MG: 10 INJECTION, POWDER, LYOPHILIZED, FOR SOLUTION INTRAVENOUS at 12:08

## 2021-02-05 RX ADMIN — PREGABALIN 200 MG: 100 CAPSULE ORAL at 21:13

## 2021-02-05 RX ADMIN — INSULIN LISPRO 7 UNITS: 100 INJECTION, SOLUTION INTRAVENOUS; SUBCUTANEOUS at 21:12

## 2021-02-05 RX ADMIN — INSULIN LISPRO 3 UNITS: 100 INJECTION, SOLUTION INTRAVENOUS; SUBCUTANEOUS at 17:58

## 2021-02-05 RX ADMIN — LISINOPRIL 10 MG: 10 TABLET ORAL at 16:54

## 2021-02-05 RX ADMIN — Medication 1 PATCH: at 17:25

## 2021-02-05 RX ADMIN — POTASSIUM & SODIUM PHOSPHATES POWDER PACK 280-160-250 MG 2 PACKET: 280-160-250 PACK at 21:23

## 2021-02-05 RX ADMIN — ROPINIROLE HYDROCHLORIDE 4 MG: 2 TABLET, FILM COATED ORAL at 21:13

## 2021-02-05 RX ADMIN — TAZOBACTAM SODIUM AND PIPERACILLIN SODIUM 3.38 G: 375; 3 INJECTION, SOLUTION INTRAVENOUS at 18:07

## 2021-02-05 RX ADMIN — PIPERACILLIN SODIUM AND TAZOBACTAM SODIUM 3.38 G: 3; .375 INJECTION, POWDER, LYOPHILIZED, FOR SOLUTION INTRAVENOUS at 10:18

## 2021-02-05 RX ADMIN — MORPHINE SULFATE 2 MG: 2 INJECTION, SOLUTION INTRAMUSCULAR; INTRAVENOUS at 21:13

## 2021-02-05 RX ADMIN — HYDROMORPHONE HYDROCHLORIDE 1 MG: 1 INJECTION, SOLUTION INTRAMUSCULAR; INTRAVENOUS; SUBCUTANEOUS at 07:01

## 2021-02-05 RX ADMIN — ASPIRIN 81 MG: 81 TABLET, CHEWABLE ORAL at 16:54

## 2021-02-05 RX ADMIN — CLOPIDOGREL BISULFATE 75 MG: 75 TABLET ORAL at 16:54

## 2021-02-05 RX ADMIN — ROPINIROLE HYDROCHLORIDE 4 MG: 1 TABLET, FILM COATED ORAL at 03:06

## 2021-02-05 NOTE — ED PROVIDER NOTES
Subjective   History of Present Illness    Review of Systems    Past Medical History:   Diagnosis Date   • Anxiety    • CHF (congestive heart failure) (CMS/[facility])    • COPD (chronic obstructive pulmonary disease) (CMS/[facility])    • Coronary artery disease involving native coronary artery of native heart without angina pectoris 1/12/2021    Added automatically from request for surgery [id number]   • Diabetes mellitus (CMS/[facility])    • Hepatitis C    • Hyperlipidemia    • Hypertension    • Sleep apnea    • T2DM on metformin  1/27/2021       No Known Allergies    Past Surgical History:   Procedure Laterality Date   • CARDIAC CATHETERIZATION  11/20/2020    x 2 stents   • CARDIAC CATHETERIZATION Right 1/20/2021    Procedure: Peripheral angiography;  Surgeon: Wesly Lund MD;  Location:  [facility];  Service: Cardiovascular;  Laterality: Right;   • CORONARY ARTERY BYPASS GRAFT      x 5   • CORONARY STENT PLACEMENT  11/20/2020    Dr Mortensen x 2 stents   • FEMORAL DISTAL BYPASS N/A 1/27/2021    Procedure: FEMORAL DISTAL BYPASS;  Surgeon: Jeremy Trevizo MD;  Location:  [facility];  Service: Vascular;  Laterality: N/A;   • FINGER SURGERY      left   • LEG AMPUTATION Left     left lower leg   • SHOULDER SURGERY Bilateral        Family History   Problem Relation Age of Onset   • Heart disease Mother    • Anuerysm Father    • Heart disease Father    • Heart disease Sister    • Heart disease Sister        Social History     Socioeconomic History   • Marital status: Legally      Spouse name: Not on file   • Number of children: 3   • Years of education: Not on file   • Highest education level: Not on file   Occupational History   • Occupation: [employer]      Comment: Disabled    Tobacco Use   • Smoking status: Current Every Day Smoker     Packs/day: 1.00     Years: 40.00     Pack years: 40.00     Types: Cigarettes   • Smokeless tobacco: Never Used   Substance and Sexual Activity   • Alcohol use: Yes      Alcohol/week: 3.0 standard drinks     Types: 2 Cans of beer, 1 Shots of liquor per week     Comment: weekly   • Drug use: Yes     Types: Marijuana     Comment: smoke Marijuana everyday   • Sexual activity: Defer   Social History Narrative    Pt lives in [place], [place] with somebody else.            Objective   Physical Exam    Procedures        Results for orders placed or performed during the hospital encounter of 02/04/21   COVID-19 and FLU A/B PCR - Swab, Nasopharynx    Specimen: Nasopharynx; Swab   Result Value Ref Range    COVID19 Not Detected Not Detected - Ref. Range    Influenza A PCR Not Detected Not Detected    Influenza B PCR Not Detected Not Detected   Blood Culture - Blood, Arm, Left    Specimen: Arm, Left; Blood   Result Value Ref Range    Blood Culture No growth at 2 days    Blood Culture - Blood, Arm, Left    Specimen: Arm, Left; Blood   Result Value Ref Range    Blood Culture No growth at 2 days    Comprehensive Metabolic Panel    Specimen: Arm, Left; Blood   Result Value Ref Range    Glucose 225 (H) 65 - 99 mg/dL    BUN 12 6 - 20 mg/dL    Creatinine 0.89 0.76 - 1.27 mg/dL    Sodium 128 (L) 136 - 145 mmol/L    Potassium 4.0 3.5 - 5.2 mmol/L    Chloride 95 (L) 98 - 107 mmol/L    CO2 24.4 22.0 - 29.0 mmol/L    Calcium 8.9 8.6 - 10.5 mg/dL    Total Protein 7.9 6.0 - 8.5 g/dL    Albumin 3.93 3.50 - 5.20 g/dL    ALT (SGPT) 79 (H) 1 - 41 U/L    AST (SGOT) 50 (H) 1 - 40 U/L    Alkaline Phosphatase 89 39 - 117 U/L    Total Bilirubin 0.7 0.0 - 1.2 mg/dL    eGFR Non African Amer 89 >60 mL/min/1.73    Globulin 4.0 gm/dL    A/G Ratio 1.0 g/dL    BUN/Creatinine Ratio 13.5 7.0 - 25.0    Anion Gap 8.6 5.0 - 15.0 mmol/L   Protime-INR    Specimen: Arm, Left; Blood   Result Value Ref Range    Protime 14.5 (H) 11.9 - 14.1 Seconds    INR 1.14 (H) 0.90 - 1.10   aPTT    Specimen: Arm, Left; Blood   Result Value Ref Range    PTT 28.8 25.6 - 35.3 seconds   Magnesium    Specimen: Arm, Left; Blood   Result Value Ref Range     Magnesium 1.6 1.6 - 2.6 mg/dL   Phosphorus    Specimen: Arm, Left; Blood   Result Value Ref Range    Phosphorus 2.4 (L) 2.5 - 4.5 mg/dL   Calcium, Ionized    Specimen: Arm, Left; Blood   Result Value Ref Range    Ionized Calcium 1.17 1.12 - 1.32 mmol/L   Lactic Acid, Plasma    Specimen: Arm, Left; Blood   Result Value Ref Range    Lactate 2.1 (C) 0.5 - 2.0 mmol/L   C-reactive Protein    Specimen: Arm, Left; Blood   Result Value Ref Range    C-Reactive Protein 2.29 (H) 0.00 - 0.50 mg/dL   CBC Auto Differential    Specimen: Arm, Left; Blood   Result Value Ref Range    WBC 18.69 (H) 3.40 - 10.80 10*3/mm3    RBC 4.04 (L) 4.14 - 5.80 10*6/mm3    Hemoglobin 13.7 13.0 - 17.7 g/dL    Hematocrit 40.5 37.5 - 51.0 %    .2 (H) 79.0 - 97.0 fL    MCH 33.9 (H) 26.6 - 33.0 pg    MCHC 33.8 31.5 - 35.7 g/dL    RDW 13.2 12.3 - 15.4 %    RDW-SD 48.6 37.0 - 54.0 fl    MPV 12.7 (H) 6.0 - 12.0 fL    Platelets 207 140 - 450 10*3/mm3    Neutrophil % 81.4 (H) 42.7 - 76.0 %    Lymphocyte % 8.8 (L) 19.6 - 45.3 %    Monocyte % 8.6 5.0 - 12.0 %    Eosinophil % 0.1 (L) 0.3 - 6.2 %    Basophil % 0.3 0.0 - 1.5 %    Immature Grans % 0.8 (H) 0.0 - 0.5 %    Neutrophils, Absolute 15.23 (H) 1.70 - 7.00 10*3/mm3    Lymphocytes, Absolute 1.64 0.70 - 3.10 10*3/mm3    Monocytes, Absolute 1.60 (H) 0.10 - 0.90 10*3/mm3    Eosinophils, Absolute 0.01 0.00 - 0.40 10*3/mm3    Basophils, Absolute 0.06 0.00 - 0.20 10*3/mm3    Immature Grans, Absolute 0.15 (H) 0.00 - 0.05 10*3/mm3    nRBC 0.0 0.0 - 0.2 /100 WBC   Lactic Acid, Reflex Timer (This will reflex a repeat order 3-3:15 hours after ordered.)    Specimen: Arm, Left; Blood   Result Value Ref Range    Hold Tube Hold for add-ons.    Lactic Acid, Reflex    Specimen: Blood   Result Value Ref Range    Lactate 2.6 (C) 0.5 - 2.0 mmol/L   POC Glucose Once    Specimen: Blood   Result Value Ref Range    Glucose 264 (H) 70 - 130 mg/dL   Light Blue Top   Result Value Ref Range    Extra Tube hold for add-on     Green Top (Gel)   Result Value Ref Range    Extra Tube Hold for add-ons.    Lavender Top   Result Value Ref Range    Extra Tube hold for add-on      Ct Angio Abdominal Aorta Bilateral Iliofem Runoff    Result Date: 2/4/2021  Narrative: CTA ABDOMEN, PELVIS AND LOWER EXTREMITY RUNOFF, HISTORY: Right femoral bypass with runoff TECHNIQUE: CT angiogram of the abdomen and pelvis with lower extremity runoff using IV contrast. 3-D postprocessing was performed and reviewed. Radiation dose reduction techniques included automated exposure control or exposure modulation based on body size. Radiation audit for CT and nuclear cardiology exams in the last 12 months: . Findings: Abdomen/pelvis Visualized lung bases are clear. Thoracic aorta is unremarkable. Celiac trunk is patent. There is potential severe narrowing approximately 1.2 cm from the origin with soft and calcific plaque. SMA appears patent but diseased as are its smaller branches. Renal arteries are patent. Extremities There are recent postsurgical changes of a femoral artery/distal bypass. Soft tissue stranding with fluid is seen in the patient's groin region extending down the length of the graft to the level of the knee. The graft appears patent to the level of the smaller plantar vessels. 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     Impression: 1. Soft tissue stranding and fluid is seen surrounding the patient's recent right femoral/distal arterial graft. It is uncertain to what extent this may represent postoperative fluid versus infection. The graft itself appears patent with adequate runoff to the level of the smaller plantar vessels. Signer Name: Joselin Daniel MD  Signed: 2/4/2021 11:05 PM  Workstation Name: [id number]  [facility]    Ct Outside Films    Result Date: 1/26/2021  Narrative: This procedure was auto-finalized with no dictation required.    Ct Outside Films    Result Date:  1/26/2021  Narrative: This procedure was auto-finalized with no dictation required.        ED Course  ED Course as of Feb 07 1411   Thu Feb 04, 2021   1928 Have discussed with Dr. Araujo who recommends patient be transferred back to [facility] postoperative infection.  Have spoken to the hospitalist, Dr. Herndon, who accepts patient in transfer.  She has requested CT of the right lower extremity with contrast.  Bed pending at this time.    [BC]   Fri Feb 05, 2021   1117 Assumed care from Dr. Mejia at shift change.  Patient states that he is feeling better.  His distal wound seems to be improved, but the more proximal thigh wound seems to have increased induration and erythema.  Discussed with Dr. Ward, hospitalist at [facility] today.  Patient should have a bed available and will be transferred today.    [BC]      ED Course User Index  [BC] Unruly Amador MD                                           MDM  Number of Diagnoses or Management Options  Postoperative infection, unspecified type, initial encounter:   S/P femoral-tibial bypass:      Amount and/or Complexity of Data Reviewed  Clinical lab tests: reviewed  Tests in the radiology section of CPT®: reviewed  Decide to obtain previous medical records or to obtain history from someone other than the patient: yes    Risk of Complications, Morbidity, and/or Mortality  Presenting problems: high  Diagnostic procedures: high  Management options: high        Final diagnoses:   Postoperative infection, unspecified type, initial encounter   S/P femoral-tibial bypass            Unruly Amador MD  02/05/21 1119       Unruly Amador MD  02/07/21 1410       Unruly Amador MD  02/07/21 1411

## 2021-02-05 NOTE — ED NOTES
Dr. Herndon accepted pt to [facility], spoke with Damaris again at [facility] and stated that he is placed on waiting list and may be until morning before pt gets a bed.     Jada Tabor  02/04/21 1945

## 2021-02-05 NOTE — ED NOTES
[facility] called to inform me pt still does not have a bed but is #4 on the wait list.     Jada Tabor  02/05/21 0628

## 2021-02-05 NOTE — ED NOTES
Called [facility] about possible transfer, spoke with Damaris, gina Herndon at this time.     Jada Tabor  02/04/21 1938

## 2021-02-05 NOTE — ED NOTES
[facility] at [facility].     Estrella Trevizo, RN  02/05/21 1505

## 2021-02-05 NOTE — H&P
[facility] Medicine Services  HISTORY AND PHYSICAL    Patient Name: Reg Sanchez  : 1965  MRN: [id number]  Primary Care Physician: Sheila Sheikh APRN  Date of admission: 2021    Subjective   Subjective     Chief Complaint:  Right leg pain, fever    HPI:  Reg Sanchez is a 55 y.o. male with past medical history significant for tobacco abuse, diabetes mellitus, peripheral vascular disease, congestive heart failure, coronary artery disease status post CABG, left leg osteomyelitis status post left leg amputation, peripheral arterial disease and most recently right SFA and popliteal artery occlusion underwent a right femoral artery bypass with Dr. Trevizo 2021.  The patient developed fever on 2021. On 2/3/21 he developed right leg swelling and redness.  Yesterday, he presented to outside hospital where he had lab work indicating inflammatory/infectious process and a CT angiogram of the leg which revealed soft tissue stranding and fluid surrounding the recent right femoral/distal artery graft.  Patent runoff with adequate flow.  Upon arrival to [facility] he reports his right leg erythema and edema is significantly worse than it was yesterday.  He reports increased pain in the thigh and redness.  He also reports increased shortness of breath and cough.  He continues to smoke tobacco.      COVID Details: [] No Symptoms  Symptoms: [] Fever []  Cough [x] Shortness of breath [] Change in taste or smell  Risks:   [] Direct Exposure [] High risk facility       Review of Systems   Constitutional: Positive for chills and fever. Negative for fatigue.   Respiratory: Positive for cough and shortness of breath. Negative for choking and chest tightness.    Cardiovascular: Positive for leg swelling (right). Negative for chest pain and palpitations.   Gastrointestinal: Negative for abdominal pain, diarrhea, nausea and vomiting.   Genitourinary: Negative for discharge,  dysuria, flank pain, penile pain and scrotal swelling.   Musculoskeletal: Negative for back pain and joint swelling.   Skin: Positive for color change and wound.        Right leg swelling, erythema and oozing from lower incisions.     Neurological: Negative for speech difficulty, weakness, numbness and headaches.   All other systems reviewed and are negative.         Personal History     Past Medical History:   Diagnosis Date   • Anxiety    • CHF (congestive heart failure) (CMS/[facility])    • COPD (chronic obstructive pulmonary disease) (CMS/[facility])    • Coronary artery disease involving native coronary artery of native heart without angina pectoris 1/12/2021    Added automatically from request for surgery [id number]   • Diabetes mellitus (CMS/[facility])    • Hepatitis C    • Hyperlipidemia    • Hypertension    • Sleep apnea    • T2DM on metformin  1/27/2021       Past Surgical History:   Procedure Laterality Date   • CARDIAC CATHETERIZATION  11/20/2020    x 2 stents   • CARDIAC CATHETERIZATION Right 1/20/2021    Procedure: Peripheral angiography;  Surgeon: Wesly Lund MD;  Location: [facility] CATH INVASIVE LOCATION;  Service: Cardiovascular;  Laterality: Right;   • CORONARY ARTERY BYPASS GRAFT      x 5   • CORONARY STENT PLACEMENT  11/20/2020    Dr Mortensen x 2 stents   • FEMORAL DISTAL BYPASS N/A 1/27/2021    Procedure: FEMORAL DISTAL BYPASS;  Surgeon: Jeremy Trevizo MD;  Location: [facility] OR;  Service: Vascular;  Laterality: N/A;   • FINGER SURGERY      left   • LEG AMPUTATION Left     left lower leg   • SHOULDER SURGERY Bilateral        Family History: family history includes Anuerysm in his father; Heart disease in his father, mother, sister, and sister. Otherwise pertinent FHx was reviewed and unremarkable.     Social History:  reports that he has been smoking cigarettes. He has a 40.00 pack-year smoking history. He has never used smokeless tobacco. He reports current alcohol use of about 3.0 standard drinks of alcohol per  week. He reports current drug use. Drug: Marijuana.  Social History     Social History Narrative    Pt lives in [place], [place] with somebody else.        Medications:  DULoxetine, Diclofenac Sodium, Insulin Glargine, albuterol, albuterol sulfate HFA, aspirin, atorvastatin, budesonide-formoterol, clopidogrel, furosemide, hydrOXYzine, isosorbide mononitrate, lisinopril, metFORMIN, metoprolol tartrate, multivitamin, nitroglycerin, omeprazole, oxyCODONE-acetaminophen, potassium chloride, pregabalin, rOPINIRole, and sulindac    No Known Allergies    Objective   Objective     Vital Signs:   Temp:  [98.2 °F (36.8 °C)-102.6 °F (39.2 °C)] 99.1 °F (37.3 °C)  Heart Rate:  [] 96  BP: (112-166)/(56-99) 166/74    Physical Exam  Vitals signs and nursing note reviewed.   Constitutional:       General: He is not in acute distress.     Appearance: Normal appearance.   HENT:      Head: Normocephalic and atraumatic.      Right Ear: External ear normal.      Left Ear: External ear normal.   Cardiovascular:      Rate and Rhythm: Normal rate.      Comments: 2+ right DP pulse    Left AKA  Pulmonary:      Effort: Pulmonary effort is normal. No respiratory distress.      Breath sounds: No stridor. Wheezing present. No rhonchi or rales.   Chest:      Chest wall: No tenderness.   Abdominal:      General: There is no distension.      Palpations: Abdomen is soft. There is no mass.      Tenderness: There is no abdominal tenderness. There is no guarding.   Skin:     Comments: Right lower extremity is erythematous from the mid thigh to the foot.  There is diffuse 1+ pitting edema.  There is medial incision from recent femoral artery bypass.   Neurological:      General: No focal deficit present.      Mental Status: He is alert and oriented to person, place, and time.   Psychiatric:         Mood and Affect: Mood normal.         Thought Content: Thought content normal.            Results Reviewed:  I have personally reviewed most recent  indicated data and agree with findings including:  [x]  Laboratory  [x]  Radiology  []  EKG/Telemetry  []  Pathology  []  Cardiac/Vascular Studies  [x]  Old records  []  Other:      Most pertinent findings include:    LAB RESULTS:      Lab 02/05/21  1614 02/04/21 2007 02/04/21  1520 02/04/21  1519   WBC 20.11*  --  18.69*  --    HEMOGLOBIN 12.0*  --  13.7  --    HEMATOCRIT 36.5*  --  40.5  --    PLATELETS 179  --  207  --    NEUTROS ABS 16.09*  --  15.23*  --    IMMATURE GRANS (ABS) 0.17*  --  0.15*  --    LYMPHS ABS 2.22  --  1.64  --    MONOS ABS 1.50*  --  1.60*  --    EOS ABS 0.06  --  0.01  --    .1*  --  100.2*  --    CRP  --   --  2.29*  --    LACTATE  --  2.6* 2.1*  --    PROTIME  --   --   --  14.5*   APTT  --   --   --  28.8         Lab 02/04/21  1520 02/04/21  1519   SODIUM 128*  --    POTASSIUM 4.0  --    CHLORIDE 95*  --    CO2 24.4  --    ANION GAP 8.6  --    BUN 12  --    CREATININE 0.89  --    GLUCOSE 225*  --    CALCIUM 8.9  --    IONIZED CALCIUM  --  1.17   MAGNESIUM 1.6  --    PHOSPHORUS 2.4*  --          Lab 02/04/21  1520   TOTAL PROTEIN 7.9   ALBUMIN 3.93   GLOBULIN 4.0   ALT (SGPT) 79*   AST (SGOT) 50*   BILIRUBIN 0.7   ALK PHOS 89         Lab 02/04/21  1519   PROTIME 14.5*   INR 1.14*                 Brief Urine Lab Results     None        Microbiology Results (last 10 days)     Procedure Component Value - Date/Time    Blood Culture - Blood, Arm, Left [[id number]] Collected: 02/04/21 1519    Lab Status: Preliminary result Specimen: Blood from Arm, Left Updated: 02/05/21 1601     Blood Culture No growth at 24 hours    Blood Culture - Blood, Arm, Left [[id number]] Collected: 02/04/21 1518    Lab Status: Preliminary result Specimen: Blood from Arm, Left Updated: 02/05/21 1546     Blood Culture No growth at 24 hours    COVID-19 and FLU A/B PCR - Swab, Nasopharynx [[id number]]  (Normal) Collected: 02/04/21 1432    Lab Status: Final result Specimen: Swab from Nasopharynx Updated: 02/04/21 3954      COVID19 Not Detected     Influenza A PCR Not Detected     Influenza B PCR Not Detected    Narrative:      Fact sheet for providers: https://www.fda.gov/media/800460/download    Fact sheet for patients: https://www.fda.gov/media/333687/download    Test performed by PCR.          Ct Angio Abdominal Aorta Bilateral Iliofem Runoff    Result Date: 2/4/2021  CTA ABDOMEN, PELVIS AND LOWER EXTREMITY RUNOFF, HISTORY: Right femoral bypass with runoff TECHNIQUE: CT angiogram of the abdomen and pelvis with lower extremity runoff using IV contrast. 3-D postprocessing was performed and reviewed. Radiation dose reduction techniques included automated exposure control or exposure modulation based on body size. Radiation audit for CT and nuclear cardiology exams in the last 12 months: . Findings: Abdomen/pelvis Visualized lung bases are clear. Thoracic aorta is unremarkable. Celiac trunk is patent. There is potential severe narrowing approximately 1.2 cm from the origin with soft and calcific plaque. SMA appears patent but diseased as are its smaller branches. Renal arteries are patent. Extremities There are recent postsurgical changes of a femoral artery/distal bypass. Soft tissue stranding with fluid is seen in the patient's groin region extending down the length of the graft to the level of the knee. The graft appears patent to the level of the smaller plantar vessels. 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     Impression: 1. Soft tissue stranding and fluid is seen surrounding the patient's recent right femoral/distal arterial graft. It is uncertain to what extent this may represent postoperative fluid versus infection. The graft itself appears patent with adequate runoff to the level of the smaller plantar vessels. Signer Name: Joselin Daniel MD  Signed: 2/4/2021 11:05 PM  Workstation Name: [id number]  [facility]      Results for orders placed during the  hospital encounter of 01/25/21   Adult Transthoracic Echo Complete W/ Cont if Necessary Per Protocol    Narrative · Left ventricular ejection fraction appears to be 66 - 70%. Left   ventricular systolic function is normal.  · The right ventricular cavity is borderline dilated.  · Left ventricular diastolic function was normal.  · No significant structural or functional valvular disease.          Assessment/Plan   Assessment & Plan       Tobacco use    PVD     S/P L leg amputation     Post-operative infection    Reg Sanchez is a 55 y.o. male with past medical history significant for tobacco abuse, diabetes mellitus, peripheral vascular disease, congestive heart failure, coronary artery disease status post CABG, left leg osteomyelitis status post left leg amputation, peripheral arterial disease and most recently right SFA and popliteal artery occlusion underwent a right femoral artery bypass with Dr. Trevizo 1/27/2021.  The patient developed fever on 2/2/2021. On 2/3/21 he developed right leg swelling and redness.  Yesterday, he presented to outside hospital where he had lab work indicating inflammatory/infectious process and a CT angiogram of the leg which revealed soft tissue stranding and fluid surrounding the recent right femoral/distal artery graft.  Patent runoff with adequate flow.  Upon arrival to [facility] he reports his right leg erythema and edema is significantly worse than it was yesterday.  He reports increased pain in the thigh and redness.  He also reports increased shortness of breath and cough.  He continues to smoke tobacco.    Right leg cellulitis  Right femoral artery bypass graft infection  -WBC 20 with neutrophil predominance, elevated lactic acid  -blood cultures pending  -have concerns of fluid resuscitation due to wet sounding lungs, give small amount of fluids.  -Vanc and zosyn  -ID tomorrow  -Vascular surgery aware     Congestive heart failure  -lasix dose as patient has  audible crackles on exam    COPD  -duonebs  -Symbicort  -Brovana  -incentive spirometer    Diabetes mellitus   -SSI    Hyperlipidemia  -Lipitor 40mg    Tobacco abuse  -nicotine patch      DVT prophylaxis:  Heparin       CODE STATUS:    Code Status and Medical Interventions:   Ordered at: 02/05/21 1505     Code Status:    CPR     Medical Interventions (Level of Support Prior to Arrest):    Full         This note has been completed as part of a split-shared workflow.     Signature: Electronically signed by Jayla Goyal PA-C, 02/05/21, 5:34 PM EST      Attending   Admission Attestation       I have seen and examined the patient, performing an independent face-to-face diagnostic evaluation with plan of care reviewed and developed with the advanced practice clinician (APC).      Brief Summary Statement:   Reg Sanchez is a 55 y.o. male with recent right fem bypass transferred from [place] with surgical wound infection. He states his pain is much worse and his leg has been getting more swollen and red.     Remainder of detailed HPI is as noted by APC and has been reviewed and/or edited by me for completeness.    Attending Physical Exam:  Constitutional: No acute distress, awake, alert  HENT: NCAT, mucous membranes moist  Respiratory: Clear to auscultation bilaterally, respiratory effort normal   Cardiovascular: RRR, no murmurs, rubs, or gallops  Gastrointestinal: Positive bowel sounds, soft, nontender, nondistended  Musculoskeletal: Left BKA,   Psychiatric: Appropriate affect, cooperative  Neurologic: Oriented x 3, strength symmetric in all extremities, Cranial Nerves grossly intact to confrontation, speech clear  Skin: No rashes, right lower extremity edema and erythema      Brief Assessment/Plan :  See detailed assessment and plan developed with APC which I have reviewed and/or edited for completeness.        Admission Status: I believe that this patient meets INPATIENT status due to surgical wound infection .  I feel  patient’s risk for adverse outcomes and need for care warrant INPATIENT evaluation and I predict the patient’s care encounter to likely last beyond 2 midnights.        Nikkie Sandoval,   02/06/21

## 2021-02-05 NOTE — ED NOTES
Report called to Nevaeh Cedillo RN at [facility]. Patient's bed assignment is [place] [id number].     Estrella Trevizo RN  02/05/21 1128

## 2021-02-05 NOTE — ED NOTES
Called [facility] for Dr. Amador to speak to Dr. Trevizo with vascular surgery, Flavia June the PA of Dr. Trevizo on the phone with him at this time.       Jada Tabor  02/04/21 1936

## 2021-02-05 NOTE — PROGRESS NOTES
CTS Progress Note      Chief Complaint: Right lower extremity pain and swelling, fever      Subjective  The patient is a 55-year-old male with a history of coronary artery disease status post previous CABG, diabetes mellitus and peripheral arterial disease status post right femoral to posterior tibial bypass with reverse saphenous vein graft performed on 1/27/2021 by Dr. Jeremy Trevizo.  The patient had an uneventful postoperative course and was ultimately discharged home on 2/1/2021.  On Wednesday, 2/3/2021 the patient began experiencing intermittent fever, chills with associated pain and swelling in the right lower extremity and foot.  He also reports the onset of periincisional erythema with very mild serosanguineous drainage from his distal most posterior tibial incision.  The symptoms progressively worsened and he ultimately presented to the emergency department at  where he underwent a CT Angio of the abdominal aorta with runoff which revealed soft tissue stranding and perigraft fluid identifiable in the groin extending to the level of the knee with a patent graft.  The patient was subsequently transferred to [facility] on 2/5/2021 for further evaluation.      Objective    Physical Exam:   Vital Signs   Temp:  [98.2 °F (36.8 °C)-102.6 °F (39.2 °C)] 98.5 °F (36.9 °C)  Heart Rate:  [] 96  Resp:  [18] 18  BP: (112-166)/(56-99) 166/74   GEN: NAD   RESP: Respirations even and unlabored and clear to auscultation bilaterally no wheezes, rales or rhonchi    CV: Regular rate and rhythm no murmurs, rubs or gallops.  Normal S1-S2.   ABD: Soft, nontender/nondistended with normoactive bowel sounds    EXT: Right lower extremity is warm and well-perfused with 2+ edema from the foot extending up to and throughout the thigh with extensive erythema throughout the leg, most appreciable surrounding the distal most incision with mild serosanguineous drainage noted.  There is a palpable 2+  dorsalis pedis pulse and a dopplerable biphasic posterior tibialis pulse   INT: No clubbing or cyanosis   Neuro: Alert and orient x3 with cranial nerves II through XII grossly intact with no motor or sensory deficits appreciated    No intake or output data in the 24 hours ending 02/05/21 1804  Results     Results from last 7 days   Lab Units 02/05/21  1614   WBC 10*3/mm3 20.11*   HEMOGLOBIN g/dL 12.0*   HEMATOCRIT % 36.5*   PLATELETS 10*3/mm3 179     Results from last 7 days   Lab Units 02/05/21  1614   SODIUM mmol/L 136   POTASSIUM mmol/L 3.9   CHLORIDE mmol/L 103   CO2 mmol/L 25.0   BUN mg/dL 11   CREATININE mg/dL 0.73*   GLUCOSE mg/dL 218*   CALCIUM mg/dL 8.3*     Results from last 7 days   Lab Units 02/04/21  1519   INR  1.14*   APTT seconds 28.8     CT Angio abdominal aorta with runoff:  Findings:  Abdomen/pelvis  Visualized lung bases are clear. Thoracic aorta is unremarkable. Celiac trunk is patent. There is potential severe narrowing approximately 1.2 cm from the origin with soft and calcific plaque. SMA appears patent but diseased as are its smaller branches.  Renal arteries are patent.   Extremities  There are recent postsurgical changes of a femoral artery/distal bypass. Soft tissue stranding with fluid is seen in the patient's groin region extending down the length of the graft to the level of the knee. The graft appears patent to the level of the  smaller plantar vessels.  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     IMPRESSION:  1. Soft tissue stranding and fluid is seen surrounding the patient's recent right femoral/distal arterial graft. It is uncertain to what extent this may represent postoperative fluid versus infection. The graft itself appears patent with adequate runoff  to the level of the smaller plantar vessels.    Assessment/Plan       Tobacco use    PVD     S/P L leg amputation     Post-operative infection        Plan   The infectious disease  service has been consulted as the patient will require prolonged IV antibiotic therapy to address his right lower extremity cellulitis.  Blood cultures have also been ordered to rule out any presence of bacteremia.  The wounds will be dressed with half-normal saline wet-to-dry dressings with local wound care.    MADISON Benavidez  02/05/21  18:04 EST

## 2021-02-05 NOTE — ED PROVIDER NOTES
Subjective   55-year-old white male complains of right leg pain and swelling.  Patient is 8 days status post right lower extremity femoral-distal bypass.  This morning at 4 AM he awoke with fever.  He noticed that his leg was red, swollen, and painful.  He waited till later in the morning and called his surgeon who recommended he come to the ED.          Review of Systems   Respiratory: Negative for cough and shortness of breath.    Cardiovascular: Negative for chest pain.   Gastrointestinal: Negative for abdominal pain, diarrhea, nausea and vomiting.   All other systems reviewed and are negative.      Past Medical History:   Diagnosis Date   • Anxiety    • CHF (congestive heart failure) (CMS/[facility])    • COPD (chronic obstructive pulmonary disease) (CMS/[facility])    • Coronary artery disease involving native coronary artery of native heart without angina pectoris 1/12/2021    Added automatically from request for surgery [id number]   • Diabetes mellitus (CMS/[facility])    • Hepatitis C    • Hyperlipidemia    • Hypertension    • Sleep apnea    • T2DM on metformin  1/27/2021       No Known Allergies    Past Surgical History:   Procedure Laterality Date   • CARDIAC CATHETERIZATION  11/20/2020    x 2 stents   • CARDIAC CATHETERIZATION Right 1/20/2021    Procedure: Peripheral angiography;  Surgeon: Wesly Lund MD;  Location:  [facility];  Service: Cardiovascular;  Laterality: Right;   • CORONARY ARTERY BYPASS GRAFT      x 5   • CORONARY STENT PLACEMENT  11/20/2020    Dr Mortensen x 2 stents   • FEMORAL DISTAL BYPASS N/A 1/27/2021    Procedure: FEMORAL DISTAL BYPASS;  Surgeon: Jeremy Trevizo MD;  Location:  [facility];  Service: Vascular;  Laterality: N/A;   • FINGER SURGERY      left   • INCISION AND DRAINAGE LEG Right 2/9/2021    Procedure: INCISION AND DRAINAGE RIGHT LOWER EXTREMITY, EVACUATION OF HEMATOMA, LIGATION OF FEM DISTAL BYPASS;  Surgeon: Jeremy Trevizo MD;  Location:  [facility] OR;  Service:  Cardiothoracic;  Laterality: Right;   • LEG AMPUTATION Left     left lower leg   • SHOULDER SURGERY Bilateral        Family History   Problem Relation Age of Onset   • Heart disease Mother    • Anuerysm Father    • Heart disease Father    • Heart disease Sister    • Heart disease Sister        Social History     Socioeconomic History   • Marital status: Legally      Spouse name: Not on file   • Number of children: 3   • Years of education: Not on file   • Highest education level: Not on file   Occupational History   • Occupation: [employer]      Comment: Disabled    Tobacco Use   • Smoking status: Current Every Day Smoker     Packs/day: 1.00     Years: 40.00     Pack years: 40.00     Types: Cigarettes   • Smokeless tobacco: Never Used   Substance and Sexual Activity   • Alcohol use: Yes     Alcohol/week: 3.0 standard drinks     Types: 2 Cans of beer, 1 Shots of liquor per week     Comment: weekly   • Drug use: Yes     Types: Marijuana     Comment: smoke Marijuana everyday   • Sexual activity: Defer   Social History Narrative    Pt lives in [place], [place] with somebody else.            Objective   Physical Exam  Vitals signs and nursing note reviewed. Exam conducted with a chaperone present (Jossie Kenyon RN).   Constitutional:       Appearance: Normal appearance. He is normal weight.   HENT:      Head: Normocephalic and atraumatic.   Cardiovascular:      Rate and Rhythm: Normal rate and regular rhythm.      Pulses:           Dorsalis pedis pulses are 1+ on the right side.      Heart sounds: Normal heart sounds. No murmur. No gallop.    Pulmonary:      Effort: Pulmonary effort is normal.      Breath sounds: Normal breath sounds. No wheezing, rhonchi or rales.   Abdominal:      General: Abdomen is flat. Bowel sounds are normal. There is no distension.      Palpations: Abdomen is soft.      Tenderness: There is no abdominal tenderness.   Musculoskeletal: Normal range of motion.      Right lower leg: No edema.       Left lower leg: No edema.      Comments: Status post left BKA.   Skin:     General: Skin is warm and dry.             Comments: Left thigh incision with moderate amount of purulent drainage noted on dressing.  He has surrounding erythema of about 10 x 15 cm extending proximally on the.  At the right ankle wound has similar discharge and surrounding erythema extending distally onto the dorsal foot with foot edema.  His wounds are closed with staples.  His other wounds appear to be healing without infection.   Neurological:      General: No focal deficit present.      Mental Status: He is alert and oriented to person, place, and time.   Psychiatric:         Mood and Affect: Mood normal.         Behavior: Behavior normal.         Procedures  Results for orders placed or performed during the hospital encounter of 02/04/21   COVID-19 and FLU A/B PCR - Swab, Nasopharynx    Specimen: Nasopharynx; Swab   Result Value Ref Range    COVID19 Not Detected Not Detected - Ref. Range    Influenza A PCR Not Detected Not Detected    Influenza B PCR Not Detected Not Detected   Blood Culture - Blood, Arm, Left    Specimen: Arm, Left; Blood   Result Value Ref Range    Blood Culture No growth at 5 days    Blood Culture - Blood, Arm, Left    Specimen: Arm, Left; Blood   Result Value Ref Range    Blood Culture No growth at 5 days    Comprehensive Metabolic Panel    Specimen: Arm, Left; Blood   Result Value Ref Range    Glucose 225 (H) 65 - 99 mg/dL    BUN 12 6 - 20 mg/dL    Creatinine 0.89 0.76 - 1.27 mg/dL    Sodium 128 (L) 136 - 145 mmol/L    Potassium 4.0 3.5 - 5.2 mmol/L    Chloride 95 (L) 98 - 107 mmol/L    CO2 24.4 22.0 - 29.0 mmol/L    Calcium 8.9 8.6 - 10.5 mg/dL    Total Protein 7.9 6.0 - 8.5 g/dL    Albumin 3.93 3.50 - 5.20 g/dL    ALT (SGPT) 79 (H) 1 - 41 U/L    AST (SGOT) 50 (H) 1 - 40 U/L    Alkaline Phosphatase 89 39 - 117 U/L    Total Bilirubin 0.7 0.0 - 1.2 mg/dL    eGFR Non African Amer 89 >60 mL/min/1.73    Globulin  4.0 gm/dL    A/G Ratio 1.0 g/dL    BUN/Creatinine Ratio 13.5 7.0 - 25.0    Anion Gap 8.6 5.0 - 15.0 mmol/L   Protime-INR    Specimen: Arm, Left; Blood   Result Value Ref Range    Protime 14.5 (H) 11.9 - 14.1 Seconds    INR 1.14 (H) 0.90 - 1.10   aPTT    Specimen: Arm, Left; Blood   Result Value Ref Range    PTT 28.8 25.6 - 35.3 seconds   Magnesium    Specimen: Arm, Left; Blood   Result Value Ref Range    Magnesium 1.6 1.6 - 2.6 mg/dL   Phosphorus    Specimen: Arm, Left; Blood   Result Value Ref Range    Phosphorus 2.4 (L) 2.5 - 4.5 mg/dL   Calcium, Ionized    Specimen: Arm, Left; Blood   Result Value Ref Range    Ionized Calcium 1.17 1.12 - 1.32 mmol/L   Lactic Acid, Plasma    Specimen: Arm, Left; Blood   Result Value Ref Range    Lactate 2.1 (C) 0.5 - 2.0 mmol/L   C-reactive Protein    Specimen: Arm, Left; Blood   Result Value Ref Range    C-Reactive Protein 2.29 (H) 0.00 - 0.50 mg/dL   CBC Auto Differential    Specimen: Arm, Left; Blood   Result Value Ref Range    WBC 18.69 (H) 3.40 - 10.80 10*3/mm3    RBC 4.04 (L) 4.14 - 5.80 10*6/mm3    Hemoglobin 13.7 13.0 - 17.7 g/dL    Hematocrit 40.5 37.5 - 51.0 %    .2 (H) 79.0 - 97.0 fL    MCH 33.9 (H) 26.6 - 33.0 pg    MCHC 33.8 31.5 - 35.7 g/dL    RDW 13.2 12.3 - 15.4 %    RDW-SD 48.6 37.0 - 54.0 fl    MPV 12.7 (H) 6.0 - 12.0 fL    Platelets 207 140 - 450 10*3/mm3    Neutrophil % 81.4 (H) 42.7 - 76.0 %    Lymphocyte % 8.8 (L) 19.6 - 45.3 %    Monocyte % 8.6 5.0 - 12.0 %    Eosinophil % 0.1 (L) 0.3 - 6.2 %    Basophil % 0.3 0.0 - 1.5 %    Immature Grans % 0.8 (H) 0.0 - 0.5 %    Neutrophils, Absolute 15.23 (H) 1.70 - 7.00 10*3/mm3    Lymphocytes, Absolute 1.64 0.70 - 3.10 10*3/mm3    Monocytes, Absolute 1.60 (H) 0.10 - 0.90 10*3/mm3    Eosinophils, Absolute 0.01 0.00 - 0.40 10*3/mm3    Basophils, Absolute 0.06 0.00 - 0.20 10*3/mm3    Immature Grans, Absolute 0.15 (H) 0.00 - 0.05 10*3/mm3    nRBC 0.0 0.0 - 0.2 /100 WBC   Lactic Acid, Reflex Timer (This will reflex a  repeat order 3-3:15 hours after ordered.)    Specimen: Arm, Left; Blood   Result Value Ref Range    Hold Tube Hold for add-ons.    Lactic Acid, Reflex    Specimen: Blood   Result Value Ref Range    Lactate 2.6 (C) 0.5 - 2.0 mmol/L   POC Glucose Once    Specimen: Blood   Result Value Ref Range    Glucose 264 (H) 70 - 130 mg/dL   Light Blue Top   Result Value Ref Range    Extra Tube hold for add-on    Green Top (Gel)   Result Value Ref Range    Extra Tube Hold for add-ons.    Lavender Top   Result Value Ref Range    Extra Tube hold for add-on                 ED Course  ED Course as of Feb 10 1933   Thu Feb 04, 2021   1928 Have discussed with Dr. Araujo who recommends patient be transferred back to Psychiatric postoperative infection.  Have spoken to the hospitalist, Dr. Herndon, who accepts patient in transfer.  She has requested CT of the right lower extremity with contrast.  Bed pending at this time.    [BC]   Fri Feb 05, 2021   1117 Assumed care from Dr. Mejia at shift change.  Patient states that he is feeling better.  His distal wound seems to be improved, but the more proximal thigh wound seems to have increased induration and erythema.  Discussed with Dr. Ward, hospitalist at [facility] today.  Patient should have a bed available and will be transferred today.    [BC]      ED Course User Index  [BC] Unruly Amador MD                                           MDM  Number of Diagnoses or Management Options     Amount and/or Complexity of Data Reviewed  Clinical lab tests: reviewed    Risk of Complications, Morbidity, and/or Mortality  Presenting problems: high  Diagnostic procedures: high  Management options: high        Final diagnoses:   Postoperative infection, unspecified type, initial encounter   S/P femoral-tibial bypass            Catalino Mejia MD  02/04/21 4361       Unruly Amador MD  02/10/21 1933

## 2021-02-05 NOTE — ED NOTES
[facility] called back [facility] pt [facility] to [facility] no eta at this time      Reg Little  02/05/21 9803

## 2021-02-05 NOTE — ED NOTES
Called [facility] for possible transfer to [facility] waiting on oic to call back      Reg Little  02/05/21 8045

## 2021-02-05 NOTE — OUTREACH NOTE
General Surgery Week 1 Survey      Responses   [facility] patient discharged from?  [place]   Does the patient have one of the following disease processes/diagnoses(primary or secondary)?  General Surgery   Week 1 attempt successful?  No   Unsuccessful attempts  Attempt 2          Renetta Longoria RN

## 2021-02-05 NOTE — NURSING NOTE
"  ACC REVIEW REPORT: [facility]        PATIENT NAME: Reg Sanchez    PATIENT ID: [id number]        COVID-19 ACC SCREENING       DOES THE PATIENT HAVE A FEVER GREATER THAN OR EQUAL .4: NO    IS THE PATIENT EXPERIENCING SHORTNESS OF BREATH: NO    DOES THE PATIENT HAVE A COUGH: NO  DOES THE PATIENT HAVE ANY OF THE FOLLOWING RISK FACTORS:    EXPOSURE TO SUSPECTED OR KNOWN COVID-19: NO    RECENT TRAVEL HISTORY TO ENDEMIC AREA (DOMESTIC/LOCAL): NO    IS THE PATIENT A HEALTHCARE WORKER: NO    HAS THE PATIENT EXPERIENCED A LOSS OF SENSE OF TASTE OR SMELL: NO    HAS THE PATIENT BEEN TESTED FOR COVID-19: YES-NEGATIVE    DATE TESTED: YESTERDAY ON 02/04/2021      LAB TESTING SENT TO:           BED: S484    BED TYPE: TELE    BED GIVEN TO: MARIBETH LUNA RN     TIME BED GIVEN: 1115    TODAY'S DATE: 2/5/2021    TRANSFER DATE: 02/05/2021    ETA:     TRANSFERRING FACILITY: [facility]    TRANSFERRING FACILITY PHONE # : [phone]    TRANSFERRING MD: DR BOBO    DATE/TIME REQUEST RECEIVED: 02/05/2021    [facility] RN: DINA DE JESUS    REPORT FROM:     TIME REPORT TAKEN: 1115    DIAGNOSIS: LEFT WOUND INFECTION S/P SURGERY    REASON FOR TRANSFER TO [facility]: HIGHER LEVEL CARE    TRANSPORTATION: GROUND    CLINICAL REASON FOR TRANSFER TO [facility]: PATIENT HAD A RIGHT LOWER EXTREMITY FEMORAL-DISTAL BYPASS 8 DAYS AGO WITH DR LEE.  THIS AM, HE AWAKENED WITH A FEVER AND HIS RIGHT LEG WAS RED, SWOLLEN AND PAINFUL. THE LONGEST INCISION FROM HIS SURGERY IS 10 CM AND HAS 14 STAPLES.  IT IS RED AND HAS DRAINAGE. THE WHOLE LEG IS TENDER AND WARM.  HIS GROIN LOOKS GOOD, PER DR BOBO.  THE PATIENT CALLED DR LEE AND WAS DIRECTED TO COME TO THE ED AT [place]. REPORTED THAT HE HAD A FEVER .9 AT HOME.  CURRENTLY HIS TEMP IS 99.7 AND HE'S BEEN TACHYCARDIC ON THE MONITOR  AND HE'S 94% ON RMA.  HE RECEIVED MORPHINE 4 MG @ 1015 AND HE REPORTS THAT HIS PAIN LEVEL IS BASELINE NOW-AT A \"4.\"  HE'S A&O X 4 AND SITTING UP IN THE CHAIR.      HISTORY:  " HE IS A LEFT LEG AMPUTEE FROM AN APPARENT ACCICENT YEARS AGO.  HE HAS HEP C, COPD AND IS STILL A SMOKER, DIABETES.        CLINICAL INFORMATION    HEIGHT:     WEIGHT:     ALLERGIES: NKDA    INFECTIOUS DISEASE:     ISOLATION:     VITAL SIGNS:   TIME: 1100  TEMP: 99.7  PULSE: 111  B/P: 119/56  RESP:   20  94% ON RMA      LAB INFORMATION: WBC-18.69, GLUCOSE-264, SODIUM-128,     CULTURE INFORMATION:     MEDS/IV FLUIDS:  # 20 G RAC NS @ 125 ML/HR    RECEIVING: ZOSYN 3.375  VANCOMYCIN 1250 MG    CONTINUOUS:  NS @ 125 ML/HR    CARDIAC SYSTEM:    CHEST PAIN:     RATE:     SCALE:     RHYTHM:     Is patient taking or has patient been given any drugs that could increase bleeding? YES    DRUG:  PLAVIX 75 MG PO                          ASA 81 MG PO    DOSE/FREQUENCY:     TROPONIN:    DATE:   TIME:   TROP:     DATE:   TIME:   TROP:       HEART CATH:     CARDIAC NOTES: ST ON THE MONITOR      RESPIRATORY SYSTEM:    LUNG SOUNDS: CLEAR & DIMINISHED.     ABG DATE:         ABG TIME:     ABG RESULTS:    PH:   PCO2:   PO2:   HCO3:   O2 SAT:       OXYGEN:     O2 SAT:     IMAGING FINDINGS:     PNEUMO CHEST TUBE SEAL TYPE:     RESPIRATORY STATUS:       CNS/MUSCULOSKELETAL    ALERT AND ORIENTED:    PERSON: YES  PLACE: YES  TIME: YES    CAT SCAN RESULTS:     MRI RESULTS:     CNS/MUSCULOSKELETAL NOTES:       GI//GY      ABDOMINAL PAIN:     VOMITING:     DIARRHEA:     NAUSEA:     BOWEL SOUNDS:     OCCULT STOOL:     HEMATURIA:     NG TUBE:    SIZE:     DATE INSERTED:       ULTRASOUND RESULTS:     ACUTE ABDOMEN RESULTS:   CT ANGIO ABDOMINAL AORTA BILATERAL   IMPRESSION:  1. Soft tissue stranding and fluid is seen surrounding the patient's recent right femoral/distal arterial graft. It is uncertain to what extent this may represent postoperative fluid versus infection. The graft itself appears patent with adequate runoff  to the level of the smaller plantar vessels.       CT SCAN RESULTS:       GI//GY NOTES:     MICHEL:     PAST MEDICAL  HISTORY:  Decreased circulation 1/12/2021   100% R SFA occlusion (Chronic) 1/12/2021   Atherosclerosis of native artery of extremity 1/12/2021   Tobacco use (Chronic) 1/12/2021   PVD (Chronic) 1/22/2021   CAD s/p stents and CABG X5 (Chronic) 1/25/2021   S/P L leg amputation (Chronic) 1/27/2021   Smoker (Chronic) 1/27/2021   Daily marijuana use (Chronic) 1/27/2021   Suspected ETOH withdrawal  1/27/2021   T2DM on metformin (Chronic) 1/27/2021   Class 1 obesity in adult (Chronic) 1/27/2021   Macrocytosis without anemia 1/27/2021         OTHER SYMPTOM NOTES:     ADDITIONAL NOTES:           Nevaeh Cedillo RN  2/5/2021  11:03 EST

## 2021-02-05 NOTE — ED NOTES
Patient signed consent for transfer to [facility].     Estrella Trevizo, RN  02/05/21 4675

## 2021-02-06 ENCOUNTER — READMISSION MANAGEMENT (OUTPATIENT)
Dept: CALL CENTER | Facility: HOSPITAL | Age: 56
End: 2021-02-06

## 2021-02-06 LAB
ALBUMIN SERPL-MCNC: 2.8 G/DL (ref 3.5–5.2)
ALBUMIN/GLOB SERPL: 0.8 G/DL
ALP SERPL-CCNC: 127 U/L (ref 39–117)
ALT SERPL W P-5'-P-CCNC: 39 U/L (ref 1–41)
ANION GAP SERPL CALCULATED.3IONS-SCNC: 7 MMOL/L (ref 5–15)
AST SERPL-CCNC: 24 U/L (ref 1–40)
BASOPHILS # BLD AUTO: 0.06 10*3/MM3 (ref 0–0.2)
BASOPHILS NFR BLD AUTO: 0.5 % (ref 0–1.5)
BILIRUB SERPL-MCNC: 1 MG/DL (ref 0–1.2)
BUN SERPL-MCNC: 8 MG/DL (ref 6–20)
BUN/CREAT SERPL: 11.3 (ref 7–25)
CALCIUM SPEC-SCNC: 7.9 MG/DL (ref 8.6–10.5)
CHLORIDE SERPL-SCNC: 105 MMOL/L (ref 98–107)
CK SERPL-CCNC: 42 U/L (ref 20–200)
CO2 SERPL-SCNC: 25 MMOL/L (ref 22–29)
CREAT SERPL-MCNC: 0.71 MG/DL (ref 0.76–1.27)
D-LACTATE SERPL-SCNC: 1.6 MMOL/L (ref 0.5–2)
DEPRECATED RDW RBC AUTO: 49.8 FL (ref 37–54)
EOSINOPHIL # BLD AUTO: 0.2 10*3/MM3 (ref 0–0.4)
EOSINOPHIL NFR BLD AUTO: 1.6 % (ref 0.3–6.2)
ERYTHROCYTE [DISTWIDTH] IN BLOOD BY AUTOMATED COUNT: 13.3 % (ref 12.3–15.4)
GFR SERPL CREATININE-BSD FRML MDRD: 115 ML/MIN/1.73
GLOBULIN UR ELPH-MCNC: 3.3 GM/DL
GLUCOSE BLDC GLUCOMTR-MCNC: 172 MG/DL (ref 70–130)
GLUCOSE BLDC GLUCOMTR-MCNC: 208 MG/DL (ref 70–130)
GLUCOSE BLDC GLUCOMTR-MCNC: 264 MG/DL (ref 70–130)
GLUCOSE BLDC GLUCOMTR-MCNC: 301 MG/DL (ref 70–130)
GLUCOSE SERPL-MCNC: 202 MG/DL (ref 65–99)
HCT VFR BLD AUTO: 31.8 % (ref 37.5–51)
HGB BLD-MCNC: 10.4 G/DL (ref 13–17.7)
IMM GRANULOCYTES # BLD AUTO: 0.06 10*3/MM3 (ref 0–0.05)
IMM GRANULOCYTES NFR BLD AUTO: 0.5 % (ref 0–0.5)
LYMPHOCYTES # BLD AUTO: 1.69 10*3/MM3 (ref 0.7–3.1)
LYMPHOCYTES NFR BLD AUTO: 13.2 % (ref 19.6–45.3)
MAGNESIUM SERPL-MCNC: 2 MG/DL (ref 1.6–2.6)
MCH RBC QN AUTO: 33.3 PG (ref 26.6–33)
MCHC RBC AUTO-ENTMCNC: 32.7 G/DL (ref 31.5–35.7)
MCV RBC AUTO: 101.9 FL (ref 79–97)
MONOCYTES # BLD AUTO: 1.03 10*3/MM3 (ref 0.1–0.9)
MONOCYTES NFR BLD AUTO: 8 % (ref 5–12)
NEUTROPHILS NFR BLD AUTO: 76.2 % (ref 42.7–76)
NEUTROPHILS NFR BLD AUTO: 9.8 10*3/MM3 (ref 1.7–7)
NRBC BLD AUTO-RTO: 0 /100 WBC (ref 0–0.2)
PHOSPHATE SERPL-MCNC: 1.8 MG/DL (ref 2.5–4.5)
PHOSPHATE SERPL-MCNC: 2.1 MG/DL (ref 2.5–4.5)
PLATELET # BLD AUTO: 152 10*3/MM3 (ref 140–450)
PMV BLD AUTO: 12.8 FL (ref 6–12)
POTASSIUM SERPL-SCNC: 3.8 MMOL/L (ref 3.5–5.2)
PROT SERPL-MCNC: 6.1 G/DL (ref 6–8.5)
RBC # BLD AUTO: 3.12 10*6/MM3 (ref 4.14–5.8)
SODIUM SERPL-SCNC: 137 MMOL/L (ref 136–145)
VANCOMYCIN TROUGH SERPL-MCNC: 5.8 MCG/ML (ref 5–20)
WBC # BLD AUTO: 12.84 10*3/MM3 (ref 3.4–10.8)

## 2021-02-06 PROCEDURE — 99232 SBSQ HOSP IP/OBS MODERATE 35: CPT | Performed by: INTERNAL MEDICINE

## 2021-02-06 PROCEDURE — 85025 COMPLETE CBC W/AUTO DIFF WBC: CPT | Performed by: FAMILY MEDICINE

## 2021-02-06 PROCEDURE — 25010000002 DAPTOMYCIN PER 1 MG: Performed by: INTERNAL MEDICINE

## 2021-02-06 PROCEDURE — 94799 UNLISTED PULMONARY SVC/PX: CPT

## 2021-02-06 PROCEDURE — 83735 ASSAY OF MAGNESIUM: CPT | Performed by: FAMILY MEDICINE

## 2021-02-06 PROCEDURE — 87186 SC STD MICRODIL/AGAR DIL: CPT | Performed by: NURSE PRACTITIONER

## 2021-02-06 PROCEDURE — 63710000001 INSULIN LISPRO (HUMAN) PER 5 UNITS: Performed by: NURSE PRACTITIONER

## 2021-02-06 PROCEDURE — 87205 SMEAR GRAM STAIN: CPT | Performed by: NURSE PRACTITIONER

## 2021-02-06 PROCEDURE — 87147 CULTURE TYPE IMMUNOLOGIC: CPT | Performed by: NURSE PRACTITIONER

## 2021-02-06 PROCEDURE — 25010000002 PIPERACILLIN SOD-TAZOBACTAM PER 1 G: Performed by: FAMILY MEDICINE

## 2021-02-06 PROCEDURE — 80202 ASSAY OF VANCOMYCIN: CPT | Performed by: FAMILY MEDICINE

## 2021-02-06 PROCEDURE — 83605 ASSAY OF LACTIC ACID: CPT | Performed by: FAMILY MEDICINE

## 2021-02-06 PROCEDURE — 25010000002 MORPHINE PER 10 MG: Performed by: FAMILY MEDICINE

## 2021-02-06 PROCEDURE — 25010000002 VANCOMYCIN 10 G RECONSTITUTED SOLUTION: Performed by: FAMILY MEDICINE

## 2021-02-06 PROCEDURE — 87070 CULTURE OTHR SPECIMN AEROBIC: CPT | Performed by: NURSE PRACTITIONER

## 2021-02-06 PROCEDURE — 25010000002 HEPARIN (PORCINE) PER 1000 UNITS: Performed by: FAMILY MEDICINE

## 2021-02-06 PROCEDURE — 82550 ASSAY OF CK (CPK): CPT | Performed by: NURSE PRACTITIONER

## 2021-02-06 PROCEDURE — 80053 COMPREHEN METABOLIC PANEL: CPT | Performed by: FAMILY MEDICINE

## 2021-02-06 PROCEDURE — 84100 ASSAY OF PHOSPHORUS: CPT | Performed by: FAMILY MEDICINE

## 2021-02-06 PROCEDURE — 82962 GLUCOSE BLOOD TEST: CPT

## 2021-02-06 RX ADMIN — INSULIN LISPRO 3 UNITS: 100 INJECTION, SOLUTION INTRAVENOUS; SUBCUTANEOUS at 13:14

## 2021-02-06 RX ADMIN — DULOXETINE HYDROCHLORIDE 60 MG: 60 CAPSULE, DELAYED RELEASE ORAL at 08:56

## 2021-02-06 RX ADMIN — POTASSIUM & SODIUM PHOSPHATES POWDER PACK 280-160-250 MG 2 PACKET: 280-160-250 PACK at 16:13

## 2021-02-06 RX ADMIN — MORPHINE SULFATE 2 MG: 2 INJECTION, SOLUTION INTRAMUSCULAR; INTRAVENOUS at 06:04

## 2021-02-06 RX ADMIN — BUDESONIDE 0.5 MG: 0.5 INHALANT ORAL at 20:37

## 2021-02-06 RX ADMIN — DULOXETINE HYDROCHLORIDE 60 MG: 60 CAPSULE, DELAYED RELEASE ORAL at 20:18

## 2021-02-06 RX ADMIN — MORPHINE SULFATE 2 MG: 2 INJECTION, SOLUTION INTRAMUSCULAR; INTRAVENOUS at 17:53

## 2021-02-06 RX ADMIN — FUROSEMIDE 20 MG: 20 TABLET ORAL at 08:56

## 2021-02-06 RX ADMIN — HEPARIN SODIUM 5000 UNITS: 5000 INJECTION, SOLUTION INTRAVENOUS; SUBCUTANEOUS at 23:08

## 2021-02-06 RX ADMIN — ASPIRIN 81 MG: 81 TABLET, CHEWABLE ORAL at 08:56

## 2021-02-06 RX ADMIN — METOPROLOL TARTRATE 50 MG: 50 TABLET, FILM COATED ORAL at 08:56

## 2021-02-06 RX ADMIN — DAPTOMYCIN 480.6 MG: 500 INJECTION, POWDER, LYOPHILIZED, FOR SOLUTION INTRAVENOUS at 09:00

## 2021-02-06 RX ADMIN — OXYCODONE AND ACETAMINOPHEN 1 TABLET: 5; 325 TABLET ORAL at 08:56

## 2021-02-06 RX ADMIN — POTASSIUM CHLORIDE 10 MEQ: 10 CAPSULE, COATED, EXTENDED RELEASE ORAL at 08:56

## 2021-02-06 RX ADMIN — PREGABALIN 200 MG: 100 CAPSULE ORAL at 08:56

## 2021-02-06 RX ADMIN — INSULIN LISPRO 4 UNITS: 100 INJECTION, SOLUTION INTRAVENOUS; SUBCUTANEOUS at 20:17

## 2021-02-06 RX ADMIN — HEPARIN SODIUM 5000 UNITS: 5000 INJECTION, SOLUTION INTRAVENOUS; SUBCUTANEOUS at 08:56

## 2021-02-06 RX ADMIN — Medication 1 PATCH: at 08:57

## 2021-02-06 RX ADMIN — OXYCODONE AND ACETAMINOPHEN 1 TABLET: 5; 325 TABLET ORAL at 03:48

## 2021-02-06 RX ADMIN — ATORVASTATIN CALCIUM 40 MG: 40 TABLET, FILM COATED ORAL at 20:18

## 2021-02-06 RX ADMIN — TAZOBACTAM SODIUM AND PIPERACILLIN SODIUM 3.38 G: 375; 3 INJECTION, SOLUTION INTRAVENOUS at 17:53

## 2021-02-06 RX ADMIN — ARFORMOTEROL TARTRATE 15 MCG: 15 SOLUTION RESPIRATORY (INHALATION) at 12:28

## 2021-02-06 RX ADMIN — MORPHINE SULFATE 2 MG: 2 INJECTION, SOLUTION INTRAMUSCULAR; INTRAVENOUS at 10:35

## 2021-02-06 RX ADMIN — INSULIN LISPRO 5 UNITS: 100 INJECTION, SOLUTION INTRAVENOUS; SUBCUTANEOUS at 17:53

## 2021-02-06 RX ADMIN — HEPARIN SODIUM 5000 UNITS: 5000 INJECTION, SOLUTION INTRAVENOUS; SUBCUTANEOUS at 16:11

## 2021-02-06 RX ADMIN — MORPHINE SULFATE 2 MG: 2 INJECTION, SOLUTION INTRAMUSCULAR; INTRAVENOUS at 23:09

## 2021-02-06 RX ADMIN — SODIUM CHLORIDE, PRESERVATIVE FREE 10 ML: 5 INJECTION INTRAVENOUS at 09:00

## 2021-02-06 RX ADMIN — OXYCODONE AND ACETAMINOPHEN 1 TABLET: 5; 325 TABLET ORAL at 20:30

## 2021-02-06 RX ADMIN — SODIUM CHLORIDE, PRESERVATIVE FREE 10 ML: 5 INJECTION INTRAVENOUS at 20:17

## 2021-02-06 RX ADMIN — TAZOBACTAM SODIUM AND PIPERACILLIN SODIUM 3.38 G: 375; 3 INJECTION, SOLUTION INTRAVENOUS at 01:57

## 2021-02-06 RX ADMIN — PREGABALIN 200 MG: 100 CAPSULE ORAL at 20:17

## 2021-02-06 RX ADMIN — BUDESONIDE 0.5 MG: 0.5 INHALANT ORAL at 12:29

## 2021-02-06 RX ADMIN — OXYCODONE AND ACETAMINOPHEN 1 TABLET: 5; 325 TABLET ORAL at 16:11

## 2021-02-06 RX ADMIN — PREGABALIN 200 MG: 100 CAPSULE ORAL at 16:11

## 2021-02-06 RX ADMIN — PANTOPRAZOLE SODIUM 40 MG: 40 TABLET, DELAYED RELEASE ORAL at 06:04

## 2021-02-06 RX ADMIN — MORPHINE SULFATE 2 MG: 2 INJECTION, SOLUTION INTRAMUSCULAR; INTRAVENOUS at 01:57

## 2021-02-06 RX ADMIN — TAZOBACTAM SODIUM AND PIPERACILLIN SODIUM 3.38 G: 375; 3 INJECTION, SOLUTION INTRAVENOUS at 10:34

## 2021-02-06 RX ADMIN — LISINOPRIL 10 MG: 10 TABLET ORAL at 08:56

## 2021-02-06 RX ADMIN — SODIUM CHLORIDE, POTASSIUM CHLORIDE, SODIUM LACTATE AND CALCIUM CHLORIDE 100 ML/HR: 600; 310; 30; 20 INJECTION, SOLUTION INTRAVENOUS at 03:48

## 2021-02-06 RX ADMIN — METOPROLOL TARTRATE 50 MG: 50 TABLET, FILM COATED ORAL at 20:18

## 2021-02-06 RX ADMIN — ARFORMOTEROL TARTRATE 15 MCG: 15 SOLUTION RESPIRATORY (INHALATION) at 20:37

## 2021-02-06 RX ADMIN — ISOSORBIDE MONONITRATE 30 MG: 30 TABLET, EXTENDED RELEASE ORAL at 08:55

## 2021-02-06 RX ADMIN — ROPINIROLE HYDROCHLORIDE 4 MG: 2 TABLET, FILM COATED ORAL at 20:18

## 2021-02-06 RX ADMIN — INSULIN LISPRO 3 UNITS: 100 INJECTION, SOLUTION INTRAVENOUS; SUBCUTANEOUS at 08:57

## 2021-02-06 NOTE — PROGRESS NOTES
[facility] Medicine Services  PROGRESS NOTE    Patient Name: Reg Sanchez  : 1965  MRN: [id number]    Date of Admission: 2021  Primary Care Physician: Sheila Sheikh APRN    Subjective   Subjective     CC:  Fever, right leg pain    HPI:  Resting in bed in no acute distress and tells me that he feels much better.  No fever or chills.  No chest pain or palpitation or shortness of breath at rest.  No nausea, vomiting, diarrhea, abdominal pain.  Feels that the swelling in the right foot is getting better.  Still has some pain in the right lower extremity.    ROS:  As above    Objective   Objective     Vital Signs:   Temp:  [98.5 °F (36.9 °C)-99.8 °F (37.7 °C)] 98.8 °F (37.1 °C)  Heart Rate:  [] 63  Resp:  [18-20] 20  BP: (123-166)/(70-74) 132/72        Physical Exam:  Constitutional: No acute distress, awake, alert  HENT: NCAT, mucous membranes moist  Respiratory: Clear to auscultation bilaterally, respiratory effort normal   Cardiovascular: RRR, no murmurs, rubs, or gallops  Gastrointestinal: Positive bowel sounds, soft, nontender, nondistended  Musculoskeletal: Left lower extremity below-knee amputation.  Right lower extremity is edematous, the incision on the lateral lower leg is oozing, the whole limb is red and has swelling.  Warm to the touch on tender.  Psychiatric: Appropriate affect, cooperative  Neurologic: Oriented x 3, strength symmetric in all extremities, Cranial Nerves grossly intact to confrontation, speech clear  Skin: No rashes    Results Reviewed:  Results from last 7 days   Lab Units 21  0621  1614 21  1520 21  1519   WBC 10*3/mm3 12.84* 20.11* 18.69*  --    HEMOGLOBIN g/dL 10.4* 12.0* 13.7  --    HEMATOCRIT % 31.8* 36.5* 40.5  --    PLATELETS 10*3/mm3 152 179 207  --    INR   --   --   --  1.14*   PROCALCITONIN ng/mL  --  0.41*  --   --      Results from last 7 days   Lab Units 21  0621  1614  02/04/21  1520   SODIUM mmol/L 137 136 128*   POTASSIUM mmol/L 3.8 3.9 4.0   CHLORIDE mmol/L 105 103 95*   CO2 mmol/L 25.0 25.0 24.4   BUN mg/dL 8 11 12   CREATININE mg/dL 0.71* 0.73* 0.89   GLUCOSE mg/dL 202* 218* 225*   CALCIUM mg/dL 7.9* 8.3* 8.9   ALT (SGPT) U/L 39 52* 79*   AST (SGOT) U/L 24 27 50*     Estimated Creatinine Clearance: 133.2 mL/min (A) (by C-G formula based on SCr of 0.71 mg/dL (L)).    Microbiology Results Abnormal     Procedure Component Value - Date/Time    Blood Culture - Blood, Arm, Left [[id number]] Collected: 02/05/21 1806    Lab Status: Preliminary result Specimen: Blood from Arm, Left Updated: 02/06/21 0719    Narrative:      Aerobic bottle only            Imaging Results (Last 24 Hours)     ** No results found for the last 24 hours. **          Results for orders placed during the hospital encounter of 01/25/21   Adult Transthoracic Echo Complete W/ Cont if Necessary Per Protocol    Narrative · Left ventricular ejection fraction appears to be 66 - 70%. Left   ventricular systolic function is normal.  · The right ventricular cavity is borderline dilated.  · Left ventricular diastolic function was normal.  · No significant structural or functional valvular disease.          I have reviewed the medications:  Scheduled Meds:arformoterol, 15 mcg, Nebulization, BID - RT  aspirin, 81 mg, Oral, Daily  atorvastatin, 40 mg, Oral, Nightly  budesonide, 0.5 mg, Nebulization, BID - RT  DAPTOmycin, 6 mg/kg (Adjusted), Intravenous, Q24H  DULoxetine, 60 mg, Oral, BID  furosemide, 20 mg, Oral, Daily  heparin (porcine), 5,000 Units, Subcutaneous, Q8H  insulin lispro, 0-7 Units, Subcutaneous, 4x Daily With Meals & Nightly  isosorbide mononitrate, 30 mg, Oral, Daily  lisinopril, 10 mg, Oral, Daily  metoprolol tartrate, 50 mg, Oral, BID  nicotine, 1 patch, Transdermal, Q24H  pantoprazole, 40 mg, Oral, QAM  piperacillin-tazobactam, 3.375 g, Intravenous, Q8H  potassium chloride, 10 mEq, Oral, Daily  pregabalin,  200 mg, Oral, TID  rOPINIRole, 4 mg, Oral, Nightly  sodium chloride, 10 mL, Intravenous, Q12H      Continuous Infusions:   PRN Meds:.•  acetaminophen **OR** acetaminophen **OR** acetaminophen  •  albuterol  •  dextrose  •  dextrose  •  glucagon (human recombinant)  •  hydrOXYzine  •  magnesium sulfate **OR** magnesium sulfate **OR** magnesium sulfate  •  Morphine  •  naloxone  •  ondansetron **OR** ondansetron  •  oxyCODONE-acetaminophen  •  potassium & sodium phosphates **OR** potassium & sodium phosphates  •  sodium chloride    Assessment/Plan   Assessment & Plan     Active Hospital Problems    Diagnosis  POA   • Post-operative infection [T81.40XA]  Yes   • S/P L leg amputation  [S88.919A]  Yes   • PVD  [I73.9]  Yes   • Tobacco use [Z72.0]  Yes      Resolved Hospital Problems   No resolved problems to display.        Brief Hospital Course to date:  Reg Sanchez is a 55 y.o. male Reg Sanchez is a 55 y.o. male with past medical history significant for tobacco abuse, diabetes mellitus, peripheral vascular disease, congestive heart failure, coronary artery disease status post CABG, left leg osteomyelitis status post left leg amputation, peripheral arterial disease and most recently right SFA and popliteal artery occlusion underwent a right femoral artery bypass with Dr. Trevizo 1/27/2021.  The patient developed fever on 2/2/2021. On 2/3/21 he developed right leg swelling and redness.  Yesterday, he presented to outside hospital where he had lab work indicating inflammatory/infectious process and a CT angiogram of the leg which revealed soft tissue stranding and fluid surrounding the recent right femoral/distal artery graft.  Patent runoff with adequate flow.  Upon arrival to [facility] he reports his right leg erythema and edema is significantly worse than it was yesterday.  He reports increased pain in the thigh and redness.  He also reports increased shortness of breath and cough.  He continues to  smoke tobacco.  (The above has been copied from my colleagues note,  However, the accuracy of the information has been checked and verified by myself.)    Right leg cellulitis and Right femoral artery bypass graft infection.  Patient is currently on IV antibiotics and ID is following.  Has improved since admission.  Currently afebrile and tells me that his weakness has resolved.     Congestive heart failure, currently on Lasix.  Will monitor renal function.     COPD, neb treatment.  Currently at baseline.     Diabetes mellitus   -SSI     Hyperlipidemia  -Lipitor 40mg     Tobacco abuse  -nicotine patch       DVT Prophylaxis:        Disposition: TBD    CODE STATUS:   Code Status and Medical Interventions:   Ordered at: 02/05/21 1505     Code Status:    CPR     Medical Interventions (Level of Support Prior to Arrest):    Full       Oscar Garcia MD  02/06/21

## 2021-02-06 NOTE — PROGRESS NOTES
CTS Progress Note      Right femoral to posterior tibial distal bypass with reverse saphenous vein graft on 1/27/2021    Chief Complaint: Right lower extremity pain swelling      Subjective  In bed.  Conversant.  Pleasant.  IV antibiotics have been started.      Objective    Physical Exam:   Vital Signs   Temp:  [98.5 °F (36.9 °C)-99.8 °F (37.7 °C)] 98.5 °F (36.9 °C)  Heart Rate:  [] 76  Resp:  [18] 18  BP: (119-166)/(56-89) 123/70   GEN: NAD   CV: Regular rate and rhythm    RESP: Clear to auscultation bilaterally no wheezes rales rubs   ABD: Soft nontender positive bowel sounds    EXT: Right lower extremity is swollen with erythema and warm to the touch.  Good Doppler signals noted in dorsalis pedis and posterior tibial pulses.  Posterior tibial pulses palpable somewhat limited with the amount of swelling.   Incision: There are 4 incisions.  The femoral incision is soft without hematoma or drainage and healing well.  His incision just distal to the knee with staples and is mostly intact with some surrounding erythema and warm to the touch no active drainage.  Another small incision just distal to the knee incision that is healing well and is intact with staples no drainage noted.  More distal incision at the right medial ankle at the posterior tibial artery.  Staples are in the incision as well however some have pulled through and the incision is dehiscing.  There is noted serosanguineous drainage.     Results     Results from last 7 days   Lab Units 02/06/21  0627   WBC 10*3/mm3 12.84*   HEMOGLOBIN g/dL 10.4*   HEMATOCRIT % 31.8*   PLATELETS 10*3/mm3 152     Results from last 7 days   Lab Units 02/06/21  0627   SODIUM mmol/L 137   POTASSIUM mmol/L 3.8   CHLORIDE mmol/L 105   CO2 mmol/L 25.0   BUN mg/dL 8   CREATININE mg/dL 0.71*   GLUCOSE mg/dL 202*   CALCIUM mg/dL 7.9*     Results from last 7 days   Lab Units 02/04/21  1519   INR  1.14*   APTT seconds 28.8           Assessment/Plan   Right lower extremity  cellulitis status post a right femoral to posterior tibial bypass with reverse saphenous vein graft  Peripheral arterial disease    Tobacco use    PVD     S/P L leg amputation     Post-operative infection        Plan   IV antibiotic therapy  Await wound cultures and blood cultures  Continue wound care with wet-to-dry dressing changes daily  Dr. Araujo covering in Dr. Trevizo's absence for the weekend  MADISON Lemos  02/06/21  08:03 EST

## 2021-02-06 NOTE — ED NOTES
Report received from LIZA Conroy. Patient resting on hospital bed with NADN. Patient alert, oriented, VSS. Oxygenation maintained on room air. Resp even, unlabored. No needs voiced. Right lower extremity is hot, reddened. See physical assessment for further details. Will continue to follow plan of care. Call light within reach     Estrella Trevizo RN  02/05/21 2025

## 2021-02-06 NOTE — CONSULTS
INFECTIOUS DISEASE CONSULT/INITIAL HOSPITAL VISIT    Reg Sanchez  1965  [id number]    Date of Consult: 2/6/2021    Admission Date: 2/5/2021      Requesting Provider: Nikkie Campuzano*  Evaluating Physician: Trav Washburn MD      Reason for Consultation: right lower extremity celluilitis    History of present illness:    Patient is a 55 y.o. male, with Diabetes, PAD, CHF, Hep C,  s/p right femoral to posterior tib bypass, with reverse saphenous vein graft 1/21/21- discharged on 2/1/21.  On 2/3/21 he developed fever up to 103 degrees,  chills, with pain and swelling, redness of right lower extremity.  CT angio with runfoff revealed soft tissue stranding and perigraft fluid identifiabl ein the groin extending to the level of the knee with patent graft.  He was transferred to [facility] from [place]. Tmax of 102.6 degrees.  Admitting labs with a leukocytosis of 18,000, CRP 2.29, LAC 2.1, PCT 0.41, blood cultures no growth so far.  He was started on Vancomycin and Zosyn  and we were consulted for evaluation and treatment.  Complains of significant swelling from the thigh region down to the knee.  There is some clear weeping fluid near the most distal lesion.    Past Medical History:   Diagnosis Date   • Anxiety    • CHF (congestive heart failure) (CMS/[facility])    • COPD (chronic obstructive pulmonary disease) (CMS/[facility])    • Coronary artery disease involving native coronary artery of native heart without angina pectoris 1/12/2021    Added automatically from request for surgery [id number]   • Diabetes mellitus (CMS/[facility])    • Hepatitis C    • Hyperlipidemia    • Hypertension    • Sleep apnea    • T2DM on metformin  1/27/2021       Past Surgical History:   Procedure Laterality Date   • CARDIAC CATHETERIZATION  11/20/2020    x 2 stents   • CARDIAC CATHETERIZATION Right 1/20/2021    Procedure: Peripheral angiography;  Surgeon: Wesly Lund MD;  Location: [facility] INVASIVE LOCATION;  Service: Cardiovascular;   Laterality: Right;   • CORONARY ARTERY BYPASS GRAFT      x 5   • CORONARY STENT PLACEMENT  11/20/2020    Dr Mortensen x 2 stents   • FEMORAL DISTAL BYPASS N/A 1/27/2021    Procedure: FEMORAL DISTAL BYPASS;  Surgeon: Jeremy Trevizo MD;  Location: [facility];  Service: Vascular;  Laterality: N/A;   • FINGER SURGERY      left   • LEG AMPUTATION Left     left lower leg   • SHOULDER SURGERY Bilateral        Family History   Problem Relation Age of Onset   • Heart disease Mother    • Anuerysm Father    • Heart disease Father    • Heart disease Sister    • Heart disease Sister        Social History     Socioeconomic History   • Marital status: Legally      Spouse name: Not on file   • Number of children: 3   • Years of education: Not on file   • Highest education level: Not on file   Occupational History   • Occupation: [employer]      Comment: Disabled    Tobacco Use   • Smoking status: Current Every Day Smoker     Packs/day: 1.00     Years: 40.00     Pack years: 40.00     Types: Cigarettes   • Smokeless tobacco: Never Used   Substance and Sexual Activity   • Alcohol use: Yes     Alcohol/week: 3.0 standard drinks     Types: 2 Cans of beer, 1 Shots of liquor per week     Comment: weekly   • Drug use: Yes     Types: Marijuana     Comment: smoke Marijuana everyday   • Sexual activity: Defer   Social History Narrative    Pt lives in [place], [place] with somebody else.        No Known Allergies      Medication:    Current Facility-Administered Medications:   •  ! VANC trough due 11:30 am 2/06 - hold 12 noon vanc dose until trough reviewed by Pharmacist, , Does not apply, Once, Nikkie Sandoval,   •  acetaminophen (TYLENOL) tablet 650 mg, 650 mg, Oral, Q4H PRN **OR** acetaminophen (TYLENOL) 160 MG/5ML solution 650 mg, 650 mg, Oral, Q4H PRN **OR** acetaminophen (TYLENOL) suppository 650 mg, 650 mg, Rectal, Q4H PRN, Nikkie Sandoval DO  •  albuterol (PROVENTIL) nebulizer solution 0.083% 2.5  mg/3mL, 2.5 mg, Nebulization, Q4H PRN, Nikkie Sandoval DO  •  arformoterol (BROVANA) nebulizer solution 15 mcg, 15 mcg, Nebulization, BID - RT, Nikkie Sandoval DO, 15 mcg at 02/05/21 1951  •  aspirin chewable tablet 81 mg, 81 mg, Oral, Daily, Nikkie Sandoval DO, 81 mg at 02/05/21 1654  •  atorvastatin (LIPITOR) tablet 40 mg, 40 mg, Oral, Nightly, Nikkie Sandoval DO, 40 mg at 02/05/21 2113  •  budesonide (PULMICORT) nebulizer solution 0.5 mg, 0.5 mg, Nebulization, BID - RT, Nikkie Sandoval DO, 0.5 mg at 02/05/21 1949  •  dextrose (D50W) 25 g/ 50mL Intravenous Solution 25 g, 25 g, Intravenous, Q15 Min PRN, Jayla Goyal PA-C  •  dextrose (GLUTOSE) oral gel 15 g, 15 g, Oral, Q15 Min PRN, Jayla Goyal PA-C  •  DULoxetine (CYMBALTA) DR capsule 60 mg, 60 mg, Oral, BID, Nikkie Sandoval DO, 60 mg at 02/05/21 2113  •  furosemide (LASIX) tablet 20 mg, 20 mg, Oral, Daily, Nikkie Sandoval DO, 20 mg at 02/05/21 1654  •  glucagon (human recombinant) (GLUCAGEN DIAGNOSTIC) injection 1 mg, 1 mg, Subcutaneous, Q15 Min PRN, Jayla Goyal PAFatumaC  •  heparin (porcine) 5000 UNIT/ML injection 5,000 Units, 5,000 Units, Subcutaneous, Q8H, Nikkie Sandoval DO, 5,000 Units at 02/05/21 2336  •  hydrOXYzine (ATARAX) tablet 25 mg, 25 mg, Oral, TID PRN, Nikkie Sandoval DO  •  insulin lispro (humaLOG, ADMELOG) injection 0-7 Units, 0-7 Units, Subcutaneous, 4x Daily With Meals & Nightly, Yesika Mendoza, APRN, 7 Units at 02/05/21 2112  •  isosorbide mononitrate (IMDUR) 24 hr tablet 30 mg, 30 mg, Oral, Daily, Nikkie Sandoval DO, 30 mg at 02/05/21 1654  •  lisinopril (PRINIVIL,ZESTRIL) tablet 10 mg, 10 mg, Oral, Daily, Nikkie Sandoval DO, 10 mg at 02/05/21 1654  •  Magnesium Sulfate 2 gram Bolus, followed by 8 gram infusion (total Mg dose 10 grams)- Mg less than or equal to 1mg/dL, 2 g, Intravenous, PRN **OR** Magnesium  Sulfate 2 gram / 50mL Infusion (GIVE X 3 BAGS TO EQUAL 6GM TOTAL DOSE) - Mg 1.1 - 1.5 mg/dl, 2 g, Intravenous, PRN **OR** Magnesium Sulfate 4 gram infusion- Mg 1.6-1.9 mg/dL, 4 g, Intravenous, PRN, Jayla Goyal PA-C, Last Rate: 25 mL/hr at 02/05/21 1758, 4 g at 02/05/21 1758  •  metoprolol tartrate (LOPRESSOR) tablet 50 mg, 50 mg, Oral, BID, Nikkie Sandoval DO, 50 mg at 02/05/21 2113  •  morphine injection 2 mg, 2 mg, Intravenous, Q4H PRN, Nikkie Sandoval DO, 2 mg at 02/06/21 0604  •  naloxone (NARCAN) injection 0.2 mg, 0.2 mg, Intravenous, Q5 Min PRN, Nikkie Sandoval DO  •  nicotine (NICODERM CQ) 21 MG/24HR patch 1 patch, 1 patch, Transdermal, Q24H, Jayla Goyal PA-C, 1 patch at 02/05/21 1725  •  ondansetron (ZOFRAN) tablet 4 mg, 4 mg, Oral, Q6H PRN **OR** ondansetron (ZOFRAN) injection 4 mg, 4 mg, Intravenous, Q6H PRN, Nikkie Sandoval DO  •  oxyCODONE-acetaminophen (PERCOCET) 5-325 MG per tablet 1 tablet, 1 tablet, Oral, Q4H PRN, Nikkie Sandoval DO, 1 tablet at 02/06/21 0348  •  pantoprazole (PROTONIX) EC tablet 40 mg, 40 mg, Oral, QAM, Nikkie Sandoval DO, 40 mg at 02/06/21 0604  •  Pharmacy to dose vancomycin, , Does not apply, Continuous PRN, Nikkie Sandoval DO  •  piperacillin-tazobactam (ZOSYN) 3.375 g in iso-osmotic dextrose 50 ml (premix), 3.375 g, Intravenous, Q8H, Nikkie Sandoval DO, 3.375 g at 02/06/21 0157  •  potassium & sodium phosphates (PHOS-NAK) 280-160-250 MG packet - for Phosphorus less than 1.25 mg/dL, 2 packet, Oral, Q6H PRN **OR** potassium & sodium phosphates (PHOS-NAK) 280-160-250 MG packet - for Phosphorus 1.25 - 2.5 mg/dL, 2 packet, Oral, Q6H PRN, Yesika Mendoza, APRN, 2 packet at 02/05/21 2123  •  potassium chloride (MICRO-K) CR capsule 10 mEq, 10 mEq, Oral, Daily, Nikkie Sandoval DO, 10 mEq at 02/05/21 1654  •  pregabalin (LYRICA) capsule 200 mg, 200 mg, Oral, TID, Jaime,  Nikkie Gillis DO, 200 mg at 02/05/21 2113  •  rOPINIRole (REQUIP) tablet 4 mg, 4 mg, Oral, Nightly, Nikkie Sadnoval DO, 4 mg at 02/05/21 2113  •  sodium chloride 0.9 % flush 10 mL, 10 mL, Intravenous, Q12H, Nikkie Sandoval DO  •  sodium chloride 0.9 % flush 10 mL, 10 mL, Intravenous, PRN, Nikkie Sandoval DO  •  vancomycin 1250 mg/250 mL 0.9% NS IVPB ([facility]), 1,250 mg, Intravenous, Q12H, Nikkie Sandoval DO, 1,250 mg at 02/05/21 2335    Antibiotics:  Anti-Infectives (From admission, onward)    Ordered     Dose/Rate Route Frequency Start Stop    02/05/21 1534  vancomycin 1250 mg/250 mL 0.9% NS IVPB ([facility])     Ordering Provider: Nikkie Sandoval DO    1,250 mg  over 90 Minutes Intravenous Every 12 Hours 02/06/21 0000 02/12/21 2359    02/05/21 1521  piperacillin-tazobactam (ZOSYN) 3.375 g in iso-osmotic dextrose 50 ml (premix)     Ordering Provider: Nikkie Sandoval DO    3.375 g  over 4 Hours Intravenous Every 8 Hours 02/05/21 1800 02/10/21 1759    02/05/21 1521  Pharmacy to dose vancomycin     Ordering Provider: Nikkie Sandoval DO     Does not apply Continuous PRN 02/05/21 1521 02/10/21 1520            Review of Systems:  Constitutional-- +Fever+ chills and  sweats.  Appetite good, and no malaise. No fatigue.  HEENT-- No new vision, hearing or throat complaints.  No epistaxis or oral sores.  Denies odynophagia or dysphagia. No headache, photophobia or neck stiffness.  CV-- No chest pain, palpitation or syncope  Resp-- No SOB/cough/Hemoptysis  GI- No nausea, vomiting, or diarrhea.  No hematochezia, melena, or hematemesis. Denies jaundice or chronic liver disease.  -- No dysuria, hematuria, or flank pain.  Denies hesitancy, urgency or flank pain.  Lymph- no swollen lymph nodes in neck/axilla or groin.   Heme- No active bruising or bleeding; no Hx of DVT or PE.  MS--significant right leg pain, swelling  Neuro-- No acute focal weakness or  numbness in the arms or legs.  No seizures.  Skin--right lower extremity swelling, redness       Physical Exam:   Vital Signs  Temp (24hrs), Av.1 °F (37.3 °C), Min:98.5 °F (36.9 °C), Max:99.8 °F (37.7 °C)    Temp  Min: 98.5 °F (36.9 °C)  Max: 99.8 °F (37.7 °C)  BP  Min: 119/56  Max: 166/74  Pulse  Min: 76  Max: 116  Resp  Min: 18  Max: 18  SpO2  Min: 92 %  Max: 98 %    GENERAL: Awake and alert, in no acute distress.   HEENT: Normocephalic, atraumatic.  EOMI. No conjunctival injection. No icterus.    NECK: Supple without nuchal rigidity.   HEART: RRR; No murmur, rubs, gallops.   LUNGS: Clear to auscultation bilaterally without wheezing, rales, rhonchi. Normal respiratory effort. Nonlabored.   ABDOMEN: Soft, nontender, nondistended. Positive bowel sounds. No rebound or guarding.    EXT:  No cyanosis, clubbing or edema. No cord. Left BKA site well healed   :  Without Sheridan catheter.  MSK:  Right lower extremity with erythema from thigh to ankle.  Right groin incision without redness, drainage, knee incision with staples, no drainage, left ankle incision with serosanginous drainage, peripheral pulse with doppler  SKIN: Warm and dry, except on RLE  NEURO: Oriented to PPT.  PSYCHIATRIC: Normal insight and judgement. Cooperative with PE  PIV    Laboratory Data    Results from last 7 days   Lab Units 21  1614 21  1520   WBC 10*3/mm3 20.11* 18.69*   HEMOGLOBIN g/dL 12.0* 13.7   HEMATOCRIT % 36.5* 40.5   PLATELETS 10*3/mm3 179 207     Results from last 7 days   Lab Units 21  1614   SODIUM mmol/L 136   POTASSIUM mmol/L 3.9   CHLORIDE mmol/L 103   CO2 mmol/L 25.0   BUN mg/dL 11   CREATININE mg/dL 0.73*   GLUCOSE mg/dL 218*   CALCIUM mg/dL 8.3*     Results from last 7 days   Lab Units 21  1614   ALK PHOS U/L 98   BILIRUBIN mg/dL 1.2   ALT (SGPT) U/L 52*   AST (SGOT) U/L 27         Results from last 7 days   Lab Units 21  1520   CRP mg/dL 2.29*     Results from last 7 days   Lab Units  02/06/21  0033   LACTATE mmol/L 1.6             Estimated Creatinine Clearance: 129.5 mL/min (A) (by C-G formula based on SCr of 0.73 mg/dL (L)).      Microbiology:  Blood Culture   Date Value Ref Range Status   02/04/2021 No growth at 24 hours  Preliminary      Covid negative    Radiology:  Imaging Results (Last 72 Hours)     ** No results found for the last 72 hours. **        2/4/21 CTA RLE:  1. Soft tissue stranding and fluid is seen surrounding the patient's recent right femoral/distal arterial graft. It is uncertain to what extent this may represent postoperative fluid versus infection. The graft itself appears patent with adequate runoff  to the level of the smaller plantar vessels.       Impression:   1. Right lower extremity cellulitis, severe deep skin and soft tissue infection s/p right femoral to posterior tibial distal bypass with reverse saphenous vein graft 1/21/21  2. Leukocytosis, sepsis: Improving  3. Lactic acidosis: Improved  4. PAD  5. T2DM  6. Hep C, previously treated with Interferon/Ribaviron, then reinfected  7.  CHF     PLAN/RECOMMENDATIONS:   - Follow-up pending blood cultures  - Gram stain and culture of right ankle wound  - Follow CBC, CMP.  Check CK    - DC Vancomycin   - Start  Daptomycin 6mg/kg q 24hrs  -  Continue Zosyn    I will follow.  Labs improved over the past 24 hours.  Surgery team following, to see if I&D is indicated, especially if infection is involving graft material.  If worse could consider adding clindamycin and micafungin.    Complex MDM.  High risk for limb loss if not treated aggressively    Dr. Washburn has obtained the history, performed the physical exam and formulated the above treatment plan.     Jennifer Kidd, APRN  2/6/2021  07:06 EST    I have edited this note to reflect my history, exam, assessment and plan.  Trav Washburn MD

## 2021-02-06 NOTE — PAYOR COMM NOTE
"Ref # [id number]  Vivienne Floyd RN, BSN  Phone # [phone]  Fax # [phone]  PotterBetty Ben (55 y.o. Male)     Date of Birth Social Security Number Address Home Phone MRN    1965  [address] [facility] [phone] [id number]    Caoda[employer] Marital Status          None Legally        Admission Date Admission Type Admitting Provider Attending Provider Department, Room/Bed    21 Urgent Oscar Garcia MD Kalantar, Masoud, MD [address], [address]/1    Discharge Date Discharge Disposition Discharge Destination                       Attending Provider: Oscar Garcia MD    Allergies: No Known Allergies    Isolation: None   Infection: None   Code Status: CPR    Ht: 170.2 cm (67\")   Wt: 101 kg (223 lb)    Admission Cmt: None   Principal Problem: None                Active Insurance as of 2021     Primary Coverage     Payor Plan Insurance Group Employer/Plan Group    ANTHEM MEDICARE REPLACEMENT ANTHEM MEDICARE ADVANTAGE [id number]     Payor Plan Address Payor Plan Phone Number Payor Plan Fax Number Effective Dates    [address] [phone]  2020 - None Entered    [facility] [id number]       Subscriber Name Subscriber Birth Date Member ID       BETTY POTTER 1965 [id number]           Secondary Coverage     Payor Plan Insurance Group Employer/Plan Group    [place]      Payor Plan Address Payor Plan Phone Number Payor Plan Fax Number Effective Dates    [address] [phone]  2/10/2020 - None Entered    [facility] [address]       Subscriber Name Subscriber Birth Date Member ID       BETTY POTTER BEN 1965 [id number]                      History & Physical      Nikkie Sandoval DO at 21 [address] [place] [address] Medicine Services  HISTORY AND PHYSICAL    Patient Name: Betty Potter  : 1965  MRN: [id number]  Primary Care Physician: Aguilar" Sheila, YOUNG  Date of admission: 2/5/2021    Subjective   Subjective     Chief Complaint:  Right leg pain, fever    HPI:  Reg Sanchez is a 55 y.o. male with past medical history significant for tobacco abuse, diabetes mellitus, peripheral vascular disease, congestive heart failure, coronary artery disease status post CABG, left leg osteomyelitis status post left leg amputation, peripheral arterial disease and most recently right SFA and popliteal artery occlusion underwent a right femoral artery bypass with Dr. Trevizo 1/27/2021.  The patient developed fever on 2/2/2021. On 2/3/21 he developed right leg swelling and redness.  Yesterday, he presented to outside hospital where he had lab work indicating inflammatory/infectious process and a CT angiogram of the leg which revealed soft tissue stranding and fluid surrounding the recent right femoral/distal artery graft.  Patent runoff with adequate flow.  Upon arrival to [facility] he reports his right leg erythema and edema is significantly worse than it was yesterday.  He reports increased pain in the thigh and redness.  He also reports increased shortness of breath and cough.  He continues to smoke tobacco.      COVID Details: [] No Symptoms  Symptoms: [] Fever []  Cough [x] Shortness of breath [] Change in taste or smell  Risks:   [] Direct Exposure [] High risk facility       Review of Systems   Constitutional: Positive for chills and fever. Negative for fatigue.   Respiratory: Positive for cough and shortness of breath. Negative for choking and chest tightness.    Cardiovascular: Positive for leg swelling (right). Negative for chest pain and palpitations.   Gastrointestinal: Negative for abdominal pain, diarrhea, nausea and vomiting.   Genitourinary: Negative for discharge, dysuria, flank pain, penile pain and scrotal swelling.   Musculoskeletal: Negative for back pain and joint swelling.   Skin: Positive for color change and wound.        Right leg  swelling, erythema and oozing from lower incisions.     Neurological: Negative for speech difficulty, weakness, numbness and headaches.   All other systems reviewed and are negative.         Personal History     Past Medical History:   Diagnosis Date   • Anxiety    • CHF (congestive heart failure) (CMS/[facility])    • COPD (chronic obstructive pulmonary disease) (CMS/[facility])    • Coronary artery disease involving native coronary artery of native heart without angina pectoris 1/12/2021    Added automatically from request for surgery [id number]   • Diabetes mellitus (CMS/[facility])    • Hepatitis C    • Hyperlipidemia    • Hypertension    • Sleep apnea    • T2DM on metformin  1/27/2021       Past Surgical History:   Procedure Laterality Date   • CARDIAC CATHETERIZATION  11/20/2020    x 2 stents   • CARDIAC CATHETERIZATION Right 1/20/2021    Procedure: Peripheral angiography;  Surgeon: Wesly Lund MD;  Location: [facility] CATH INVASIVE LOCATION;  Service: Cardiovascular;  Laterality: Right;   • CORONARY ARTERY BYPASS GRAFT      x 5   • CORONARY STENT PLACEMENT  11/20/2020    Dr Mortensen x 2 stents   • FEMORAL DISTAL BYPASS N/A 1/27/2021    Procedure: FEMORAL DISTAL BYPASS;  Surgeon: Jeremy Trevizo MD;  Location: [facility] OR;  Service: Vascular;  Laterality: N/A;   • FINGER SURGERY      left   • LEG AMPUTATION Left     left lower leg   • SHOULDER SURGERY Bilateral        Family History: family history includes Anuerysm in his father; Heart disease in his father, mother, sister, and sister. Otherwise pertinent FHx was reviewed and unremarkable.     Social History:  reports that he has been smoking cigarettes. He has a 40.00 pack-year smoking history. He has never used smokeless tobacco. He reports current alcohol use of about 3.0 standard drinks of alcohol per week. He reports current drug use. Drug: Marijuana.  Social History     Social History Narrative    Pt lives in [place], [place] with somebody else.         Medications:  DULoxetine, Diclofenac Sodium, Insulin Glargine, albuterol, albuterol sulfate HFA, aspirin, atorvastatin, budesonide-formoterol, clopidogrel, furosemide, hydrOXYzine, isosorbide mononitrate, lisinopril, metFORMIN, metoprolol tartrate, multivitamin, nitroglycerin, omeprazole, oxyCODONE-acetaminophen, potassium chloride, pregabalin, rOPINIRole, and sulindac    No Known Allergies    Objective   Objective     Vital Signs:   Temp:  [98.2 °F (36.8 °C)-102.6 °F (39.2 °C)] 99.1 °F (37.3 °C)  Heart Rate:  [] 96  BP: (112-166)/(56-99) 166/74    Physical Exam  Vitals signs and nursing note reviewed.   Constitutional:       General: He is not in acute distress.     Appearance: Normal appearance.   HENT:      Head: Normocephalic and atraumatic.      Right Ear: External ear normal.      Left Ear: External ear normal.   Cardiovascular:      Rate and Rhythm: Normal rate.      Comments: 2+ right DP pulse    Left AKA  Pulmonary:      Effort: Pulmonary effort is normal. No respiratory distress.      Breath sounds: No stridor. Wheezing present. No rhonchi or rales.   Chest:      Chest wall: No tenderness.   Abdominal:      General: There is no distension.      Palpations: Abdomen is soft. There is no mass.      Tenderness: There is no abdominal tenderness. There is no guarding.   Skin:     Comments: Right lower extremity is erythematous from the mid thigh to the foot.  There is diffuse 1+ pitting edema.  There is medial incision from recent femoral artery bypass.   Neurological:      General: No focal deficit present.      Mental Status: He is alert and oriented to person, place, and time.   Psychiatric:         Mood and Affect: Mood normal.         Thought Content: Thought content normal.            Results Reviewed:  I have personally reviewed most recent indicated data and agree with findings including:  [x]  Laboratory  [x]  Radiology  []  EKG/Telemetry  []  Pathology  []  Cardiac/Vascular Studies  [x]   Old records  []  Other:      Most pertinent findings include:    LAB RESULTS:      Lab 02/05/21  1614 02/04/21 2007 02/04/21  1520 02/04/21  1519   WBC 20.11*  --  18.69*  --    HEMOGLOBIN 12.0*  --  13.7  --    HEMATOCRIT 36.5*  --  40.5  --    PLATELETS 179  --  207  --    NEUTROS ABS 16.09*  --  15.23*  --    IMMATURE GRANS (ABS) 0.17*  --  0.15*  --    LYMPHS ABS 2.22  --  1.64  --    MONOS ABS 1.50*  --  1.60*  --    EOS ABS 0.06  --  0.01  --    .1*  --  100.2*  --    CRP  --   --  2.29*  --    LACTATE  --  2.6* 2.1*  --    PROTIME  --   --   --  14.5*   APTT  --   --   --  28.8         Lab 02/04/21  1520 02/04/21  1519   SODIUM 128*  --    POTASSIUM 4.0  --    CHLORIDE 95*  --    CO2 24.4  --    ANION GAP 8.6  --    BUN 12  --    CREATININE 0.89  --    GLUCOSE 225*  --    CALCIUM 8.9  --    IONIZED CALCIUM  --  1.17   MAGNESIUM 1.6  --    PHOSPHORUS 2.4*  --          Lab 02/04/21  1520   TOTAL PROTEIN 7.9   ALBUMIN 3.93   GLOBULIN 4.0   ALT (SGPT) 79*   AST (SGOT) 50*   BILIRUBIN 0.7   ALK PHOS 89         Lab 02/04/21  1519   PROTIME 14.5*   INR 1.14*                 Brief Urine Lab Results     None        Microbiology Results (last 10 days)     Procedure Component Value - Date/Time    Blood Culture - Blood, Arm, Left [[id number]] Collected: 02/04/21 1519    Lab Status: Preliminary result Specimen: Blood from Arm, Left Updated: 02/05/21 1601     Blood Culture No growth at 24 hours    Blood Culture - Blood, Arm, Left [[id number]] Collected: 02/04/21 1518    Lab Status: Preliminary result Specimen: Blood from Arm, Left Updated: 02/05/21 1546     Blood Culture No growth at 24 hours    COVID-19 and FLU A/B PCR - Swab, Nasopharynx [[id number]]  (Normal) Collected: 02/04/21 1432    Lab Status: Final result Specimen: Swab from Nasopharynx Updated: 02/04/21 1540     COVID19 Not Detected     Influenza A PCR Not Detected     Influenza B PCR Not Detected    Narrative:      Fact sheet for providers:  https://www.fda.gov/media/959360/download    Fact sheet for patients: https://www.fda.gov/media/005113/download    Test performed by PCR.          Ct Angio Abdominal Aorta Bilateral Iliofem Runoff    Result Date: 2/4/2021  CTA ABDOMEN, PELVIS AND LOWER EXTREMITY RUNOFF, HISTORY: Right femoral bypass with runoff TECHNIQUE: CT angiogram of the abdomen and pelvis with lower extremity runoff using IV contrast. 3-D postprocessing was performed and reviewed. Radiation dose reduction techniques included automated exposure control or exposure modulation based on body size. Radiation audit for CT and nuclear cardiology exams in the last 12 months: . Findings: Abdomen/pelvis Visualized lung bases are clear. Thoracic aorta is unremarkable. Celiac trunk is patent. There is potential severe narrowing approximately 1.2 cm from the origin with soft and calcific plaque. SMA appears patent but diseased as are its smaller branches. Renal arteries are patent. Extremities There are recent postsurgical changes of a femoral artery/distal bypass. Soft tissue stranding with fluid is seen in the patient's groin region extending down the length of the graft to the level of the knee. The graft appears patent to the level of the smaller plantar vessels. 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     Impression: 1. Soft tissue stranding and fluid is seen surrounding the patient's recent right femoral/distal arterial graft. It is uncertain to what extent this may represent postoperative fluid versus infection. The graft itself appears patent with adequate runoff to the level of the smaller plantar vessels. Signer Name: Joselin Daniel MD  Signed: 2/4/2021 11:05 PM  Workstation Name: [id number]  [facility]      Results for orders placed during the hospital encounter of 01/25/21   Adult Transthoracic Echo Complete W/ Cont if Necessary Per Protocol    Narrative · Left ventricular ejection  fraction appears to be 66 - 70%. Left   ventricular systolic function is normal.  · The right ventricular cavity is borderline dilated.  · Left ventricular diastolic function was normal.  · No significant structural or functional valvular disease.          Assessment/Plan   Assessment & Plan       Tobacco use    PVD     S/P L leg amputation     Post-operative infection    Reg Sanchez is a 55 y.o. male with past medical history significant for tobacco abuse, diabetes mellitus, peripheral vascular disease, congestive heart failure, coronary artery disease status post CABG, left leg osteomyelitis status post left leg amputation, peripheral arterial disease and most recently right SFA and popliteal artery occlusion underwent a right femoral artery bypass with Dr. Trevizo 1/27/2021.  The patient developed fever on 2/2/2021. On 2/3/21 he developed right leg swelling and redness.  Yesterday, he presented to outside hospital where he had lab work indicating inflammatory/infectious process and a CT angiogram of the leg which revealed soft tissue stranding and fluid surrounding the recent right femoral/distal artery graft.  Patent runoff with adequate flow.  Upon arrival to [facility] he reports his right leg erythema and edema is significantly worse than it was yesterday.  He reports increased pain in the thigh and redness.  He also reports increased shortness of breath and cough.  He continues to smoke tobacco.    Right leg cellulitis  Right femoral artery bypass graft infection  -WBC 20 with neutrophil predominance, elevated lactic acid  -blood cultures pending  -have concerns of fluid resuscitation due to wet sounding lungs, give small amount of fluids.  -Vanc and zosyn  -ID tomorrow  -Vascular surgery aware     Congestive heart failure  -lasix dose as patient has audible crackles on exam    COPD  -duonebs  -Symbicort  -Brovana  -incentive spirometer    Diabetes mellitus   -SSI    Hyperlipidemia  -Lipitor  40mg    Tobacco abuse  -nicotine patch      DVT prophylaxis:  Heparin       CODE STATUS:    Code Status and Medical Interventions:   Ordered at: 02/05/21 1505     Code Status:    CPR     Medical Interventions (Level of Support Prior to Arrest):    Full         This note has been completed as part of a split-shared workflow.     Signature: Electronically signed by Jayla Goyal PA-C, 02/05/21, 5:34 PM EST      Attending   Admission Attestation       I have seen and examined the patient, performing an independent face-to-face diagnostic evaluation with plan of care reviewed and developed with the advanced practice clinician (APC).      Brief Summary Statement:   Reg Sanchez is a 55 y.o. male with recent right fem bypass transferred from [place] with surgical wound infection. He states his pain is much worse and his leg has been getting more swollen and red.     Remainder of detailed HPI is as noted by APC and has been reviewed and/or edited by me for completeness.    Attending Physical Exam:  Constitutional: No acute distress, awake, alert  HENT: NCAT, mucous membranes moist  Respiratory: Clear to auscultation bilaterally, respiratory effort normal   Cardiovascular: RRR, no murmurs, rubs, or gallops  Gastrointestinal: Positive bowel sounds, soft, nontender, nondistended  Musculoskeletal: Left BKA,   Psychiatric: Appropriate affect, cooperative  Neurologic: Oriented x 3, strength symmetric in all extremities, Cranial Nerves grossly intact to confrontation, speech clear  Skin: No rashes, right lower extremity edema and erythema      Brief Assessment/Plan :  See detailed assessment and plan developed with APC which I have reviewed and/or edited for completeness.        Admission Status: I believe that this patient meets INPATIENT status due to surgical wound infection .  I feel patient’s risk for adverse outcomes and need for care warrant INPATIENT evaluation and I predict the patient’s care encounter to likely last  beyond 2 midnights.        Nikkie Sandoval DO  02/06/21                          Electronically signed by Nikkie Sandoval DO at 02/06/21 0038       Emergency Department Notes    No notes of this type exist for this encounter.         Operative/Procedure Notes (last 72 hours) (Notes from 02/03/21 0958 through 02/06/21 0958)    No notes of this type exist for this encounter.            Physician Progress Notes (last 72 hours) (Notes from 02/03/21 0958 through 02/06/21 0958)      Harpreet Whatley PA at 02/06/21 0803            CTS Progress Note      Right femoral to posterior tibial distal bypass with reverse saphenous vein graft on 1/27/2021    Chief Complaint: Right lower extremity pain swelling      Subjective  In bed.  Conversant.  Pleasant.  IV antibiotics have been started.      Objective    Physical Exam:   Vital Signs   Temp:  [98.5 °F (36.9 °C)-99.8 °F (37.7 °C)] 98.5 °F (36.9 °C)  Heart Rate:  [] 76  Resp:  [18] 18  BP: (119-166)/(56-89) 123/70   GEN: NAD   CV: Regular rate and rhythm    RESP: Clear to auscultation bilaterally no wheezes rales rubs   ABD: Soft nontender positive bowel sounds    EXT: Right lower extremity is swollen with erythema and warm to the touch.  Good Doppler signals noted in dorsalis pedis and posterior tibial pulses.  Posterior tibial pulses palpable somewhat limited with the amount of swelling.   Incision: There are 4 incisions.  The femoral incision is soft without hematoma or drainage and healing well.  His incision just distal to the knee with staples and is mostly intact with some surrounding erythema and warm to the touch no active drainage.  Another small incision just distal to the knee incision that is healing well and is intact with staples no drainage noted.  More distal incision at the right medial ankle at the posterior tibial artery.  Staples are in the incision as well however some have pulled through and the incision is dehiscing.  There is  noted serosanguineous drainage.     Results     Results from last 7 days   Lab Units 02/06/21  0627   WBC 10*3/mm3 12.84*   HEMOGLOBIN g/dL 10.4*   HEMATOCRIT % 31.8*   PLATELETS 10*3/mm3 152     Results from last 7 days   Lab Units 02/06/21  0627   SODIUM mmol/L 137   POTASSIUM mmol/L 3.8   CHLORIDE mmol/L 105   CO2 mmol/L 25.0   BUN mg/dL 8   CREATININE mg/dL 0.71*   GLUCOSE mg/dL 202*   CALCIUM mg/dL 7.9*     Results from last 7 days   Lab Units 02/04/21  1519   INR  1.14*   APTT seconds 28.8           Assessment/Plan   Right lower extremity cellulitis status post a right femoral to posterior tibial bypass with reverse saphenous vein graft  Peripheral arterial disease    Tobacco use    PVD     S/P L leg amputation     Post-operative infection        Plan   IV antibiotic therapy  Await wound cultures and blood cultures  Continue wound care with wet-to-dry dressing changes daily  Dr. Araujo covering in Dr. Trevizo's absence for the weekend  MADISON Lemos  02/06/21  08:03 EST                    Electronically signed by Harpreet Whatley PA at 02/06/21 0848     Dima Montalvo PA at 02/05/21 1804          CTS Progress Note      Chief Complaint: Right lower extremity pain and swelling, fever      Subjective  The patient is a 55-year-old male with a history of coronary artery disease status post previous CABG, diabetes mellitus and peripheral arterial disease status post right femoral to posterior tibial bypass with reverse saphenous vein graft performed on 1/27/2021 by Dr. Jeremy Trevizo.  The patient had an uneventful postoperative course and was ultimately discharged home on 2/1/2021.  On Wednesday, 2/3/2021 the patient began experiencing intermittent fever, chills with associated pain and swelling in the right lower extremity and foot.  He also reports the onset of periincisional erythema with very mild serosanguineous drainage from his distal most posterior tibial incision.  The symptoms progressively  worsened and he ultimately presented to the emergency department at [facility] where he underwent a CT Angio of the abdominal aorta with runoff which revealed soft tissue stranding and perigraft fluid identifiable in the groin extending to the level of the knee with a patent graft.  The patient was subsequently transferred to [facility] on 2/5/2021 for further evaluation.      Objective    Physical Exam:   Vital Signs   Temp:  [98.2 °F (36.8 °C)-102.6 °F (39.2 °C)] 98.5 °F (36.9 °C)  Heart Rate:  [] 96  Resp:  [18] 18  BP: (112-166)/(56-99) 166/74   GEN: NAD   RESP: Respirations even and unlabored and clear to auscultation bilaterally no wheezes, rales or rhonchi    CV: Regular rate and rhythm no murmurs, rubs or gallops.  Normal S1-S2.   ABD: Soft, nontender/nondistended with normoactive bowel sounds    EXT: Right lower extremity is warm and well-perfused with 2+ edema from the foot extending up to and throughout the thigh with extensive erythema throughout the leg, most appreciable surrounding the distal most incision with mild serosanguineous drainage noted.  There is a palpable 2+ dorsalis pedis pulse and a dopplerable biphasic posterior tibialis pulse   INT: No clubbing or cyanosis   Neuro: Alert and orient x3 with cranial nerves II through XII grossly intact with no motor or sensory deficits appreciated    No intake or output data in the 24 hours ending 02/05/21 1804  Results     Results from last 7 days   Lab Units 02/05/21  1614   WBC 10*3/mm3 20.11*   HEMOGLOBIN g/dL 12.0*   HEMATOCRIT % 36.5*   PLATELETS 10*3/mm3 179     Results from last 7 days   Lab Units 02/05/21  1614   SODIUM mmol/L 136   POTASSIUM mmol/L 3.9   CHLORIDE mmol/L 103   CO2 mmol/L 25.0   BUN mg/dL 11   CREATININE mg/dL 0.73*   GLUCOSE mg/dL 218*   CALCIUM mg/dL 8.3*     Results from last 7 days   Lab Units 02/04/21  1519   INR  1.14*   APTT seconds 28.8     CT Angio abdominal aorta with  runoff:  Findings:  Abdomen/pelvis  Visualized lung bases are clear. Thoracic aorta is unremarkable. Celiac trunk is patent. There is potential severe narrowing approximately 1.2 cm from the origin with soft and calcific plaque. SMA appears patent but diseased as are its smaller branches.  Renal arteries are patent.   Extremities  There are recent postsurgical changes of a femoral artery/distal bypass. Soft tissue stranding with fluid is seen in the patient's groin region extending down the length of the graft to the level of the knee. The graft appears patent to the level of the  smaller plantar vessels.  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     IMPRESSION:  1. Soft tissue stranding and fluid is seen surrounding the patient's recent right femoral/distal arterial graft. It is uncertain to what extent this may represent postoperative fluid versus infection. The graft itself appears patent with adequate runoff  to the level of the smaller plantar vessels.    Assessment/Plan       Tobacco use    PVD     S/P L leg amputation     Post-operative infection        Plan   The infectious disease service has been consulted as the patient will require prolonged IV antibiotic therapy to address his right lower extremity cellulitis.  Blood cultures have also been ordered to rule out any presence of bacteremia.  The wounds will be dressed with half-normal saline wet-to-dry dressings with local wound care.    MADISON Benavidez  02/05/21  18:04 EST                  Electronically signed by Dima Montalvo PA at 02/05/21 1816          Consult Notes (last 72 hours) (Notes from 02/03/21 0958 through 02/06/21 0958)      Trav Washburn MD at 02/06/21 0706      Consult Orders    1. Inpatient Infectious Diseases Consult [[id number]] ordered by Dima Montalvo PA at 02/05/21 165               INFECTIOUS DISEASE CONSULT/INITIAL HOSPITAL VISIT    Reg Sanchez  1965  [id number]    Date  of Consult: 2/6/2021    Admission Date: 2/5/2021      Requesting Provider: Nikkie Campuzano*  Evaluating Physician: Trav Washburn MD      Reason for Consultation: right lower extremity celluilitis    History of present illness:    Patient is a 55 y.o. male, with Diabetes, PAD, CHF, Hep C,  s/p right femoral to posterior tib bypass, with reverse saphenous vein graft 1/21/21- discharged on 2/1/21.  On 2/3/21 he developed fever up to 103 degrees,  chills, with pain and swelling, redness of right lower extremity.  CT angio with runfoff revealed soft tissue stranding and perigraft fluid identifiabl ein the groin extending to the level of the knee with patent graft.  He was transferred to [facility] from [place]. Tmax of 102.6 degrees.  Admitting labs with a leukocytosis of 18,000, CRP 2.29, LAC 2.1, PCT 0.41, blood cultures no growth so far.  He was started on Vancomycin and Zosyn  and we were consulted for evaluation and treatment.  Complains of significant swelling from the thigh region down to the knee.  There is some clear weeping fluid near the most distal lesion.    Past Medical History:   Diagnosis Date   • Anxiety    • CHF (congestive heart failure) (CMS/Formerly [facility])    • COPD (chronic obstructive pulmonary disease) (CMS/Formerly [facility])    • Coronary artery disease involving native coronary artery of native heart without angina pectoris 1/12/2021    Added automatically from request for surgery [id number]   • Diabetes mellitus (CMS/Formerly [facility])    • Hepatitis C    • Hyperlipidemia    • Hypertension    • Sleep apnea    • T2DM on metformin  1/27/2021       Past Surgical History:   Procedure Laterality Date   • CARDIAC CATHETERIZATION  11/20/2020    x 2 stents   • CARDIAC CATHETERIZATION Right 1/20/2021    Procedure: Peripheral angiography;  Surgeon: Wesly Lund MD;  Location: [facility] INVASIVE LOCATION;  Service: Cardiovascular;  Laterality: Right;   • CORONARY ARTERY BYPASS GRAFT      x 5   • CORONARY STENT PLACEMENT  11/20/2020     Dr Mortensen x 2 stents   • FEMORAL DISTAL BYPASS N/A 1/27/2021    Procedure: FEMORAL DISTAL BYPASS;  Surgeon: Jeremy Trevizo MD;  Location: [facility];  Service: Vascular;  Laterality: N/A;   • FINGER SURGERY      left   • LEG AMPUTATION Left     left lower leg   • SHOULDER SURGERY Bilateral        Family History   Problem Relation Age of Onset   • Heart disease Mother    • Anuerysm Father    • Heart disease Father    • Heart disease Sister    • Heart disease Sister        Social History     Socioeconomic History   • Marital status: Legally      Spouse name: Not on file   • Number of children: 3   • Years of education: Not on file   • Highest education level: Not on file   Occupational History   • Occupation: "[employer]"      Comment: Disabled    Tobacco Use   • Smoking status: Current Every Day Smoker     Packs/day: 1.00     Years: 40.00     Pack years: 40.00     Types: Cigarettes   • Smokeless tobacco: Never Used   Substance and Sexual Activity   • Alcohol use: Yes     Alcohol/week: 3.0 standard drinks     Types: 2 Cans of beer, 1 Shots of liquor per week     Comment: weekly   • Drug use: Yes     Types: Marijuana     Comment: smoke Marijuana everyday   • Sexual activity: Defer   Social History Narrative    Pt lives in [place], [place] with somebody else.        No Known Allergies      Medication:    Current Facility-Administered Medications:   •  ! VANC trough due 11:30 am 2/06 - hold 12 noon vanc dose until trough reviewed by Pharmacist, , Does not apply, Once, Nikkie Sandoval,   •  acetaminophen (TYLENOL) tablet 650 mg, 650 mg, Oral, Q4H PRN **OR** acetaminophen (TYLENOL) 160 MG/5ML solution 650 mg, 650 mg, Oral, Q4H PRN **OR** acetaminophen (TYLENOL) suppository 650 mg, 650 mg, Rectal, Q4H PRN, Nikkie Sandoval DO  •  albuterol (PROVENTIL) nebulizer solution 0.083% 2.5 mg/3mL, 2.5 mg, Nebulization, Q4H PRN, Nikkie Sandoval,   •  arformoterol (BROVANA) nebulizer  solution 15 mcg, 15 mcg, Nebulization, BID - RT, Nikkie Sandoval DO, 15 mcg at 02/05/21 1951  •  aspirin chewable tablet 81 mg, 81 mg, Oral, Daily, Nikkie Sandoval DO, 81 mg at 02/05/21 1654  •  atorvastatin (LIPITOR) tablet 40 mg, 40 mg, Oral, Nightly, Nikkie Sandoval DO, 40 mg at 02/05/21 2113  •  budesonide (PULMICORT) nebulizer solution 0.5 mg, 0.5 mg, Nebulization, BID - RT, Nikkie Sanodval DO, 0.5 mg at 02/05/21 1949  •  dextrose (D50W) 25 g/ 50mL Intravenous Solution 25 g, 25 g, Intravenous, Q15 Min PRN, Jayla Goyal PA-C  •  dextrose (GLUTOSE) oral gel 15 g, 15 g, Oral, Q15 Min PRN, Jayla Goyal PA-C  •  DULoxetine (CYMBALTA) DR capsule 60 mg, 60 mg, Oral, BID, Nikkie Sandoval DO, 60 mg at 02/05/21 2113  •  furosemide (LASIX) tablet 20 mg, 20 mg, Oral, Daily, Nikkie Sandoval DO, 20 mg at 02/05/21 1654  •  glucagon (human recombinant) (GLUCAGEN DIAGNOSTIC) injection 1 mg, 1 mg, Subcutaneous, Q15 Min PRN, Jayla Goyal PA-C  •  heparin (porcine) 5000 UNIT/ML injection 5,000 Units, 5,000 Units, Subcutaneous, Q8H, Nikkie Sandoval DO, 5,000 Units at 02/05/21 2336  •  hydrOXYzine (ATARAX) tablet 25 mg, 25 mg, Oral, TID PRN, Nikkie Sandoval DO  •  insulin lispro (humaLOG, ADMELOG) injection 0-7 Units, 0-7 Units, Subcutaneous, 4x Daily With Meals & Nightly, Yesika Mendoza, APRN, 7 Units at 02/05/21 2112  •  isosorbide mononitrate (IMDUR) 24 hr tablet 30 mg, 30 mg, Oral, Daily, Nikkie Sandoval DO, 30 mg at 02/05/21 1654  •  lisinopril (PRINIVIL,ZESTRIL) tablet 10 mg, 10 mg, Oral, Daily, Nikkie Sandoval DO, 10 mg at 02/05/21 1654  •  Magnesium Sulfate 2 gram Bolus, followed by 8 gram infusion (total Mg dose 10 grams)- Mg less than or equal to 1mg/dL, 2 g, Intravenous, PRN **OR** Magnesium Sulfate 2 gram / 50mL Infusion (GIVE X 3 BAGS TO EQUAL 6GM TOTAL DOSE) - Mg 1.1 - 1.5 mg/dl, 2 g,  Intravenous, PRN **OR** Magnesium Sulfate 4 gram infusion- Mg 1.6-1.9 mg/dL, 4 g, Intravenous, PRN, Jayla Goyal PA-C, Last Rate: 25 mL/hr at 02/05/21 1758, 4 g at 02/05/21 1758  •  metoprolol tartrate (LOPRESSOR) tablet 50 mg, 50 mg, Oral, BID, Nikkie Sandoval DO, 50 mg at 02/05/21 2113  •  morphine injection 2 mg, 2 mg, Intravenous, Q4H PRN, Nikkie Sandoval DO, 2 mg at 02/06/21 0604  •  naloxone (NARCAN) injection 0.2 mg, 0.2 mg, Intravenous, Q5 Min PRN, Nikkie Sandoval DO  •  nicotine (NICODERM CQ) 21 MG/24HR patch 1 patch, 1 patch, Transdermal, Q24H, Jayla Goyal PA-C, 1 patch at 02/05/21 1725  •  ondansetron (ZOFRAN) tablet 4 mg, 4 mg, Oral, Q6H PRN **OR** ondansetron (ZOFRAN) injection 4 mg, 4 mg, Intravenous, Q6H PRN, Nikkie Sandoval DO  •  oxyCODONE-acetaminophen (PERCOCET) 5-325 MG per tablet 1 tablet, 1 tablet, Oral, Q4H PRN, Nikkie Sandoval DO, 1 tablet at 02/06/21 0348  •  pantoprazole (PROTONIX) EC tablet 40 mg, 40 mg, Oral, QAM, Nikkie Sandoval DO, 40 mg at 02/06/21 0604  •  Pharmacy to dose vancomycin, , Does not apply, Continuous PRN, Nikkie Sandoval DO  •  piperacillin-tazobactam (ZOSYN) 3.375 g in iso-osmotic dextrose 50 ml (premix), 3.375 g, Intravenous, Q8H, Nikkie Sandoval DO, 3.375 g at 02/06/21 0157  •  potassium & sodium phosphates (PHOS-NAK) 280-160-250 MG packet - for Phosphorus less than 1.25 mg/dL, 2 packet, Oral, Q6H PRN **OR** potassium & sodium phosphates (PHOS-NAK) 280-160-250 MG packet - for Phosphorus 1.25 - 2.5 mg/dL, 2 packet, Oral, Q6H PRN, Yesika Mendoza, YOUNG, 2 packet at 02/05/21 2123  •  potassium chloride (MICRO-K) CR capsule 10 mEq, 10 mEq, Oral, Daily, Nikkie Sandoval DO, 10 mEq at 02/05/21 1654  •  pregabalin (LYRICA) capsule 200 mg, 200 mg, Oral, TID, Nikkie Sandoval DO, 200 mg at 02/05/21 2113  •  rOPINIRole (REQUIP) tablet 4 mg, 4 mg, Oral,  Nightly, Nikkie Sandoval DO, 4 mg at 02/05/21 2113  •  sodium chloride 0.9 % flush 10 mL, 10 mL, Intravenous, Q12H, Nikkie Sandoval DO  •  sodium chloride 0.9 % flush 10 mL, 10 mL, Intravenous, PRN, Nikkie Sandoval DO  •  vancomycin 1250 mg/250 mL 0.9% NS IVPB ([facility]), 1,250 mg, Intravenous, Q12H, Nikkie Sandoval DO, 1,250 mg at 02/05/21 2335    Antibiotics:  Anti-Infectives (From admission, onward)    Ordered     Dose/Rate Route Frequency Start Stop    02/05/21 1534  vancomycin 1250 mg/250 mL 0.9% NS IVPB ([facility])     Ordering Provider: Nikkie Sandoval DO    1,250 mg  over 90 Minutes Intravenous Every 12 Hours 02/06/21 0000 02/12/21 2359    02/05/21 1521  piperacillin-tazobactam (ZOSYN) 3.375 g in iso-osmotic dextrose 50 ml (premix)     Ordering Provider: Nikkie Sandoval DO    3.375 g  over 4 Hours Intravenous Every 8 Hours 02/05/21 1800 02/10/21 1759    02/05/21 1521  Pharmacy to dose vancomycin     Ordering Provider: Nikkie Sandoval DO     Does not apply Continuous PRN 02/05/21 1521 02/10/21 1520            Review of Systems:  Constitutional-- +Fever+ chills and  sweats.  Appetite good, and no malaise. No fatigue.  HEENT-- No new vision, hearing or throat complaints.  No epistaxis or oral sores.  Denies odynophagia or dysphagia. No headache, photophobia or neck stiffness.  CV-- No chest pain, palpitation or syncope  Resp-- No SOB/cough/Hemoptysis  GI- No nausea, vomiting, or diarrhea.  No hematochezia, melena, or hematemesis. Denies jaundice or chronic liver disease.  -- No dysuria, hematuria, or flank pain.  Denies hesitancy, urgency or flank pain.  Lymph- no swollen lymph nodes in neck/axilla or groin.   Heme- No active bruising or bleeding; no Hx of DVT or PE.  MS--significant right leg pain, swelling  Neuro-- No acute focal weakness or numbness in the arms or legs.  No seizures.  Skin--right lower extremity swelling, redness        Physical Exam:   Vital Signs  Temp (24hrs), Av.1 °F (37.3 °C), Min:98.5 °F (36.9 °C), Max:99.8 °F (37.7 °C)    Temp  Min: 98.5 °F (36.9 °C)  Max: 99.8 °F (37.7 °C)  BP  Min: 119/56  Max: 166/74  Pulse  Min: 76  Max: 116  Resp  Min: 18  Max: 18  SpO2  Min: 92 %  Max: 98 %    GENERAL: Awake and alert, in no acute distress.   HEENT: Normocephalic, atraumatic.  EOMI. No conjunctival injection. No icterus.    NECK: Supple without nuchal rigidity.   HEART: RRR; No murmur, rubs, gallops.   LUNGS: Clear to auscultation bilaterally without wheezing, rales, rhonchi. Normal respiratory effort. Nonlabored.   ABDOMEN: Soft, nontender, nondistended. Positive bowel sounds. No rebound or guarding.    EXT:  No cyanosis, clubbing or edema. No cord. Left BKA site well healed   :  Without Sheridan catheter.  MSK:  Right lower extremity with erythema from thigh to ankle.  Right groin incision without redness, drainage, knee incision with staples, no drainage, left ankle incision with serosanginous drainage, peripheral pulse with doppler  SKIN: Warm and dry, except on RLE  NEURO: Oriented to PPT.  PSYCHIATRIC: Normal insight and judgement. Cooperative with [facility]    Laboratory Data    Results from last 7 days   Lab Units 21  1614 21  1520   WBC 10*3/mm3 20.11* 18.69*   HEMOGLOBIN g/dL 12.0* 13.7   HEMATOCRIT % 36.5* 40.5   PLATELETS 10*3/mm3 179 207     Results from last 7 days   Lab Units 21  1614   SODIUM mmol/L 136   POTASSIUM mmol/L 3.9   CHLORIDE mmol/L 103   CO2 mmol/L 25.0   BUN mg/dL 11   CREATININE mg/dL 0.73*   GLUCOSE mg/dL 218*   CALCIUM mg/dL 8.3*     Results from last 7 days   Lab Units 21  1614   ALK PHOS U/L 98   BILIRUBIN mg/dL 1.2   ALT (SGPT) U/L 52*   AST (SGOT) U/L 27         Results from last 7 days   Lab Units 21  1520   CRP mg/dL 2.29*     Results from last 7 days   Lab Units 21  0033   LACTATE mmol/L 1.6             Estimated Creatinine Clearance: 129.5 mL/min (A) (by  C-G formula based on SCr of 0.73 mg/dL (L)).      Microbiology:  Blood Culture   Date Value Ref Range Status   02/04/2021 No growth at 24 hours  Preliminary      Covid negative    Radiology:  Imaging Results (Last 72 Hours)     ** No results found for the last 72 hours. **        2/4/21 CTA RLE:  1. Soft tissue stranding and fluid is seen surrounding the patient's recent right femoral/distal arterial graft. It is uncertain to what extent this may represent postoperative fluid versus infection. The graft itself appears patent with adequate runoff  to the level of the smaller plantar vessels.       Impression:   1. Right lower extremity cellulitis, severe deep skin and soft tissue infection s/p right femoral to posterior tibial distal bypass with reverse saphenous vein graft 1/21/21  2. Leukocytosis, sepsis: Improving  3. Lactic acidosis: Improved  4. PAD  5. T2DM  6. Hep C, previously treated with Interferon/Ribaviron, then reinfected  7.  CHF     PLAN/RECOMMENDATIONS:   - Follow-up pending blood cultures  - Gram stain and culture of right ankle wound  - Follow CBC, CMP.  Check CK    - DC Vancomycin   - Start  Daptomycin 6mg/kg q 24hrs  -  Continue Zosyn    I will follow.  Labs improved over the past 24 hours.  Surgery team following, to see if I&D is indicated, especially if infection is involving graft material.  If worse could consider adding clindamycin and micafungin.    Complex MDM.  High risk for limb loss if not treated aggressively    Dr. Washburn has obtained the history, performed the physical exam and formulated the above treatment plan.     Jennifer Kidd, APRN  2/6/2021  07:06 EST    I have edited this note to reflect my history, exam, assessment and plan.  Trav Washburn MD                Electronically signed by Trav Washburn MD at 02/06/21 9652

## 2021-02-07 LAB
GLUCOSE BLDC GLUCOMTR-MCNC: 145 MG/DL (ref 70–130)
GLUCOSE BLDC GLUCOMTR-MCNC: 175 MG/DL (ref 70–130)
GLUCOSE BLDC GLUCOMTR-MCNC: 180 MG/DL (ref 70–130)
GLUCOSE BLDC GLUCOMTR-MCNC: 237 MG/DL (ref 70–130)

## 2021-02-07 PROCEDURE — 25010000002 HEPARIN (PORCINE) PER 1000 UNITS: Performed by: FAMILY MEDICINE

## 2021-02-07 PROCEDURE — 25010000002 PIPERACILLIN SOD-TAZOBACTAM PER 1 G: Performed by: FAMILY MEDICINE

## 2021-02-07 PROCEDURE — 25010000002 MORPHINE PER 10 MG: Performed by: FAMILY MEDICINE

## 2021-02-07 PROCEDURE — 25010000002 DAPTOMYCIN PER 1 MG: Performed by: NURSE PRACTITIONER

## 2021-02-07 PROCEDURE — 82962 GLUCOSE BLOOD TEST: CPT

## 2021-02-07 PROCEDURE — 99232 SBSQ HOSP IP/OBS MODERATE 35: CPT | Performed by: INTERNAL MEDICINE

## 2021-02-07 PROCEDURE — 94799 UNLISTED PULMONARY SVC/PX: CPT

## 2021-02-07 PROCEDURE — 63710000001 INSULIN LISPRO (HUMAN) PER 5 UNITS: Performed by: NURSE PRACTITIONER

## 2021-02-07 RX ORDER — LORAZEPAM 1 MG/1
1 TABLET ORAL EVERY 6 HOURS PRN
Status: DISPENSED | OUTPATIENT
Start: 2021-02-07 | End: 2021-02-14

## 2021-02-07 RX ADMIN — MORPHINE SULFATE 2 MG: 2 INJECTION, SOLUTION INTRAMUSCULAR; INTRAVENOUS at 03:48

## 2021-02-07 RX ADMIN — HEPARIN SODIUM 5000 UNITS: 5000 INJECTION, SOLUTION INTRAVENOUS; SUBCUTANEOUS at 20:11

## 2021-02-07 RX ADMIN — POTASSIUM CHLORIDE 10 MEQ: 10 CAPSULE, COATED, EXTENDED RELEASE ORAL at 08:16

## 2021-02-07 RX ADMIN — METOPROLOL TARTRATE 50 MG: 50 TABLET, FILM COATED ORAL at 20:10

## 2021-02-07 RX ADMIN — LISINOPRIL 10 MG: 10 TABLET ORAL at 08:16

## 2021-02-07 RX ADMIN — MORPHINE SULFATE 2 MG: 2 INJECTION, SOLUTION INTRAMUSCULAR; INTRAVENOUS at 10:16

## 2021-02-07 RX ADMIN — DULOXETINE HYDROCHLORIDE 60 MG: 60 CAPSULE, DELAYED RELEASE ORAL at 20:10

## 2021-02-07 RX ADMIN — PREGABALIN 200 MG: 100 CAPSULE ORAL at 20:10

## 2021-02-07 RX ADMIN — OXYCODONE AND ACETAMINOPHEN 1 TABLET: 5; 325 TABLET ORAL at 17:01

## 2021-02-07 RX ADMIN — OXYCODONE AND ACETAMINOPHEN 1 TABLET: 5; 325 TABLET ORAL at 08:16

## 2021-02-07 RX ADMIN — DULOXETINE HYDROCHLORIDE 60 MG: 60 CAPSULE, DELAYED RELEASE ORAL at 08:16

## 2021-02-07 RX ADMIN — BUDESONIDE 0.5 MG: 0.5 INHALANT ORAL at 08:40

## 2021-02-07 RX ADMIN — LORAZEPAM 1 MG: 1 TABLET ORAL at 20:13

## 2021-02-07 RX ADMIN — ARFORMOTEROL TARTRATE 15 MCG: 15 SOLUTION RESPIRATORY (INHALATION) at 08:40

## 2021-02-07 RX ADMIN — METOPROLOL TARTRATE 50 MG: 50 TABLET, FILM COATED ORAL at 08:15

## 2021-02-07 RX ADMIN — TAZOBACTAM SODIUM AND PIPERACILLIN SODIUM 3.38 G: 375; 3 INJECTION, SOLUTION INTRAVENOUS at 09:15

## 2021-02-07 RX ADMIN — INSULIN LISPRO 3 UNITS: 100 INJECTION, SOLUTION INTRAVENOUS; SUBCUTANEOUS at 12:16

## 2021-02-07 RX ADMIN — HEPARIN SODIUM 5000 UNITS: 5000 INJECTION, SOLUTION INTRAVENOUS; SUBCUTANEOUS at 08:15

## 2021-02-07 RX ADMIN — ACETAMINOPHEN 650 MG: 325 TABLET, FILM COATED ORAL at 03:52

## 2021-02-07 RX ADMIN — PREGABALIN 200 MG: 100 CAPSULE ORAL at 08:16

## 2021-02-07 RX ADMIN — FUROSEMIDE 20 MG: 20 TABLET ORAL at 08:16

## 2021-02-07 RX ADMIN — BUDESONIDE 0.5 MG: 0.5 INHALANT ORAL at 20:08

## 2021-02-07 RX ADMIN — MORPHINE SULFATE 2 MG: 2 INJECTION, SOLUTION INTRAMUSCULAR; INTRAVENOUS at 15:10

## 2021-02-07 RX ADMIN — ASPIRIN 81 MG: 81 TABLET, CHEWABLE ORAL at 08:15

## 2021-02-07 RX ADMIN — ISOSORBIDE MONONITRATE 30 MG: 30 TABLET, EXTENDED RELEASE ORAL at 08:16

## 2021-02-07 RX ADMIN — HYDROXYZINE HYDROCHLORIDE 25 MG: 25 TABLET, FILM COATED ORAL at 10:47

## 2021-02-07 RX ADMIN — OXYCODONE AND ACETAMINOPHEN 1 TABLET: 5; 325 TABLET ORAL at 01:17

## 2021-02-07 RX ADMIN — TAZOBACTAM SODIUM AND PIPERACILLIN SODIUM 3.38 G: 375; 3 INJECTION, SOLUTION INTRAVENOUS at 01:15

## 2021-02-07 RX ADMIN — TAZOBACTAM SODIUM AND PIPERACILLIN SODIUM 3.38 G: 375; 3 INJECTION, SOLUTION INTRAVENOUS at 17:01

## 2021-02-07 RX ADMIN — INSULIN LISPRO 2 UNITS: 100 INJECTION, SOLUTION INTRAVENOUS; SUBCUTANEOUS at 09:15

## 2021-02-07 RX ADMIN — DAPTOMYCIN 480.6 MG: 500 INJECTION, POWDER, LYOPHILIZED, FOR SOLUTION INTRAVENOUS at 08:16

## 2021-02-07 RX ADMIN — INSULIN LISPRO 2 UNITS: 100 INJECTION, SOLUTION INTRAVENOUS; SUBCUTANEOUS at 17:39

## 2021-02-07 RX ADMIN — OXYCODONE AND ACETAMINOPHEN 1 TABLET: 5; 325 TABLET ORAL at 12:16

## 2021-02-07 RX ADMIN — HEPARIN SODIUM 5000 UNITS: 5000 INJECTION, SOLUTION INTRAVENOUS; SUBCUTANEOUS at 17:01

## 2021-02-07 RX ADMIN — ROPINIROLE HYDROCHLORIDE 4 MG: 2 TABLET, FILM COATED ORAL at 20:11

## 2021-02-07 RX ADMIN — ATORVASTATIN CALCIUM 40 MG: 40 TABLET, FILM COATED ORAL at 20:11

## 2021-02-07 RX ADMIN — OXYCODONE AND ACETAMINOPHEN 1 TABLET: 5; 325 TABLET ORAL at 22:56

## 2021-02-07 RX ADMIN — MORPHINE SULFATE 2 MG: 2 INJECTION, SOLUTION INTRAMUSCULAR; INTRAVENOUS at 20:13

## 2021-02-07 RX ADMIN — PANTOPRAZOLE SODIUM 40 MG: 40 TABLET, DELAYED RELEASE ORAL at 08:15

## 2021-02-07 RX ADMIN — PREGABALIN 200 MG: 100 CAPSULE ORAL at 17:01

## 2021-02-07 RX ADMIN — Medication 1 PATCH: at 08:16

## 2021-02-07 RX ADMIN — ARFORMOTEROL TARTRATE 15 MCG: 15 SOLUTION RESPIRATORY (INHALATION) at 20:08

## 2021-02-07 NOTE — PROGRESS NOTES
CTS Progress Note      Right femoral to posterior tibial distal bypass with reverse saphenous vein graft on 1/27/2021    Chief Complaint: Right lower extremity swelling      Subjective  No acute events overnight.  Patient reports pain has decreased however still has swelling in his right lower extremity.  Denies chest pain or dyspnea.      Objective    Physical Exam:   Vital Signs   Temp:  [97.8 °F (36.6 °C)-99.3 °F (37.4 °C)] 99.3 °F (37.4 °C)  Heart Rate:  [59-80] 74  Resp:  [18-20] 18  BP: (132-165)/(52-88) 165/61   GEN: NAD   CV: Regular rate and rhythm no murmurs, rubs or gallops    RESP: Respirations even and unlabored and clear to auscultation bilaterally no wheezes, rales or rhonchi   ABD: Soft, nontender/nondistended with normoactive bowel sounds   EXT: Right extremity is warm with the erythema having significantly decreased throughout the extremity however the swelling is still present from the foot extending up throughout the thigh.  There are dopplerable, biphasic dorsalis pedis and posterior tibialis pulses   Incision: Right groin incision is clean, dry and intact with no surrounding erythema or drainage.  Distalmost posterior tibial incision with mild dehiscence staples present skin margins with minimal serosanguineous drainage.  Remaining incisions with mild surrounding erythema however significant proved with the previous 36 hours.     Results     Results from last 7 days   Lab Units 02/06/21  0627   WBC 10*3/mm3 12.84*   HEMOGLOBIN g/dL 10.4*   HEMATOCRIT % 31.8*   PLATELETS 10*3/mm3 152     Results from last 7 days   Lab Units 02/06/21  0627   SODIUM mmol/L 137   POTASSIUM mmol/L 3.8   CHLORIDE mmol/L 105   CO2 mmol/L 25.0   BUN mg/dL 8   CREATININE mg/dL 0.71*   GLUCOSE mg/dL 202*   CALCIUM mg/dL 7.9*     Results from last 7 days   Lab Units 02/04/21  1519   INR  1.14*   APTT seconds 28.8       Wound cultures: Negative to date    Assessment/Plan   Right lower extremity cellulitis status post a  right femoral to posterior tibial bypass with reverse saphenous vein graft  Peripheral arterial disease    Tobacco use    PVD     S/P L leg amputation     Post-operative infection        Plan   Continue IV antibiotics per infectious disease  Continue to monitor wound cultures  Continue daily wet-to-dry dressing changes and as needed per saturation  We will make the patient n.p.o. after midnight in the event surgical washout of his incisions is required.  Dr. Trevizo to evaluate on Monday, 2/8/2021.      MADISON Benavidez  02/07/21  08:37 EST

## 2021-02-07 NOTE — PROGRESS NOTES
[facility]  PROGRESS NOTE    Patient Name: Reg Sanchez  : 1965  MRN: [id number]    Date of Admission: 2021  Primary Care Physician: Sheila Sheikh APRN    Subjective   Subjective     CC:  Fever, right leg pain    HPI:  Resting in bed in no acute distress and tells me that he feels about the same.  No fever or chills.  No chest pain or palpitation or shortness of breath at rest.  No nausea, vomiting, diarrhea, abdominal pain.  Feels that the swelling in the right foot is getting better hut the right thigh, he feels, getting worse.     ROS:  As above    Objective   Objective     Vital Signs:   Temp:  [97.8 °F (36.6 °C)-99.3 °F (37.4 °C)] 98.5 °F (36.9 °C)  Heart Rate:  [61-82] 71  Resp:  [18-20] 18  BP: (136-165)/(52-88) 160/56        Physical Exam:  Constitutional: No acute distress, awake, alert  HENT: NCAT, mucous membranes moist  Respiratory: Clear to auscultation bilaterally, respiratory effort normal   Cardiovascular: RRR, no murmurs, rubs, or gallops  Gastrointestinal: Positive bowel sounds, soft, nontender, nondistended  Musculoskeletal: Left lower extremity below-knee amputation.  Right lower extremity is edematous, the whole limb is red and has swelling.  Warm to the touch and tender tender.  Psychiatric: Appropriate affect, cooperative  Neurologic: Oriented x 3, strength symmetric in all extremities, Cranial Nerves grossly intact to confrontation, speech clear  Skin: No rashes    Results Reviewed:  Results from last 7 days   Lab Units 21  1614 21  1520 21  1519   WBC 10*3/mm3 12.84* 20.11* 18.69*  --    HEMOGLOBIN g/dL 10.4* 12.0* 13.7  --    HEMATOCRIT % 31.8* 36.5* 40.5  --    PLATELETS 10*3/mm3 152 179 207  --    INR   --   --   --  1.14*   PROCALCITONIN ng/mL  --  0.41*  --   --      Results from last 7 days   Lab Units 21  0621  1614 21  1520   SODIUM mmol/L 137 136 128*   POTASSIUM  mmol/L 3.8 3.9 4.0   CHLORIDE mmol/L 105 103 95*   CO2 mmol/L 25.0 25.0 24.4   BUN mg/dL 8 11 12   CREATININE mg/dL 0.71* 0.73* 0.89   GLUCOSE mg/dL 202* 218* 225*   CALCIUM mg/dL 7.9* 8.3* 8.9   ALT (SGPT) U/L 39 52* 79*   AST (SGOT) U/L 24 27 50*     Estimated Creatinine Clearance: 133.2 mL/min (A) (by C-G formula based on SCr of 0.71 mg/dL (L)).    Microbiology Results Abnormal     Procedure Component Value - Date/Time    Wound Culture - Wound, Leg, Right [[id number]] Collected: 02/06/21 1748    Lab Status: Preliminary result Specimen: Wound from Leg, Right Updated: 02/07/21 1215     Wound Culture Growth present, too young to evaluate     Gram Stain Few (2+) WBCs seen      No organisms seen    Blood Culture - Blood, Hand, Left [[id number]] Collected: 02/05/21 1806    Lab Status: Preliminary result Specimen: Blood from Hand, Left Updated: 02/06/21 1931     Blood Culture No growth at 24 hours    Blood Culture - Blood, Arm, Left [[id number]] Collected: 02/05/21 1806    Lab Status: Preliminary result Specimen: Blood from Arm, Left Updated: 02/06/21 1931     Blood Culture No growth at 24 hours    Narrative:      Aerobic bottle only            Imaging Results (Last 24 Hours)     ** No results found for the last 24 hours. **          Results for orders placed during the hospital encounter of 01/25/21   Adult Transthoracic Echo Complete W/ Cont if Necessary Per Protocol    Narrative · Left ventricular ejection fraction appears to be 66 - 70%. Left   ventricular systolic function is normal.  · The right ventricular cavity is borderline dilated.  · Left ventricular diastolic function was normal.  · No significant structural or functional valvular disease.          I have reviewed the medications:  Scheduled Meds:arformoterol, 15 mcg, Nebulization, BID - RT  aspirin, 81 mg, Oral, Daily  atorvastatin, 40 mg, Oral, Nightly  budesonide, 0.5 mg, Nebulization, BID - RT  DAPTOmycin, 6 mg/kg (Adjusted), Intravenous, Q24H  DULoxetine,  60 mg, Oral, BID  furosemide, 20 mg, Oral, Daily  heparin (porcine), 5,000 Units, Subcutaneous, Q8H  insulin lispro, 0-7 Units, Subcutaneous, 4x Daily With Meals & Nightly  isosorbide mononitrate, 30 mg, Oral, Daily  lisinopril, 10 mg, Oral, Daily  metoprolol tartrate, 50 mg, Oral, BID  nicotine, 1 patch, Transdermal, Q24H  pantoprazole, 40 mg, Oral, QAM  piperacillin-tazobactam, 3.375 g, Intravenous, Q8H  potassium chloride, 10 mEq, Oral, Daily  pregabalin, 200 mg, Oral, TID  rOPINIRole, 4 mg, Oral, Nightly  sodium chloride, 10 mL, Intravenous, Q12H      Continuous Infusions:   PRN Meds:.•  acetaminophen **OR** acetaminophen **OR** acetaminophen  •  albuterol  •  dextrose  •  dextrose  •  glucagon (human recombinant)  •  hydrOXYzine  •  magnesium sulfate **OR** magnesium sulfate **OR** magnesium sulfate  •  Morphine  •  naloxone  •  ondansetron **OR** ondansetron  •  oxyCODONE-acetaminophen  •  potassium & sodium phosphates **OR** potassium & sodium phosphates  •  sodium chloride    Assessment/Plan   Assessment & Plan     Active Hospital Problems    Diagnosis  POA   • Post-operative infection [T81.40XA]  Yes   • S/P L leg amputation  [S88.919A]  Yes   • PVD  [I73.9]  Yes   • Tobacco use [Z72.0]  Yes      Resolved Hospital Problems   No resolved problems to display.        Brief Hospital Course to date:  Reg Sanchez is a 55 y.o. male Reg Sanchez is a 55 y.o. male with past medical history significant for tobacco abuse, diabetes mellitus, peripheral vascular disease, congestive heart failure, coronary artery disease status post CABG, left leg osteomyelitis status post left leg amputation, peripheral arterial disease and most recently right SFA and popliteal artery occlusion underwent a right femoral artery bypass with Dr. Trevizo 1/27/2021.  The patient developed fever on 2/2/2021. On 2/3/21 he developed right leg swelling and redness.  Yesterday, he presented to outside hospital where he had lab work indicating  inflammatory/infectious process and a CT angiogram of the leg which revealed soft tissue stranding and fluid surrounding the recent right femoral/distal artery graft.  Patent runoff with adequate flow.  Upon arrival to [facility] he reports his right leg erythema and edema is significantly worse than it was yesterday.  He reports increased pain in the thigh and redness.  He also reports increased shortness of breath and cough.  He continues to smoke tobacco.  (The above has been copied from my colleagues note,  However, the accuracy of the information has been checked and verified by myself.)    Right leg cellulitis and Right femoral artery bypass graft infection.  Patient is currently on IV antibiotics and ID is following.  Has improved since admission but there is concern for deep soft tissue infection that requires some washout or draining.  Currently afebrile and tells me that his weakness has resolved.     Congestive heart failure, currently on Lasix.  Will monitor renal function.     COPD, neb treatment.  Currently at baseline.     Diabetes mellitus   -SSI     Hyperlipidemia  -Lipitor 40mg     Tobacco abuse  -nicotine patch    etoh abuse  - ativan PRN       DVT Prophylaxis:        Disposition: TBD    CODE STATUS:   Code Status and Medical Interventions:   Ordered at: 02/05/21 1505     Code Status:    CPR     Medical Interventions (Level of Support Prior to Arrest):    Full       Oscar Garcia MD  02/07/21

## 2021-02-07 NOTE — PROGRESS NOTES
INFECTIOUS DISEASE Progress Note    Reg Sanchez  1965  [id number]     Reason for Consultation: right lower extremity celluilitis    History of present illness:    Patient is a 55 y.o. male, with Diabetes, PAD, CHF, Hep C,  s/p right femoral to posterior tib bypass, with reverse saphenous vein graft 1/21/21- discharged on 2/1/21.  On 2/3/21 he developed fever up to 103 degrees,  chills, with pain and swelling, redness of right lower extremity.  CT angio with runfoff revealed soft tissue stranding and perigraft fluid identifiabl ein the groin extending to the level of the knee with patent graft.  He was transferred to [facility] from [place]. Tmax of 102.6 degrees.  Admitting labs with a leukocytosis of 18,000, CRP 2.29, LAC 2.1, PCT 0.41, blood cultures no growth so far.  He was started on Vancomycin and Zosyn  and we were consulted for evaluation and treatment.  Complains of significant swelling from the thigh region down to the knee.  There is some clear weeping fluid near the most distal lesion.    2/7/21: Afebrile overnight.  Hemodynamically stable.  Tolerating current antibiotics.  Complains of significant leg pain from the thigh down towards the foot.  There appears to be less serous drainage on the dressing    ROS:  No fevers or chills. No diarrhea. Poor appetite. No rash. No new ADR to Abx.       No Known Allergies      Medication:    Current Facility-Administered Medications:   •  acetaminophen (TYLENOL) tablet 650 mg, 650 mg, Oral, Q4H PRN, 650 mg at 02/07/21 0352 **OR** acetaminophen (TYLENOL) 160 MG/5ML solution 650 mg, 650 mg, Oral, Q4H PRN **OR** acetaminophen (TYLENOL) suppository 650 mg, 650 mg, Rectal, Q4H PRN, Nikkie Sandoval,   •  albuterol (PROVENTIL) nebulizer solution 0.083% 2.5 mg/3mL, 2.5 mg, Nebulization, Q4H PRN, Nikkie Sandoval,   •  arformoterol (BROVANA) nebulizer solution 15 mcg, 15 mcg, Nebulization, BID - RT, Nikkie Sandoval, , 15 mcg at  02/07/21 0840  •  aspirin chewable tablet 81 mg, 81 mg, Oral, Daily, Nikkie Sandoval DO, 81 mg at 02/07/21 0815  •  atorvastatin (LIPITOR) tablet 40 mg, 40 mg, Oral, Nightly, Nikkie Sandoval DO, 40 mg at 02/06/21 2018  •  budesonide (PULMICORT) nebulizer solution 0.5 mg, 0.5 mg, Nebulization, BID - RT, Nikkie Sandoval DO, 0.5 mg at 02/07/21 0840  •  DAPTOmycin (CUBICIN) 500 mg/50 mL in sodium chloride, 6 mg/kg (Adjusted), Intravenous, Q24H, Jennifer Kidd APRN, 480.6 mg at 02/07/21 0816  •  dextrose (D50W) 25 g/ 50mL Intravenous Solution 25 g, 25 g, Intravenous, Q15 Min PRN, Jayla Goyal PA-C  •  dextrose (GLUTOSE) oral gel 15 g, 15 g, Oral, Q15 Min PRN, Jayla Goyal PA-C  •  DULoxetine (CYMBALTA) DR capsule 60 mg, 60 mg, Oral, BID, Nikkie Sandoval DO, 60 mg at 02/07/21 0816  •  furosemide (LASIX) tablet 20 mg, 20 mg, Oral, Daily, Nikkie Sandoval DO, 20 mg at 02/07/21 0816  •  glucagon (human recombinant) (GLUCAGEN DIAGNOSTIC) injection 1 mg, 1 mg, Subcutaneous, Q15 Min PRN, Jayla Goyal, PA-C  •  heparin (porcine) 5000 UNIT/ML injection 5,000 Units, 5,000 Units, Subcutaneous, Q8H, Nikkie Sandoval DO, 5,000 Units at 02/07/21 0815  •  hydrOXYzine (ATARAX) tablet 25 mg, 25 mg, Oral, TID PRN, Nikkie Sandoval DO  •  insulin lispro (humaLOG, ADMELOG) injection 0-7 Units, 0-7 Units, Subcutaneous, 4x Daily With Meals & Nightly, Mendoza, Yesika R, APRN, 2 Units at 02/07/21 0915  •  isosorbide mononitrate (IMDUR) 24 hr tablet 30 mg, 30 mg, Oral, Daily, Nikkie Sandoval DO, 30 mg at 02/07/21 0816  •  lisinopril (PRINIVIL,ZESTRIL) tablet 10 mg, 10 mg, Oral, Daily, Nikkie Sandoval DO, 10 mg at 02/07/21 0816  •  Magnesium Sulfate 2 gram Bolus, followed by 8 gram infusion (total Mg dose 10 grams)- Mg less than or equal to 1mg/dL, 2 g, Intravenous, PRN **OR** Magnesium Sulfate 2 gram / 50mL Infusion (GIVE X 3 BAGS TO  EQUAL 6GM TOTAL DOSE) - Mg 1.1 - 1.5 mg/dl, 2 g, Intravenous, PRN **OR** Magnesium Sulfate 4 gram infusion- Mg 1.6-1.9 mg/dL, 4 g, Intravenous, PRN, Jayla Goyal PA-C, Last Rate: 25 mL/hr at 02/05/21 1758, 4 g at 02/05/21 1758  •  metoprolol tartrate (LOPRESSOR) tablet 50 mg, 50 mg, Oral, BID, Nikkie Sandoval DO, 50 mg at 02/07/21 0815  •  morphine injection 2 mg, 2 mg, Intravenous, Q4H PRN, Nikkie Sandoval DO, 2 mg at 02/07/21 0348  •  naloxone (NARCAN) injection 0.2 mg, 0.2 mg, Intravenous, Q5 Min PRN, Nikkie Sandoval DO  •  nicotine (NICODERM CQ) 21 MG/24HR patch 1 patch, 1 patch, Transdermal, Q24H, Jayla Goyal PA-C, 1 patch at 02/07/21 0816  •  ondansetron (ZOFRAN) tablet 4 mg, 4 mg, Oral, Q6H PRN **OR** ondansetron (ZOFRAN) injection 4 mg, 4 mg, Intravenous, Q6H PRN, Nikkie Sandoval DO  •  oxyCODONE-acetaminophen (PERCOCET) 5-325 MG per tablet 1 tablet, 1 tablet, Oral, Q4H PRN, Nikkie Sandoval DO, 1 tablet at 02/07/21 0816  •  pantoprazole (PROTONIX) EC tablet 40 mg, 40 mg, Oral, QAM, Nikkie Sandoval DO, 40 mg at 02/07/21 0815  •  piperacillin-tazobactam (ZOSYN) 3.375 g in iso-osmotic dextrose 50 ml (premix), 3.375 g, Intravenous, Q8H, Nikkie Sandoval DO, 3.375 g at 02/07/21 0915  •  potassium & sodium phosphates (PHOS-NAK) 280-160-250 MG packet - for Phosphorus less than 1.25 mg/dL, 2 packet, Oral, Q6H PRN, 2 packet at 02/06/21 1613 **OR** potassium & sodium phosphates (PHOS-NAK) 280-160-250 MG packet - for Phosphorus 1.25 - 2.5 mg/dL, 2 packet, Oral, Q6H PRN, Yesika Mendoza, APRN, 2 packet at 02/05/21 2123  •  potassium chloride (MICRO-K) CR capsule 10 mEq, 10 mEq, Oral, Daily, Nikkie Sandoval DO, 10 mEq at 02/07/21 0816  •  pregabalin (LYRICA) capsule 200 mg, 200 mg, Oral, TID, Nikkie Sandoval DO, 200 mg at 02/07/21 0816  •  rOPINIRole (REQUIP) tablet 4 mg, 4 mg, Oral, Nightly, Nikkie Sandoval  DO Elis, 4 mg at 21 2018  •  sodium chloride 0.9 % flush 10 mL, 10 mL, Intravenous, Q12H, Nikkie Sandoval DO, 10 mL at 21 2017  •  sodium chloride 0.9 % flush 10 mL, 10 mL, Intravenous, PRN, Nikkie Sandoval DO    Antibiotics:  Anti-Infectives (From admission, onward)    Ordered     Dose/Rate Route Frequency Start Stop    21 0744  DAPTOmycin (CUBICIN) 500 mg/50 mL in sodium chloride     Katja Larios, PharmD reviewed the order on 21 1237.   Ordering Provider: Jennifer Kidd APRN    6 mg/kg × 80.1 kg (Adjusted)  over 30 Minutes Intravenous Every 24 Hours 21 0830 21 0829    21 1521  piperacillin-tazobactam (ZOSYN) 3.375 g in iso-osmotic dextrose 50 ml (premix)     Katja Larios, PharmD reviewed the order on 21 0710.   Ordering Provider: Nikkie Sandoval DO    3.375 g  over 4 Hours Intravenous Every 8 Hours 21 1800 02/10/21 1759         Physical Exam:   Vital Signs  Temp (24hrs), Av.6 °F (37 °C), Min:97.8 °F (36.6 °C), Max:99.3 °F (37.4 °C)    Temp  Min: 97.8 °F (36.6 °C)  Max: 99.3 °F (37.4 °C)  BP  Min: 136/88  Max: 165/61  Pulse  Min: 59  Max: 82  Resp  Min: 18  Max: 20  SpO2  Min: 94 %  Max: 98 %    GENERAL: Awake and alert, uncomfortable  HEENT: Normocephalic, atraumatic.  EOMI. No conjunctival injection. No icterus.    HEART: RRR; No murmur   LUNGS: Clear to auscultation bilaterally without wheezing, rales, rhonchi. Normal respiratory effort. Nonlabored.   ABDOMEN: Soft, nontender, nondistended.   EXT:   Left BKA site well healed   :  Without Sheridan catheter.  MSK:  Right lower extremity with erythema from thigh to ankle.  Right groin incision without redness, drainage, knee incision with staples, no drainage. Dressing CDI.  Significantly tender throughout the leg  SKIN: Warm and dry, except on RLE  NEURO: Oriented to PPT.  PSYCHIATRIC: Normal insight and judgement. Cooperative with PE  PIV    Laboratory  Data    Results from last 7 days   Lab Units 02/06/21  0627 02/05/21  1614 02/04/21  1520   WBC 10*3/mm3 12.84* 20.11* 18.69*   HEMOGLOBIN g/dL 10.4* 12.0* 13.7   HEMATOCRIT % 31.8* 36.5* 40.5   PLATELETS 10*3/mm3 152 179 207     Results from last 7 days   Lab Units 02/06/21  0627   SODIUM mmol/L 137   POTASSIUM mmol/L 3.8   CHLORIDE mmol/L 105   CO2 mmol/L 25.0   BUN mg/dL 8   CREATININE mg/dL 0.71*   GLUCOSE mg/dL 202*   CALCIUM mg/dL 7.9*     Results from last 7 days   Lab Units 02/06/21  0627   ALK PHOS U/L 127*   BILIRUBIN mg/dL 1.0   ALT (SGPT) U/L 39   AST (SGOT) U/L 24         Results from last 7 days   Lab Units 02/04/21  1520   CRP mg/dL 2.29*     Results from last 7 days   Lab Units 02/06/21  0033   LACTATE mmol/L 1.6     Results from last 7 days   Lab Units 02/06/21  0627   CK TOTAL U/L 42     Results from last 7 days   Lab Units 02/06/21  1033   VANCOMYCIN TR mcg/mL 5.80     Estimated Creatinine Clearance: 133.2 mL/min (A) (by C-G formula based on SCr of 0.71 mg/dL (L)).      Microbiology:  Covid negative  Blood cultures negative to date    Radiology:  Imaging Results (Last 72 Hours)     ** No results found for the last 72 hours. **        2/4/21 CTA RLE:  1. Soft tissue stranding and fluid is seen surrounding the patient's recent right femoral/distal arterial graft. It is uncertain to what extent this may represent postoperative fluid versus infection. The graft itself appears patent with adequate runoff  to the level of the smaller plantar vessels.       Impression:   1. Right lower extremity cellulitis, severe deep skin and soft tissue infection complicated by recent graft placement.  right femoral to posterior tibial distal bypass with reverse saphenous vein graft 1/21/21. Fluid and stranding on CT scan: Unclear if this is typical postoperative change versus deep infection.  Due to drainage and sepsis he will likely require at least washout  2. Leukocytosis, sepsis: Improving  3. Lactic acidosis:  Improved  4. PAD  5. T2DM: A1c 7.2  6. Hep C, previously treated with Interferon/Ribaviron, then reinfected  7.  CHF   8. 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 May require I&D  - I anticipate at least several weeks of IV antibiotics     Complex MDM.  High risk for limb loss if not treated aggressively     Trav Washburn MD  2/7/2021  09:43 EST

## 2021-02-08 ENCOUNTER — ANESTHESIA EVENT (OUTPATIENT)
Dept: PERIOP | Facility: HOSPITAL | Age: 56
End: 2021-02-08

## 2021-02-08 PROBLEM — T81.40XA POSTOPERATIVE INFECTION: Status: ACTIVE | Noted: 2021-02-05

## 2021-02-08 LAB
ABO GROUP BLD: NORMAL
ANION GAP SERPL CALCULATED.3IONS-SCNC: 5 MMOL/L (ref 5–15)
BASOPHILS # BLD AUTO: 0.03 10*3/MM3 (ref 0–0.2)
BASOPHILS NFR BLD AUTO: 0.4 % (ref 0–1.5)
BLD GP AB SCN SERPL QL: NEGATIVE
BUN SERPL-MCNC: 8 MG/DL (ref 6–20)
BUN/CREAT SERPL: 12.5 (ref 7–25)
CALCIUM SPEC-SCNC: 8.9 MG/DL (ref 8.6–10.5)
CHLORIDE SERPL-SCNC: 99 MMOL/L (ref 98–107)
CO2 SERPL-SCNC: 28 MMOL/L (ref 22–29)
CREAT SERPL-MCNC: 0.64 MG/DL (ref 0.76–1.27)
DEPRECATED RDW RBC AUTO: 48.7 FL (ref 37–54)
EOSINOPHIL # BLD AUTO: 0.14 10*3/MM3 (ref 0–0.4)
EOSINOPHIL NFR BLD AUTO: 2.1 % (ref 0.3–6.2)
ERYTHROCYTE [DISTWIDTH] IN BLOOD BY AUTOMATED COUNT: 12.9 % (ref 12.3–15.4)
GFR SERPL CREATININE-BSD FRML MDRD: 130 ML/MIN/1.73
GLUCOSE BLDC GLUCOMTR-MCNC: 140 MG/DL (ref 70–130)
GLUCOSE BLDC GLUCOMTR-MCNC: 192 MG/DL (ref 70–130)
GLUCOSE BLDC GLUCOMTR-MCNC: 199 MG/DL (ref 70–130)
GLUCOSE BLDC GLUCOMTR-MCNC: 236 MG/DL (ref 70–130)
GLUCOSE SERPL-MCNC: 188 MG/DL (ref 65–99)
HCT VFR BLD AUTO: 34.3 % (ref 37.5–51)
HGB BLD-MCNC: 11.1 G/DL (ref 13–17.7)
IMM GRANULOCYTES # BLD AUTO: 0.04 10*3/MM3 (ref 0–0.05)
IMM GRANULOCYTES NFR BLD AUTO: 0.6 % (ref 0–0.5)
LYMPHOCYTES # BLD AUTO: 1.46 10*3/MM3 (ref 0.7–3.1)
LYMPHOCYTES NFR BLD AUTO: 21.9 % (ref 19.6–45.3)
MCH RBC QN AUTO: 33.2 PG (ref 26.6–33)
MCHC RBC AUTO-ENTMCNC: 32.4 G/DL (ref 31.5–35.7)
MCV RBC AUTO: 102.7 FL (ref 79–97)
MONOCYTES # BLD AUTO: 0.58 10*3/MM3 (ref 0.1–0.9)
MONOCYTES NFR BLD AUTO: 8.7 % (ref 5–12)
NEUTROPHILS NFR BLD AUTO: 4.43 10*3/MM3 (ref 1.7–7)
NEUTROPHILS NFR BLD AUTO: 66.3 % (ref 42.7–76)
NRBC BLD AUTO-RTO: 0 /100 WBC (ref 0–0.2)
PHOSPHATE SERPL-MCNC: 3.2 MG/DL (ref 2.5–4.5)
PLATELET # BLD AUTO: 195 10*3/MM3 (ref 140–450)
PMV BLD AUTO: 12.8 FL (ref 6–12)
POTASSIUM SERPL-SCNC: 4.5 MMOL/L (ref 3.5–5.2)
RBC # BLD AUTO: 3.34 10*6/MM3 (ref 4.14–5.8)
RH BLD: NEGATIVE
SODIUM SERPL-SCNC: 132 MMOL/L (ref 136–145)
T&S EXPIRATION DATE: NORMAL
WBC # BLD AUTO: 6.68 10*3/MM3 (ref 3.4–10.8)

## 2021-02-08 PROCEDURE — 25010000002 PIPERACILLIN SOD-TAZOBACTAM PER 1 G: Performed by: FAMILY MEDICINE

## 2021-02-08 PROCEDURE — 86923 COMPATIBILITY TEST ELECTRIC: CPT

## 2021-02-08 PROCEDURE — 86901 BLOOD TYPING SEROLOGIC RH(D): CPT | Performed by: PHYSICIAN ASSISTANT

## 2021-02-08 PROCEDURE — 05HY33Z INSERTION OF INFUSION DEVICE INTO UPPER VEIN, PERCUTANEOUS APPROACH: ICD-10-PCS | Performed by: INTERNAL MEDICINE

## 2021-02-08 PROCEDURE — 25010000002 KETOROLAC TROMETHAMINE PER 15 MG: Performed by: PHYSICIAN ASSISTANT

## 2021-02-08 PROCEDURE — 25010000002 HYDROMORPHONE HCL-NACL 30-0.9 MG/30ML-% SOLUTION PREFILLED SYRINGE: Performed by: PHYSICIAN ASSISTANT

## 2021-02-08 PROCEDURE — 86900 BLOOD TYPING SEROLOGIC ABO: CPT | Performed by: PHYSICIAN ASSISTANT

## 2021-02-08 PROCEDURE — 25010000002 HYDROMORPHONE 1 MG/ML SOLUTION: Performed by: INTERNAL MEDICINE

## 2021-02-08 PROCEDURE — 25010000002 DAPTOMYCIN PER 1 MG: Performed by: NURSE PRACTITIONER

## 2021-02-08 PROCEDURE — 25010000002 PIPERACILLIN SOD-TAZOBACTAM PER 1 G: Performed by: INTERNAL MEDICINE

## 2021-02-08 PROCEDURE — 80048 BASIC METABOLIC PNL TOTAL CA: CPT | Performed by: INTERNAL MEDICINE

## 2021-02-08 PROCEDURE — 94799 UNLISTED PULMONARY SVC/PX: CPT

## 2021-02-08 PROCEDURE — 99233 SBSQ HOSP IP/OBS HIGH 50: CPT | Performed by: INTERNAL MEDICINE

## 2021-02-08 PROCEDURE — 63710000001 INSULIN LISPRO (HUMAN) PER 5 UNITS: Performed by: NURSE PRACTITIONER

## 2021-02-08 PROCEDURE — 84100 ASSAY OF PHOSPHORUS: CPT | Performed by: INTERNAL MEDICINE

## 2021-02-08 PROCEDURE — 99024 POSTOP FOLLOW-UP VISIT: CPT | Performed by: THORACIC SURGERY (CARDIOTHORACIC VASCULAR SURGERY)

## 2021-02-08 PROCEDURE — 82962 GLUCOSE BLOOD TEST: CPT

## 2021-02-08 PROCEDURE — 25010000002 HEPARIN (PORCINE) PER 1000 UNITS: Performed by: FAMILY MEDICINE

## 2021-02-08 PROCEDURE — 86850 RBC ANTIBODY SCREEN: CPT | Performed by: PHYSICIAN ASSISTANT

## 2021-02-08 PROCEDURE — 85025 COMPLETE CBC W/AUTO DIFF WBC: CPT | Performed by: INTERNAL MEDICINE

## 2021-02-08 PROCEDURE — 25010000002 MORPHINE PER 10 MG: Performed by: FAMILY MEDICINE

## 2021-02-08 RX ORDER — NALOXONE HCL 0.4 MG/ML
0.1 VIAL (ML) INJECTION
Status: DISCONTINUED | OUTPATIENT
Start: 2021-02-08 | End: 2021-02-17 | Stop reason: HOSPADM

## 2021-02-08 RX ORDER — LIDOCAINE HYDROCHLORIDE 10 MG/ML
0.5 INJECTION, SOLUTION EPIDURAL; INFILTRATION; INTRACAUDAL; PERINEURAL ONCE AS NEEDED
Status: CANCELLED | OUTPATIENT
Start: 2021-02-08

## 2021-02-08 RX ORDER — NALOXONE HCL 0.4 MG/ML
0.1 VIAL (ML) INJECTION
Status: DISCONTINUED | OUTPATIENT
Start: 2021-02-08 | End: 2021-02-16

## 2021-02-08 RX ORDER — KETOROLAC TROMETHAMINE 30 MG/ML
30 INJECTION, SOLUTION INTRAMUSCULAR; INTRAVENOUS EVERY 6 HOURS PRN
Status: COMPLETED | OUTPATIENT
Start: 2021-02-08 | End: 2021-02-11

## 2021-02-08 RX ADMIN — SODIUM CHLORIDE, PRESERVATIVE FREE 10 ML: 5 INJECTION INTRAVENOUS at 20:26

## 2021-02-08 RX ADMIN — HEPARIN SODIUM 5000 UNITS: 5000 INJECTION, SOLUTION INTRAVENOUS; SUBCUTANEOUS at 09:18

## 2021-02-08 RX ADMIN — OXYCODONE AND ACETAMINOPHEN 1 TABLET: 5; 325 TABLET ORAL at 09:21

## 2021-02-08 RX ADMIN — MORPHINE SULFATE 2 MG: 2 INJECTION, SOLUTION INTRAMUSCULAR; INTRAVENOUS at 12:14

## 2021-02-08 RX ADMIN — LORAZEPAM 1 MG: 1 TABLET ORAL at 18:25

## 2021-02-08 RX ADMIN — POTASSIUM CHLORIDE 10 MEQ: 10 CAPSULE, COATED, EXTENDED RELEASE ORAL at 09:21

## 2021-02-08 RX ADMIN — METOPROLOL TARTRATE 50 MG: 50 TABLET, FILM COATED ORAL at 20:26

## 2021-02-08 RX ADMIN — HEPARIN SODIUM 5000 UNITS: 5000 INJECTION, SOLUTION INTRAVENOUS; SUBCUTANEOUS at 16:08

## 2021-02-08 RX ADMIN — DULOXETINE HYDROCHLORIDE 60 MG: 60 CAPSULE, DELAYED RELEASE ORAL at 09:18

## 2021-02-08 RX ADMIN — ROPINIROLE HYDROCHLORIDE 4 MG: 2 TABLET, FILM COATED ORAL at 20:25

## 2021-02-08 RX ADMIN — PREGABALIN 200 MG: 100 CAPSULE ORAL at 16:09

## 2021-02-08 RX ADMIN — PREGABALIN 200 MG: 100 CAPSULE ORAL at 20:25

## 2021-02-08 RX ADMIN — DULOXETINE HYDROCHLORIDE 60 MG: 60 CAPSULE, DELAYED RELEASE ORAL at 20:25

## 2021-02-08 RX ADMIN — ATORVASTATIN CALCIUM 40 MG: 40 TABLET, FILM COATED ORAL at 20:26

## 2021-02-08 RX ADMIN — ARFORMOTEROL TARTRATE 15 MCG: 15 SOLUTION RESPIRATORY (INHALATION) at 09:59

## 2021-02-08 RX ADMIN — OXYCODONE AND ACETAMINOPHEN 1 TABLET: 5; 325 TABLET ORAL at 14:37

## 2021-02-08 RX ADMIN — TAZOBACTAM SODIUM AND PIPERACILLIN SODIUM 3.38 G: 375; 3 INJECTION, SOLUTION INTRAVENOUS at 23:54

## 2021-02-08 RX ADMIN — OXYCODONE AND ACETAMINOPHEN 1 TABLET: 5; 325 TABLET ORAL at 19:27

## 2021-02-08 RX ADMIN — LISINOPRIL 10 MG: 10 TABLET ORAL at 09:25

## 2021-02-08 RX ADMIN — HEPARIN SODIUM 5000 UNITS: 5000 INJECTION, SOLUTION INTRAVENOUS; SUBCUTANEOUS at 23:54

## 2021-02-08 RX ADMIN — TAZOBACTAM SODIUM AND PIPERACILLIN SODIUM 3.38 G: 375; 3 INJECTION, SOLUTION INTRAVENOUS at 04:02

## 2021-02-08 RX ADMIN — KETOROLAC TROMETHAMINE 30 MG: 30 INJECTION, SOLUTION INTRAMUSCULAR; INTRAVENOUS at 20:07

## 2021-02-08 RX ADMIN — Medication 1 PATCH: at 09:21

## 2021-02-08 RX ADMIN — INSULIN LISPRO 3 UNITS: 100 INJECTION, SOLUTION INTRAVENOUS; SUBCUTANEOUS at 20:26

## 2021-02-08 RX ADMIN — METOPROLOL TARTRATE 50 MG: 50 TABLET, FILM COATED ORAL at 09:19

## 2021-02-08 RX ADMIN — ISOSORBIDE MONONITRATE 30 MG: 30 TABLET, EXTENDED RELEASE ORAL at 09:17

## 2021-02-08 RX ADMIN — DAPTOMYCIN 480.6 MG: 500 INJECTION, POWDER, LYOPHILIZED, FOR SOLUTION INTRAVENOUS at 09:06

## 2021-02-08 RX ADMIN — Medication: at 20:06

## 2021-02-08 RX ADMIN — HYDROMORPHONE HYDROCHLORIDE 1 MG: 1 INJECTION, SOLUTION INTRAMUSCULAR; INTRAVENOUS; SUBCUTANEOUS at 18:26

## 2021-02-08 RX ADMIN — SODIUM CHLORIDE, PRESERVATIVE FREE 10 ML: 5 INJECTION INTRAVENOUS at 12:18

## 2021-02-08 RX ADMIN — MORPHINE SULFATE 2 MG: 2 INJECTION, SOLUTION INTRAMUSCULAR; INTRAVENOUS at 17:43

## 2021-02-08 RX ADMIN — TAZOBACTAM SODIUM AND PIPERACILLIN SODIUM 3.38 G: 375; 3 INJECTION, SOLUTION INTRAVENOUS at 21:50

## 2021-02-08 RX ADMIN — INSULIN LISPRO 2 UNITS: 100 INJECTION, SOLUTION INTRAVENOUS; SUBCUTANEOUS at 09:24

## 2021-02-08 RX ADMIN — ASPIRIN 81 MG: 81 TABLET, CHEWABLE ORAL at 09:21

## 2021-02-08 RX ADMIN — MORPHINE SULFATE 2 MG: 2 INJECTION, SOLUTION INTRAMUSCULAR; INTRAVENOUS at 00:56

## 2021-02-08 RX ADMIN — OXYCODONE AND ACETAMINOPHEN 1 TABLET: 5; 325 TABLET ORAL at 04:06

## 2021-02-08 RX ADMIN — HYDROXYZINE HYDROCHLORIDE 25 MG: 25 TABLET, FILM COATED ORAL at 20:06

## 2021-02-08 RX ADMIN — TAZOBACTAM SODIUM AND PIPERACILLIN SODIUM 3.38 G: 375; 3 INJECTION, SOLUTION INTRAVENOUS at 00:56

## 2021-02-08 RX ADMIN — FUROSEMIDE 20 MG: 20 TABLET ORAL at 09:18

## 2021-02-08 RX ADMIN — PREGABALIN 200 MG: 100 CAPSULE ORAL at 09:19

## 2021-02-08 RX ADMIN — BUDESONIDE 0.5 MG: 0.5 INHALANT ORAL at 09:59

## 2021-02-08 RX ADMIN — INSULIN LISPRO 2 UNITS: 100 INJECTION, SOLUTION INTRAVENOUS; SUBCUTANEOUS at 12:14

## 2021-02-08 RX ADMIN — TAZOBACTAM SODIUM AND PIPERACILLIN SODIUM 3.38 G: 375; 3 INJECTION, SOLUTION INTRAVENOUS at 12:14

## 2021-02-08 NOTE — PROGRESS NOTES
INFECTIOUS DISEASE Progress Note    Reg Sanchez  1965  [id number]     Reason for Consultation: right lower extremity celluilitis    History of present illness:    Patient is a 55 y.o. male, with Diabetes, PAD, CHF, Hep C,  s/p right femoral to posterior tib bypass, with reverse saphenous vein graft 1/21/21- discharged on 2/1/21.  On 2/3/21 he developed fever up to 103 degrees,  chills, with pain and swelling, redness of right lower extremity.  CT angio with runfoff revealed soft tissue stranding and perigraft fluid identifiabl ein the groin extending to the level of the knee with patent graft.  He was transferred to [facility] from [place]. Tmax of 102.6 degrees.  Admitting labs with a leukocytosis of 18,000, CRP 2.29, LAC 2.1, PCT 0.41, blood cultures no growth so far.  He was started on Vancomycin and Zosyn  and we were consulted for evaluation and treatment.  Complains of significant swelling from the thigh region down to the knee.  There is some clear weeping fluid near the most distal lesion.    2/7/21: Afebrile overnight.  Hemodynamically stable.  Tolerating current antibiotics.  Complains of significant leg pain from the thigh down towards the foot.  There appears to be less serous drainage on the dressing    2/8/21: Still complains of leg pain, swelling and drainage although overall slightly improved.  Wound cultures with MRSA.  Dr. Trevizo planning I&D tomorrow    ROS:  No fevers or chills. No diarrhea. Poor appetite. No rash. No new ADR to Abx.       No Known Allergies      Medication:    Current Facility-Administered Medications:   •  acetaminophen (TYLENOL) tablet 650 mg, 650 mg, Oral, Q4H PRN, 650 mg at 02/07/21 0352 **OR** acetaminophen (TYLENOL) 160 MG/5ML solution 650 mg, 650 mg, Oral, Q4H PRN **OR** acetaminophen (TYLENOL) suppository 650 mg, 650 mg, Rectal, Q4H PRN, Nikkie Sandoval DO  •  albuterol (PROVENTIL) nebulizer solution 0.083% 2.5 mg/3mL, 2.5 mg, Nebulization, Q4H PRN,  Nikkie Sandoval DO  •  arformoterol (BROVANA) nebulizer solution 15 mcg, 15 mcg, Nebulization, BID - RT, Nikkie Sandoval DO, 15 mcg at 02/08/21 0959  •  aspirin chewable tablet 81 mg, 81 mg, Oral, Daily, Nikkie Sandoval DO, 81 mg at 02/08/21 0921  •  atorvastatin (LIPITOR) tablet 40 mg, 40 mg, Oral, Nightly, Nikkie Sandoval DO, 40 mg at 02/07/21 2011  •  budesonide (PULMICORT) nebulizer solution 0.5 mg, 0.5 mg, Nebulization, BID - RT, Nikkie Sandoval DO, 0.5 mg at 02/08/21 0959  •  DAPTOmycin (CUBICIN) 500 mg/50 mL in sodium chloride, 6 mg/kg (Adjusted), Intravenous, Q24H, Jennifer Kidd APRN, 480.6 mg at 02/08/21 0906  •  dextrose (D50W) 25 g/ 50mL Intravenous Solution 25 g, 25 g, Intravenous, Q15 Min PRN, Jayla Goyal PA-C  •  dextrose (GLUTOSE) oral gel 15 g, 15 g, Oral, Q15 Min PRN, Jayla Goyal PA-C  •  DULoxetine (CYMBALTA) DR capsule 60 mg, 60 mg, Oral, BID, Nikkie Sandoval DO, 60 mg at 02/08/21 0918  •  furosemide (LASIX) tablet 20 mg, 20 mg, Oral, Daily, Nikkie Sandoval DO, 20 mg at 02/08/21 0918  •  glucagon (human recombinant) (GLUCAGEN DIAGNOSTIC) injection 1 mg, 1 mg, Subcutaneous, Q15 Min PRN, Jayla Goyal PA-C  •  heparin (porcine) 5000 UNIT/ML injection 5,000 Units, 5,000 Units, Subcutaneous, Q8H, Nikkie Sandoval DO, 5,000 Units at 02/08/21 0918  •  hydrOXYzine (ATARAX) tablet 25 mg, 25 mg, Oral, TID PRN, Nikkie Sandoval DO, 25 mg at 02/07/21 1047  •  insulin lispro (humaLOG, ADMELOG) injection 0-7 Units, 0-7 Units, Subcutaneous, 4x Daily With Meals & Nightly, Yesika Mendoza, APRN, 2 Units at 02/08/21 0924  •  isosorbide mononitrate (IMDUR) 24 hr tablet 30 mg, 30 mg, Oral, Daily, Nikkie Sandoval DO, 30 mg at 02/08/21 0917  •  lisinopril (PRINIVIL,ZESTRIL) tablet 10 mg, 10 mg, Oral, Daily, Nikkie Sandoval DO, 10 mg at 02/08/21 0925  •  LORazepam (ATIVAN) tablet  1 mg, 1 mg, Oral, Q6H PRN, Oscar Garcia MD, 1 mg at 02/07/21 2013  •  Magnesium Sulfate 2 gram Bolus, followed by 8 gram infusion (total Mg dose 10 grams)- Mg less than or equal to 1mg/dL, 2 g, Intravenous, PRN **OR** Magnesium Sulfate 2 gram / 50mL Infusion (GIVE X 3 BAGS TO EQUAL 6GM TOTAL DOSE) - Mg 1.1 - 1.5 mg/dl, 2 g, Intravenous, PRN **OR** Magnesium Sulfate 4 gram infusion- Mg 1.6-1.9 mg/dL, 4 g, Intravenous, PRN, Jayla Goyal PA-C, Last Rate: 25 mL/hr at 02/05/21 1758, 4 g at 02/05/21 1758  •  metoprolol tartrate (LOPRESSOR) tablet 50 mg, 50 mg, Oral, BID, Nikkie Sandoval DO, 50 mg at 02/08/21 0919  •  morphine injection 2 mg, 2 mg, Intravenous, Q4H PRN, Nikkie Sandoval DO, 2 mg at 02/08/21 0056  •  naloxone (NARCAN) injection 0.2 mg, 0.2 mg, Intravenous, Q5 Min PRN, Nikkie Sandoval DO  •  nicotine (NICODERM CQ) 21 MG/24HR patch 1 patch, 1 patch, Transdermal, Q24H, Jayla Goyal PA-C, 1 patch at 02/08/21 0921  •  ondansetron (ZOFRAN) tablet 4 mg, 4 mg, Oral, Q6H PRN **OR** ondansetron (ZOFRAN) injection 4 mg, 4 mg, Intravenous, Q6H PRN, Nikkie Sandoval DO  •  oxyCODONE-acetaminophen (PERCOCET) 5-325 MG per tablet 1 tablet, 1 tablet, Oral, Q4H PRN, Nikkie Sandoval DO, 1 tablet at 02/08/21 0921  •  pantoprazole (PROTONIX) EC tablet 40 mg, 40 mg, Oral, QAM, Nikkie Sandoval DO, 40 mg at 02/07/21 0815  •  piperacillin-tazobactam (ZOSYN) 3.375 g in iso-osmotic dextrose 50 ml (premix), 3.375 g, Intravenous, Q8H, Trav Washburn MD, 3.375 g at 02/08/21 0402  •  potassium & sodium phosphates (PHOS-NAK) 280-160-250 MG packet - for Phosphorus less than 1.25 mg/dL, 2 packet, Oral, Q6H PRN, 2 packet at 02/06/21 1613 **OR** potassium & sodium phosphates (PHOS-NAK) 280-160-250 MG packet - for Phosphorus 1.25 - 2.5 mg/dL, 2 packet, Oral, Q6H PRN, Yesika Mendoza, APRN, 2 packet at 02/05/21 2123  •  potassium chloride (MICRO-K) CR capsule 10  mEq, 10 mEq, Oral, Daily, Nikkie Sandoval DO, 10 mEq at 21  •  pregabalin (LYRICA) capsule 200 mg, 200 mg, Oral, TID, Nikkie Sandoval, , 200 mg at 21  •  rOPINIRole (REQUIP) tablet 4 mg, 4 mg, Oral, Nightly, Nikkie Sandoval DO, 4 mg at 21  •  sodium chloride 0.9 % flush 10 mL, 10 mL, Intravenous, Q12H, RebekaquericeNikkie, , 10 mL at 21  •  sodium chloride 0.9 % flush 10 mL, 10 mL, Intravenous, PRN, Nikkie Sandoval DO    Antibiotics:  Anti-Infectives (From admission, onward)    Ordered     Dose/Rate Route Frequency Start Stop    21 0744  DAPTOmycin (CUBICIN) 500 mg/50 mL in sodium chloride     Katja Larios, PharmD reviewed the order on 21 1237.   Ordering Provider: Jennifer Kidd APRN    6 mg/kg × 80.1 kg (Adjusted)  over 30 Minutes Intravenous Every 24 Hours 21 0830 21 0829    21 1521  piperacillin-tazobactam (ZOSYN) 3.375 g in iso-osmotic dextrose 50 ml (premix)     Trav Washburn MD reviewed the order on 21 09.   Ordering Provider: Trav aWshburn MD    3.375 g  over 4 Hours Intravenous Every 8 Hours 21 1800 21 1759         Physical Exam:   Vital Signs  Temp (24hrs), Av.1 °F (36.7 °C), Min:97.5 °F (36.4 °C), Max:98.5 °F (36.9 °C)    Temp  Min: 97.5 °F (36.4 °C)  Max: 98.5 °F (36.9 °C)  BP  Min: 145/59  Max: 171/71  Pulse  Min: 63  Max: 80  Resp  Min: 16  Max: 20  SpO2  Min: 96 %  Max: 98 %    GENERAL: Awake and alert, more comfortable  HEENT: Normocephalic, atraumatic.  EOMI. No conjunctival injection. No icterus.    HEART: RRR; No murmur   LUNGS: Clear to auscultation bilaterally without wheezing, rales, rhonchi. Normal respiratory effort. Nonlabored.   ABDOMEN: Soft, nontender, nondistended.   EXT:   Left BKA site well healed   :  Without Sheridan catheter.  MSK:  Right lower extremity with edema and erythema from thigh to ankle, slightly  improved.  Dressing with serous discharge.  Moderate tenderness to palpation  SKIN: Warm and dry, except on RLE  NEURO: Oriented to PPT.  PSYCHIATRIC: Normal insight and judgement. Cooperative with PE  PIV    Laboratory Data    Results from last 7 days   Lab Units 02/08/21  0404 02/06/21  0627 02/05/21  1614   WBC 10*3/mm3 6.68 12.84* 20.11*   HEMOGLOBIN g/dL 11.1* 10.4* 12.0*   HEMATOCRIT % 34.3* 31.8* 36.5*   PLATELETS 10*3/mm3 195 152 179     Results from last 7 days   Lab Units 02/08/21  0404   SODIUM mmol/L 132*   POTASSIUM mmol/L 4.5   CHLORIDE mmol/L 99   CO2 mmol/L 28.0   BUN mg/dL 8   CREATININE mg/dL 0.64*   GLUCOSE mg/dL 188*   CALCIUM mg/dL 8.9     Results from last 7 days   Lab Units 02/06/21  0627   ALK PHOS U/L 127*   BILIRUBIN mg/dL 1.0   ALT (SGPT) U/L 39   AST (SGOT) U/L 24         Results from last 7 days   Lab Units 02/04/21  1520   CRP mg/dL 2.29*     Results from last 7 days   Lab Units 02/06/21  0033   LACTATE mmol/L 1.6     Results from last 7 days   Lab Units 02/06/21  0627   CK TOTAL U/L 42     Results from last 7 days   Lab Units 02/06/21  1033   VANCOMYCIN TR mcg/mL 5.80     Estimated Creatinine Clearance: 147.8 mL/min (A) (by C-G formula based on SCr of 0.64 mg/dL (L)).      Microbiology:  Covid negative  Blood cultures negative to date  Wound drainage culture with MRSA    Radiology:  Imaging Results (Last 72 Hours)     ** No results found for the last 72 hours. **        2/4/21 CTA RLE:  1. Soft tissue stranding and fluid is seen surrounding the patient's recent right femoral/distal arterial graft. It is uncertain to what extent this may represent postoperative fluid versus infection. The graft itself appears patent with adequate runoff  to the level of the smaller plantar vessels.       Impression:   1. Right lower extremity cellulitis, severe deep skin and soft tissue infection complicated by recent graft placement due to MRSA:  right femoral to posterior tibial distal bypass with  reverse saphenous vein graft 1/21/21. Fluid and stranding on CT scan.  Dr Trevizo planning I&D tomorrow  2. Leukocytosis, sepsis: Improving  3. Lactic acidosis: Improved  4. PAD  5. T2DM: A1c 7.2  6. Hep C, previously treated with Interferon/Ribaviron, then reinfected  7.  CHF   8. 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 well healed    PLAN/RECOMMENDATIONS:   - Follow-up pending blood cultures, so far negative  - Gram stain and culture of right ankle wound pending  - Follow CBC, CMP, CK    Continue current antibiotics pending further culture results:  - Daptomycin 6mg/kg q 24 hrs for MRSA  - Continue Zosyn for possible polymicrobial infection     - I anticipate at least several weeks of IV antibiotics via PICC      High risk for limb loss if not treated aggressively. I will follow      Trav Washburn MD  2/8/2021  11:11 EST

## 2021-02-08 NOTE — PROGRESS NOTES
Discharge Planning Assessment  [facility]     Patient Name: Reg Sanchez  MRN: [id number]  Today's Date: 2/8/2021    Admit Date: 2/5/2021    Discharge Needs Assessment     Row Name 02/08/21 6586       Living Environment    Lives With  significant other    Name(s) of Who Lives With Patient  Ever Dixon    Current Living Arrangements  home/apartment/condo    Primary Care Provided by  self    Provides Primary Care For  no one    Family Caregiver if Needed  significant other    Family Caregiver Names  Lilia Dixon    Quality of Family Relationships  supportive;involved;helpful    Able to Return to Prior Arrangements  yes       Resource/Environmental Concerns    Resource/Environmental Concerns  none    Transportation Concerns  car, none       Transition Planning    Patient/Family Anticipates Transition to  home with family    Patient/Family Anticipated Services at Transition      Transportation Anticipated  family or friend will provide       Discharge Needs Assessment    Readmission Within the Last 30 Days  previous discharge plan unsuccessful    Equipment Currently Used at Home  wheelchair, motorized;prosthesis Left leg prosthesis    Concerns to be Addressed  discharge planning    Anticipated Changes Related to Illness  none    Current Discharge Risk  physical impairment    Discharge Coordination/Progress  [facility]        Discharge Plan     Row Name 02/08/21 5327       Plan    Plan  Home    Patient/Family in Agreement with Plan  yes    Plan Comments  Spoke with patient at bedside.  He lives in [facility] with his ever and their dog.  Independent with equipment, has a left leg prosthesis and motorized wheelchair. Likely home with IV antibiotics, pending final ID plan. Additional wound cultures pending.  Daughter to transport.    Final Discharge Disposition Code  01 - home or self-care        Continued Care and Services - Admitted Since 2/5/2021     Coordination has not been started for this encounter.     Selected Continued Care - Prior Encounters Includes selections from prior encounters from 11/7/2020 to 2/8/2021    Discharged on 2/1/2021 Admission date: 1/25/2021 - Discharge disposition: Home or Self Care    Home Medical Care     Service Provider Selected Services Address Phone Fax Patient Preferred    [facility] [address] [facility] [address] [phone] -- --                    Expected Discharge Date and Time     Expected Discharge Date Expected Discharge Time    Feb 13, 2021         Demographic Summary     Row Name 02/08/21 1402       General Information    Admission Type  inpatient    Arrived From  emergency department    Referral Source  admission list    Reason for Consult  discharge planning    Preferred Language  English     Used During This Interaction  no        Functional Status     Row Name 02/08/21 1402       Functional Status    Usual Activity Tolerance  good    Current Activity Tolerance  good       Functional Status, IADL    Medications  independent    Meal Preparation  assistive equipment    Housekeeping  assistive equipment    Laundry  assistive equipment    Shopping  assistive equipment       Mental Status Summary    Recent Changes in Mental Status/Cognitive Functioning  no changes       Employment/    Employment Status  unemployed    Employment/ Comments  [employer], [place] [employer]        Psychosocial    No documentation.       Abuse/Neglect    No documentation.       Legal    No documentation.       Substance Abuse    No documentation.       Patient Forms    No documentation.           Ama Johnson RN

## 2021-02-08 NOTE — PROGRESS NOTES
[facility] Medicine Services  PROGRESS NOTE    Patient Name: Reg Sanchez  : 1965  MRN: [id number]    Date of Admission: 2021  Primary Care Physician: Sheila Sheikh APRN    Subjective   Subjective     CC:  Fever, right leg pain    HPI:  Pt denies any issues overnight, having some pain RLE at wound site    ROS:  Gen- No fevers, chills  CV- No chest pain, palpitations  Resp- No cough, dyspnea  GI- No N/V/D, abd pain        Objective   Objective     Vital Signs:   Temp:  [97.5 °F (36.4 °C)-98.5 °F (36.9 °C)] 98.3 °F (36.8 °C)  Heart Rate:  [63-80] 69  Resp:  [16-20] 16  BP: (145-171)/(57-71) 159/57        Physical Exam:  Constitutional: No acute distress, awake, alert  HENT: NCAT, mucous membranes moist  Respiratory: Clear to auscultation bilaterally, respiratory effort normal   Cardiovascular: RRR, no murmurs, rubs, or gallops  Gastrointestinal: Positive bowel sounds, soft, nontender, nondistended  Musculoskeletal: Left lower extremity below-knee amputation.  Right lower extremity is edematous, lower leg wrapped in dressing with erythema visible from superior portion  Psychiatric: Appropriate affect, cooperative  Neurologic: Oriented x 3, strength symmetric in all extremities, Cranial Nerves grossly intact to confrontation, speech clear  Skin: as above    Results Reviewed:  Results from last 7 days   Lab Units 21  1519   WBC 10*3/mm3 6.68 12.84* 20.11*   < >  --    HEMOGLOBIN g/dL 11.1* 10.4* 12.0*   < >  --    HEMATOCRIT % 34.3* 31.8* 36.5*   < >  --    PLATELETS 10*3/mm3 195 152 179   < >  --    INR   --   --   --   --  1.14*   PROCALCITONIN ng/mL  --   --  0.41*  --   --     < > = values in this interval not displayed.     Results from last 7 days   Lab Units 21  16121  1520   SODIUM mmol/L 132* 137 136 128*   POTASSIUM mmol/L 4.5 3.8 3.9 4.0   CHLORIDE mmol/L 99 105 103  95*   CO2 mmol/L 28.0 25.0 25.0 24.4   BUN mg/dL 8 8 11 12   CREATININE mg/dL 0.64* 0.71* 0.73* 0.89   GLUCOSE mg/dL 188* 202* 218* 225*   CALCIUM mg/dL 8.9 7.9* 8.3* 8.9   ALT (SGPT) U/L  --  39 52* 79*   AST (SGOT) U/L  --  24 27 50*     Estimated Creatinine Clearance: 147.8 mL/min (A) (by C-G formula based on SCr of 0.64 mg/dL (L)).    Microbiology Results Abnormal     Procedure Component Value - Date/Time    Wound Culture - Wound, Leg, Right [[id number]]  (Abnormal) Collected: 02/06/21 1748    Lab Status: Preliminary result Specimen: Wound from Leg, Right Updated: 02/08/21 0734     Wound Culture Moderate growth (3+) Staphylococcus aureus, MRSA     Comment: Methicillin resistant Staphylococcus aureus, Patient may be an isolation risk.        Gram Stain Few (2+) WBCs seen      No organisms seen    Blood Culture - Blood, Hand, Left [[id number]] Collected: 02/05/21 1806    Lab Status: Preliminary result Specimen: Blood from Hand, Left Updated: 02/07/21 1930     Blood Culture No growth at 2 days    Blood Culture - Blood, Arm, Left [[id number]] Collected: 02/05/21 1806    Lab Status: Preliminary result Specimen: Blood from Arm, Left Updated: 02/07/21 1930     Blood Culture No growth at 2 days    Narrative:      Aerobic bottle only            Imaging Results (Last 24 Hours)     ** No results found for the last 24 hours. **          Results for orders placed during the hospital encounter of 01/25/21   Adult Transthoracic Echo Complete W/ Cont if Necessary Per Protocol    Narrative · Left ventricular ejection fraction appears to be 66 - 70%. Left   ventricular systolic function is normal.  · The right ventricular cavity is borderline dilated.  · Left ventricular diastolic function was normal.  · No significant structural or functional valvular disease.          I have reviewed the medications:  Scheduled Meds:arformoterol, 15 mcg, Nebulization, BID - RT  aspirin, 81 mg, Oral, Daily  atorvastatin, 40 mg, Oral,  Nightly  budesonide, 0.5 mg, Nebulization, BID - RT  DAPTOmycin, 6 mg/kg (Adjusted), Intravenous, Q24H  DULoxetine, 60 mg, Oral, BID  furosemide, 20 mg, Oral, Daily  heparin (porcine), 5,000 Units, Subcutaneous, Q8H  insulin lispro, 0-7 Units, Subcutaneous, 4x Daily With Meals & Nightly  isosorbide mononitrate, 30 mg, Oral, Daily  lisinopril, 10 mg, Oral, Daily  metoprolol tartrate, 50 mg, Oral, BID  nicotine, 1 patch, Transdermal, Q24H  pantoprazole, 40 mg, Oral, QAM  piperacillin-tazobactam, 3.375 g, Intravenous, Q8H  potassium chloride, 10 mEq, Oral, Daily  pregabalin, 200 mg, Oral, TID  rOPINIRole, 4 mg, Oral, Nightly  sodium chloride, 10 mL, Intravenous, Q12H      Continuous Infusions:   PRN Meds:.•  acetaminophen **OR** acetaminophen **OR** acetaminophen  •  albuterol  •  dextrose  •  dextrose  •  glucagon (human recombinant)  •  hydrOXYzine  •  LORazepam  •  magnesium sulfate **OR** magnesium sulfate **OR** magnesium sulfate  •  Morphine  •  naloxone  •  ondansetron **OR** ondansetron  •  oxyCODONE-acetaminophen  •  potassium & sodium phosphates **OR** potassium & sodium phosphates  •  sodium chloride    Assessment/Plan   Assessment & Plan     Active Hospital Problems    Diagnosis  POA   • **Postoperative infection [T81.40XA]  Unknown   • Post-operative infection [T81.40XA]  Yes   • S/P L leg amputation  [S88.029A]  Yes   • PVD  [I73.9]  Yes   • Tobacco use [Z72.0]  Yes      Resolved Hospital Problems   No resolved problems to display.        Brief Hospital Course to date:  Reg Sanchez is a 55 y.o. male with past medical history significant for tobacco abuse, diabetes mellitus, peripheral vascular disease, congestive heart failure, coronary artery disease status post CABG, left leg osteomyelitis status post left leg amputation, peripheral arterial disease and most recently right SFA and popliteal artery occlusion underwent a right femoral artery bypass with Dr. Trevizo 1/27/2021.  The patient developed  fever on 2/2/2021. On 2/3/21 he developed right leg swelling and redness. On 2/4/21, he presented to outside hospital where he had lab work indicating inflammatory/infectious process and a CT angiogram of the leg which revealed soft tissue stranding and fluid surrounding the recent right femoral/distal artery graft.  Patent runoff with adequate flow.      This patient's problems and plans were partially entered by my partner and updated as appropriate by me 02/08/21.        Right leg cellulitis   Right femoral artery bypass graft infection  --Patient is currently on IV antibiotics and ID is following.  Has improved since admission but there is concern for deep soft tissue infection that requires some washout or draining.   --MRSA for wound culture  --plans for surgical intervention tomorrow, 2/9, with Dr. Trevizo     Congestive heart failure  --currently on Lasix.  Will monitor renal function.     COPD  --PRN nebs     Diabetes mellitus   -SSI     Hyperlipidemia  -Lipitor 40mg     Tobacco abuse  -nicotine patch    etoh abuse  - ativan PRN       DVT Prophylaxis:  SALIMA      Disposition: TBD    CODE STATUS:   Code Status and Medical Interventions:   Ordered at: 02/05/21 1505     Code Status:    CPR     Medical Interventions (Level of Support Prior to Arrest):    Full       Jessie Kendall MD  02/08/21

## 2021-02-09 ENCOUNTER — ANESTHESIA (OUTPATIENT)
Dept: PERIOP | Facility: HOSPITAL | Age: 56
End: 2021-02-09

## 2021-02-09 LAB
ANION GAP SERPL CALCULATED.3IONS-SCNC: 8 MMOL/L (ref 5–15)
BACTERIA SPEC AEROBE CULT: NORMAL
BACTERIA SPEC AEROBE CULT: NORMAL
BUN SERPL-MCNC: 13 MG/DL (ref 6–20)
BUN/CREAT SERPL: 13.8 (ref 7–25)
CALCIUM SPEC-SCNC: 9.2 MG/DL (ref 8.6–10.5)
CHLORIDE SERPL-SCNC: 97 MMOL/L (ref 98–107)
CO2 SERPL-SCNC: 28 MMOL/L (ref 22–29)
CREAT SERPL-MCNC: 0.94 MG/DL (ref 0.76–1.27)
D-LACTATE SERPL-SCNC: 3.7 MMOL/L (ref 0.5–2)
DEPRECATED RDW RBC AUTO: 46.4 FL (ref 37–54)
ERYTHROCYTE [DISTWIDTH] IN BLOOD BY AUTOMATED COUNT: 12.9 % (ref 12.3–15.4)
GFR SERPL CREATININE-BSD FRML MDRD: 83 ML/MIN/1.73
GLUCOSE BLDC GLUCOMTR-MCNC: 165 MG/DL (ref 70–130)
GLUCOSE BLDC GLUCOMTR-MCNC: 191 MG/DL (ref 70–130)
GLUCOSE BLDC GLUCOMTR-MCNC: 223 MG/DL (ref 70–130)
GLUCOSE BLDC GLUCOMTR-MCNC: 227 MG/DL (ref 70–130)
GLUCOSE BLDC GLUCOMTR-MCNC: 268 MG/DL (ref 70–130)
GLUCOSE SERPL-MCNC: 235 MG/DL (ref 65–99)
HCT VFR BLD AUTO: 27.5 % (ref 37.5–51)
HGB BLD-MCNC: 9.1 G/DL (ref 13–17.7)
MCH RBC QN AUTO: 32.9 PG (ref 26.6–33)
MCHC RBC AUTO-ENTMCNC: 33.1 G/DL (ref 31.5–35.7)
MCV RBC AUTO: 99.3 FL (ref 79–97)
PLATELET # BLD AUTO: 253 10*3/MM3 (ref 140–450)
PMV BLD AUTO: 12.5 FL (ref 6–12)
POTASSIUM SERPL-SCNC: 4.8 MMOL/L (ref 3.5–5.2)
POTASSIUM SERPL-SCNC: 5.7 MMOL/L (ref 3.5–5.2)
RBC # BLD AUTO: 2.77 10*6/MM3 (ref 4.14–5.8)
SODIUM SERPL-SCNC: 133 MMOL/L (ref 136–145)
WBC # BLD AUTO: 17.78 10*3/MM3 (ref 3.4–10.8)

## 2021-02-09 PROCEDURE — 87176 TISSUE HOMOGENIZATION CULTR: CPT | Performed by: THORACIC SURGERY (CARDIOTHORACIC VASCULAR SURGERY)

## 2021-02-09 PROCEDURE — 82962 GLUCOSE BLOOD TEST: CPT

## 2021-02-09 PROCEDURE — 25010000002 ALBUMIN HUMAN 5% PER 50 ML: Performed by: NURSE ANESTHETIST, CERTIFIED REGISTERED

## 2021-02-09 PROCEDURE — C1894 INTRO/SHEATH, NON-LASER: HCPCS

## 2021-02-09 PROCEDURE — 83605 ASSAY OF LACTIC ACID: CPT | Performed by: PHYSICIAN ASSISTANT

## 2021-02-09 PROCEDURE — 25010000003 LIDOCAINE 1 % SOLUTION: Performed by: NURSE ANESTHETIST, CERTIFIED REGISTERED

## 2021-02-09 PROCEDURE — 0H9HXZZ DRAINAGE OF RIGHT UPPER LEG SKIN, EXTERNAL APPROACH: ICD-10-PCS | Performed by: THORACIC SURGERY (CARDIOTHORACIC VASCULAR SURGERY)

## 2021-02-09 PROCEDURE — 84132 ASSAY OF SERUM POTASSIUM: CPT | Performed by: INTERNAL MEDICINE

## 2021-02-09 PROCEDURE — 0J9C0ZZ DRAINAGE OF PELVIC REGION SUBCUTANEOUS TISSUE AND FASCIA, OPEN APPROACH: ICD-10-PCS | Performed by: THORACIC SURGERY (CARDIOTHORACIC VASCULAR SURGERY)

## 2021-02-09 PROCEDURE — 25010000002 FENTANYL CITRATE (PF) 100 MCG/2ML SOLUTION: Performed by: NURSE ANESTHETIST, CERTIFIED REGISTERED

## 2021-02-09 PROCEDURE — 25010000002 DAPTOMYCIN PER 1 MG: Performed by: NURSE PRACTITIONER

## 2021-02-09 PROCEDURE — 87070 CULTURE OTHR SPECIMN AEROBIC: CPT | Performed by: THORACIC SURGERY (CARDIOTHORACIC VASCULAR SURGERY)

## 2021-02-09 PROCEDURE — 10140 I&D HMTMA SEROMA/FLUID COLLJ: CPT | Performed by: THORACIC SURGERY (CARDIOTHORACIC VASCULAR SURGERY)

## 2021-02-09 PROCEDURE — 25010000002 PIPERACILLIN SOD-TAZOBACTAM PER 1 G: Performed by: PHYSICIAN ASSISTANT

## 2021-02-09 PROCEDURE — 25010000002 PROPOFOL 10 MG/ML EMULSION: Performed by: NURSE ANESTHETIST, CERTIFIED REGISTERED

## 2021-02-09 PROCEDURE — 63710000001 INSULIN LISPRO (HUMAN) PER 5 UNITS: Performed by: PHYSICIAN ASSISTANT

## 2021-02-09 PROCEDURE — 87205 SMEAR GRAM STAIN: CPT | Performed by: THORACIC SURGERY (CARDIOTHORACIC VASCULAR SURGERY)

## 2021-02-09 PROCEDURE — 99233 SBSQ HOSP IP/OBS HIGH 50: CPT | Performed by: INTERNAL MEDICINE

## 2021-02-09 PROCEDURE — 63710000001 INSULIN LISPRO (HUMAN) PER 5 UNITS: Performed by: NURSE PRACTITIONER

## 2021-02-09 PROCEDURE — 87102 FUNGUS ISOLATION CULTURE: CPT | Performed by: THORACIC SURGERY (CARDIOTHORACIC VASCULAR SURGERY)

## 2021-02-09 PROCEDURE — 25010000002 PIPERACILLIN SOD-TAZOBACTAM PER 1 G: Performed by: INTERNAL MEDICINE

## 2021-02-09 PROCEDURE — 87075 CULTR BACTERIA EXCEPT BLOOD: CPT | Performed by: THORACIC SURGERY (CARDIOTHORACIC VASCULAR SURGERY)

## 2021-02-09 PROCEDURE — P9041 ALBUMIN (HUMAN),5%, 50ML: HCPCS | Performed by: NURSE ANESTHETIST, CERTIFIED REGISTERED

## 2021-02-09 PROCEDURE — 0H9KXZZ DRAINAGE OF RIGHT LOWER LEG SKIN, EXTERNAL APPROACH: ICD-10-PCS | Performed by: THORACIC SURGERY (CARDIOTHORACIC VASCULAR SURGERY)

## 2021-02-09 PROCEDURE — 85027 COMPLETE CBC AUTOMATED: CPT | Performed by: INTERNAL MEDICINE

## 2021-02-09 PROCEDURE — 80048 BASIC METABOLIC PNL TOTAL CA: CPT | Performed by: INTERNAL MEDICINE

## 2021-02-09 PROCEDURE — 25010000002 HYDROMORPHONE HCL-NACL 30-0.9 MG/30ML-% SOLUTION PREFILLED SYRINGE: Performed by: PHYSICIAN ASSISTANT

## 2021-02-09 PROCEDURE — 87206 SMEAR FLUORESCENT/ACID STAI: CPT | Performed by: THORACIC SURGERY (CARDIOTHORACIC VASCULAR SURGERY)

## 2021-02-09 PROCEDURE — 25010000002 ONDANSETRON PER 1 MG: Performed by: NURSE ANESTHETIST, CERTIFIED REGISTERED

## 2021-02-09 PROCEDURE — C1751 CATH, INF, PER/CENT/MIDLINE: HCPCS

## 2021-02-09 PROCEDURE — 99024 POSTOP FOLLOW-UP VISIT: CPT | Performed by: THORACIC SURGERY (CARDIOTHORACIC VASCULAR SURGERY)

## 2021-02-09 PROCEDURE — 94799 UNLISTED PULMONARY SVC/PX: CPT

## 2021-02-09 PROCEDURE — 87116 MYCOBACTERIA CULTURE: CPT | Performed by: THORACIC SURGERY (CARDIOTHORACIC VASCULAR SURGERY)

## 2021-02-09 PROCEDURE — 25010000002 NEOSTIGMINE 10 MG/10ML SOLUTION: Performed by: NURSE ANESTHETIST, CERTIFIED REGISTERED

## 2021-02-09 PROCEDURE — 99222 1ST HOSP IP/OBS MODERATE 55: CPT | Performed by: INTERNAL MEDICINE

## 2021-02-09 RX ORDER — MIDAZOLAM HYDROCHLORIDE 1 MG/ML
2 INJECTION INTRAMUSCULAR; INTRAVENOUS
Status: DISCONTINUED | OUTPATIENT
Start: 2021-02-09 | End: 2021-02-09 | Stop reason: HOSPADM

## 2021-02-09 RX ORDER — PHENYLEPHRINE HCL IN 0.9% NACL 0.5 MG/5ML
.5-3 SYRINGE (ML) INTRAVENOUS
Status: DISCONTINUED | OUTPATIENT
Start: 2021-02-09 | End: 2021-02-10

## 2021-02-09 RX ORDER — GLYCOPYRROLATE 0.2 MG/ML
INJECTION INTRAMUSCULAR; INTRAVENOUS AS NEEDED
Status: DISCONTINUED | OUTPATIENT
Start: 2021-02-09 | End: 2021-02-09 | Stop reason: SURG

## 2021-02-09 RX ORDER — LIDOCAINE HYDROCHLORIDE 10 MG/ML
INJECTION, SOLUTION INFILTRATION; PERINEURAL AS NEEDED
Status: DISCONTINUED | OUTPATIENT
Start: 2021-02-09 | End: 2021-02-09 | Stop reason: SURG

## 2021-02-09 RX ORDER — SODIUM CHLORIDE, SODIUM LACTATE, POTASSIUM CHLORIDE, CALCIUM CHLORIDE 600; 310; 30; 20 MG/100ML; MG/100ML; MG/100ML; MG/100ML
9 INJECTION, SOLUTION INTRAVENOUS CONTINUOUS
Status: DISCONTINUED | OUTPATIENT
Start: 2021-02-09 | End: 2021-02-10

## 2021-02-09 RX ORDER — LORAZEPAM 2 MG/ML
1 INJECTION INTRAMUSCULAR ONCE
Status: COMPLETED | OUTPATIENT
Start: 2021-02-10 | End: 2021-02-09

## 2021-02-09 RX ORDER — HYDROMORPHONE HCL 110MG/55ML
0.5 PATIENT CONTROLLED ANALGESIA SYRINGE INTRAVENOUS ONCE
Status: DISCONTINUED | OUTPATIENT
Start: 2021-02-09 | End: 2021-02-09

## 2021-02-09 RX ORDER — PROPOFOL 10 MG/ML
VIAL (ML) INTRAVENOUS AS NEEDED
Status: DISCONTINUED | OUTPATIENT
Start: 2021-02-09 | End: 2021-02-09 | Stop reason: SURG

## 2021-02-09 RX ORDER — IPRATROPIUM BROMIDE AND ALBUTEROL SULFATE 2.5; .5 MG/3ML; MG/3ML
3 SOLUTION RESPIRATORY (INHALATION) ONCE
Status: DISCONTINUED | OUTPATIENT
Start: 2021-02-09 | End: 2021-02-09 | Stop reason: HOSPADM

## 2021-02-09 RX ORDER — SODIUM CHLORIDE 0.9 % (FLUSH) 0.9 %
10 SYRINGE (ML) INJECTION EVERY 12 HOURS SCHEDULED
Status: DISCONTINUED | OUTPATIENT
Start: 2021-02-09 | End: 2021-02-09 | Stop reason: HOSPADM

## 2021-02-09 RX ORDER — MIDAZOLAM HYDROCHLORIDE 1 MG/ML
1 INJECTION INTRAMUSCULAR; INTRAVENOUS
Status: DISCONTINUED | OUTPATIENT
Start: 2021-02-09 | End: 2021-02-09 | Stop reason: HOSPADM

## 2021-02-09 RX ORDER — HYDROMORPHONE HCL 110MG/55ML
0.5 PATIENT CONTROLLED ANALGESIA SYRINGE INTRAVENOUS ONCE
Status: DISCONTINUED | OUTPATIENT
Start: 2021-02-09 | End: 2021-02-09 | Stop reason: HOSPADM

## 2021-02-09 RX ORDER — FENTANYL CITRATE 50 UG/ML
50 INJECTION, SOLUTION INTRAMUSCULAR; INTRAVENOUS
Status: DISCONTINUED | OUTPATIENT
Start: 2021-02-09 | End: 2021-02-09

## 2021-02-09 RX ORDER — MAGNESIUM HYDROXIDE 1200 MG/15ML
LIQUID ORAL AS NEEDED
Status: DISCONTINUED | OUTPATIENT
Start: 2021-02-09 | End: 2021-02-09 | Stop reason: HOSPADM

## 2021-02-09 RX ORDER — NEOSTIGMINE METHYLSULFATE 1 MG/ML
INJECTION, SOLUTION INTRAVENOUS AS NEEDED
Status: DISCONTINUED | OUTPATIENT
Start: 2021-02-09 | End: 2021-02-09 | Stop reason: SURG

## 2021-02-09 RX ORDER — SODIUM HYPOCHLORITE 1.25 MG/ML
SOLUTION TOPICAL ONCE
Status: DISCONTINUED | OUTPATIENT
Start: 2021-02-09 | End: 2021-02-09 | Stop reason: HOSPADM

## 2021-02-09 RX ORDER — BUPIVACAINE HCL/0.9 % NACL/PF 0.125 %
PLASTIC BAG, INJECTION (ML) EPIDURAL AS NEEDED
Status: DISCONTINUED | OUTPATIENT
Start: 2021-02-09 | End: 2021-02-09 | Stop reason: SURG

## 2021-02-09 RX ORDER — SODIUM HYPOCHLORITE 1.25 MG/ML
SOLUTION TOPICAL AS NEEDED
Status: DISCONTINUED | OUTPATIENT
Start: 2021-02-09 | End: 2021-02-09 | Stop reason: HOSPADM

## 2021-02-09 RX ORDER — ATRACURIUM BESYLATE 10 MG/ML
INJECTION, SOLUTION INTRAVENOUS AS NEEDED
Status: DISCONTINUED | OUTPATIENT
Start: 2021-02-09 | End: 2021-02-09 | Stop reason: SURG

## 2021-02-09 RX ORDER — ONDANSETRON 2 MG/ML
INJECTION INTRAMUSCULAR; INTRAVENOUS AS NEEDED
Status: DISCONTINUED | OUTPATIENT
Start: 2021-02-09 | End: 2021-02-09 | Stop reason: SURG

## 2021-02-09 RX ORDER — SODIUM CHLORIDE 0.9 % (FLUSH) 0.9 %
10 SYRINGE (ML) INJECTION AS NEEDED
Status: DISCONTINUED | OUTPATIENT
Start: 2021-02-09 | End: 2021-02-17 | Stop reason: HOSPADM

## 2021-02-09 RX ORDER — SODIUM CHLORIDE, SODIUM LACTATE, POTASSIUM CHLORIDE, CALCIUM CHLORIDE 600; 310; 30; 20 MG/100ML; MG/100ML; MG/100ML; MG/100ML
INJECTION, SOLUTION INTRAVENOUS CONTINUOUS PRN
Status: DISCONTINUED | OUTPATIENT
Start: 2021-02-09 | End: 2021-02-09 | Stop reason: SURG

## 2021-02-09 RX ORDER — ALBUMIN, HUMAN INJ 5% 5 %
SOLUTION INTRAVENOUS CONTINUOUS PRN
Status: DISCONTINUED | OUTPATIENT
Start: 2021-02-09 | End: 2021-02-09 | Stop reason: SURG

## 2021-02-09 RX ORDER — FENTANYL CITRATE 50 UG/ML
INJECTION, SOLUTION INTRAMUSCULAR; INTRAVENOUS AS NEEDED
Status: DISCONTINUED | OUTPATIENT
Start: 2021-02-09 | End: 2021-02-09 | Stop reason: SURG

## 2021-02-09 RX ORDER — SODIUM CHLORIDE 0.9 % (FLUSH) 0.9 %
10 SYRINGE (ML) INJECTION AS NEEDED
Status: DISCONTINUED | OUTPATIENT
Start: 2021-02-09 | End: 2021-02-09 | Stop reason: HOSPADM

## 2021-02-09 RX ORDER — SODIUM CHLORIDE 0.9 % (FLUSH) 0.9 %
10 SYRINGE (ML) INJECTION EVERY 12 HOURS SCHEDULED
Status: DISCONTINUED | OUTPATIENT
Start: 2021-02-09 | End: 2021-02-17 | Stop reason: HOSPADM

## 2021-02-09 RX ADMIN — ALBUTEROL SULFATE 2.5 MG: 2.5 SOLUTION RESPIRATORY (INHALATION) at 23:44

## 2021-02-09 RX ADMIN — Medication 1 PATCH: at 09:29

## 2021-02-09 RX ADMIN — PREGABALIN 200 MG: 100 CAPSULE ORAL at 20:24

## 2021-02-09 RX ADMIN — FENTANYL CITRATE 50 MCG: 50 INJECTION, SOLUTION INTRAMUSCULAR; INTRAVENOUS at 17:57

## 2021-02-09 RX ADMIN — FENTANYL CITRATE 50 MCG: 50 INJECTION, SOLUTION INTRAMUSCULAR; INTRAVENOUS at 18:30

## 2021-02-09 RX ADMIN — ROPINIROLE HYDROCHLORIDE 4 MG: 2 TABLET, FILM COATED ORAL at 20:24

## 2021-02-09 RX ADMIN — ATORVASTATIN CALCIUM 40 MG: 40 TABLET, FILM COATED ORAL at 20:24

## 2021-02-09 RX ADMIN — PREGABALIN 200 MG: 100 CAPSULE ORAL at 09:10

## 2021-02-09 RX ADMIN — Medication 100 MCG: at 17:07

## 2021-02-09 RX ADMIN — DULOXETINE HYDROCHLORIDE 60 MG: 60 CAPSULE, DELAYED RELEASE ORAL at 20:24

## 2021-02-09 RX ADMIN — ASPIRIN 81 MG: 81 TABLET, CHEWABLE ORAL at 09:09

## 2021-02-09 RX ADMIN — Medication: at 18:43

## 2021-02-09 RX ADMIN — INSULIN LISPRO 2 UNITS: 100 INJECTION, SOLUTION INTRAVENOUS; SUBCUTANEOUS at 12:28

## 2021-02-09 RX ADMIN — ATRACURIUM BESYLATE 20 MG: 10 INJECTION, SOLUTION INTRAVENOUS at 17:33

## 2021-02-09 RX ADMIN — LIDOCAINE HYDROCHLORIDE 50 MG: 10 INJECTION, SOLUTION INFILTRATION; PERINEURAL at 16:42

## 2021-02-09 RX ADMIN — ARFORMOTEROL TARTRATE 15 MCG: 15 SOLUTION RESPIRATORY (INHALATION) at 08:00

## 2021-02-09 RX ADMIN — METOPROLOL TARTRATE 50 MG: 50 TABLET, FILM COATED ORAL at 09:09

## 2021-02-09 RX ADMIN — PATIROMER 2 PACKET: 8.4 POWDER, FOR SUSPENSION ORAL at 10:57

## 2021-02-09 RX ADMIN — DAPTOMYCIN 480.6 MG: 500 INJECTION, POWDER, LYOPHILIZED, FOR SOLUTION INTRAVENOUS at 09:14

## 2021-02-09 RX ADMIN — LORAZEPAM 1 MG: 2 INJECTION INTRAMUSCULAR; INTRAVENOUS at 23:55

## 2021-02-09 RX ADMIN — SODIUM CHLORIDE, POTASSIUM CHLORIDE, SODIUM LACTATE AND CALCIUM CHLORIDE: 600; 310; 30; 20 INJECTION, SOLUTION INTRAVENOUS at 16:35

## 2021-02-09 RX ADMIN — TAZOBACTAM SODIUM AND PIPERACILLIN SODIUM 3.38 G: 375; 3 INJECTION, SOLUTION INTRAVENOUS at 20:23

## 2021-02-09 RX ADMIN — ALBUMIN HUMAN: 0.05 INJECTION, SOLUTION INTRAVENOUS at 17:21

## 2021-02-09 RX ADMIN — TAZOBACTAM SODIUM AND PIPERACILLIN SODIUM 3.38 G: 375; 3 INJECTION, SOLUTION INTRAVENOUS at 10:51

## 2021-02-09 RX ADMIN — PANTOPRAZOLE SODIUM 40 MG: 40 TABLET, DELAYED RELEASE ORAL at 05:36

## 2021-02-09 RX ADMIN — PROPOFOL 130 MG: 10 INJECTION, EMULSION INTRAVENOUS at 16:42

## 2021-02-09 RX ADMIN — INSULIN LISPRO 3 UNITS: 100 INJECTION, SOLUTION INTRAVENOUS; SUBCUTANEOUS at 09:09

## 2021-02-09 RX ADMIN — FENTANYL CITRATE 50 MCG: 50 INJECTION, SOLUTION INTRAMUSCULAR; INTRAVENOUS at 18:15

## 2021-02-09 RX ADMIN — GLYCOPYRROLATE 0.4 MG: 0.4 INJECTION INTRAMUSCULAR; INTRAVENOUS at 17:41

## 2021-02-09 RX ADMIN — SODIUM CHLORIDE, PRESERVATIVE FREE 10 ML: 5 INJECTION INTRAVENOUS at 20:26

## 2021-02-09 RX ADMIN — LISINOPRIL 10 MG: 10 TABLET ORAL at 09:10

## 2021-02-09 RX ADMIN — FUROSEMIDE 20 MG: 20 TABLET ORAL at 09:10

## 2021-02-09 RX ADMIN — SODIUM CHLORIDE 1000 ML: 9 INJECTION, SOLUTION INTRAVENOUS at 14:52

## 2021-02-09 RX ADMIN — OXYCODONE AND ACETAMINOPHEN 1 TABLET: 5; 325 TABLET ORAL at 20:35

## 2021-02-09 RX ADMIN — ONDANSETRON 4 MG: 2 INJECTION INTRAMUSCULAR; INTRAVENOUS at 16:35

## 2021-02-09 RX ADMIN — SODIUM CHLORIDE, PRESERVATIVE FREE 10 ML: 5 INJECTION INTRAVENOUS at 09:37

## 2021-02-09 RX ADMIN — Medication 100 MCG: at 16:51

## 2021-02-09 RX ADMIN — FENTANYL CITRATE 50 MCG: 50 INJECTION, SOLUTION INTRAMUSCULAR; INTRAVENOUS at 16:42

## 2021-02-09 RX ADMIN — INSULIN LISPRO 3 UNITS: 100 INJECTION, SOLUTION INTRAVENOUS; SUBCUTANEOUS at 20:35

## 2021-02-09 RX ADMIN — ATRACURIUM BESYLATE 30 MG: 10 INJECTION, SOLUTION INTRAVENOUS at 16:42

## 2021-02-09 RX ADMIN — DULOXETINE HYDROCHLORIDE 60 MG: 60 CAPSULE, DELAYED RELEASE ORAL at 09:10

## 2021-02-09 RX ADMIN — SODIUM CHLORIDE, PRESERVATIVE FREE 10 ML: 5 INJECTION INTRAVENOUS at 12:29

## 2021-02-09 RX ADMIN — ISOSORBIDE MONONITRATE 30 MG: 30 TABLET, EXTENDED RELEASE ORAL at 09:10

## 2021-02-09 RX ADMIN — Medication 100 MCG: at 16:46

## 2021-02-09 RX ADMIN — BUDESONIDE 0.5 MG: 0.5 INHALANT ORAL at 08:00

## 2021-02-09 RX ADMIN — SODIUM CHLORIDE, POTASSIUM CHLORIDE, SODIUM LACTATE AND CALCIUM CHLORIDE 9 ML/HR: 600; 310; 30; 20 INJECTION, SOLUTION INTRAVENOUS at 16:10

## 2021-02-09 RX ADMIN — NEOSTIGMINE 4 MG: 1 INJECTION INTRAVENOUS at 17:41

## 2021-02-09 NOTE — PROGRESS NOTES
[facility] Medicine Services  PROGRESS NOTE    Patient Name: Reg Sanchez  : 1965  MRN: [id number]    Date of Admission: 2021  Primary Care Physician: Sheila Sheikh APRN    Subjective   Subjective     CC:  Fever, right leg pain    HPI:  Pt had increased pain at incision site last pm. Doing better now with PCA pump.     ROS:  Gen- No fevers, chills  CV- No chest pain, palpitations  Resp- No cough, dyspnea  GI- No N/V/D, abd pain        Objective   Objective     Vital Signs:   Temp:  [98 °F (36.7 °C)-98.4 °F (36.9 °C)] 98.3 °F (36.8 °C)  Heart Rate:  [57-86] 65  Resp:  [15-22] 16  BP: ()/(52-83) 108/52        Physical Exam:  Constitutional: No acute distress, awake, alert  HENT: NCAT, mucous membranes moist  Respiratory: Clear to auscultation bilaterally, respiratory effort normal   Cardiovascular: RRR, no murmurs, rubs, or gallops  Gastrointestinal: Positive bowel sounds, soft, nontender, nondistended  Musculoskeletal: Left lower extremity below-knee amputation.  Right lower extremity is edematous, lower leg wrapped in dressing with erythema visible from superior portion  Psychiatric: Appropriate affect, cooperative  Neurologic: Oriented x 3, strength symmetric in all extremities, Cranial Nerves grossly intact to confrontation, speech clear  Skin: as above    Results Reviewed:  Results from last 7 days   Lab Units 21  0358 21  0404 21  0627 21  1614  21  1519   WBC 10*3/mm3 17.78* 6.68 12.84* 20.11*   < >  --    HEMOGLOBIN g/dL 9.1* 11.1* 10.4* 12.0*   < >  --    HEMATOCRIT % 27.5* 34.3* 31.8* 36.5*   < >  --    PLATELETS 10*3/mm3 253 195 152 179   < >  --    INR   --   --   --   --   --  1.14*   PROCALCITONIN ng/mL  --   --   --  0.41*  --   --     < > = values in this interval not displayed.     Results from last 7 days   Lab Units 21  0358 21  0404 21  0627 21  1614 21  1520   SODIUM mmol/L 133* 132*  137 136 128*   POTASSIUM mmol/L 5.7* 4.5 3.8 3.9 4.0   CHLORIDE mmol/L 97* 99 105 103 95*   CO2 mmol/L 28.0 28.0 25.0 25.0 24.4   BUN mg/dL 13 8 8 11 12   CREATININE mg/dL 0.94 0.64* 0.71* 0.73* 0.89   GLUCOSE mg/dL 235* 188* 202* 218* 225*   CALCIUM mg/dL 9.2 8.9 7.9* 8.3* 8.9   ALT (SGPT) U/L  --   --  39 52* 79*   AST (SGOT) U/L  --   --  24 27 50*     Estimated Creatinine Clearance: 100.6 mL/min (by C-G formula based on SCr of 0.94 mg/dL).    Microbiology Results Abnormal     Procedure Component Value - Date/Time    Wound Culture - Wound, Leg, Right [[id number]]  (Abnormal)  (Susceptibility) Collected: 02/06/21 1748    Lab Status: Final result Specimen: Wound from Leg, Right Updated: 02/09/21 0643     Wound Culture Moderate growth (3+) Staphylococcus aureus, MRSA     Comment: Methicillin resistant Staphylococcus aureus, Patient may be an isolation risk.        Gram Stain Few (2+) WBCs seen      No organisms seen    Susceptibility      Staphylococcus aureus, MRSA     AZIZA     Clindamycin Susceptible     Erythromycin Susceptible     Inducible Clindamycin Resistance Negative     Oxacillin Resistant     Penicillin G Resistant     Rifampin Susceptible     Tetracycline Susceptible     Trimethoprim + Sulfamethoxazole Susceptible     Vancomycin Susceptible                Susceptibility Comments     Staphylococcus aureus, MRSA    This isolate does not demonstrate inducible clindamycin resistance in vitro.               Blood Culture - Blood, Hand, Left [[id number]] Collected: 02/05/21 1806    Lab Status: Preliminary result Specimen: Blood from Hand, Left Updated: 02/08/21 1930     Blood Culture No growth at 3 days    Blood Culture - Blood, Arm, Left [[id number]] Collected: 02/05/21 1806    Lab Status: Preliminary result Specimen: Blood from Arm, Left Updated: 02/08/21 1930     Blood Culture No growth at 3 days    Narrative:      Aerobic bottle only            Imaging Results (Last 24 Hours)     ** No results found for the  last 24 hours. **          Results for orders placed during the hospital encounter of 01/25/21   Adult Transthoracic Echo Complete W/ Cont if Necessary Per Protocol    Narrative · Left ventricular ejection fraction appears to be 66 - 70%. Left   ventricular systolic function is normal.  · The right ventricular cavity is borderline dilated.  · Left ventricular diastolic function was normal.  · No significant structural or functional valvular disease.          I have reviewed the medications:  Scheduled Meds:arformoterol, 15 mcg, Nebulization, BID - RT  aspirin, 81 mg, Oral, Daily  atorvastatin, 40 mg, Oral, Nightly  budesonide, 0.5 mg, Nebulization, BID - RT  DAPTOmycin, 6 mg/kg (Adjusted), Intravenous, Q24H  DULoxetine, 60 mg, Oral, BID  furosemide, 20 mg, Oral, Daily  insulin lispro, 0-7 Units, Subcutaneous, 4x Daily With Meals & Nightly  isosorbide mononitrate, 30 mg, Oral, Daily  lisinopril, 10 mg, Oral, Daily  metoprolol tartrate, 50 mg, Oral, BID  nicotine, 1 patch, Transdermal, Q24H  pantoprazole, 40 mg, Oral, QAM  Patiromer Sorbitex Calcium, 16.8 g, Oral, Once  piperacillin-tazobactam, 3.375 g, Intravenous, Q8H  pregabalin, 200 mg, Oral, TID  rOPINIRole, 4 mg, Oral, Nightly  sodium chloride, 10 mL, Intravenous, Q12H      Continuous Infusions:HYDROmorphone HCl-NaCl,       PRN Meds:.•  acetaminophen **OR** acetaminophen **OR** acetaminophen  •  albuterol  •  dextrose  •  dextrose  •  glucagon (human recombinant)  •  HYDROmorphone  •  hydrOXYzine  •  ketorolac  •  LORazepam  •  magnesium sulfate **OR** magnesium sulfate **OR** magnesium sulfate  •  Morphine  •  naloxone  •  naloxone  •  naloxone  •  ondansetron **OR** ondansetron  •  oxyCODONE-acetaminophen  •  potassium & sodium phosphates **OR** potassium & sodium phosphates  •  sodium chloride    Assessment/Plan   Assessment & Plan     Active Hospital Problems    Diagnosis  POA   • **Postoperative infection [T81.40XA]  Unknown   • Post-operative infection  [T81.40XA]  Yes   • S/P L leg amputation  [S88.919A]  Yes   • PVD  [I73.9]  Yes   • Tobacco use [Z72.0]  Yes      Resolved Hospital Problems   No resolved problems to display.        Brief Hospital Course to date:  Reg Sanchez is a 55 y.o. male with past medical history significant for tobacco abuse, diabetes mellitus, peripheral vascular disease, congestive heart failure, coronary artery disease status post CABG, left leg osteomyelitis status post left leg amputation, peripheral arterial disease and most recently right SFA and popliteal artery occlusion underwent a right femoral artery bypass with Dr. Trevizo 1/27/2021.  The patient developed fever on 2/2/2021. On 2/3/21 he developed right leg swelling and redness. On 2/4/21, he presented to outside hospital where he had lab work indicating inflammatory/infectious process and a CT angiogram of the leg which revealed soft tissue stranding and fluid surrounding the recent right femoral/distal artery graft.  Patent runoff with adequate flow.      This patient's problems and plans were partially entered by my partner and updated as appropriate by me 02/09/21.        Right leg cellulitis   Right femoral artery bypass graft infection  --Patient is currently on IV antibiotics and ID is following.  Has improved since admission but there is concern for deep soft tissue infection that requires some washout or draining.   -- adjusted pain medications as Morphine was inadequate per pt  --MRSA from wound culture  --plans for surgical intervention today, 2/9, with Dr. Trevizo     Congestive heart failure  --currently on Lasix.  Will monitor renal function.    Hyperkalemia  --stop K+ replacement  --give two packets Veltassa this am, repeat K+ at one pm    Leukocytosis  --significantly worse today, likely due to above.  Plan for I&D in OR today     COPD  --PRN nebs     Diabetes mellitus   -SSI     Hyperlipidemia  -Lipitor 40mg     Tobacco abuse  -nicotine patch    etoh  abuse  - ativan PRN       DVT Prophylaxis:  SALIMA      Disposition: TBD    CODE STATUS:   Code Status and Medical Interventions:   Ordered at: 02/05/21 1505     Code Status:    CPR     Medical Interventions (Level of Support Prior to Arrest):    Full       Jessie Kendall MD  02/09/21

## 2021-02-09 NOTE — ANESTHESIA POSTPROCEDURE EVALUATION
Patient: Reg Sanchez    Procedure Summary     Date: 02/09/21 Room / Location:  [facility]  /  [facility]    Anesthesia Start: 1635 Anesthesia Stop: 1802    Procedure: INCISION AND DRAINAGE RIGHT LOWER EXTREMITY, EVACUATION OF HEMATOMA, LIGATION OF FEM DISTAL BYPASS (Right ) Diagnosis:       Postoperative infection, unspecified type, initial encounter      (Postoperative infection, unspecified type, initial encounter [T81.40XA])    Surgeon: Jeremy Trevizo MD Provider: Mervin Lopez MD    Anesthesia Type: general ASA Status: 3          Anesthesia Type: general    Vitals  No vitals data found for the desired time range.          Anesthesia Post Evaluation

## 2021-02-09 NOTE — ANESTHESIA PREPROCEDURE EVALUATION
Anesthesia Evaluation     Patient summary reviewed and Nursing notes reviewed   NPO Solid Status: > 8 hours  NPO Liquid Status: > 8 hours           Airway   Mallampati: I  TM distance: >3 FB  Neck ROM: full  No difficulty expected  Dental    (+) upper dentures    Pulmonary    (+) a smoker Current, COPD (MDI) moderate, sleep apnea,   (-) shortness of breath  Cardiovascular     ECG reviewed  Patient on routine beta blocker and Beta blocker given within 24 hours of surgery    (+) hypertension, CAD, CABG, cardiac stents within the past 12 months CHF Diastolic >=55%, PVD, hyperlipidemia,   (-) dysrhythmias, angina    ROS comment: ECG NSR   ECHO1/2021-  EF>66 % RV cavity is borderline dilated.  Stress EF  69% -no focal wall motion abnormalities.  No evidence of  ischemic dilation or of increased lung uptake of radiopharmaceutical.      Neuro/Psych  GI/Hepatic/Renal/Endo    (+) morbid obesity,  hepatitis C, diabetes mellitus (aic >7) type 2 poorly controlled,   (-) liver disease    Musculoskeletal     Abdominal    Substance History   (+) alcohol use, drug use (daily MJ)     OB/GYN          Other        ROS/Med Hx Other: HGB  9.1  cr 0.94   k 4.8  Gluc 191  1/27/21 LE bypass- [id number] view      Phys Exam Other: Beard     Left Leg amuptee trauma                   Anesthesia Plan    ASA 3     general     intravenous induction     Anesthetic plan, all risks, benefits, and alternatives have been provided, discussed and informed consent has been obtained with: patient.    Plan discussed with CRNA.

## 2021-02-09 NOTE — ANESTHESIA PROCEDURE NOTES
Airway  Urgency: elective    Date/Time: 2/9/2021 4:43 PM  Airway not difficult    General Information and Staff    Patient location during procedure: OR  CRNA: Andree García CRNA    Indications and Patient Condition  Indications for airway management: airway protection    Preoxygenated: yes  MILS not maintained throughout  Mask difficulty assessment: 1 - vent by mask    Final Airway Details  Final airway type: endotracheal airway      Successful airway: ETT  Cuffed: yes   Successful intubation technique: direct laryngoscopy  Facilitating devices/methods: intubating stylet  Endotracheal tube insertion site: oral  Blade: Mendoza  Blade size: 2  ETT size (mm): 7.0  Cormack-Lehane Classification: grade I - full view of glottis  Placement verified by: chest auscultation and capnometry   Measured from: lips  ETT/EBT  to lips (cm): 20  Number of attempts at approach: 1  Assessment: lips, teeth, and gum same as pre-op and atraumatic intubation    Additional Comments  Negative epigastric sounds, Breath sound equal bilaterally with symmetric chest rise and fall.  No teeth, atraumatic

## 2021-02-09 NOTE — PROGRESS NOTES
CTS Progress Note       LOS: 4 days   Patient Care Team:  Sheila Shiekh APRN as PCP - General (Nurse Practitioner)    Chief Complaint: Postoperative infection    Vital Signs:  Temp:  [98 °F (36.7 °C)-98.4 °F (36.9 °C)] 98.3 °F (36.8 °C)  Heart Rate:  [57-86] 65  Resp:  [16-22] 18  BP: ()/(52-83) 108/52    Physical Exam:       Results:   Results from last 7 days   Lab Units 02/09/21  0358   WBC 10*3/mm3 17.78*   HEMOGLOBIN g/dL 9.1*   HEMATOCRIT % 27.5*   PLATELETS 10*3/mm3 253     Results from last 7 days   Lab Units 02/09/21  0358   SODIUM mmol/L 133*   POTASSIUM mmol/L 5.7*   CHLORIDE mmol/L 97*   CO2 mmol/L 28.0   BUN mg/dL 13   CREATININE mg/dL 0.94   GLUCOSE mg/dL 235*   CALCIUM mg/dL 9.2           Imaging Results (Last 24 Hours)     ** No results found for the last 24 hours. **          Assessment      Postoperative infection    Tobacco use    PVD     S/P L leg amputation     Post-operative infection        Plan   Plan for incision and drainage of leg wound today    Please note that portions of this note were completed with a voice recognition program. Efforts were made to edit the dictations, but occasionally words are mistranscribed.    Jeremy Trevizo MD  02/09/21  06:18 EST

## 2021-02-09 NOTE — PROGRESS NOTES
INFECTIOUS DISEASE Progress Note    Reg Sanchez  1965  [id number]     Reason for Consultation: right lower extremity celluilitis    History of present illness:    Patient is a 55 y.o. male, with Diabetes, PAD, CHF, Hep C,  s/p right femoral to posterior tib bypass, with reverse saphenous vein graft 1/21/21- discharged on 2/1/21.  On 2/3/21 he developed fever up to 103 degrees,  chills, with pain and swelling, redness of right lower extremity.  CT angio with runfoff revealed soft tissue stranding and perigraft fluid identifiabl ein the groin extending to the level of the knee with patent graft.  He was transferred to [facility] from [place]. Tmax of 102.6 degrees.  Admitting labs with a leukocytosis of 18,000, CRP 2.29, LAC 2.1, PCT 0.41, blood cultures no growth so far.  He was started on Vancomycin and Zosyn  and we were consulted for evaluation and treatment.  Complains of significant swelling from the thigh region down to the knee.  There is some clear weeping fluid near the most distal lesion.    2/7/21: Afebrile overnight.  Hemodynamically stable.  Tolerating current antibiotics.  Complains of significant leg pain from the thigh down towards the foot.  There appears to be less serous drainage on the dressing    2/8/21: Still complains of leg pain, swelling and drainage although overall slightly improved.  Wound cultures with MRSA.  Dr. Trevizo planning I&D tomorrow    2/9/21: Afebrile overnight.  Tolerating current antibodics.  Have an episode where he had worsening swelling and bruising of the right upper thigh last night but today it has improved.  Tolerating her antibiotics.  Awaiting surgery later today    ROS:  No fevers or chills. No diarrhea. Poor appetite. No rash. No new ADR to Abx.       No Known Allergies      Medication:    Current Facility-Administered Medications:   •  [MAR Hold] acetaminophen (TYLENOL) tablet 650 mg, 650 mg, Oral, Q4H PRN, 650 mg at 02/07/21 0352 **OR** [MAR Hold]  acetaminophen (TYLENOL) 160 MG/5ML solution 650 mg, 650 mg, Oral, Q4H PRN **OR** [MAR Hold] acetaminophen (TYLENOL) suppository 650 mg, 650 mg, Rectal, Q4H PRN, Nikkie Sandoval DO  •  [MAR Hold] albuterol (PROVENTIL) nebulizer solution 0.083% 2.5 mg/3mL, 2.5 mg, Nebulization, Q4H PRN, Nikkie Sandoval DO  •  [MAR Hold] arformoterol (BROVANA) nebulizer solution 15 mcg, 15 mcg, Nebulization, BID - RT, Nikkie Sandoval DO, 15 mcg at 02/09/21 0800  •  [MAR Hold] aspirin chewable tablet 81 mg, 81 mg, Oral, Daily, Nikkei Sandoval DO, 81 mg at 02/09/21 0909  •  [MAR Hold] atorvastatin (LIPITOR) tablet 40 mg, 40 mg, Oral, Nightly, Nikkie Sandoval DO, 40 mg at 02/08/21 2026  •  [MAR Hold] budesonide (PULMICORT) nebulizer solution 0.5 mg, 0.5 mg, Nebulization, BID - RT, Nikkie Sandoval DO, 0.5 mg at 02/09/21 0800  •  DAPTOmycin (CUBICIN) 500 mg/50 mL in sodium chloride, 6 mg/kg (Adjusted), Intravenous, Q24H, Jennifer Kidd APRN, 480.6 mg at 02/09/21 0914  •  [MAR Hold] dextrose (D50W) 25 g/ 50mL Intravenous Solution 25 g, 25 g, Intravenous, Q15 Min PRN, Jayla Goyal PA-C  •  [MAR Hold] dextrose (GLUTOSE) oral gel 15 g, 15 g, Oral, Q15 Min PRN, Jayla Goyal PA-C  •  [MAR Hold] DULoxetine (CYMBALTA) DR capsule 60 mg, 60 mg, Oral, BID, Nikkie Sandoval DO, 60 mg at 02/09/21 0910  •  [MAR Hold] furosemide (LASIX) tablet 20 mg, 20 mg, Oral, Daily, Nikkie Sandoval DO, 20 mg at 02/09/21 0910  •  [MAR Hold] glucagon (human recombinant) (GLUCAGEN DIAGNOSTIC) injection 1 mg, 1 mg, Subcutaneous, Q15 Min PRN, Jayla Goyal PA-C  •  HYDROmorphone (DILAUDID) injection 0.5 mg, 0.5 mg, Intravenous, Once, Mervin Lopez MD  •  [MAR Hold] HYDROmorphone (DILAUDID) injection 1 mg, 1 mg, Intravenous, Q4H PRN, Jessie Kendall MD, 1 mg at 02/08/21 1826  •  HYDROmorphone (DILAUDID) PCA 1 mg/mL syringe, , Intravenous, Continuous,  Flavia June PA-C, Stopped at 02/09/21 1550  •  [MAR Hold] hydrOXYzine (ATARAX) tablet 25 mg, 25 mg, Oral, TID PRN, Nikkie Sandoval DO, 25 mg at 02/08/21 2006  •  [MAR Hold] insulin lispro (humaLOG, ADMELOG) injection 0-7 Units, 0-7 Units, Subcutaneous, 4x Daily With Meals & Nightly, Yesika Mendoza, APRN, 2 Units at 02/09/21 1228  •  ipratropium-albuterol (DUO-NEB) nebulizer solution 3 mL, 3 mL, Nebulization, Once, Mervin Lopez MD  •  [MAR Hold] isosorbide mononitrate (IMDUR) 24 hr tablet 30 mg, 30 mg, Oral, Daily, Nikkie Sandoval DO, 30 mg at 02/09/21 0910  •  [MAR Hold] ketorolac (TORADOL) injection 30 mg, 30 mg, Intravenous, Q6H PRN, Flavia June PA-C, 30 mg at 02/08/21 2007  •  lactated ringers infusion, 9 mL/hr, Intravenous, Continuous, Reg Willams MD, Last Rate: 9 mL/hr at 02/09/21 1610, 9 mL/hr at 02/09/21 1610  •  lisinopril (PRINIVIL,ZESTRIL) tablet 10 mg, 10 mg, Oral, Daily, Nikkie Sandoval DO, 10 mg at 02/09/21 0910  •  [MAR Hold] LORazepam (ATIVAN) tablet 1 mg, 1 mg, Oral, Q6H PRN, Oscar Garcia MD, 1 mg at 02/08/21 1825  •  [MAR Hold] Magnesium Sulfate 2 gram Bolus, followed by 8 gram infusion (total Mg dose 10 grams)- Mg less than or equal to 1mg/dL, 2 g, Intravenous, PRN **OR** [MAR Hold] Magnesium Sulfate 2 gram / 50mL Infusion (GIVE X 3 BAGS TO EQUAL 6GM TOTAL DOSE) - Mg 1.1 - 1.5 mg/dl, 2 g, Intravenous, PRN **OR** [MAR Hold] Magnesium Sulfate 4 gram infusion- Mg 1.6-1.9 mg/dL, 4 g, Intravenous, PRN, Jayla Goyal PA-C, Last Rate: 25 mL/hr at 02/05/21 1758, 4 g at 02/05/21 1758  •  metoprolol tartrate (LOPRESSOR) tablet 50 mg, 50 mg, Oral, BID, Nikkie Sandoval DO, 50 mg at 02/09/21 0909  •  midazolam (VERSED) injection 1 mg, 1 mg, Intravenous, Q10 Min PRN **OR** midazolam (VERSED) injection 2 mg, 2 mg, Intravenous, Q10 Min PRN, Reg Willams MD  •  [MAR Hold] morphine injection 2 mg, 2 mg, Intravenous, Q4H PRN,  Nikkie Sandoval DO, 2 mg at 02/08/21 1743  •  [MAR Hold] naloxone (NARCAN) injection 0.1 mg, 0.1 mg, Intravenous, Q5 Min PRN, Flavia June PA-C  •  [MAR Hold] naloxone (NARCAN) injection 0.1 mg, 0.1 mg, Intravenous, Q5 Min PRN, Flavia June PA-C  •  [MAR Hold] naloxone (NARCAN) injection 0.2 mg, 0.2 mg, Intravenous, Q5 Min PRN, Nikkie Sandoval DO  •  [MAR Hold] nicotine (NICODERM CQ) 21 MG/24HR patch 1 patch, 1 patch, Transdermal, Q24H, Jayla Goyal PA-C, 1 patch at 02/09/21 0929  •  [MAR Hold] ondansetron (ZOFRAN) tablet 4 mg, 4 mg, Oral, Q6H PRN **OR** [MAR Hold] ondansetron (ZOFRAN) injection 4 mg, 4 mg, Intravenous, Q6H PRN, Nikkie Sandoval DO  •  [MAR Hold] oxyCODONE-acetaminophen (PERCOCET) 5-325 MG per tablet 1 tablet, 1 tablet, Oral, Q4H PRN, Nikkie Sandoval DO, 1 tablet at 02/08/21 1927  •  [MAR Hold] pantoprazole (PROTONIX) EC tablet 40 mg, 40 mg, Oral, QAM, Nikkie Sandoval DO, 40 mg at 02/09/21 0536  •  piperacillin-tazobactam (ZOSYN) 3.375 g in iso-osmotic dextrose 50 ml (premix), 3.375 g, Intravenous, Q8H, Trav Washburn MD, 3.375 g at 02/09/21 1051  •  [MAR Hold] potassium & sodium phosphates (PHOS-NAK) 280-160-250 MG packet - for Phosphorus less than 1.25 mg/dL, 2 packet, Oral, Q6H PRN, 2 packet at 02/06/21 1613 **OR** [MAR Hold] potassium & sodium phosphates (PHOS-NAK) 280-160-250 MG packet - for Phosphorus 1.25 - 2.5 mg/dL, 2 packet, Oral, Q6H PRN, Yesika Mendoza, APRN, 2 packet at 02/05/21 2123  •  pregabalin (LYRICA) capsule 200 mg, 200 mg, Oral, TID, Nikkie Sandoval DO, 200 mg at 02/09/21 0910  •  [MAR Hold] rOPINIRole (REQUIP) tablet 4 mg, 4 mg, Oral, Nightly, Nikkie Sandoval DO, 4 mg at 02/08/21 2025  •  sodium chloride 0.9 % bolus 1,000 mL, 1,000 mL, Intravenous, Once, Jessie Kendall MD, Last Rate: 333.3 mL/hr at 02/09/21 1452, 1,000 mL at 02/09/21 1452  •  [MAR Hold] sodium chloride  0.9 % flush 10 mL, 10 mL, Intravenous, Q12H, RebekaquNikkie brown, DO, 10 mL at 21 0937  •  [MAR Hold] sodium chloride 0.9 % flush 10 mL, 10 mL, Intravenous, PRN, Nikkie Sandoval, DO  •  sodium chloride 0.9 % flush 10 mL, 10 mL, Intravenous, Q12H, Reg Willams MD  •  sodium chloride 0.9 % flush 10 mL, 10 mL, Intravenous, PRN, Reg Willams MD  •  [MAR Hold] sodium chloride 0.9 % flush 10 mL, 10 mL, Intravenous, Q12H, Jessie Kendall MD, 10 mL at 21 1229  •  [MAR Hold] sodium chloride 0.9 % flush 10 mL, 10 mL, Intravenous, PRN, Jessie Kendall MD  •  sodium chloride 1,000 mL with bacitracin 50,000 Units, polymyxin B 500,000 Units irrigation, , , PRN, Jeremy Trevizo MD, 500 mL at 21 1603    Antibiotics:  Anti-Infectives (From admission, onward)    Ordered     Dose/Rate Route Frequency Start Stop    21 1605  sodium chloride 1,000 mL with bacitracin 50,000 Units, polymyxin B 500,000 Units irrigation     Ordering Provider: Jeremy Trevizo MD      As Needed 21 1605      21 0744  DAPTOmycin (CUBICIN) 500 mg/50 mL in sodium chloride     Katja Larios, PharmD reviewed the order on 21 1237.   Ordering Provider: Jennifer Kidd APRN    6 mg/kg × 80.1 kg (Adjusted)  over 30 Minutes Intravenous Every 24 Hours 21 0830 21 0829    21 1521  piperacillin-tazobactam (ZOSYN) 3.375 g in iso-osmotic dextrose 50 ml (premix)     Trav Washburn MD reviewed the order on 21 0919.   Ordering Provider: Trav Washburn MD    3.375 g  over 4 Hours Intravenous Every 8 Hours 21 1800 21 7170         Physical Exam:   Vital Signs  Temp (24hrs), Av.4 °F (36.9 °C), Min:98 °F (36.7 °C), Max:99.1 °F (37.3 °C)    Temp  Min: 98 °F (36.7 °C)  Max: 99.1 °F (37.3 °C)  BP  Min: 76/53  Max: 177/83  Pulse  Min: 57  Max: 86  Resp  Min: 15  Max: 22  SpO2  Min: 89 %  Max: 98 %    GENERAL: Awake and alert, more  comfortable  HEENT: Normocephalic, atraumatic.  EOMI. No conjunctival injection. No icterus.    HEART: RRR; No murmur   LUNGS: Clear to auscultation bilaterally without wheezing, rales, rhonchi. Normal respiratory effort. Nonlabored.   ABDOMEN: Soft, nontender, nondistended.   EXT:   Left BKA site well healed   :  Without Sheridan catheter.  MSK:  Right lower extremity with edema and erythema from thigh to ankle, stable. Ecchymoses noted more proximally with erythema and swelling improved from the marked borders..  Dressing with serous discharge.  Moderate tenderness to palpation  SKIN: Warm and dry, except on RLE  NEURO: Oriented to PPT.  PSYCHIATRIC: Normal insight and judgement. Cooperative with PE  PIV    Laboratory Data    Results from last 7 days   Lab Units 02/09/21  0358 02/08/21  0404 02/06/21  0627   WBC 10*3/mm3 17.78* 6.68 12.84*   HEMOGLOBIN g/dL 9.1* 11.1* 10.4*   HEMATOCRIT % 27.5* 34.3* 31.8*   PLATELETS 10*3/mm3 253 195 152     Results from last 7 days   Lab Units 02/09/21  1348 02/09/21  0358   SODIUM mmol/L  --  133*   POTASSIUM mmol/L 4.8 5.7*   CHLORIDE mmol/L  --  97*   CO2 mmol/L  --  28.0   BUN mg/dL  --  13   CREATININE mg/dL  --  0.94   GLUCOSE mg/dL  --  235*   CALCIUM mg/dL  --  9.2     Results from last 7 days   Lab Units 02/06/21  0627   ALK PHOS U/L 127*   BILIRUBIN mg/dL 1.0   ALT (SGPT) U/L 39   AST (SGOT) U/L 24         Results from last 7 days   Lab Units 02/04/21  1520   CRP mg/dL 2.29*     Results from last 7 days   Lab Units 02/06/21  0033   LACTATE mmol/L 1.6     Results from last 7 days   Lab Units 02/06/21  0627   CK TOTAL U/L 42     Results from last 7 days   Lab Units 02/06/21  1033   VANCOMYCIN TR mcg/mL 5.80     Estimated Creatinine Clearance: 100.6 mL/min (by C-G formula based on SCr of 0.94 mg/dL).      Microbiology:  Covid negative  Blood cultures negative to date  Wound drainage culture with MRSA    Radiology:  Imaging Results (Last 72 Hours)     ** No results found  for the last 72 hours. **        2/4/21 CTA RLE:  1. Soft tissue stranding and fluid is seen surrounding the patient's recent right femoral/distal arterial graft. It is uncertain to what extent this may represent postoperative fluid versus infection. The graft itself appears patent with adequate runoff  to the level of the smaller plantar vessels.       Impression:   1. Right lower extremity cellulitis, severe deep skin and soft tissue infection complicated by recent graft placement due to MRSA:  right femoral to posterior tibial distal bypass with reverse saphenous vein graft 1/21/21. Fluid and stranding on CT scan.  Dr Trevizo planning I&D later today.   2. Leukocytosis, sepsis: worse today  3. Lactic acidosis: Improved  4. PAD  5. T2DM: A1c 7.2  6. Hep C, previously treated with Interferon/Ribaviron, then reinfected  7.  CHF   8. 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 well healed    PLAN/RECOMMENDATIONS:   - Follow-up pending blood cultures, so far negative  - Follow CBC, CMP, CK  - f/u any new cultures from surgery later today    Continue current antibiotics pending further culture results:  - Daptomycin 6mg/kg q 24 hrs for MRSA  - Continue Zosyn for possible polymicrobial infection     - I anticipate at least several weeks of IV antibiotics via PICC      High risk for limb loss if not treated aggressively. I will follow      Trav Washburn MD  2/9/2021  16:40 EST

## 2021-02-09 NOTE — ANESTHESIA POSTPROCEDURE EVALUATION
Patient: Reg Sanchez    Procedure Summary     Date: 02/09/21 Room / Location:  [facility]  [facility]    Anesthesia Start: 1635 Anesthesia Stop: 1802    Procedure: INCISION AND DRAINAGE RIGHT LOWER EXTREMITY, EVACUATION OF HEMATOMA, LIGATION OF FEM DISTAL BYPASS (Right ) Diagnosis:       Postoperative infection, unspecified type, initial encounter      (Postoperative infection, unspecified type, initial encounter [T81.40XA])    Surgeon: Jeremy Trevizo MD Provider: Mervin Lopez MD    Anesthesia Type: general ASA Status: 3          Anesthesia Type: general    Vitals  No vitals data found for the desired time range.          Post Anesthesia Care and Evaluation    Patient location during evaluation: PACU  Patient participation: complete - patient participated  Level of consciousness: awake and alert  Pain management: adequate  Airway patency: patent  Anesthetic complications: No anesthetic complications  PONV Status: none  Cardiovascular status: hemodynamically stable and acceptable  Respiratory status: nonlabored ventilation, acceptable and nasal cannula  Hydration status: acceptable

## 2021-02-10 LAB
ANION GAP SERPL CALCULATED.3IONS-SCNC: 6 MMOL/L (ref 5–15)
BACTERIA SPEC AEROBE CULT: ABNORMAL
BACTERIA SPEC AEROBE CULT: NORMAL
BACTERIA SPEC AEROBE CULT: NORMAL
BASOPHILS # BLD AUTO: 0.03 10*3/MM3 (ref 0–0.2)
BASOPHILS NFR BLD AUTO: 0.3 % (ref 0–1.5)
BUN SERPL-MCNC: 19 MG/DL (ref 6–20)
BUN/CREAT SERPL: 16.8 (ref 7–25)
CALCIUM SPEC-SCNC: 8.2 MG/DL (ref 8.6–10.5)
CHLORIDE SERPL-SCNC: 95 MMOL/L (ref 98–107)
CK SERPL-CCNC: 37 U/L (ref 20–200)
CO2 SERPL-SCNC: 29 MMOL/L (ref 22–29)
CREAT SERPL-MCNC: 1.13 MG/DL (ref 0.76–1.27)
D-LACTATE SERPL-SCNC: 2.5 MMOL/L (ref 0.5–2)
DEPRECATED RDW RBC AUTO: 50.4 FL (ref 37–54)
EOSINOPHIL # BLD AUTO: 0.03 10*3/MM3 (ref 0–0.4)
EOSINOPHIL NFR BLD AUTO: 0.3 % (ref 0.3–6.2)
ERYTHROCYTE [DISTWIDTH] IN BLOOD BY AUTOMATED COUNT: 13.7 % (ref 12.3–15.4)
GFR SERPL CREATININE-BSD FRML MDRD: 67 ML/MIN/1.73
GLUCOSE BLDC GLUCOMTR-MCNC: 218 MG/DL (ref 70–130)
GLUCOSE BLDC GLUCOMTR-MCNC: 227 MG/DL (ref 70–130)
GLUCOSE BLDC GLUCOMTR-MCNC: 230 MG/DL (ref 70–130)
GLUCOSE BLDC GLUCOMTR-MCNC: 237 MG/DL (ref 70–130)
GLUCOSE SERPL-MCNC: 224 MG/DL (ref 65–99)
GRAM STN SPEC: ABNORMAL
GRAM STN SPEC: ABNORMAL
HCT VFR BLD AUTO: 18.8 % (ref 37.5–51)
HCT VFR BLD AUTO: 24.8 % (ref 37.5–51)
HGB BLD-MCNC: 6.2 G/DL (ref 13–17.7)
HGB BLD-MCNC: 8.2 G/DL (ref 13–17.7)
IMM GRANULOCYTES # BLD AUTO: 0.15 10*3/MM3 (ref 0–0.05)
IMM GRANULOCYTES NFR BLD AUTO: 1.7 % (ref 0–0.5)
LYMPHOCYTES # BLD AUTO: 2.13 10*3/MM3 (ref 0.7–3.1)
LYMPHOCYTES NFR BLD AUTO: 23.5 % (ref 19.6–45.3)
MAGNESIUM SERPL-MCNC: 1.7 MG/DL (ref 1.6–2.6)
MCH RBC QN AUTO: 33.7 PG (ref 26.6–33)
MCHC RBC AUTO-ENTMCNC: 33 G/DL (ref 31.5–35.7)
MCV RBC AUTO: 102.2 FL (ref 79–97)
MONOCYTES # BLD AUTO: 0.86 10*3/MM3 (ref 0.1–0.9)
MONOCYTES NFR BLD AUTO: 9.5 % (ref 5–12)
NEUTROPHILS NFR BLD AUTO: 5.85 10*3/MM3 (ref 1.7–7)
NEUTROPHILS NFR BLD AUTO: 64.7 % (ref 42.7–76)
NRBC BLD AUTO-RTO: 0 /100 WBC (ref 0–0.2)
PHOSPHATE SERPL-MCNC: 4.1 MG/DL (ref 2.5–4.5)
PLATELET # BLD AUTO: 208 10*3/MM3 (ref 140–450)
PMV BLD AUTO: 12.2 FL (ref 6–12)
POTASSIUM SERPL-SCNC: 4.4 MMOL/L (ref 3.5–5.2)
RBC # BLD AUTO: 1.84 10*6/MM3 (ref 4.14–5.8)
SODIUM SERPL-SCNC: 130 MMOL/L (ref 136–145)
WBC # BLD AUTO: 9.05 10*3/MM3 (ref 3.4–10.8)

## 2021-02-10 PROCEDURE — 83735 ASSAY OF MAGNESIUM: CPT | Performed by: INTERNAL MEDICINE

## 2021-02-10 PROCEDURE — 85018 HEMOGLOBIN: CPT | Performed by: INTERNAL MEDICINE

## 2021-02-10 PROCEDURE — 94799 UNLISTED PULMONARY SVC/PX: CPT

## 2021-02-10 PROCEDURE — 36430 TRANSFUSION BLD/BLD COMPNT: CPT

## 2021-02-10 PROCEDURE — 83605 ASSAY OF LACTIC ACID: CPT | Performed by: PHYSICIAN ASSISTANT

## 2021-02-10 PROCEDURE — 63710000001 INSULIN DETEMIR PER 5 UNITS: Performed by: INTERNAL MEDICINE

## 2021-02-10 PROCEDURE — P9016 RBC LEUKOCYTES REDUCED: HCPCS

## 2021-02-10 PROCEDURE — 82550 ASSAY OF CK (CPK): CPT | Performed by: PHYSICIAN ASSISTANT

## 2021-02-10 PROCEDURE — 25010000003 MAGNESIUM SULFATE 4 GM/100ML SOLUTION: Performed by: PHYSICIAN ASSISTANT

## 2021-02-10 PROCEDURE — 84100 ASSAY OF PHOSPHORUS: CPT | Performed by: INTERNAL MEDICINE

## 2021-02-10 PROCEDURE — 85025 COMPLETE CBC W/AUTO DIFF WBC: CPT | Performed by: PHYSICIAN ASSISTANT

## 2021-02-10 PROCEDURE — 63710000001 INSULIN LISPRO (HUMAN) PER 5 UNITS: Performed by: PHYSICIAN ASSISTANT

## 2021-02-10 PROCEDURE — 25010000002 DAPTOMYCIN PER 1 MG: Performed by: PHYSICIAN ASSISTANT

## 2021-02-10 PROCEDURE — 80048 BASIC METABOLIC PNL TOTAL CA: CPT | Performed by: PHYSICIAN ASSISTANT

## 2021-02-10 PROCEDURE — 85014 HEMATOCRIT: CPT | Performed by: INTERNAL MEDICINE

## 2021-02-10 PROCEDURE — 99232 SBSQ HOSP IP/OBS MODERATE 35: CPT | Performed by: INTERNAL MEDICINE

## 2021-02-10 PROCEDURE — 25010000002 KETOROLAC TROMETHAMINE PER 15 MG: Performed by: PHYSICIAN ASSISTANT

## 2021-02-10 PROCEDURE — 25010000002 PIPERACILLIN SOD-TAZOBACTAM PER 1 G: Performed by: PHYSICIAN ASSISTANT

## 2021-02-10 PROCEDURE — 86900 BLOOD TYPING SEROLOGIC ABO: CPT

## 2021-02-10 PROCEDURE — 25010000002 ENOXAPARIN PER 10 MG: Performed by: INTERNAL MEDICINE

## 2021-02-10 PROCEDURE — 25010000002 LORAZEPAM PER 2 MG: Performed by: NURSE PRACTITIONER

## 2021-02-10 PROCEDURE — 82962 GLUCOSE BLOOD TEST: CPT

## 2021-02-10 RX ORDER — SODIUM HYPOCHLORITE 1.25 MG/ML
SOLUTION TOPICAL EVERY 12 HOURS SCHEDULED
Status: DISCONTINUED | OUTPATIENT
Start: 2021-02-10 | End: 2021-02-17 | Stop reason: HOSPADM

## 2021-02-10 RX ORDER — DOXYCYCLINE 100 MG/1
100 CAPSULE ORAL EVERY 12 HOURS SCHEDULED
Status: DISCONTINUED | OUTPATIENT
Start: 2021-02-10 | End: 2021-02-17 | Stop reason: HOSPADM

## 2021-02-10 RX ADMIN — ASPIRIN 81 MG: 81 TABLET, CHEWABLE ORAL at 09:37

## 2021-02-10 RX ADMIN — PREGABALIN 200 MG: 100 CAPSULE ORAL at 21:54

## 2021-02-10 RX ADMIN — PREGABALIN 200 MG: 100 CAPSULE ORAL at 16:15

## 2021-02-10 RX ADMIN — SODIUM HYPOCHLORITE: 1.25 SOLUTION TOPICAL at 11:27

## 2021-02-10 RX ADMIN — OXYCODONE AND ACETAMINOPHEN 1 TABLET: 5; 325 TABLET ORAL at 05:43

## 2021-02-10 RX ADMIN — INSULIN DETEMIR 8 UNITS: 100 INJECTION, SOLUTION SUBCUTANEOUS at 12:05

## 2021-02-10 RX ADMIN — DULOXETINE HYDROCHLORIDE 60 MG: 60 CAPSULE, DELAYED RELEASE ORAL at 09:38

## 2021-02-10 RX ADMIN — OXYCODONE AND ACETAMINOPHEN 1 TABLET: 5; 325 TABLET ORAL at 13:36

## 2021-02-10 RX ADMIN — PANTOPRAZOLE SODIUM 40 MG: 40 TABLET, DELAYED RELEASE ORAL at 05:37

## 2021-02-10 RX ADMIN — OXYCODONE AND ACETAMINOPHEN 1 TABLET: 5; 325 TABLET ORAL at 17:50

## 2021-02-10 RX ADMIN — ENOXAPARIN SODIUM 40 MG: 40 INJECTION SUBCUTANEOUS at 09:37

## 2021-02-10 RX ADMIN — TAZOBACTAM SODIUM AND PIPERACILLIN SODIUM 3.38 G: 375; 3 INJECTION, SOLUTION INTRAVENOUS at 02:08

## 2021-02-10 RX ADMIN — INSULIN LISPRO 3 UNITS: 100 INJECTION, SOLUTION INTRAVENOUS; SUBCUTANEOUS at 21:55

## 2021-02-10 RX ADMIN — FUROSEMIDE 20 MG: 20 TABLET ORAL at 09:38

## 2021-02-10 RX ADMIN — INSULIN LISPRO 3 UNITS: 100 INJECTION, SOLUTION INTRAVENOUS; SUBCUTANEOUS at 17:52

## 2021-02-10 RX ADMIN — OXYCODONE AND ACETAMINOPHEN 1 TABLET: 5; 325 TABLET ORAL at 22:12

## 2021-02-10 RX ADMIN — SODIUM HYPOCHLORITE: 1.25 SOLUTION TOPICAL at 22:13

## 2021-02-10 RX ADMIN — MAGNESIUM SULFATE 4 G: 4 INJECTION INTRAVENOUS at 16:15

## 2021-02-10 RX ADMIN — ISOSORBIDE MONONITRATE 30 MG: 30 TABLET, EXTENDED RELEASE ORAL at 09:38

## 2021-02-10 RX ADMIN — ATORVASTATIN CALCIUM 40 MG: 40 TABLET, FILM COATED ORAL at 21:54

## 2021-02-10 RX ADMIN — PREGABALIN 200 MG: 100 CAPSULE ORAL at 09:38

## 2021-02-10 RX ADMIN — ARFORMOTEROL TARTRATE 15 MCG: 15 SOLUTION RESPIRATORY (INHALATION) at 08:39

## 2021-02-10 RX ADMIN — ROPINIROLE HYDROCHLORIDE 4 MG: 2 TABLET, FILM COATED ORAL at 21:54

## 2021-02-10 RX ADMIN — KETOROLAC TROMETHAMINE 30 MG: 30 INJECTION, SOLUTION INTRAMUSCULAR; INTRAVENOUS at 02:39

## 2021-02-10 RX ADMIN — TAZOBACTAM SODIUM AND PIPERACILLIN SODIUM 3.38 G: 375; 3 INJECTION, SOLUTION INTRAVENOUS at 09:38

## 2021-02-10 RX ADMIN — BUDESONIDE 0.5 MG: 0.5 INHALANT ORAL at 08:39

## 2021-02-10 RX ADMIN — INSULIN LISPRO 3 UNITS: 100 INJECTION, SOLUTION INTRAVENOUS; SUBCUTANEOUS at 12:06

## 2021-02-10 RX ADMIN — DOXYCYCLINE 100 MG: 100 CAPSULE ORAL at 21:53

## 2021-02-10 RX ADMIN — SODIUM CHLORIDE, PRESERVATIVE FREE 10 ML: 5 INJECTION INTRAVENOUS at 21:54

## 2021-02-10 RX ADMIN — METOPROLOL TARTRATE 50 MG: 50 TABLET, FILM COATED ORAL at 21:53

## 2021-02-10 RX ADMIN — OXYCODONE AND ACETAMINOPHEN 1 TABLET: 5; 325 TABLET ORAL at 09:38

## 2021-02-10 RX ADMIN — TAZOBACTAM SODIUM AND PIPERACILLIN SODIUM 3.38 G: 375; 3 INJECTION, SOLUTION INTRAVENOUS at 18:28

## 2021-02-10 RX ADMIN — DULOXETINE HYDROCHLORIDE 60 MG: 60 CAPSULE, DELAYED RELEASE ORAL at 21:54

## 2021-02-10 RX ADMIN — DAPTOMYCIN 480.6 MG: 500 INJECTION, POWDER, LYOPHILIZED, FOR SOLUTION INTRAVENOUS at 09:52

## 2021-02-10 RX ADMIN — OXYCODONE AND ACETAMINOPHEN 1 TABLET: 5; 325 TABLET ORAL at 01:29

## 2021-02-10 RX ADMIN — Medication 1 PATCH: at 09:38

## 2021-02-10 NOTE — PROGRESS NOTES
Clinical Nutrition   Reason For Visit: MARTHA MCGEE    Patient Name: Reg Sanchez  YOB: 1965  MRN: [id number]  Date of Encounter: 02/10/21 11:43 EST  Admission date: 2/5/2021      Nutrition Assessment     Admission Problem List:  RLE cellulitis  S/p incision and drainage of R groin incision, opening of R knee incision for drainage, incision of R medial thigh for drainage (2/9)       Applicable PMH:  CHF  PVD  CAD  CABG  DM  Hep C  ROLDAN  L BKA  Recent R femoral to posterior tibial bypass, with revers saphenous vein and graft (1/27/2021)  Tobacco use  EtOH use- 3-6 beers/day       Reported/Observed/Food/Nutrition Related History     Pt resting in bed at time of RD visit. Pt reports fair appetite. States that he does not have any food allergies, no issues chewing. Reports that sometimes he accidentally swallows air prior to drinking liquids, but states that he never has issues choking/coughing on liquids. Reports that he would like chocolate Boost Glucose Control with each meal.     Pt provided specific food preferences for each meal:  Breakfast: omelet (with onion, tomatoes, green bell pepper, mushrooms, cheese), toast, 2 cartons of regular milk, 2 ice cups, chocolate Boost Glucose Control  Lunch: fruit plate (more pineapple and grapes, less melon), dessert, 2 cartons chocolate milk, 2 ice cups, chocolate Boost Glucose Control  Dinner:  salad with raspberry vinaigrette, dessert, 2 cartons chocolate milk, 2 ice cups, chocolate Boost Glucose Control      Anthropometrics   Height: 67 in  Weight: 223 lb per stated weight (2/4)  BMI: N/A    Date Weight (kg) Weight (lbs) Weight Method   2/4/2021 101.152 kg 223 lb Stated   1/28/2021 105.6 kg 232 lb 12.9 oz Bed scale   1/25/2021 100.699 kg 222 lb -   1/25/2021 101 kg 222 lb 10.6 oz Stated   1/25/2021 101.152 kg 223 lb -   1/20/2021 100.699 kg 222 lb Stated   1/12/2021 102.059 kg 225 lb -   1/7/2021 101.606 kg 224 lb -   12/8/2020 100.245 kg 221 lb -    10/27/2020 100.699 kg 222 lb -   2/13/2020 98.431 kg 217 lb -   11/7/2019 94.167 kg 207 lb 9.6 oz -       Labs reviewed   Labs reviewed: Yes    Results from last 7 days   Lab Units 02/10/21  0529 02/09/21  1348 02/09/21  0358 02/08/21  0404 02/06/21  0627  02/05/21  1614   SODIUM mmol/L 130*  --  133* 132* 137  --  136   POTASSIUM mmol/L 4.4 4.8 5.7* 4.5 3.8  --  3.9   CHLORIDE mmol/L 95*  --  97* 99 105  --  103   CO2 mmol/L 29.0  --  28.0 28.0 25.0  --  25.0   BUN mg/dL 19  --  13 8 8  --  11   CREATININE mg/dL 1.13  --  0.94 0.64* 0.71*  --  0.73*   GLUCOSE mg/dL 224*  --  235* 188* 202*  --  218*   CALCIUM mg/dL 8.2*  --  9.2 8.9 7.9*  --  8.3*   PHOSPHORUS mg/dL 4.1  --   --  3.2 1.8*   < > 1.8*   MAGNESIUM mg/dL 1.7  --   --   --  2.0  --  1.8    < > = values in this interval not displayed.     Results from last 7 days   Lab Units 02/10/21  0529 02/09/21  0358 02/08/21  0404 02/06/21  0627 02/05/21  1614 02/04/21  1520   WBC 10*3/mm3 9.05 17.78* 6.68 12.84* 20.11* 18.69*   ALBUMIN g/dL  --   --   --  2.80* 3.40* 3.93   CRP mg/dL  --   --   --   --   --  2.29*     Results from last 7 days   Lab Units 02/10/21  1107 02/10/21  0917 02/09/21 2022 02/09/21  1546 02/09/21  1119 02/09/21  0726   GLUCOSE mg/dL 230* 218* 227* 165* 191* 223*     Lab Results   Lab Value Date/Time    HGBA1C 7.20 (H) 01/21/2021 0437     Medications reviewed   Medications reviewed: Yes  Pertinent: antibiotics, lasix, insulin  PRN: toradol, percocet      Current Nutrition Prescription   PO: Diet Regular; Consistent Carbohydrate, Cardiac      Evaluation of Received Nutrient/Fluid Intake-PO:  69%/4 meals      Nutrition Diagnosis   2/10  Problem Increased nutrient needs- protein   Etiology Skin integrity   Signs/Symptoms Post-op wound infections, increased protein needs       Intervention   Intervention: Follow treatment progress, Care plan reviewed, Interview for preferences, Menu adjusted, Encourage intake, Supplement provided   -Will send  chocolate Boost Glucose Control 3x/day  -Food preferences noted and sent to the diet office:    -Breakfast: omelet (with onion, tomatoes, green bell pepper, mushrooms,     cheese), toast, 2 cartons of regular milk, 2 ice cups, chocolate Boost     Glucose Control    -Lunch: fruit plate (more pineapple and grapes, less melon), dessert, 2     cartons chocolate milk, 2 ice cups, chocolate Boost Glucose Control    -Dinner:  salad with raspberry vinaigrette, dessert, 2 cartons chocolate     milk, 2 ice cups, chocolate Boost Glucose Control      Goal:   General: Nutrition support treatment  PO: Maintain intake       Monitoring/Evaluation:   Monitoring/Evaluation: Per protocol, PO intake, Supplement intake, Pertinent labs, Skin status, Symptoms    Tiki Schmidt MS RD/LD CNSC  Time Spent: 35 minutes

## 2021-02-10 NOTE — OP NOTE
Operative Report    Preop Diagnosis: For arterial vascular disease probable right leg infection.  Previous left leg amputation.  Previous coronary bypass grafting.  Obesity.  Ongoing tobacco abuse.  Diabetes.  Recent right femoral to posterior tibial distal bypass with reverse saphenous vein            Procedure: Incision and drainage of right groin incision.  Opening of right knee incision for drainage.  Incision on the right medial thigh for drainage.        Surgeons: Jeremy Trevizo MD with Shaw Knight MD in the operating room as consultant      Assistant: Harpreet Whatley PA-C    The Assistant provided services of suctioning, irrigation and retraction.  Also, assisted in suture closure of parts of the skin incision.      Indication: This patient's past history is well-documented elsewhere.  He had recently undergone a right femoral to posterior tibial distal bypass with reverse saphenous vein.  He presented to an outside hospital with complaints of redness and tenderness in the leg and was placed on antibiotics and transferred here to our facility.  Was evaluated by infectious disease services and CT scanning with CT angiogram was performed demonstrating a possible fluid collection around the graft.  Patient was aware of the procedure for incision and drainage and that we would probably leave these wounds open and packed also that the risk of limb loss was high as his target vessels were extremely small.  He understood and agreed to proceed        Description: Supine position sterile prep drape antibiotics had already been given and general endotracheal anesthesia used.  First the right groin incision which was somewhat swollen and bruised was opened hematoma and murky brown fluid was removed from this area and sent to microbiology for culture.  This was then copiously irrigated with antibiotic solution.  The vein graft itself was extremely friable and noted to be bleeding from small areas on the surface of the vein.   We then opened 2 incisions near the knee which had previously been counterincisions for the tunneling graft from the groin to the ankle.  Again murky tissue was removed from this area bleeding was noted just from the surface of the graft itself.  The thigh itself was edematous and somewhat indurated and small longitudinal incision was made in the right medial thigh to also drain additional edema fluid from this area.  All of these areas were sent for culture for complete bacterial logic studies.  Dr. Knight and I were extremely concerned about graft blowout.  The patient had number of areas that were bleeding off the surface of the graft which were essentially unprovoked.  In other words just by exposing the graft alone bleeding was noted to the surface of the adventitia and it appeared that the graft itself may be grossly infected from the surrounding infection or at least damage to the point that this patient had a high risk of bleeding out.  All the incisions were copiously irrigated with bacitracin solution and following this they were packed with Kerlix gauze and one fourth strength Dakin's solution.  Acute blood loss during the procedure was less than 100 mL.  Specimens were sent to microbiology as indicated.  Dr. Knight and I both felt that the risk of limb loss in this patient is extremely high especially considering his underlying diseased vasculature and comorbidities.      Please note that portions of this note were completed with a voice recognition program. Efforts were made to edit the dictations, but occasionally words are mistranscribed.

## 2021-02-10 NOTE — PROGRESS NOTES
INFECTIOUS DISEASE Progress Note    Reg Sanchez  1965  [id number]     Reason for Consultation: right lower extremity celluilitis    History of present illness:    Patient is a 55 y.o. male, with Diabetes, PAD, CHF, Hep C,  s/p right femoral to posterior tib bypass, with reverse saphenous vein graft 1/21/21- discharged on 2/1/21.  On 2/3/21 he developed fever up to 103 degrees,  chills, with pain and swelling, redness of right lower extremity.  CT angio with runfoff revealed soft tissue stranding and perigraft fluid identifiabl ein the groin extending to the level of the knee with patent graft.  He was transferred to [facility] from [place]. Tmax of 102.6 degrees.  Admitting labs with a leukocytosis of 18,000, CRP 2.29, LAC 2.1, PCT 0.41, blood cultures no growth so far.  He was started on Vancomycin and Zosyn  and we were consulted for evaluation and treatment.  Complains of significant swelling from the thigh region down to the knee.  There is some clear weeping fluid near the most distal lesion.    2/7/21: Afebrile overnight.  Hemodynamically stable.  Tolerating current antibiotics.  Complains of significant leg pain from the thigh down towards the foot.  There appears to be less serous drainage on the dressing    2/8/21: Still complains of leg pain, swelling and drainage although overall slightly improved.  Wound cultures with MRSA.  Dr. Trevizo planning I&D tomorrow    2/9/21: Afebrile overnight.  Tolerating current antibodics.  Have an episode where he had worsening swelling and bruising of the right upper thigh last night but today it has improved.  Tolerating her antibiotics.  Awaiting surgery later today    2/10/2021: Status post I&D yesterday.  Operative cultures pending.  Recovering in the ICU.  Still significant pain but swelling is better.  Tolerating current antibiotics    ROS:  No fevers or chills. No diarrhea. Poor appetite. No rash. No new ADR to Abx.       No Known  Allergies      Medication:    Current Facility-Administered Medications:   •  acetaminophen (TYLENOL) tablet 650 mg, 650 mg, Oral, Q4H PRN, 650 mg at 02/07/21 0352 **OR** acetaminophen (TYLENOL) 160 MG/5ML solution 650 mg, 650 mg, Oral, Q4H PRN **OR** acetaminophen (TYLENOL) suppository 650 mg, 650 mg, Rectal, Q4H PRN, Harpreet Whatley PA  •  albuterol (PROVENTIL) nebulizer solution 0.083% 2.5 mg/3mL, 2.5 mg, Nebulization, Q4H PRN, Harpreet Whatley PA, 2.5 mg at 02/09/21 2344  •  arformoterol (BROVANA) nebulizer solution 15 mcg, 15 mcg, Nebulization, BID - RT, Harpreet Whatley PA, 15 mcg at 02/10/21 0839  •  aspirin chewable tablet 81 mg, 81 mg, Oral, Daily, Harpreet Whatley PA, 81 mg at 02/10/21 0937  •  atorvastatin (LIPITOR) tablet 40 mg, 40 mg, Oral, Nightly, Harpreet Whatley PA, 40 mg at 02/09/21 2024  •  budesonide (PULMICORT) nebulizer solution 0.5 mg, 0.5 mg, Nebulization, BID - RT, Harpreet Whatley PA, 0.5 mg at 02/10/21 0839  •  DAPTOmycin (CUBICIN) 500 mg/50 mL in sodium chloride, 6 mg/kg (Adjusted), Intravenous, Q24H, Harpreet Whatley PA, 480.6 mg at 02/10/21 0952  •  dextrose (D50W) 25 g/ 50mL Intravenous Solution 25 g, 25 g, Intravenous, Q15 Min PRN, Harpreet Whatley PA  •  dextrose (GLUTOSE) oral gel 15 g, 15 g, Oral, Q15 Min PRN, Harpreet Whatley PA  •  doxycycline (MONODOX) capsule 100 mg, 100 mg, Oral, Q12H, Trav Washburn MD  •  DULoxetine (CYMBALTA) DR capsule 60 mg, 60 mg, Oral, BID, Harpreet Whatley PA, 60 mg at 02/10/21 0938  •  enoxaparin (LOVENOX) syringe 40 mg, 40 mg, Subcutaneous, Daily, Tigre Davis MD, 40 mg at 02/10/21 0937  •  furosemide (LASIX) tablet 20 mg, 20 mg, Oral, Daily, Harpreet Whatley PA, 20 mg at 02/10/21 0938  •  glucagon (human recombinant) (GLUCAGEN DIAGNOSTIC) injection 1 mg, 1 mg, Subcutaneous, Q15 Min PRN, Harpreet Whatley PA  •  HYDROmorphone (DILAUDID) injection 1 mg, 1 mg, Intravenous, Q4H PRN, Harpreet Whatley PA, 1 mg at 02/08/21 1826  •  HYDROmorphone (DILAUDID)  PCA 1 mg/mL syringe, , Intravenous, Continuous, Harpreet Whatley PA, New Syringe/Cartridge at 02/09/21 1843  •  hydrOXYzine (ATARAX) tablet 25 mg, 25 mg, Oral, TID PRN, Harpreet Whatley PA, 25 mg at 02/08/21 2006  •  insulin detemir (LEVEMIR) injection 8 Units, 8 Units, Subcutaneous, Daily, Case, Cecy V., DO, 8 Units at 02/10/21 1205  •  insulin lispro (humaLOG, ADMELOG) injection 0-7 Units, 0-7 Units, Subcutaneous, 4x Daily With Meals & Nightly, Harpreet Whatley PA, 3 Units at 02/10/21 1206  •  isosorbide mononitrate (IMDUR) 24 hr tablet 30 mg, 30 mg, Oral, Daily, Harpreet Whatley PA, 30 mg at 02/10/21 0938  •  ketorolac (TORADOL) injection 30 mg, 30 mg, Intravenous, Q6H PRN, Harpreet Whatley PA, 30 mg at 02/10/21 0239  •  lisinopril (PRINIVIL,ZESTRIL) tablet 10 mg, 10 mg, Oral, Daily, Harpreet Whatley PA, 10 mg at 02/09/21 0910  •  LORazepam (ATIVAN) tablet 1 mg, 1 mg, Oral, Q6H PRN, Harpreet Whatley PA, 1 mg at 02/08/21 1825  •  Magnesium Sulfate 2 gram Bolus, followed by 8 gram infusion (total Mg dose 10 grams)- Mg less than or equal to 1mg/dL, 2 g, Intravenous, PRN **OR** Magnesium Sulfate 2 gram / 50mL Infusion (GIVE X 3 BAGS TO EQUAL 6GM TOTAL DOSE) - Mg 1.1 - 1.5 mg/dl, 2 g, Intravenous, PRN **OR** Magnesium Sulfate 4 gram infusion- Mg 1.6-1.9 mg/dL, 4 g, Intravenous, PRN, Harpreet Whatley PA, Last Rate: 25 mL/hr at 02/05/21 1758, 4 g at 02/05/21 1758  •  metoprolol tartrate (LOPRESSOR) tablet 50 mg, 50 mg, Oral, BID, Harpreet Whatley PA, Stopped at 02/09/21 2033  •  morphine injection 2 mg, 2 mg, Intravenous, Q4H PRN, Harpreet Whatley PA, 2 mg at 02/08/21 1743  •  naloxone (NARCAN) injection 0.1 mg, 0.1 mg, Intravenous, Q5 Min PRN, Harpreet Whatley PA  •  naloxone (NARCAN) injection 0.1 mg, 0.1 mg, Intravenous, Q5 Min PRN, Harpreet Whatley PA  •  naloxone (NARCAN) injection 0.2 mg, 0.2 mg, Intravenous, Q5 Min PRN, Harpreet Whatley PA  •  nicotine (NICODERM CQ) 21 MG/24HR patch 1 patch, 1 patch, Transdermal, Q24H,  Harpreet Whatley PA, 1 patch at 02/10/21 0938  •  ondansetron (ZOFRAN) tablet 4 mg, 4 mg, Oral, Q6H PRN **OR** ondansetron (ZOFRAN) injection 4 mg, 4 mg, Intravenous, Q6H PRN, Harpreet Whatley PA  •  oxyCODONE-acetaminophen (PERCOCET) 5-325 MG per tablet 1 tablet, 1 tablet, Oral, Q4H PRN, Harpreet Whatley PA, 1 tablet at 02/10/21 1336  •  pantoprazole (PROTONIX) EC tablet 40 mg, 40 mg, Oral, QAM, Harpreet Whatley, PA, 40 mg at 02/10/21 0537  •  piperacillin-tazobactam (ZOSYN) 3.375 g in iso-osmotic dextrose 50 ml (premix), 3.375 g, Intravenous, Q8H, Harpreet Whatley, PA, 3.375 g at 02/10/21 0938  •  potassium & sodium phosphates (PHOS-NAK) 280-160-250 MG packet - for Phosphorus less than 1.25 mg/dL, 2 packet, Oral, Q6H PRN, 2 packet at 02/06/21 1613 **OR** potassium & sodium phosphates (PHOS-NAK) 280-160-250 MG packet - for Phosphorus 1.25 - 2.5 mg/dL, 2 packet, Oral, Q6H PRN, Harpreet Whatley, PA, 2 packet at 02/05/21 2123  •  pregabalin (LYRICA) capsule 200 mg, 200 mg, Oral, TID, Harpreet Whatley PA, 200 mg at 02/10/21 0938  •  rOPINIRole (REQUIP) tablet 4 mg, 4 mg, Oral, Nightly, Harpreet Whatley, PA, 4 mg at 02/09/21 2024  •  sodium chloride 0.9 % flush 10 mL, 10 mL, Intravenous, Q12H, Harpreet Whatley, PA, 10 mL at 02/09/21 0937  •  sodium chloride 0.9 % flush 10 mL, 10 mL, Intravenous, PRN, Harpreet Whatley PA  •  sodium chloride 0.9 % flush 10 mL, 10 mL, Intravenous, Q12H, Harpreet Whatley PA, 10 mL at 02/09/21 2026  •  sodium chloride 0.9 % flush 10 mL, 10 mL, Intravenous, PRN, Harpreet Whatley PA  •  sodium hypochlorite (DAKIN'S 1/4 STRENGTH) 0.125 % topical solution 0.125% solution, , Topical, Q12H, Case, Cecy V., DO, Given at 02/10/21 1127    Antibiotics:  Anti-Infectives (From admission, onward)    Ordered     Dose/Rate Route Frequency Start Stop    02/10/21 1556  doxycycline (MONODOX) capsule 100 mg     Ordering Provider: Trav Washburn MD    100 mg Oral Every 12 Hours Scheduled 02/10/21 2100 02/24/21 6459     21 0744  DAPTOmycin (CUBICIN) 500 mg/50 mL in sodium chloride     Katja Larios, PharmD reviewed the order on 21 1237.   Ordering Provider: Harpreet Whatley PA    6 mg/kg × 80.1 kg (Adjusted)  over 30 Minutes Intravenous Every 24 Hours 21 0830 21 0829    21 1521  piperacillin-tazobactam (ZOSYN) 3.375 g in iso-osmotic dextrose 50 ml (premix)     Trav Washburn MD reviewed the order on 21 0919.   Ordering Provider: Harpreet Whatley PA    3.375 g  over 4 Hours Intravenous Every 8 Hours 21 1800 21 1759         Physical Exam:   Vital Signs  Temp (24hrs), Av.3 °F (36.8 °C), Min:97.8 °F (36.6 °C), Max:98.9 °F (37.2 °C)    Temp  Min: 97.8 °F (36.6 °C)  Max: 98.9 °F (37.2 °C)  BP  Min: 78/49  Max: 140/52  Pulse  Min: 84  Max: 112  Resp  Min: 14  Max: 22  SpO2  Min: 89 %  Max: 100 %    GENERAL: Awake and alert, moderate discomfort, sitting up  HEENT: Normocephalic, atraumatic.  EOMI. No conjunctival injection. No icterus.    HEART: RRR; No murmur   LUNGS: Clear to auscultation bilaterally without wheezing, rales, rhonchi. Normal respiratory effort. Nonlabored.   ABDOMEN: Soft, nontender, nondistended.   EXT:   Left BKA site well healed   :  Without Sheridan catheter.  MSK:  Right lower extremity with less edema.  Numerous surgical incisions bandaged from the groin down to the ankle.  Staples in place.  Moderate tenderness   SKIN: Warm and dry, except on RLE  NEURO: Oriented to PPT.  PSYCHIATRIC: Normal insight and judgement. Cooperative with PE  DEMETRIO    Laboratory Data    Results from last 7 days   Lab Units 02/10/21  1510 02/10/21  0529 21  0358 21  0404   WBC 10*3/mm3  --  9.05 17.78* 6.68   HEMOGLOBIN g/dL 8.2* 6.2* 9.1* 11.1*   HEMATOCRIT % 24.8* 18.8* 27.5* 34.3*   PLATELETS 10*3/mm3  --  208 253 195     Results from last 7 days   Lab Units 02/10/21  0529   SODIUM mmol/L 130*   POTASSIUM mmol/L 4.4   CHLORIDE mmol/L 95*   CO2 mmol/L 29.0   BUN mg/dL 19    CREATININE mg/dL 1.13   GLUCOSE mg/dL 224*   CALCIUM mg/dL 8.2*     Results from last 7 days   Lab Units 02/06/21  0627   ALK PHOS U/L 127*   BILIRUBIN mg/dL 1.0   ALT (SGPT) U/L 39   AST (SGOT) U/L 24         Results from last 7 days   Lab Units 02/04/21  1520   CRP mg/dL 2.29*     Results from last 7 days   Lab Units 02/10/21  0529   LACTATE mmol/L 2.5*     Results from last 7 days   Lab Units 02/10/21  0529 02/06/21  0627   CK TOTAL U/L 37 42     Results from last 7 days   Lab Units 02/06/21  1033   VANCOMYCIN TR mcg/mL 5.80     Estimated Creatinine Clearance: 83.7 mL/min (by C-G formula based on SCr of 1.13 mg/dL).      Microbiology:  Covid negative  Blood cultures negative to date  2/6 Wound drainage culture with MRSA  2/9 OR cultures pending    Radiology:  Imaging Results (Last 72 Hours)     ** No results found for the last 72 hours. **        2/4/21 CTA RLE:  1. Soft tissue stranding and fluid is seen surrounding the patient's recent right femoral/distal arterial graft. It is uncertain to what extent this may represent postoperative fluid versus infection. The graft itself appears patent with adequate runoff  to the level of the smaller plantar vessels.       Impression:   1. Right lower extremity cellulitis, severe deep skin and soft tissue infection complicated by recent graft placement due to MRSA:  right femoral to posterior tibial distal bypass with reverse saphenous vein graft 1/21/21. Fluid and stranding on CT scan.  S/p I&D on 2/9. OR cultures pending.  Vein graft is tenuous  2. Leukocytosis, sepsis: improved  3. Lactic acidosis: Improved  4. PAD  5. T2DM: A1c 7.2  6. Hep C, previously treated with Interferon/Ribaviron, then reinfected  7.  CHF   8. 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 well healed    PLAN/RECOMMENDATIONS:   - Follow-up pending blood cultures, so far negative  - called micro lab to release daptomycin AZIZA  - Follow CBC, CMP, CK  - f/u operative  cultures    Continue current antibiotics pending further culture results:  - Daptomycin 6mg/kg q24 hrs for MRSA  - PO doxycycline 100 mg BID to double cover MRSA  - Continue Zosyn for possible polymicrobial infection pending final culture     - I anticipate a 6 week course of IV antibiotics. PICC placed      High risk for limb loss if not treated aggressively. I will follow      Trav Washburn MD  2/10/2021  15:57 EST

## 2021-02-10 NOTE — PROGRESS NOTES
INTENSIVIST   PROGRESS NOTE     Hospital:  LOS: 5 days     Mr. Reg Sanchez, 55 y.o. male is followed for a Chief Complaint of: Leg pain      Subjective   S     Interval History:  No acute events overnight. H/H down this AM. Getting 2 units of pRBCs. Blood pressure is stable.        The patient's relevant past medical, surgical and social history were reviewed and updated in Epic as appropriate.      ROS:   Constitutional: Negative for fever.   Respiratory: Negative for dyspnea.   Cardiovascular: Negative for chest pain.   Gastrointestinal: Negative for  nausea, vomiting and diarrhea.     Objective   O     Vitals:  Temp  Min: 97.8 °F (36.6 °C)  Max: 99.1 °F (37.3 °C)  BP  Min: 76/53  Max: 140/52  Pulse  Min: 72  Max: 112  Resp  Min: 14  Max: 22  SpO2  Min: 89 %  Max: 100 % Flow (L/min)  Min: 2  Max: 4    Intake/Ouptut 24 hrs (7:00AM - 6:59 AM)  Intake & Output (last 3 days)       02/07 0701 - 02/08 0700 02/08 0701 - 02/09 0700 02/09 0701 - 02/10 0700 02/10 0701 - 02/11 0700    P.O. 840 480 0 360    I.V.   14.5     Blood    337.6    IV Piggyback 150 150 550     Total Intake 990 630 564.5 697.6    Urine 3025 2600 675 575    Total Output 3025 2600 675 575    Net -2035 -1970 -110.6 +122.6                  Medications (drips):  HYDROmorphone HCl-NaCl  lactated ringers, Last Rate: 9 mL/hr (02/09/21 1610)          Physical Examination  Telemetry:  Normal sinus rhythm.    Constitutional:  No acute distress.  Resting in bed comfortably.    Eyes: No scleral icterus.   PERRL, EOM intact.    Neck:  Supple, FROM   Cardiovascular: Normal rate, regular and rhythm. Normal heart sounds.  No murmurs, gallop or rub.   Respiratory: No respiratory distress. Normal respiratory effort.  Normal breath sounds  Clear to ascultation   Abdominal:  Soft. No masses. Nontender. No distension. No HSM.   Extremities: No digital cyanosis. No clubbing.  No peripheral edema.   Skin: No rashes, lesions or ulcers   Neurological:   Alert and Oriented  to person, place, and time.           Results from last 7 days   Lab Units 02/10/21  0529 02/09/21  0358 02/08/21  0404   WBC 10*3/mm3 9.05 17.78* 6.68   HEMOGLOBIN g/dL 6.2* 9.1* 11.1*   MCV fL 102.2* 99.3* 102.7*   PLATELETS 10*3/mm3 208 253 195     Results from last 7 days   Lab Units 02/10/21  0529 02/09/21  1348 02/09/21  0358 02/08/21  0404 02/06/21  0627  02/05/21  1614   SODIUM mmol/L 130*  --  133* 132* 137  --  136   POTASSIUM mmol/L 4.4 4.8 5.7* 4.5 3.8  --  3.9   CO2 mmol/L 29.0  --  28.0 28.0 25.0  --  25.0   CREATININE mg/dL 1.13  --  0.94 0.64* 0.71*  --  0.73*   GLUCOSE mg/dL 224*  --  235* 188* 202*  --  218*   MAGNESIUM mg/dL 1.7  --   --   --  2.0  --  1.8   PHOSPHORUS mg/dL 4.1  --   --  3.2 1.8*   < > 1.8*    < > = values in this interval not displayed.     Estimated Creatinine Clearance: 83.7 mL/min (by C-G formula based on SCr of 1.13 mg/dL).  Results from last 7 days   Lab Units 02/06/21  0627 02/05/21  1614 02/04/21  1520   ALK PHOS U/L 127* 98 89   BILIRUBIN mg/dL 1.0 1.2 0.7   ALT (SGPT) U/L 39 52* 79*   AST (SGOT) U/L 24 27 50*             Images:  Imaging Results (Last 24 Hours)     ** No results found for the last 24 hours. **            Results: Reviewed.  I reviewed the patient's new laboratory and imaging results.  I independently reviewed the patient's new images.    Medications: Reviewed.    Assessment/Plan   A / P     Mr. Sanchez is a 56yo M who was admitted from 1/21/21 to 2/1/21 for a left leg comminuted fracture apparently followed by osteomyelitis status post below-knee amputation, s/p right femoral artery to posterior tibial distal bypass with reverse saphenous vein graft. He developed a fever of 103 on 2/3/21 and was admitted to [facility] for further workup and treatment of cellulitis. ID was consulted and he was started on Daptomycin. Wound cultures were consistent with MRSA. He was taken to the OR on 2/9/21 by Dr. Trevizo for surgical drainage of the right groin and right knee. He  was admitted to the ICU after surgery.      Nutrition:   Diet Regular; Consistent Carbohydrate, Cardiac  Advance Directives:   Code Status and Medical Interventions:   Ordered at: 02/05/21 1509     Code Status:    CPR     Medical Interventions (Level of Support Prior to Arrest):    Full       Active Hospital Problems    Diagnosis   • **Postoperative infection     Added automatically from request for surgery [id number]     • Post-operative infection   • S/P L leg amputation    • PVD    • Tobacco use     Added automatically from request for surgery [id number]         Assessment / Plan:    1. Complete blood transfusion. Continue to monitor H/H.   2. Antibiotics per ID  3. Dilaudid PCA for pain control.   4. Adjust insulin. Will add Levemir.   5. AM labs  6. Okay to transfer to telemetry when a bed is available.     High level of risk due to:  severe exacerbation of chronic illness and illness with threat to life or bodily function.    Plan of care and goals reviewed during interdisciplinary rounds.  I discussed the patient's findings and my recommendations with patient and nursing staff      Cecy Forrest, DO    Intensive Care Medicine and Pulmonary Medicine

## 2021-02-10 NOTE — PROGRESS NOTES
Intensive Care Follow-up     Hospital:  LOS: 4 days   Mr. Reg Sanchez, 55 y.o. male is followed for:   Postoperative infection            History of present illness:   55-year-old male with past medical history of diabetes, peripheral vascular disease, CHF, coronary disease status post CABG, hep C infection, ROLDAN, intolerant of CPAP therapy, left leg comminuted fracture apparently followed by osteomyelitis status post below-knee amputation, s/p right femoral artery to posterior tibial distal bypass with reverse saphenous vein graft.  He had a previous right greater saphenous vein harvest for his bypass surgery and previous left leg amputation and therefore a cryopreserved organ donor saphenous vein graft had to be used.  Patient was admitted from January 21 to February 1.  On February 3 patient developed fever of 103 degree, chills, pain and swelling and redness of right lower extremity.  Patient was admitted to hospital for further work-up and treatment of cellulitis.  Patient was seen by infectious disease and patient was started on daptomycin.  Wound cultures grew moderate growth of MRSA.  Patient started to show improvement in fevers with significant pain persisted and swelling persisted as well.  Decision was made to take patient to operating room for surgical drainage which he underwent today.  Postop note is not available at this time.  Patient is transferred to ICU for closer hemodynamic and neurologic monitoring.    Patient was seen at bedside.  Complains of postop pain and on Dilaudid PCA currently.  He is talking in full sentences.  Does not appear to be in any acute distress.  Does have mild numbness and tingling in lower extremity.  Doppler pulses positive.  Currently not on any pressors.  No tachycardia noted either.    Patient does drink 3-6 beers a day.  Also smokes 1 pack a day for a number of years.    The patient's past medical, surgical and social history were reviewed and updated in Epic as  appropriate.       Objective     Infusions:  HYDROmorphone HCl-NaCl,   lactated ringers, 9 mL/hr, Last Rate: 9 mL/hr (02/09/21 1610)  niCARdipine, 5-15 mg/hr  phenylephrine, 0.5-3 mcg/kg/min      Medications:  arformoterol, 15 mcg, Nebulization, BID - RT  aspirin, 81 mg, Oral, Daily  atorvastatin, 40 mg, Oral, Nightly  budesonide, 0.5 mg, Nebulization, BID - RT  DAPTOmycin, 6 mg/kg (Adjusted), Intravenous, Q24H  DULoxetine, 60 mg, Oral, BID  furosemide, 20 mg, Oral, Daily  insulin lispro, 0-7 Units, Subcutaneous, 4x Daily With Meals & Nightly  isosorbide mononitrate, 30 mg, Oral, Daily  lisinopril, 10 mg, Oral, Daily  metoprolol tartrate, 50 mg, Oral, BID  nicotine, 1 patch, Transdermal, Q24H  pantoprazole, 40 mg, Oral, QAM  piperacillin-tazobactam, 3.375 g, Intravenous, Q8H  pregabalin, 200 mg, Oral, TID  rOPINIRole, 4 mg, Oral, Nightly  sodium chloride, 10 mL, Intravenous, Q12H  sodium chloride, 10 mL, Intravenous, Q12H        Vital Sign Min/Max for last 24 hours  Temp  Min: 97.9 °F (36.6 °C)  Max: 99.1 °F (37.3 °C)   BP  Min: 76/53  Max: 128/57   Pulse  Min: 57  Max: 106   Resp  Min: 15  Max: 22   SpO2  Min: 89 %  Max: 98 %   Flow (L/min)  Min: 2  Max: 4       Input/Output for last 24 hour shift  02/08 0701 - 02/09 0700  In: 630 [P.O.:480]  Out: 2600 [Urine:2600]      Objective    General Appearance: Awake, alert, in no acute distress  Head:    Atraumatic, Normocephalic, without obvious abnormality  Lungs:   B/L Breath sounds present with decreased breath sounds on bases, no wheezing heard, no crackles.   Heart: S1 and S2 present, no murmur  Abdomen: Soft, nontender, no guarding or rigidity, bowel sounds positive.  Extremities: Dressing intact right lower extremity with purulent drainage showing through the dressing.  No bleeding noted.  Right foot shows edema.  Warm to touch however.  Good capillary refill.  Left BKA.  Neurologic:  Moving all four extremities. Good strength bilaterally.         Results from last  7 days   Lab Units 02/09/21  0358 02/08/21  0404 02/06/21  0627   WBC 10*3/mm3 17.78* 6.68 12.84*   HEMOGLOBIN g/dL 9.1* 11.1* 10.4*   PLATELETS 10*3/mm3 253 195 152     Results from last 7 days   Lab Units 02/09/21  1348 02/09/21  0358 02/08/21  0404 02/06/21  0627 02/06/21  0033 02/05/21  1614 02/04/21  1520   SODIUM mmol/L  --  133* 132* 137  --  136 128*   POTASSIUM mmol/L 4.8 5.7* 4.5 3.8  --  3.9 4.0   CO2 mmol/L  --  28.0 28.0 25.0  --  25.0 24.4   BUN mg/dL  --  13 8 8  --  11 12   CREATININE mg/dL  --  0.94 0.64* 0.71*  --  0.73* 0.89   MAGNESIUM mg/dL  --   --   --  2.0  --  1.8 1.6   PHOSPHORUS mg/dL  --   --  3.2 1.8* 2.1* 1.8* 2.4*   GLUCOSE mg/dL  --  235* 188* 202*  --  218* 225*     Estimated Creatinine Clearance: 100.6 mL/min (by C-G formula based on SCr of 0.94 mg/dL).          Images:   None    I reviewed the patient's results and images.     Assessment/Plan   Impression        Postoperative infection    Tobacco use    PVD     S/P L leg amputation     Post-operative infection       Plan        1.  Patient status post postoperative MRSA infection after vascular surgery.  On appropriate antibiotics per ID team.  Continue ID follow-up.  Patient remains on daptomycin and Zosyn.  WBC count did go up today and will be monitored again tomorrow.  Follow surgical cultures as well.  2.  Status post surgical intervention with incision and drainage.  Postop note is not available but per nursing report no other untoward events noted intraoperatively.  Continue to monitor hemodynamics for now.  Dilaudid PCA for pain control.  Monitor respiratory status closely especially with narcotics and previous history of sleep apnea intolerant of CPAP therapy will be at high risk of complications.  3.  Blood glucose are not under good control Preop.  Discussed the importance of that with patient.  We will continue insulin regimen for blood sugar control.  4.  GI prophylaxis Protonix.  Will add DVT prophylaxis with Lovenox  from tomorrow.  5.  Monitor electrolytes and replace per ICU protocol.  6.  Nicotine patch for now.  Nebulizers with Brovana and budesonide.  DuoNeb as needed.  Smoking cessation counseling.  7.  Counseling against alcohol abuse.  Not showing any signs of withdrawal.    Continue close monitoring in ICU.      Time spent 30 min (exclusive of procedure time)  including high complexity decision making to assess, manipulate, and support vital organ system failure in this individual who has impairment of one or more vital organ systems such that there is a high probability of imminent or life threatening deterioration in the patient’s condition.      Tigre Davis MD, [facility]  Pulmonary, Critical care and Sleep Medicine

## 2021-02-10 NOTE — PROGRESS NOTES
CTS Progress Note      POD 1 s/p I&D of right groin incision. Opening of right knee incision for drainage.  Incision on the right medial thigh for drainage.      Chief Complaint: Postoperative infection.    Subjective  Patient is doing very well today.  Does admit to some mild pain in his incision sites, but states that the pain in his right leg has decreased noticeably since yesterday.  Denies fever, chills, nausea, vomiting.  Denies chest pain, shortness breath.  Redness around incision sites have receded compared to yesterday.  Dressings soaked with serous drainage.      Objective    Physical Exam:   Vital Signs   Temp:  [97.8 °F (36.6 °C)-99.1 °F (37.3 °C)] 98.7 °F (37.1 °C)  Heart Rate:  [] 90  Resp:  [14-21] 20  BP: ()/(36-99) 111/36   GEN: Patient no acute distress.   RESP: Diffuse wheezing bilaterally.     CV: Regular rate and rhythm.  No murmurs or gallops.    ABD: NABS.  No distention.    EXT: Multiple incision sites on right lower extremity.  Right lower extremity wrapped in dressing.   INT: Incision c/d/i      Intake/Output Summary (Last 24 hours) at 2/10/2021 1007  Last data filed at 2/10/2021 0957  Gross per 24 hour   Intake 902.05 ml   Output 1250 ml   Net -347.95 ml     Results     Results from last 7 days   Lab Units 02/10/21  0529   WBC 10*3/mm3 9.05   HEMOGLOBIN g/dL 6.2*   HEMATOCRIT % 18.8*   PLATELETS 10*3/mm3 208     Results from last 7 days   Lab Units 02/10/21  0529   SODIUM mmol/L 130*   POTASSIUM mmol/L 4.4   CHLORIDE mmol/L 95*   CO2 mmol/L 29.0   BUN mg/dL 19   CREATININE mg/dL 1.13   GLUCOSE mg/dL 224*   CALCIUM mg/dL 8.2*     Results from last 7 days   Lab Units 02/04/21  1519   INR  1.14*   APTT seconds 28.8           Assessment/Plan       Postoperative infection    Tobacco use    PVD     S/P L leg amputation     Post-operative infection        Plan:   PT/OT  Pulmonary toiletry  Routine dressing changes  Abx per Infectious Disease   Will reevaluate tomorrow      Please  note that portions of this note were completed with a voice recognition program. Efforts were made to edit the dictations, but occasionally words are mistranscribed.    Lisa Muñoz PA-C  02/10/21  10:07 EST

## 2021-02-10 NOTE — PROGRESS NOTES
Continued Stay Note   [facility]     Patient Name: Reg Sanchez  MRN: [id number]  Today's Date: 2/10/2021    Admit Date: 2/5/2021    Discharge Plan     Row Name 02/10/21 5020       Plan    Plan  Home with HH vs rehab    Patient/Family in Agreement with Plan  yes    Plan Comments  Spoke with patient at bedside.  Patient is hoping to go home with home health.  Per ID patient will need antibiotics for several weeks.  Explained to patient when appropriate would have PT/OT consulted to be sure he could go home with home health.  Patient states he has had [facility] before but that would be his last choice and he will think about possible rehab.  CM will continue to follow and waiting for ID to finalize plans.        Discharge Codes    No documentation.       Expected Discharge Date and Time     Expected Discharge Date Expected Discharge Time    Feb 17, 2021             Dalia Rosario RN

## 2021-02-11 LAB
ANION GAP SERPL CALCULATED.3IONS-SCNC: 5 MMOL/L (ref 5–15)
BACTERIA SPEC AEROBE CULT: NORMAL
BH BB BLOOD EXPIRATION DATE: NORMAL
BH BB BLOOD EXPIRATION DATE: NORMAL
BH BB BLOOD TYPE BARCODE: 600
BH BB BLOOD TYPE BARCODE: 600
BH BB DISPENSE STATUS: NORMAL
BH BB DISPENSE STATUS: NORMAL
BH BB PRODUCT CODE: NORMAL
BH BB PRODUCT CODE: NORMAL
BH BB UNIT NUMBER: NORMAL
BH BB UNIT NUMBER: NORMAL
BUN SERPL-MCNC: 15 MG/DL (ref 6–20)
BUN/CREAT SERPL: 18.5 (ref 7–25)
CALCIUM SPEC-SCNC: 8.3 MG/DL (ref 8.6–10.5)
CHLORIDE SERPL-SCNC: 98 MMOL/L (ref 98–107)
CO2 SERPL-SCNC: 29 MMOL/L (ref 22–29)
CREAT SERPL-MCNC: 0.81 MG/DL (ref 0.76–1.27)
CROSSMATCH INTERPRETATION: NORMAL
CROSSMATCH INTERPRETATION: NORMAL
DEPRECATED RDW RBC AUTO: 56.4 FL (ref 37–54)
ERYTHROCYTE [DISTWIDTH] IN BLOOD BY AUTOMATED COUNT: 16.1 % (ref 12.3–15.4)
GFR SERPL CREATININE-BSD FRML MDRD: 99 ML/MIN/1.73
GLUCOSE BLDC GLUCOMTR-MCNC: 145 MG/DL (ref 70–130)
GLUCOSE BLDC GLUCOMTR-MCNC: 161 MG/DL (ref 70–130)
GLUCOSE BLDC GLUCOMTR-MCNC: 167 MG/DL (ref 70–130)
GLUCOSE BLDC GLUCOMTR-MCNC: 215 MG/DL (ref 70–130)
GLUCOSE SERPL-MCNC: 163 MG/DL (ref 65–99)
GRAM STN SPEC: NORMAL
GRAM STN SPEC: NORMAL
HCT VFR BLD AUTO: 26.2 % (ref 37.5–51)
HGB BLD-MCNC: 8.5 G/DL (ref 13–17.7)
MAGNESIUM SERPL-MCNC: 2 MG/DL (ref 1.6–2.6)
MCH RBC QN AUTO: 32.2 PG (ref 26.6–33)
MCHC RBC AUTO-ENTMCNC: 32.4 G/DL (ref 31.5–35.7)
MCV RBC AUTO: 99.2 FL (ref 79–97)
PHOSPHATE SERPL-MCNC: 2.6 MG/DL (ref 2.5–4.5)
PLATELET # BLD AUTO: 237 10*3/MM3 (ref 140–450)
PMV BLD AUTO: 12 FL (ref 6–12)
POTASSIUM SERPL-SCNC: 4.8 MMOL/L (ref 3.5–5.2)
RBC # BLD AUTO: 2.64 10*6/MM3 (ref 4.14–5.8)
SODIUM SERPL-SCNC: 132 MMOL/L (ref 136–145)
UNIT  ABO: NORMAL
UNIT  ABO: NORMAL
UNIT  RH: NORMAL
UNIT  RH: NORMAL
WBC # BLD AUTO: 8.58 10*3/MM3 (ref 3.4–10.8)

## 2021-02-11 PROCEDURE — 25010000002 PIPERACILLIN SOD-TAZOBACTAM PER 1 G: Performed by: INTERNAL MEDICINE

## 2021-02-11 PROCEDURE — 94799 UNLISTED PULMONARY SVC/PX: CPT

## 2021-02-11 PROCEDURE — 63710000001 INSULIN LISPRO (HUMAN) PER 5 UNITS: Performed by: INTERNAL MEDICINE

## 2021-02-11 PROCEDURE — 99024 POSTOP FOLLOW-UP VISIT: CPT | Performed by: THORACIC SURGERY (CARDIOTHORACIC VASCULAR SURGERY)

## 2021-02-11 PROCEDURE — 63710000001 INSULIN DETEMIR PER 5 UNITS: Performed by: INTERNAL MEDICINE

## 2021-02-11 PROCEDURE — 25010000002 KETOROLAC TROMETHAMINE PER 15 MG: Performed by: INTERNAL MEDICINE

## 2021-02-11 PROCEDURE — 85027 COMPLETE CBC AUTOMATED: CPT | Performed by: INTERNAL MEDICINE

## 2021-02-11 PROCEDURE — 99233 SBSQ HOSP IP/OBS HIGH 50: CPT | Performed by: INTERNAL MEDICINE

## 2021-02-11 PROCEDURE — 80048 BASIC METABOLIC PNL TOTAL CA: CPT | Performed by: INTERNAL MEDICINE

## 2021-02-11 PROCEDURE — 25010000002 ENOXAPARIN PER 10 MG: Performed by: INTERNAL MEDICINE

## 2021-02-11 PROCEDURE — 82962 GLUCOSE BLOOD TEST: CPT

## 2021-02-11 PROCEDURE — 83735 ASSAY OF MAGNESIUM: CPT | Performed by: INTERNAL MEDICINE

## 2021-02-11 PROCEDURE — 25010000002 HYDROMORPHONE 1 MG/ML SOLUTION: Performed by: INTERNAL MEDICINE

## 2021-02-11 PROCEDURE — 84100 ASSAY OF PHOSPHORUS: CPT | Performed by: INTERNAL MEDICINE

## 2021-02-11 PROCEDURE — 25010000002 DAPTOMYCIN PER 1 MG: Performed by: INTERNAL MEDICINE

## 2021-02-11 RX ADMIN — ATORVASTATIN CALCIUM 40 MG: 40 TABLET, FILM COATED ORAL at 21:36

## 2021-02-11 RX ADMIN — PREGABALIN 200 MG: 100 CAPSULE ORAL at 21:36

## 2021-02-11 RX ADMIN — SODIUM CHLORIDE, PRESERVATIVE FREE 10 ML: 5 INJECTION INTRAVENOUS at 09:54

## 2021-02-11 RX ADMIN — HYDROMORPHONE HYDROCHLORIDE 1 MG: 1 INJECTION, SOLUTION INTRAMUSCULAR; INTRAVENOUS; SUBCUTANEOUS at 17:31

## 2021-02-11 RX ADMIN — Medication 1 PATCH: at 09:57

## 2021-02-11 RX ADMIN — METOPROLOL TARTRATE 50 MG: 50 TABLET, FILM COATED ORAL at 21:36

## 2021-02-11 RX ADMIN — LISINOPRIL 10 MG: 10 TABLET ORAL at 09:45

## 2021-02-11 RX ADMIN — DULOXETINE HYDROCHLORIDE 60 MG: 60 CAPSULE, DELAYED RELEASE ORAL at 21:36

## 2021-02-11 RX ADMIN — INSULIN LISPRO 2 UNITS: 100 INJECTION, SOLUTION INTRAVENOUS; SUBCUTANEOUS at 09:45

## 2021-02-11 RX ADMIN — OXYCODONE AND ACETAMINOPHEN 1 TABLET: 5; 325 TABLET ORAL at 21:35

## 2021-02-11 RX ADMIN — TAZOBACTAM SODIUM AND PIPERACILLIN SODIUM 3.38 G: 375; 3 INJECTION, SOLUTION INTRAVENOUS at 02:50

## 2021-02-11 RX ADMIN — BUDESONIDE 0.5 MG: 0.5 INHALANT ORAL at 08:03

## 2021-02-11 RX ADMIN — SODIUM HYPOCHLORITE: 1.25 SOLUTION TOPICAL at 21:36

## 2021-02-11 RX ADMIN — SODIUM CHLORIDE, PRESERVATIVE FREE 10 ML: 5 INJECTION INTRAVENOUS at 21:36

## 2021-02-11 RX ADMIN — TAZOBACTAM SODIUM AND PIPERACILLIN SODIUM 3.38 G: 375; 3 INJECTION, SOLUTION INTRAVENOUS at 18:42

## 2021-02-11 RX ADMIN — HYDROMORPHONE HYDROCHLORIDE 1 MG: 1 INJECTION, SOLUTION INTRAMUSCULAR; INTRAVENOUS; SUBCUTANEOUS at 12:07

## 2021-02-11 RX ADMIN — INSULIN LISPRO 3 UNITS: 100 INJECTION, SOLUTION INTRAVENOUS; SUBCUTANEOUS at 17:33

## 2021-02-11 RX ADMIN — DOXYCYCLINE 100 MG: 100 CAPSULE ORAL at 21:35

## 2021-02-11 RX ADMIN — OXYCODONE AND ACETAMINOPHEN 1 TABLET: 5; 325 TABLET ORAL at 09:53

## 2021-02-11 RX ADMIN — PREGABALIN 200 MG: 100 CAPSULE ORAL at 17:32

## 2021-02-11 RX ADMIN — DAPTOMYCIN 500 MG: 500 INJECTION, POWDER, LYOPHILIZED, FOR SOLUTION INTRAVENOUS at 09:00

## 2021-02-11 RX ADMIN — DULOXETINE HYDROCHLORIDE 60 MG: 60 CAPSULE, DELAYED RELEASE ORAL at 09:45

## 2021-02-11 RX ADMIN — ENOXAPARIN SODIUM 40 MG: 40 INJECTION SUBCUTANEOUS at 09:45

## 2021-02-11 RX ADMIN — FUROSEMIDE 20 MG: 20 TABLET ORAL at 09:45

## 2021-02-11 RX ADMIN — TAZOBACTAM SODIUM AND PIPERACILLIN SODIUM 3.38 G: 375; 3 INJECTION, SOLUTION INTRAVENOUS at 11:00

## 2021-02-11 RX ADMIN — OXYCODONE AND ACETAMINOPHEN 1 TABLET: 5; 325 TABLET ORAL at 02:53

## 2021-02-11 RX ADMIN — ARFORMOTEROL TARTRATE 15 MCG: 15 SOLUTION RESPIRATORY (INHALATION) at 08:03

## 2021-02-11 RX ADMIN — OXYCODONE AND ACETAMINOPHEN 1 TABLET: 5; 325 TABLET ORAL at 16:44

## 2021-02-11 RX ADMIN — BUDESONIDE 0.5 MG: 0.5 INHALANT ORAL at 20:59

## 2021-02-11 RX ADMIN — ISOSORBIDE MONONITRATE 30 MG: 30 TABLET, EXTENDED RELEASE ORAL at 09:45

## 2021-02-11 RX ADMIN — DOXYCYCLINE 100 MG: 100 CAPSULE ORAL at 09:45

## 2021-02-11 RX ADMIN — INSULIN DETEMIR 8 UNITS: 100 INJECTION, SOLUTION SUBCUTANEOUS at 09:59

## 2021-02-11 RX ADMIN — SODIUM HYPOCHLORITE 1 ML: 1.25 SOLUTION TOPICAL at 10:45

## 2021-02-11 RX ADMIN — METOPROLOL TARTRATE 50 MG: 50 TABLET, FILM COATED ORAL at 09:44

## 2021-02-11 RX ADMIN — SODIUM CHLORIDE, PRESERVATIVE FREE 10 ML: 5 INJECTION INTRAVENOUS at 21:38

## 2021-02-11 RX ADMIN — KETOROLAC TROMETHAMINE 30 MG: 30 INJECTION, SOLUTION INTRAMUSCULAR; INTRAVENOUS at 09:53

## 2021-02-11 RX ADMIN — PANTOPRAZOLE SODIUM 40 MG: 40 TABLET, DELAYED RELEASE ORAL at 09:44

## 2021-02-11 RX ADMIN — ROPINIROLE HYDROCHLORIDE 4 MG: 2 TABLET, FILM COATED ORAL at 21:35

## 2021-02-11 RX ADMIN — INSULIN LISPRO 2 UNITS: 100 INJECTION, SOLUTION INTRAVENOUS; SUBCUTANEOUS at 12:30

## 2021-02-11 RX ADMIN — PREGABALIN 200 MG: 100 CAPSULE ORAL at 09:44

## 2021-02-11 RX ADMIN — ASPIRIN 81 MG: 81 TABLET, CHEWABLE ORAL at 09:45

## 2021-02-11 RX ADMIN — HYDROXYZINE HYDROCHLORIDE 25 MG: 25 TABLET, FILM COATED ORAL at 09:53

## 2021-02-11 RX ADMIN — ARFORMOTEROL TARTRATE 15 MCG: 15 SOLUTION RESPIRATORY (INHALATION) at 20:59

## 2021-02-11 NOTE — PROGRESS NOTES
CTS Progress Note      POD #2 s/p Incision and drainage of right groin incision.  Opening of right knee incision for drainage.  Incision on the right medial thigh for drainage.    Right femoral to posterior tibial distal bypass with reverse saphenous vein graft on 1/27/2021       LOS: 6 days   Patient Care Team:  Sheila Sheikh APRN as PCP - General (Nurse Practitioner)    Subjective  Patient reports pain under adequate control with dilaudid PCA. States he is ready to stop smoking and wants to get his diet under better control. Reports constipation.  Denies fever, chills, N/V, shortness of breath and chest pain.     Objective    Vital Signs  Temp:  [96.3 °F (35.7 °C)-98.5 °F (36.9 °C)] 96.3 °F (35.7 °C)  Heart Rate:  [] 62  Resp:  [18-22] 18  BP: (102-129)/(48-65) 118/55    Physical Exam:   General Appearance: alert, appears stated age and cooperative   Lungs: clear to auscultation, respirations regular, respirations even and respirations unlabored   Heart: regular rhythm & normal rate, normal S1, S2, no murmur, no gallop, no rub and no click   Ext: Dressings clean and dry, xeroform dressing to right ankle wound, ABD dressings to remaining leg wounds        Results   Results from last 7 days   Lab Units 02/11/21  1110   WBC 10*3/mm3 8.58   HEMOGLOBIN g/dL 8.5*   HEMATOCRIT % 26.2*   PLATELETS 10*3/mm3 237     Results from last 7 days   Lab Units 02/11/21  1110   SODIUM mmol/L 132*   POTASSIUM mmol/L 4.8   CHLORIDE mmol/L 98   CO2 mmol/L 29.0   BUN mg/dL 15   CREATININE mg/dL 0.81   GLUCOSE mg/dL 163*   CALCIUM mg/dL 8.3*           Imaging Results (Last 24 Hours)     ** No results found for the last 24 hours. **          Assessment      Postoperative infection    Tobacco use    PVD     S/P L leg amputation     Post-operative infection      Plan   Continue wet to dry dressing changes  Abx per ID  PT/OT  Bowel regimen        Kristel Wick PA-C  02/11/21  16:42 EST

## 2021-02-11 NOTE — PROGRESS NOTES
Continued Stay Note   [facility]     Patient Name: Reg Sanchez  MRN: [id number]  Today's Date: 2/11/2021    Admit Date: 2/5/2021    Discharge Plan     Row Name 02/11/21 1409       Plan    Plan  Home with home health    Patient/Family in Agreement with Plan  yes    Plan Comments  Spoke with patient at bedside.  Patient states that he is planning for home with home health at this time.  Declines rehab despite recommendations.  Patient states that he has had home IV antibiotics previously and has assistance of wife when ready for discharge.   Patient not medically ready for discharge at this time.  Will continue to follow for discharge planning.    Final Discharge Disposition Code  06 - home with home health care        Discharge Codes    No documentation.       Expected Discharge Date and Time     Expected Discharge Date Expected Discharge Time    Feb 15, 2021             Shaina Cramer RN

## 2021-02-11 NOTE — PROGRESS NOTES
[facility] Medicine Services  PROGRESS NOTE    Patient Name: Reg Sanchez  : 1965  MRN: [id number]    Date of Admission: 2021  Primary Care Physician: Sheila Sheikh APRN    Subjective   Subjective     CC:  Fever, right leg pain    HPI:  Pain adequately controlled. Patient reports he has had trouble sleeping and trouble with wound and nursing reports copious drainage from wound . Patient reports no other complaints at this time     ROS:  Gen- No fevers, chills  CV- No chest pain, palpitations  Resp- No cough, dyspnea  GI- No N/V/D, abd pain        Objective   Objective     Vital Signs:   Temp:  [97.9 °F (36.6 °C)-98.9 °F (37.2 °C)] 98.3 °F (36.8 °C)  Heart Rate:  [] 69  Resp:  [18-22] 18  BP: ()/(34-82) 102/48        Physical Exam:  Constitutional: No acute distress, awake, alert  HENT: NCAT, mucous membranes moist  Respiratory: Clear to auscultation bilaterally, on RA  Cardiovascular: RRR, no murmurs, rubs, or gallops  Musculoskeletal: Left lower extremity below-knee amputation.  Right lower extremity is edematous, lower leg wrapped in dressings with drainage noted   Psychiatric: Appropriate affect, cooperative  Neurologic: Oriented x 3, strength symmetric in all extremities, Cranial Nerves grossly intact to confrontation, speech clear    Results Reviewed:  Results from last 7 days   Lab Units 02/10/21  1510 02/10/21  0529 21  0358 21  0404  21  1614  21  1519   WBC 10*3/mm3  --  9.05 17.78* 6.68   < > 20.11*   < >  --    HEMOGLOBIN g/dL 8.2* 6.2* 9.1* 11.1*   < > 12.0*   < >  --    HEMATOCRIT % 24.8* 18.8* 27.5* 34.3*   < > 36.5*   < >  --    PLATELETS 10*3/mm3  --  208 253 195   < > 179   < >  --    INR   --   --   --   --   --   --   --  1.14*   PROCALCITONIN ng/mL  --   --   --   --   --  0.41*  --   --     < > = values in this interval not displayed.     Results from last 7 days   Lab Units 02/10/21  0529 21  6512  02/09/21  0358 02/08/21  0404 02/06/21  0627 02/05/21  1614 02/04/21  1520   SODIUM mmol/L 130*  --  133* 132* 137 136 128*   POTASSIUM mmol/L 4.4 4.8 5.7* 4.5 3.8 3.9 4.0   CHLORIDE mmol/L 95*  --  97* 99 105 103 95*   CO2 mmol/L 29.0  --  28.0 28.0 25.0 25.0 24.4   BUN mg/dL 19  --  13 8 8 11 12   CREATININE mg/dL 1.13  --  0.94 0.64* 0.71* 0.73* 0.89   GLUCOSE mg/dL 224*  --  235* 188* 202* 218* 225*   CALCIUM mg/dL 8.2*  --  9.2 8.9 7.9* 8.3* 8.9   ALT (SGPT) U/L  --   --   --   --  39 52* 79*   AST (SGOT) U/L  --   --   --   --  24 27 50*     Estimated Creatinine Clearance: 83.7 mL/min (by C-G formula based on SCr of 1.13 mg/dL).    Microbiology Results Abnormal     Procedure Component Value - Date/Time    Tissue / Bone Culture - Tissue, Groin, right [[id number]] Collected: 02/09/21 1738    Lab Status: Final result Specimen: Tissue from Groin, right Updated: 02/11/21 0639     Tissue Culture Scant growth (1+) Normal Skin Celi     Gram Stain Moderate (3+) WBCs seen      No organisms seen    Blood Culture - Blood, Arm, Left [[id number]] Collected: 02/05/21 1806    Lab Status: Final result Specimen: Blood from Arm, Left Updated: 02/10/21 1931     Blood Culture No growth at 5 days    Narrative:      Aerobic bottle only      Blood Culture - Blood, Hand, Left [[id number]] Collected: 02/05/21 1806    Lab Status: Final result Specimen: Blood from Hand, Left Updated: 02/10/21 1931     Blood Culture No growth at 5 days    Wound Culture - Wound, Leg, Right [[id number]]  (Abnormal)  (Susceptibility) Collected: 02/06/21 1748    Lab Status: Edited Result - FINAL Specimen: Wound from Leg, Right Updated: 02/10/21 1603     Wound Culture Moderate growth (3+) Staphylococcus aureus, MRSA     Comment: Methicillin resistant Staphylococcus aureus, Patient may be an isolation risk.        Gram Stain Few (2+) WBCs seen      No organisms seen    Susceptibility      Staphylococcus aureus, MRSA     AZIZA     Clindamycin Susceptible      Daptomycin Susceptible (C) [1]      Erythromycin Susceptible     Inducible Clindamycin Resistance Negative     Oxacillin Resistant     Penicillin G Resistant     Rifampin Susceptible     Tetracycline Susceptible     Trimethoprim + Sulfamethoxazole Susceptible     Vancomycin Susceptible            [1]   Appended report. These results have been appended to a previously final verified report.             Susceptibility Comments     Staphylococcus aureus, MRSA    Daptomycin susceptibility requested by [facility]    This isolate does not demonstrate inducible clindamycin resistance in vitro.               AFB Culture - Tissue, Groin, right [[id number]] Collected: 02/09/21 5381    Lab Status: Preliminary result Specimen: Tissue from Groin, right Updated: 02/10/21 7378     AFB Stain No acid fast bacilli seen on concentrated smear          Imaging Results (Last 24 Hours)     ** No results found for the last 24 hours. **          Results for orders placed during the hospital encounter of 01/25/21   Adult Transthoracic Echo Complete W/ Cont if Necessary Per Protocol    Narrative · Left ventricular ejection fraction appears to be 66 - 70%. Left   ventricular systolic function is normal.  · The right ventricular cavity is borderline dilated.  · Left ventricular diastolic function was normal.  · No significant structural or functional valvular disease.          I have reviewed the medications:  Scheduled Meds:arformoterol, 15 mcg, Nebulization, BID - RT  aspirin, 81 mg, Oral, Daily  atorvastatin, 40 mg, Oral, Nightly  budesonide, 0.5 mg, Nebulization, BID - RT  DAPTOmycin, 6 mg/kg (Adjusted), Intravenous, Q24H  doxycycline, 100 mg, Oral, Q12H  DULoxetine, 60 mg, Oral, BID  enoxaparin, 40 mg, Subcutaneous, Daily  furosemide, 20 mg, Oral, Daily  insulin detemir, 8 Units, Subcutaneous, Daily  insulin lispro, 0-7 Units, Subcutaneous, 4x Daily With Meals & Nightly  isosorbide mononitrate, 30 mg, Oral, Daily  lisinopril, 10 mg, Oral,  Daily  metoprolol tartrate, 50 mg, Oral, BID  nicotine, 1 patch, Transdermal, Q24H  pantoprazole, 40 mg, Oral, QAM  piperacillin-tazobactam, 3.375 g, Intravenous, Q8H  pregabalin, 200 mg, Oral, TID  rOPINIRole, 4 mg, Oral, Nightly  sodium chloride, 10 mL, Intravenous, Q12H  sodium chloride, 10 mL, Intravenous, Q12H  sodium hypochlorite, , Topical, Q12H      Continuous Infusions:HYDROmorphone HCl-NaCl,       PRN Meds:.•  acetaminophen **OR** acetaminophen **OR** acetaminophen  •  albuterol  •  dextrose  •  dextrose  •  glucagon (human recombinant)  •  HYDROmorphone  •  hydrOXYzine  •  ketorolac  •  LORazepam  •  magnesium sulfate **OR** magnesium sulfate **OR** magnesium sulfate  •  Morphine  •  naloxone  •  naloxone  •  naloxone  •  ondansetron **OR** ondansetron  •  oxyCODONE-acetaminophen  •  potassium & sodium phosphates **OR** potassium & sodium phosphates  •  sodium chloride  •  sodium chloride    Assessment/Plan   Assessment & Plan     Active Hospital Problems    Diagnosis  POA   • **Postoperative infection [T81.40XA]  Unknown   • Post-operative infection [T81.40XA]  Yes   • S/P L leg amputation  [S88.919A]  Yes   • PVD  [I73.9]  Yes   • Tobacco use [Z72.0]  Yes      Resolved Hospital Problems   No resolved problems to display.        Brief Hospital Course to date:  Reg Sanchez is a 55 y.o. male with past medical history significant for tobacco abuse, diabetes mellitus, peripheral vascular disease, congestive heart failure, coronary artery disease status post CABG, left leg osteomyelitis status post left leg amputation, peripheral arterial disease and most recently right SFA and popliteal artery occlusion underwent a right femoral artery bypass with Dr. Trevizo 1/27/2021.  The patient developed fever on 2/2/2021. On 2/3/21 he developed right leg swelling and redness. On 2/4/21, he presented to outside hospital where he had lab work indicating inflammatory/infectious process and a CT angiogram of the leg  which revealed soft tissue stranding and fluid surrounding the recent right femoral/distal artery graft.  Patent runoff with adequate flow.  Patient was taken to the OR on 2/9 with Dr Trevizo for surgical drainage of right groin and knee. Patient was monitored in the ICU Following surgery, transferred back to floor 2/11.     This patient's problems and plans were partially entered by my partner and updated as appropriate by me 02/11/21.        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  Will monitor renal function.    Hyperkalemia  --stop K+ replacement  --give two packets Veltassa this am, repeat K+ at one pm    Leukocytosis-resolved     COPD  --PRN nebs     Diabetes mellitus   -SSI, A1C 7.2 -control adequate at this time     Hyperlipidemia  -Lipitor 40mg     Tobacco abuse  -nicotine patch    etoh abuse  - ativan PRN     Hep C previously treated with interferon/ribaviron then reinfected     DVT Prophylaxis:  SALIMA      Disposition: TBD. Suspect patient will need rehab    CODE STATUS:   Code Status and Medical Interventions:   Ordered at: 02/05/21 1505     Code Status:    CPR     Medical Interventions (Level of Support Prior to Arrest):    Full       Nikkie Vivas MD  02/11/21

## 2021-02-12 LAB
ANION GAP SERPL CALCULATED.3IONS-SCNC: 5 MMOL/L (ref 5–15)
BASOPHILS # BLD AUTO: 0.07 10*3/MM3 (ref 0–0.2)
BASOPHILS NFR BLD AUTO: 0.8 % (ref 0–1.5)
BUN SERPL-MCNC: 13 MG/DL (ref 6–20)
BUN/CREAT SERPL: 15.7 (ref 7–25)
CALCIUM SPEC-SCNC: 8.9 MG/DL (ref 8.6–10.5)
CHLORIDE SERPL-SCNC: 97 MMOL/L (ref 98–107)
CK SERPL-CCNC: 48 U/L (ref 20–200)
CO2 SERPL-SCNC: 29 MMOL/L (ref 22–29)
CREAT SERPL-MCNC: 0.83 MG/DL (ref 0.76–1.27)
DEPRECATED RDW RBC AUTO: 57.1 FL (ref 37–54)
EOSINOPHIL # BLD AUTO: 0.2 10*3/MM3 (ref 0–0.4)
EOSINOPHIL NFR BLD AUTO: 2.2 % (ref 0.3–6.2)
ERYTHROCYTE [DISTWIDTH] IN BLOOD BY AUTOMATED COUNT: 15.3 % (ref 12.3–15.4)
GFR SERPL CREATININE-BSD FRML MDRD: 96 ML/MIN/1.73
GLUCOSE BLDC GLUCOMTR-MCNC: 145 MG/DL (ref 70–130)
GLUCOSE BLDC GLUCOMTR-MCNC: 147 MG/DL (ref 70–130)
GLUCOSE BLDC GLUCOMTR-MCNC: 187 MG/DL (ref 70–130)
GLUCOSE BLDC GLUCOMTR-MCNC: 204 MG/DL (ref 70–130)
GLUCOSE SERPL-MCNC: 151 MG/DL (ref 65–99)
HCT VFR BLD AUTO: 30.7 % (ref 37.5–51)
HGB BLD-MCNC: 9.7 G/DL (ref 13–17.7)
IMM GRANULOCYTES # BLD AUTO: 0.37 10*3/MM3 (ref 0–0.05)
IMM GRANULOCYTES NFR BLD AUTO: 4.1 % (ref 0–0.5)
LYMPHOCYTES # BLD AUTO: 1.92 10*3/MM3 (ref 0.7–3.1)
LYMPHOCYTES NFR BLD AUTO: 21.4 % (ref 19.6–45.3)
MCH RBC QN AUTO: 32.8 PG (ref 26.6–33)
MCHC RBC AUTO-ENTMCNC: 31.6 G/DL (ref 31.5–35.7)
MCV RBC AUTO: 103.7 FL (ref 79–97)
MONOCYTES # BLD AUTO: 0.78 10*3/MM3 (ref 0.1–0.9)
MONOCYTES NFR BLD AUTO: 8.7 % (ref 5–12)
NEUTROPHILS NFR BLD AUTO: 5.64 10*3/MM3 (ref 1.7–7)
NEUTROPHILS NFR BLD AUTO: 62.8 % (ref 42.7–76)
NRBC BLD AUTO-RTO: 0.2 /100 WBC (ref 0–0.2)
PLATELET # BLD AUTO: 278 10*3/MM3 (ref 140–450)
PMV BLD AUTO: 11.8 FL (ref 6–12)
POTASSIUM SERPL-SCNC: 4.8 MMOL/L (ref 3.5–5.2)
RBC # BLD AUTO: 2.96 10*6/MM3 (ref 4.14–5.8)
SODIUM SERPL-SCNC: 131 MMOL/L (ref 136–145)
WBC # BLD AUTO: 8.98 10*3/MM3 (ref 3.4–10.8)

## 2021-02-12 PROCEDURE — 25010000002 FUROSEMIDE PER 20 MG: Performed by: THORACIC SURGERY (CARDIOTHORACIC VASCULAR SURGERY)

## 2021-02-12 PROCEDURE — 25010000002 HYDROMORPHONE 1 MG/ML SOLUTION: Performed by: INTERNAL MEDICINE

## 2021-02-12 PROCEDURE — 25010000002 ENOXAPARIN PER 10 MG: Performed by: INTERNAL MEDICINE

## 2021-02-12 PROCEDURE — 63710000001 INSULIN DETEMIR PER 5 UNITS: Performed by: INTERNAL MEDICINE

## 2021-02-12 PROCEDURE — 25010000002 DAPTOMYCIN PER 1 MG: Performed by: INTERNAL MEDICINE

## 2021-02-12 PROCEDURE — 82962 GLUCOSE BLOOD TEST: CPT

## 2021-02-12 PROCEDURE — 80048 BASIC METABOLIC PNL TOTAL CA: CPT | Performed by: INTERNAL MEDICINE

## 2021-02-12 PROCEDURE — 99024 POSTOP FOLLOW-UP VISIT: CPT | Performed by: THORACIC SURGERY (CARDIOTHORACIC VASCULAR SURGERY)

## 2021-02-12 PROCEDURE — 94799 UNLISTED PULMONARY SVC/PX: CPT

## 2021-02-12 PROCEDURE — 63710000001 INSULIN LISPRO (HUMAN) PER 5 UNITS: Performed by: INTERNAL MEDICINE

## 2021-02-12 PROCEDURE — 82550 ASSAY OF CK (CPK): CPT | Performed by: INTERNAL MEDICINE

## 2021-02-12 PROCEDURE — 85025 COMPLETE CBC W/AUTO DIFF WBC: CPT | Performed by: INTERNAL MEDICINE

## 2021-02-12 PROCEDURE — 99232 SBSQ HOSP IP/OBS MODERATE 35: CPT | Performed by: NURSE PRACTITIONER

## 2021-02-12 PROCEDURE — 25010000002 PIPERACILLIN SOD-TAZOBACTAM PER 1 G: Performed by: INTERNAL MEDICINE

## 2021-02-12 RX ORDER — FUROSEMIDE 10 MG/ML
80 INJECTION INTRAMUSCULAR; INTRAVENOUS ONCE
Status: COMPLETED | OUTPATIENT
Start: 2021-02-12 | End: 2021-02-12

## 2021-02-12 RX ADMIN — INSULIN LISPRO 3 UNITS: 100 INJECTION, SOLUTION INTRAVENOUS; SUBCUTANEOUS at 22:00

## 2021-02-12 RX ADMIN — LORAZEPAM 1 MG: 1 TABLET ORAL at 11:43

## 2021-02-12 RX ADMIN — DULOXETINE HYDROCHLORIDE 60 MG: 60 CAPSULE, DELAYED RELEASE ORAL at 07:53

## 2021-02-12 RX ADMIN — LISINOPRIL 10 MG: 10 TABLET ORAL at 07:51

## 2021-02-12 RX ADMIN — OXYCODONE AND ACETAMINOPHEN 1 TABLET: 5; 325 TABLET ORAL at 22:00

## 2021-02-12 RX ADMIN — OXYCODONE AND ACETAMINOPHEN 1 TABLET: 5; 325 TABLET ORAL at 18:08

## 2021-02-12 RX ADMIN — PREGABALIN 200 MG: 100 CAPSULE ORAL at 07:52

## 2021-02-12 RX ADMIN — FUROSEMIDE 20 MG: 20 TABLET ORAL at 07:52

## 2021-02-12 RX ADMIN — PREGABALIN 200 MG: 100 CAPSULE ORAL at 21:03

## 2021-02-12 RX ADMIN — SODIUM HYPOCHLORITE: 1.25 SOLUTION TOPICAL at 21:02

## 2021-02-12 RX ADMIN — OXYCODONE AND ACETAMINOPHEN 1 TABLET: 5; 325 TABLET ORAL at 03:05

## 2021-02-12 RX ADMIN — METOPROLOL TARTRATE 50 MG: 50 TABLET, FILM COATED ORAL at 21:03

## 2021-02-12 RX ADMIN — LORAZEPAM 1 MG: 1 TABLET ORAL at 21:04

## 2021-02-12 RX ADMIN — BUDESONIDE 0.5 MG: 0.5 INHALANT ORAL at 20:30

## 2021-02-12 RX ADMIN — DAPTOMYCIN 500 MG: 500 INJECTION, POWDER, LYOPHILIZED, FOR SOLUTION INTRAVENOUS at 07:54

## 2021-02-12 RX ADMIN — OXYCODONE AND ACETAMINOPHEN 1 TABLET: 5; 325 TABLET ORAL at 13:51

## 2021-02-12 RX ADMIN — ARFORMOTEROL TARTRATE 15 MCG: 15 SOLUTION RESPIRATORY (INHALATION) at 07:27

## 2021-02-12 RX ADMIN — DULOXETINE HYDROCHLORIDE 60 MG: 60 CAPSULE, DELAYED RELEASE ORAL at 21:03

## 2021-02-12 RX ADMIN — SODIUM CHLORIDE, PRESERVATIVE FREE 10 ML: 5 INJECTION INTRAVENOUS at 15:16

## 2021-02-12 RX ADMIN — Medication 1 PATCH: at 08:07

## 2021-02-12 RX ADMIN — SODIUM CHLORIDE, PRESERVATIVE FREE 10 ML: 5 INJECTION INTRAVENOUS at 07:55

## 2021-02-12 RX ADMIN — SODIUM CHLORIDE, PRESERVATIVE FREE 10 ML: 5 INJECTION INTRAVENOUS at 08:08

## 2021-02-12 RX ADMIN — HYDROMORPHONE HYDROCHLORIDE 1 MG: 1 INJECTION, SOLUTION INTRAMUSCULAR; INTRAVENOUS; SUBCUTANEOUS at 01:19

## 2021-02-12 RX ADMIN — TAZOBACTAM SODIUM AND PIPERACILLIN SODIUM 3.38 G: 375; 3 INJECTION, SOLUTION INTRAVENOUS at 09:30

## 2021-02-12 RX ADMIN — ATORVASTATIN CALCIUM 40 MG: 40 TABLET, FILM COATED ORAL at 21:03

## 2021-02-12 RX ADMIN — TAZOBACTAM SODIUM AND PIPERACILLIN SODIUM 3.38 G: 375; 3 INJECTION, SOLUTION INTRAVENOUS at 03:05

## 2021-02-12 RX ADMIN — HYDROMORPHONE HYDROCHLORIDE 1 MG: 1 INJECTION, SOLUTION INTRAMUSCULAR; INTRAVENOUS; SUBCUTANEOUS at 15:16

## 2021-02-12 RX ADMIN — INSULIN DETEMIR 8 UNITS: 100 INJECTION, SOLUTION SUBCUTANEOUS at 09:00

## 2021-02-12 RX ADMIN — SODIUM CHLORIDE, PRESERVATIVE FREE 10 ML: 5 INJECTION INTRAVENOUS at 21:04

## 2021-02-12 RX ADMIN — ENOXAPARIN SODIUM 40 MG: 40 INJECTION SUBCUTANEOUS at 07:53

## 2021-02-12 RX ADMIN — FUROSEMIDE 80 MG: 10 INJECTION, SOLUTION INTRAVENOUS at 07:54

## 2021-02-12 RX ADMIN — DOXYCYCLINE 100 MG: 100 CAPSULE ORAL at 07:52

## 2021-02-12 RX ADMIN — ROPINIROLE HYDROCHLORIDE 4 MG: 2 TABLET, FILM COATED ORAL at 21:03

## 2021-02-12 RX ADMIN — Medication 1 PATCH: at 07:55

## 2021-02-12 RX ADMIN — ARFORMOTEROL TARTRATE 15 MCG: 15 SOLUTION RESPIRATORY (INHALATION) at 20:31

## 2021-02-12 RX ADMIN — INSULIN LISPRO 2 UNITS: 100 INJECTION, SOLUTION INTRAVENOUS; SUBCUTANEOUS at 12:30

## 2021-02-12 RX ADMIN — ISOSORBIDE MONONITRATE 30 MG: 30 TABLET, EXTENDED RELEASE ORAL at 07:53

## 2021-02-12 RX ADMIN — BUDESONIDE 0.5 MG: 0.5 INHALANT ORAL at 07:27

## 2021-02-12 RX ADMIN — PREGABALIN 200 MG: 100 CAPSULE ORAL at 15:16

## 2021-02-12 RX ADMIN — ASPIRIN 81 MG: 81 TABLET, CHEWABLE ORAL at 07:51

## 2021-02-12 RX ADMIN — METOPROLOL TARTRATE 50 MG: 50 TABLET, FILM COATED ORAL at 07:52

## 2021-02-12 RX ADMIN — DOXYCYCLINE 100 MG: 100 CAPSULE ORAL at 21:03

## 2021-02-12 RX ADMIN — PANTOPRAZOLE SODIUM 40 MG: 40 TABLET, DELAYED RELEASE ORAL at 06:15

## 2021-02-12 RX ADMIN — OXYCODONE AND ACETAMINOPHEN 1 TABLET: 5; 325 TABLET ORAL at 07:50

## 2021-02-12 NOTE — PROGRESS NOTES
CTS Progress Note       LOS: 7 days   Patient Care Team:  hSeila Sheikh APRN as PCP - General (Nurse Practitioner)    Chief Complaint: Postoperative infection    Vital Signs:  Temp:  [96.3 °F (35.7 °C)-98.5 °F (36.9 °C)] 98.4 °F (36.9 °C)  Heart Rate:  [57-74] 66  Resp:  [16-20] 18  BP: (113-138)/(44-62) 138/62    Physical Exam:  Foot remains viable     Results:   Results from last 7 days   Lab Units 02/11/21  1110   WBC 10*3/mm3 8.58   HEMOGLOBIN g/dL 8.5*   HEMATOCRIT % 26.2*   PLATELETS 10*3/mm3 237     Results from last 7 days   Lab Units 02/11/21  1110   SODIUM mmol/L 132*   POTASSIUM mmol/L 4.8   CHLORIDE mmol/L 98   CO2 mmol/L 29.0   BUN mg/dL 15   CREATININE mg/dL 0.81   GLUCOSE mg/dL 163*   CALCIUM mg/dL 8.3*           Imaging Results (Last 24 Hours)     ** No results found for the last 24 hours. **          Assessment      Postoperative infection    Tobacco use    PVD     S/P L leg amputation     Post-operative infection    Foot remains viable.  I anticipate patient will require dressing changes until potential placement of wound vacuum on Monday or Tuesday or Wednesday depending upon the progression of the wound    Plan   As above    Please note that portions of this note were completed with a voice recognition program. Efforts were made to edit the dictations, but occasionally words are mistranscribed.    Jeremy Trevizo MD  02/12/21  07:13 EST

## 2021-02-12 NOTE — PROGRESS NOTES
INFECTIOUS DISEASE Progress Note    Reg Sanchez  1965  [id number]     Reason for Consultation: right lower extremity celluilitis    History of present illness:    Patient is a 55 y.o. male, with Diabetes, PAD, CHF, Hep C,  s/p right femoral to posterior tib bypass, with reverse saphenous vein graft 1/21/21- discharged on 2/1/21.  On 2/3/21 he developed fever up to 103 degrees,  chills, with pain and swelling, redness of right lower extremity.  CT angio with runfoff revealed soft tissue stranding and perigraft fluid identifiabl ein the groin extending to the level of the knee with patent graft.  He was transferred to [facility] from [place]. Tmax of 102.6 degrees.  Admitting labs with a leukocytosis of 18,000, CRP 2.29, LAC 2.1, PCT 0.41, blood cultures no growth so far.  He was started on Vancomycin and Zosyn  and we were consulted for evaluation and treatment.  Complains of significant swelling from the thigh region down to the knee.  There is some clear weeping fluid near the most distal lesion.    2/7/21: Afebrile overnight.  Hemodynamically stable.  Tolerating current antibiotics.  Complains of significant leg pain from the thigh down towards the foot.  There appears to be less serous drainage on the dressing    2/8/21: Still complains of leg pain, swelling and drainage although overall slightly improved.  Wound cultures with MRSA.  Dr. Trevizo planning I&D tomorrow    2/9/21: Afebrile overnight.  Tolerating current antibodics.  Have an episode where he had worsening swelling and bruising of the right upper thigh last night but today it has improved.  Tolerating her antibiotics.  Awaiting surgery later today    2/10/2021: Status post I&D yesterday.  Operative cultures pending.  Recovering in the ICU.  Still significant pain but swelling is better.  Tolerating current antibiotics    2/12/2021: Patient transferred to the floor.  Complains of dressing changes not be done frequent enough.  Still having a  moderate amount of pain despite IV narcotics.  PICC line in place and working. MRSA only on cultures.     ROS:  No fevers or chills. No diarrhea. Poor appetite. No rash. No new ADR to Abx.       No Known Allergies      Medication:    Current Facility-Administered Medications:   •  acetaminophen (TYLENOL) tablet 650 mg, 650 mg, Oral, Q4H PRN, 650 mg at 02/07/21 0352 **OR** acetaminophen (TYLENOL) 160 MG/5ML solution 650 mg, 650 mg, Oral, Q4H PRN **OR** acetaminophen (TYLENOL) suppository 650 mg, 650 mg, Rectal, Q4H PRN, Case, Cecy V., DO  •  albuterol (PROVENTIL) nebulizer solution 0.083% 2.5 mg/3mL, 2.5 mg, Nebulization, Q4H PRN, Case, Cecy V., DO, 2.5 mg at 02/09/21 2344  •  arformoterol (BROVANA) nebulizer solution 15 mcg, 15 mcg, Nebulization, BID - RT, Case, Cecy V., DO, 15 mcg at 02/12/21 0727  •  aspirin chewable tablet 81 mg, 81 mg, Oral, Daily, Case, Cecy V., DO, 81 mg at 02/12/21 0751  •  atorvastatin (LIPITOR) tablet 40 mg, 40 mg, Oral, Nightly, Case, Cecy V., DO, 40 mg at 02/11/21 2136  •  budesonide (PULMICORT) nebulizer solution 0.5 mg, 0.5 mg, Nebulization, BID - RT, Case, Cecy V., DO, 0.5 mg at 02/12/21 0727  •  DAPTOmycin (CUBICIN) 500 mg/50 mL in sodium chloride, 6 mg/kg (Adjusted), Intravenous, Q24H, Trav Washburn MD, 500 mg at 02/12/21 0754  •  dextrose (D50W) 25 g/ 50mL Intravenous Solution 25 g, 25 g, Intravenous, Q15 Min PRN, Case, Cecy V., DO  •  dextrose (GLUTOSE) oral gel 15 g, 15 g, Oral, Q15 Min PRN, Case, Cecy V., DO  •  doxycycline (MONODOX) capsule 100 mg, 100 mg, Oral, Q12H, Trav Washburn MD, 100 mg at 02/12/21 0752  •  DULoxetine (CYMBALTA) DR capsule 60 mg, 60 mg, Oral, BID, Case, Cecy V., DO, 60 mg at 02/12/21 0753  •  enoxaparin (LOVENOX) syringe 40 mg, 40 mg, Subcutaneous, Daily, Case, Cecy V., DO, 40 mg at 02/12/21 0753  •  furosemide (LASIX) tablet 20 mg, 20 mg, Oral, Daily, Case, Cecy V., DO, Stopped at 02/13/21 0900  •  glucagon  (human recombinant) (GLUCAGEN DIAGNOSTIC) injection 1 mg, 1 mg, Subcutaneous, Q15 Min PRN, Case, Cecy V., DO  •  HYDROmorphone (DILAUDID) injection 1 mg, 1 mg, Intravenous, Q4H PRN, Case, Cecy V., DO, 1 mg at 02/12/21 0119  •  hydrOXYzine (ATARAX) tablet 25 mg, 25 mg, Oral, TID PRN, Case, Cecy V., DO, 25 mg at 02/11/21 0953  •  insulin detemir (LEVEMIR) injection 8 Units, 8 Units, Subcutaneous, Daily, Case, Cecy V., DO, 8 Units at 02/11/21 0959  •  insulin lispro (humaLOG, ADMELOG) injection 0-7 Units, 0-7 Units, Subcutaneous, 4x Daily With Meals & Nightly, Case, Cecy V., DO, 3 Units at 02/11/21 1733  •  isosorbide mononitrate (IMDUR) 24 hr tablet 30 mg, 30 mg, Oral, Daily, Case, Cecy V., DO, 30 mg at 02/12/21 0753  •  lisinopril (PRINIVIL,ZESTRIL) tablet 10 mg, 10 mg, Oral, Daily, Case, Cecy V., DO, 10 mg at 02/12/21 0751  •  LORazepam (ATIVAN) tablet 1 mg, 1 mg, Oral, Q6H PRN, Case, Ceyc V., DO, 1 mg at 02/08/21 1825  •  Magnesium Sulfate 2 gram Bolus, followed by 8 gram infusion (total Mg dose 10 grams)- Mg less than or equal to 1mg/dL, 2 g, Intravenous, PRN **OR** Magnesium Sulfate 2 gram / 50mL Infusion (GIVE X 3 BAGS TO EQUAL 6GM TOTAL DOSE) - Mg 1.1 - 1.5 mg/dl, 2 g, Intravenous, PRN **OR** Magnesium Sulfate 4 gram infusion- Mg 1.6-1.9 mg/dL, 4 g, Intravenous, PRN, Case, Cecy V., DO, Last Rate: 25 mL/hr at 02/10/21 1615, 4 g at 02/10/21 1615  •  metoprolol tartrate (LOPRESSOR) tablet 50 mg, 50 mg, Oral, BID, Case, Cecy V., DO, 50 mg at 02/12/21 0752  •  morphine injection 2 mg, 2 mg, Intravenous, Q4H PRN, Nikkie Vivas MD, 2 mg at 02/08/21 1743  •  naloxone (NARCAN) injection 0.1 mg, 0.1 mg, Intravenous, Q5 Min PRN, Case, Cecy V., DO  •  naloxone (NARCAN) injection 0.1 mg, 0.1 mg, Intravenous, Q5 Min PRN, Case, Cecy V., DO  •  naloxone (NARCAN) injection 0.2 mg, 0.2 mg, Intravenous, Q5 Min PRN, Case, Cecy V., DO  •  nicotine (NICODERM CQ) 21 MG/24HR patch 1 patch, 1  patch, Transdermal, Q24H, Case, Cecy V., DO, 1 patch at 02/12/21 0807  •  ondansetron (ZOFRAN) tablet 4 mg, 4 mg, Oral, Q6H PRN **OR** ondansetron (ZOFRAN) injection 4 mg, 4 mg, Intravenous, Q6H PRN, Case, Cecy V., DO  •  oxyCODONE-acetaminophen (PERCOCET) 5-325 MG per tablet 1 tablet, 1 tablet, Oral, Q4H PRN, Nikkie Vivas MD, 1 tablet at 02/12/21 0750  •  pantoprazole (PROTONIX) EC tablet 40 mg, 40 mg, Oral, QAM, Case, Cecy V., DO, 40 mg at 02/12/21 0615  •  potassium & sodium phosphates (PHOS-NAK) 280-160-250 MG packet - for Phosphorus less than 1.25 mg/dL, 2 packet, Oral, Q6H PRN, 2 packet at 02/06/21 1613 **OR** potassium & sodium phosphates (PHOS-NAK) 280-160-250 MG packet - for Phosphorus 1.25 - 2.5 mg/dL, 2 packet, Oral, Q6H PRN, Case, Cecy V., DO, 2 packet at 02/05/21 2123  •  pregabalin (LYRICA) capsule 200 mg, 200 mg, Oral, TID, Case, Cecy V., DO, 200 mg at 02/12/21 0752  •  rOPINIRole (REQUIP) tablet 4 mg, 4 mg, Oral, Nightly, Case, Cecy V., DO, 4 mg at 02/11/21 2135  •  sodium chloride 0.9 % flush 10 mL, 10 mL, Intravenous, Q12H, Case, Cecy V., DO, 10 mL at 02/12/21 0808  •  sodium chloride 0.9 % flush 10 mL, 10 mL, Intravenous, PRN, Case, Cecy V., DO  •  sodium chloride 0.9 % flush 10 mL, 10 mL, Intravenous, Q12H, Case, Cecy V., DO, 10 mL at 02/12/21 0755  •  sodium chloride 0.9 % flush 10 mL, 10 mL, Intravenous, PRN, Case, Cecy V., DO  •  sodium hypochlorite (DAKIN'S 1/4 STRENGTH) 0.125 % topical solution 0.125% solution, , Topical, Q12H, Case, Cecy BEE DO, Given at 02/11/21 2136    Antibiotics:  Anti-Infectives (From admission, onward)    Ordered     Dose/Rate Route Frequency Start Stop    02/10/21 1556  doxycycline (MONODOX) capsule 100 mg     Rafal Leonard, [facility] reviewed the order on 02/11/21 1518.   Ordering Provider: Trav Washburn MD    100 mg Oral Every 12 Hours Scheduled 02/10/21 2100 02/24/21 2059 02/06/21 0744  DAPTOmycin (CUBICIN) 500 mg/50 mL in  sodium chloride     Trav Washburn MD reviewed the order on 21 1034.   Ordering Provider: Trav Washburn MD    6 mg/kg × 80.1 kg (Adjusted)  over 30 Minutes Intravenous Every 24 Hours 21 0830 21 0829         Physical Exam:   Vital Signs  Temp (24hrs), Av.9 °F (36.6 °C), Min:96.3 °F (35.7 °C), Max:98.5 °F (36.9 °C)    Temp  Min: 96.3 °F (35.7 °C)  Max: 98.5 °F (36.9 °C)  BP  Min: 112/34  Max: 138/62  Pulse  Min: 57  Max: 88  Resp  Min: 16  Max: 20  SpO2  Min: 95 %  Max: 95 %    GENERAL: Awake and alert, moderate discomfort, sitting up  HEENT: Normocephalic, atraumatic.  EOMI. No conjunctival injection. No icterus.    HEART: RRR; No murmur   LUNGS: Clear to auscultation bilaterally without wheezing, rales, rhonchi. Normal respiratory effort. Nonlabored.   ABDOMEN: Soft, nontender, nondistended.   EXT:   Left BKA site well healed   :  Without Sheridan catheter.  MSK:  Right lower extremity dressed from the hip down to the ankle.  There is some serous discharge on the bandages.  Moderate tenderness to palpation  SKIN: Warm and dry, except on RLE  NEURO: Oriented to PPT.  PSYCHIATRIC: Normal insight and judgement. Cooperative with PE  PICC line    Laboratory Data    Results from last 7 days   Lab Units 21  0722 21  1110 02/10/21  1510 02/10/21  0529   WBC 10*3/mm3 8.98 8.58  --  9.05   HEMOGLOBIN g/dL 9.7* 8.5* 8.2* 6.2*   HEMATOCRIT % 30.7* 26.2* 24.8* 18.8*   PLATELETS 10*3/mm3 278 237  --  208     Results from last 7 days   Lab Units 21  0722   SODIUM mmol/L 131*   POTASSIUM mmol/L 4.8   CHLORIDE mmol/L 97*   CO2 mmol/L 29.0   BUN mg/dL 13   CREATININE mg/dL 0.83   GLUCOSE mg/dL 151*   CALCIUM mg/dL 8.9     Results from last 7 days   Lab Units 21  0627   ALK PHOS U/L 127*   BILIRUBIN mg/dL 1.0   ALT (SGPT) U/L 39   AST (SGOT) U/L 24             Results from last 7 days   Lab Units 02/10/21  0529   LACTATE mmol/L 2.5*     Results from last 7 days   Lab Units  02/12/21  0722 02/10/21  0529 02/06/21  0627   CK TOTAL U/L 48 37 42     Results from last 7 days   Lab Units 02/06/21  1033   VANCOMYCIN TR mcg/mL 5.80     Estimated Creatinine Clearance: 113.9 mL/min (by C-G formula based on SCr of 0.83 mg/dL).      Microbiology:  Covid negative  Blood cultures negative to date  2/6 Wound drainage culture with MRSA  2/9 OR cultures grew only skin maribel    Radiology:  Imaging Results (Last 72 Hours)     ** No results found for the last 72 hours. **        2/4/21 CTA RLE:  1. Soft tissue stranding and fluid is seen surrounding the patient's recent right femoral/distal arterial graft. It is uncertain to what extent this may represent postoperative fluid versus infection. The graft itself appears patent with adequate runoff  to the level of the smaller plantar vessels.       Impression:   1. Right lower extremity cellulitis, severe deep skin and soft tissue infection complicated by recent graft placement due to MRSA:  right femoral to posterior tibial distal bypass with reverse saphenous vein graft 1/21/21. Fluid and stranding on CT scan.  S/p I&D on 2/9. OR cultures negative.  Vein graft is tenuous per surgery   2. Leukocytosis, sepsis: improved  3. Lactic acidosis: Improved  4. PAD  5. T2DM: A1c 7.2  6. Hep C, previously treated with Interferon/Ribaviron, then reinfected  7.  CHF   8. 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 well healed    PLAN/RECOMMENDATIONS:   - Follow CBC, CMP, CK    - stop zpsyn    - Continue IV Daptomycin 6mg/kg q24 hrs for MRSA  - PO doxycycline 100 mg BID to double cover MRSA     - I anticipate a 6 week course of IV antibiotics through March 23. PICC placed    Discharge Orders:  - IV daptomycin 500 mg daily   - PO doxycycline 100 mg BID  - Weekly sterile PICC dressing changes  - Weekly labs CBC w/diff, CMP, CK, CRP faxed to [facility] at [phone] attn: Dr Washburn  - Follow up with   [facility] 1 week of discharge      High risk for limb loss if not treated aggressively. I will re-evaluate next week. Call if any new concerns in the meantime     Trav Washburn MD  2/12/2021  11:36 EST

## 2021-02-12 NOTE — PROGRESS NOTES
[facility] Medicine Services  PROGRESS NOTE    Patient Name: Reg Sanchez  : 1965  MRN: [id number]    Date of Admission: 2021  Primary Care Physician: Sheila Sheikh APRN    Subjective   Subjective     CC:  Fever, right leg pain    HPI:  Sleeping, arouses easily. Pain moderately controlled. No BM since admission, doesn't want to use BSC or bedpan. Is going to try BSC today. No new concerns.     ROS:  Gen- No fevers, chills  CV- No chest pain, palpitations  Resp- No cough, dyspnea  GI- No N/V/D, abd pain      Objective   Objective     Vital Signs:   Temp:  [96.3 °F (35.7 °C)-98.5 °F (36.9 °C)] 97.8 °F (36.6 °C)  Heart Rate:  [57-88] 66  Resp:  [16-20] 18  BP: (112-138)/(34-62) 112/34        Physical Exam:  Constitutional: No acute distress, sleeping, arouses easily  HENT: NCAT, mucous membranes moist  Respiratory: Clear to auscultation bilaterally, respiratory effort normal   Cardiovascular: RRR, no murmurs, rubs, or gallops  Gastrointestinal: Positive bowel sounds, soft, nontender, nondistended  Musculoskeletal: left BKA, right lower extremity with moderate edema, saturated serous drainage on dressing to right groin/ thigh   Psychiatric: Appropriate affect, cooperative  Neurologic: Oriented x 3, strength symmetric in all extremities, Cranial Nerves grossly intact to confrontation, speech clear  Skin: No rashes on visible skin        Results Reviewed:  Results from last 7 days   Lab Units 21  0722 21  1110 02/10/21  1510 02/10/21  0529  21  1614   WBC 10*3/mm3 8.98 8.58  --  9.05   < > 20.11*   HEMOGLOBIN g/dL 9.7* 8.5* 8.2* 6.2*   < > 12.0*   HEMATOCRIT % 30.7* 26.2* 24.8* 18.8*   < > 36.5*   PLATELETS 10*3/mm3 278 237  --  208   < > 179   PROCALCITONIN ng/mL  --   --   --   --   --  0.41*    < > = values in this interval not displayed.     Results from last 7 days   Lab Units 21  0722 21  1110 02/10/21  0529  21  0627 21  1617    SODIUM mmol/L 131* 132* 130*   < > 137 136   POTASSIUM mmol/L 4.8 4.8 4.4   < > 3.8 3.9   CHLORIDE mmol/L 97* 98 95*   < > 105 103   CO2 mmol/L 29.0 29.0 29.0   < > 25.0 25.0   BUN mg/dL 13 15 19   < > 8 11   CREATININE mg/dL 0.83 0.81 1.13   < > 0.71* 0.73*   GLUCOSE mg/dL 151* 163* 224*   < > 202* 218*   CALCIUM mg/dL 8.9 8.3* 8.2*   < > 7.9* 8.3*   ALT (SGPT) U/L  --   --   --   --  39 52*   AST (SGOT) U/L  --   --   --   --  24 27    < > = values in this interval not displayed.     Estimated Creatinine Clearance: 113.9 mL/min (by C-G formula based on SCr of 0.83 mg/dL).    Microbiology Results Abnormal     Procedure Component Value - Date/Time    Anaerobic Culture - Tissue, Groin, right [[id number]] Collected: 02/09/21 1738    Lab Status: Preliminary result Specimen: Tissue from Groin, right Updated: 02/12/21 0657     Anaerobic Culture No anaerobes isolated at 3 days    Tissue / Bone Culture - Tissue, Groin, right [[id number]] Collected: 02/09/21 1738    Lab Status: Final result Specimen: Tissue from Groin, right Updated: 02/11/21 0639     Tissue Culture Scant growth (1+) Normal Skin Celi     Gram Stain Moderate (3+) WBCs seen      No organisms seen    Blood Culture - Blood, Arm, Left [[id number]] Collected: 02/05/21 1806    Lab Status: Final result Specimen: Blood from Arm, Left Updated: 02/10/21 1931     Blood Culture No growth at 5 days    Narrative:      Aerobic bottle only      Blood Culture - Blood, Hand, Left [[id number]] Collected: 02/05/21 1806    Lab Status: Final result Specimen: Blood from Hand, Left Updated: 02/10/21 1931     Blood Culture No growth at 5 days    Wound Culture - Wound, Leg, Right [[id number]]  (Abnormal)  (Susceptibility) Collected: 02/06/21 1748    Lab Status: Edited Result - FINAL Specimen: Wound from Leg, Right Updated: 02/10/21 7400     Wound Culture Moderate growth (3+) Staphylococcus aureus, MRSA     Comment: Methicillin resistant Staphylococcus aureus, Patient may be an  isolation risk.        Gram Stain Few (2+) WBCs seen      No organisms seen    Susceptibility      Staphylococcus aureus, MRSA     AZIZA     Clindamycin Susceptible     Daptomycin Susceptible (C) [1]      Erythromycin Susceptible     Inducible Clindamycin Resistance Negative     Oxacillin Resistant     Penicillin G Resistant     Rifampin Susceptible     Tetracycline Susceptible     Trimethoprim + Sulfamethoxazole Susceptible     Vancomycin Susceptible            [1]   Appended report. These results have been appended to a previously final verified report.             Susceptibility Comments     Staphylococcus aureus, MRSA    Daptomycin susceptibility requested by Inderjit ID    This isolate does not demonstrate inducible clindamycin resistance in vitro.               AFB Culture - Tissue, Groin, right [[id number]] Collected: 02/09/21 6393    Lab Status: Preliminary result Specimen: Tissue from Groin, right Updated: 02/10/21 6777     AFB Stain No acid fast bacilli seen on concentrated smear          Imaging Results (Last 24 Hours)     ** No results found for the last 24 hours. **          Results for orders placed during the hospital encounter of 01/25/21   Adult Transthoracic Echo Complete W/ Cont if Necessary Per Protocol    Narrative · Left ventricular ejection fraction appears to be 66 - 70%. Left   ventricular systolic function is normal.  · The right ventricular cavity is borderline dilated.  · Left ventricular diastolic function was normal.  · No significant structural or functional valvular disease.          I have reviewed the medications:  Scheduled Meds:arformoterol, 15 mcg, Nebulization, BID - RT  aspirin, 81 mg, Oral, Daily  atorvastatin, 40 mg, Oral, Nightly  budesonide, 0.5 mg, Nebulization, BID - RT  DAPTOmycin, 6 mg/kg (Adjusted), Intravenous, Q24H  doxycycline, 100 mg, Oral, Q12H  DULoxetine, 60 mg, Oral, BID  enoxaparin, 40 mg, Subcutaneous, Daily  furosemide, 20 mg, Oral, Daily  insulin detemir, 8 Units,  Subcutaneous, Daily  insulin lispro, 0-7 Units, Subcutaneous, 4x Daily With Meals & Nightly  isosorbide mononitrate, 30 mg, Oral, Daily  lisinopril, 10 mg, Oral, Daily  metoprolol tartrate, 50 mg, Oral, BID  nicotine, 1 patch, Transdermal, Q24H  pantoprazole, 40 mg, Oral, QAM  pregabalin, 200 mg, Oral, TID  rOPINIRole, 4 mg, Oral, Nightly  sodium chloride, 10 mL, Intravenous, Q12H  sodium chloride, 10 mL, Intravenous, Q12H  sodium hypochlorite, , Topical, Q12H      Continuous Infusions:   PRN Meds:.•  acetaminophen **OR** acetaminophen **OR** acetaminophen  •  albuterol  •  dextrose  •  dextrose  •  glucagon (human recombinant)  •  HYDROmorphone  •  hydrOXYzine  •  LORazepam  •  magnesium sulfate **OR** magnesium sulfate **OR** magnesium sulfate  •  Morphine  •  naloxone  •  naloxone  •  naloxone  •  ondansetron **OR** ondansetron  •  oxyCODONE-acetaminophen  •  potassium & sodium phosphates **OR** potassium & sodium phosphates  •  sodium chloride  •  sodium chloride    Assessment/Plan   Assessment & Plan     Active Hospital Problems    Diagnosis  POA   • **Postoperative infection [T81.40XA]  Unknown   • Post-operative infection [T81.40XA]  Yes   • S/P L leg amputation  [S88.919A]  Yes   • PVD  [I73.9]  Yes   • Tobacco use [Z72.0]  Yes      Resolved Hospital Problems   No resolved problems to display.        Brief Hospital Course to date:  Reg Sanchez is a 55 y.o. male with past medical history significant for tobacco abuse, diabetes mellitus, peripheral vascular disease, congestive heart failure, coronary artery disease status post CABG, left leg osteomyelitis status post left leg amputation, peripheral arterial disease and most recently right SFA and popliteal artery occlusion underwent a right femoral artery bypass with Dr. Trevizo 1/27/2021.  The patient developed fever on 2/2/2021. On 2/3/21 he developed right leg swelling and redness. On 2/4/21, he presented to outside hospital where he had lab work  indicating inflammatory/infectious process and a CT angiogram of the leg which revealed soft tissue stranding and fluid surrounding the recent right femoral/distal artery graft.  Patent runoff with adequate flow.  Patient was taken to the OR on 2/9 with Dr Trevizo for surgical drainage of right groin and knee. Patient was monitored in the ICU Following surgery, transferred back to floor 2/11.     This patient's problems and plans were partially entered by my partner and updated as appropriate by me 02/12/21.        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 s/p I&D with CTS 2/9  --ID (Dr Washburn) is following for assistance with abx- continue to follow cx- continue dapto/po doxy- 6 week course of IV abx anticipated, PICC placed  -- Dilaudid PCA for pain control-adequate at this time   --MRSA from wound culture (2/10)- susceptibilities noted  --CTS for wound management; possible wound vac next week   --PT/OT  --follow up with Dr Washburn ID 1 week after DC        Congestive heart failure  --currently on Lasix.  Will monitor renal function.    Hyperkalemia  --stop K+ replacement  --give two packets Veltassa this am, repeat K+ at one pm    Leukocytosis-resolved     COPD  --PRN nebs     Diabetes mellitus   -SSI, A1C 7.2 -control adequate at this time     Hyperlipidemia  -Lipitor 40mg     Tobacco abuse  -nicotine patch    etoh abuse  - ativan PRN     Hep C previously treated with interferon/ribaviron then reinfected     DVT Prophylaxis:  SALIMA      Disposition: TBD. Suspect patient will need rehab    CODE STATUS:   Code Status and Medical Interventions:   Ordered at: 02/05/21 1505     Code Status:    CPR     Medical Interventions (Level of Support Prior to Arrest):    Full       Stephanie Gold, APRN  02/12/21

## 2021-02-12 NOTE — PROGRESS NOTES
Continued Stay Note  [facility]     Patient Name: Reg Sanchez  MRN: [id number]  Today's Date: 2/12/2021    Admit Date: 2/5/2021    Discharge Plan     Row Name 02/12/21 1516       Plan    Plan  Home with home health    Patient/Family in Agreement with Plan  yes    Plan Comments  Spoke with patient.  Patient continues to state that he wouldl like home with home health.  He declines rehab.  Consult has been placed by ID for IV antibiotics, but patient will not discharge over the weekend.  Remains on PCA for pain.  Possible wound vac placement next week.  Will follow up on Monday for discharge planning.    Final Discharge Disposition Code  06 - home with home health care        Discharge Codes    No documentation.       Expected Discharge Date and Time     Expected Discharge Date Expected Discharge Time    Feb 17, 2021             Shaina Cramer RN

## 2021-02-13 LAB
GLUCOSE BLDC GLUCOMTR-MCNC: 180 MG/DL (ref 70–130)
GLUCOSE BLDC GLUCOMTR-MCNC: 198 MG/DL (ref 70–130)
GLUCOSE BLDC GLUCOMTR-MCNC: 216 MG/DL (ref 70–130)
GLUCOSE BLDC GLUCOMTR-MCNC: 260 MG/DL (ref 70–130)

## 2021-02-13 PROCEDURE — 94799 UNLISTED PULMONARY SVC/PX: CPT

## 2021-02-13 PROCEDURE — 25010000002 DAPTOMYCIN PER 1 MG: Performed by: INTERNAL MEDICINE

## 2021-02-13 PROCEDURE — 99232 SBSQ HOSP IP/OBS MODERATE 35: CPT | Performed by: INTERNAL MEDICINE

## 2021-02-13 PROCEDURE — 63710000001 INSULIN DETEMIR PER 5 UNITS: Performed by: INTERNAL MEDICINE

## 2021-02-13 PROCEDURE — 82962 GLUCOSE BLOOD TEST: CPT

## 2021-02-13 PROCEDURE — 25010000002 HYDROMORPHONE 1 MG/ML SOLUTION: Performed by: INTERNAL MEDICINE

## 2021-02-13 PROCEDURE — 25010000002 ENOXAPARIN PER 10 MG: Performed by: INTERNAL MEDICINE

## 2021-02-13 PROCEDURE — 63710000001 INSULIN LISPRO (HUMAN) PER 5 UNITS: Performed by: INTERNAL MEDICINE

## 2021-02-13 RX ADMIN — OXYCODONE AND ACETAMINOPHEN 1 TABLET: 5; 325 TABLET ORAL at 02:44

## 2021-02-13 RX ADMIN — BUDESONIDE 0.5 MG: 0.5 INHALANT ORAL at 08:05

## 2021-02-13 RX ADMIN — SODIUM CHLORIDE, PRESERVATIVE FREE 10 ML: 5 INJECTION INTRAVENOUS at 21:07

## 2021-02-13 RX ADMIN — OXYCODONE AND ACETAMINOPHEN 1 TABLET: 5; 325 TABLET ORAL at 23:17

## 2021-02-13 RX ADMIN — INSULIN LISPRO 2 UNITS: 100 INJECTION, SOLUTION INTRAVENOUS; SUBCUTANEOUS at 22:21

## 2021-02-13 RX ADMIN — Medication 1 PATCH: at 09:25

## 2021-02-13 RX ADMIN — LORAZEPAM 1 MG: 1 TABLET ORAL at 09:31

## 2021-02-13 RX ADMIN — METOPROLOL TARTRATE 50 MG: 50 TABLET, FILM COATED ORAL at 21:07

## 2021-02-13 RX ADMIN — INSULIN LISPRO 4 UNITS: 100 INJECTION, SOLUTION INTRAVENOUS; SUBCUTANEOUS at 17:30

## 2021-02-13 RX ADMIN — HYDROMORPHONE HYDROCHLORIDE 1 MG: 1 INJECTION, SOLUTION INTRAMUSCULAR; INTRAVENOUS; SUBCUTANEOUS at 06:05

## 2021-02-13 RX ADMIN — PREGABALIN 200 MG: 100 CAPSULE ORAL at 15:04

## 2021-02-13 RX ADMIN — DULOXETINE HYDROCHLORIDE 60 MG: 60 CAPSULE, DELAYED RELEASE ORAL at 21:07

## 2021-02-13 RX ADMIN — ASPIRIN 81 MG: 81 TABLET, CHEWABLE ORAL at 09:23

## 2021-02-13 RX ADMIN — ENOXAPARIN SODIUM 40 MG: 40 INJECTION SUBCUTANEOUS at 09:23

## 2021-02-13 RX ADMIN — SODIUM CHLORIDE, PRESERVATIVE FREE 10 ML: 5 INJECTION INTRAVENOUS at 09:25

## 2021-02-13 RX ADMIN — BUDESONIDE 0.5 MG: 0.5 INHALANT ORAL at 21:19

## 2021-02-13 RX ADMIN — ATORVASTATIN CALCIUM 40 MG: 40 TABLET, FILM COATED ORAL at 21:07

## 2021-02-13 RX ADMIN — SODIUM HYPOCHLORITE: 1.25 SOLUTION TOPICAL at 21:08

## 2021-02-13 RX ADMIN — METOPROLOL TARTRATE 50 MG: 50 TABLET, FILM COATED ORAL at 09:23

## 2021-02-13 RX ADMIN — INSULIN LISPRO 2 UNITS: 100 INJECTION, SOLUTION INTRAVENOUS; SUBCUTANEOUS at 09:25

## 2021-02-13 RX ADMIN — SODIUM HYPOCHLORITE: 1.25 SOLUTION TOPICAL at 18:34

## 2021-02-13 RX ADMIN — DOXYCYCLINE 100 MG: 100 CAPSULE ORAL at 09:24

## 2021-02-13 RX ADMIN — LORAZEPAM 1 MG: 1 TABLET ORAL at 03:03

## 2021-02-13 RX ADMIN — HYDROMORPHONE HYDROCHLORIDE 1 MG: 1 INJECTION, SOLUTION INTRAMUSCULAR; INTRAVENOUS; SUBCUTANEOUS at 21:06

## 2021-02-13 RX ADMIN — PREGABALIN 200 MG: 100 CAPSULE ORAL at 09:23

## 2021-02-13 RX ADMIN — PREGABALIN 200 MG: 100 CAPSULE ORAL at 21:07

## 2021-02-13 RX ADMIN — DULOXETINE HYDROCHLORIDE 60 MG: 60 CAPSULE, DELAYED RELEASE ORAL at 09:24

## 2021-02-13 RX ADMIN — INSULIN LISPRO 3 UNITS: 100 INJECTION, SOLUTION INTRAVENOUS; SUBCUTANEOUS at 12:37

## 2021-02-13 RX ADMIN — LORAZEPAM 1 MG: 1 TABLET ORAL at 21:07

## 2021-02-13 RX ADMIN — DOXYCYCLINE 100 MG: 100 CAPSULE ORAL at 21:07

## 2021-02-13 RX ADMIN — LISINOPRIL 10 MG: 10 TABLET ORAL at 09:24

## 2021-02-13 RX ADMIN — ISOSORBIDE MONONITRATE 30 MG: 30 TABLET, EXTENDED RELEASE ORAL at 09:24

## 2021-02-13 RX ADMIN — HYDROMORPHONE HYDROCHLORIDE 1 MG: 1 INJECTION, SOLUTION INTRAMUSCULAR; INTRAVENOUS; SUBCUTANEOUS at 17:06

## 2021-02-13 RX ADMIN — OXYCODONE AND ACETAMINOPHEN 1 TABLET: 5; 325 TABLET ORAL at 15:04

## 2021-02-13 RX ADMIN — OXYCODONE AND ACETAMINOPHEN 1 TABLET: 5; 325 TABLET ORAL at 07:24

## 2021-02-13 RX ADMIN — ARFORMOTEROL TARTRATE 15 MCG: 15 SOLUTION RESPIRATORY (INHALATION) at 08:06

## 2021-02-13 RX ADMIN — OXYCODONE AND ACETAMINOPHEN 1 TABLET: 5; 325 TABLET ORAL at 18:54

## 2021-02-13 RX ADMIN — INSULIN DETEMIR 8 UNITS: 100 INJECTION, SOLUTION SUBCUTANEOUS at 09:31

## 2021-02-13 RX ADMIN — HYDROMORPHONE HYDROCHLORIDE 1 MG: 1 INJECTION, SOLUTION INTRAMUSCULAR; INTRAVENOUS; SUBCUTANEOUS at 12:37

## 2021-02-13 RX ADMIN — ROPINIROLE HYDROCHLORIDE 4 MG: 2 TABLET, FILM COATED ORAL at 21:07

## 2021-02-13 RX ADMIN — PANTOPRAZOLE SODIUM 40 MG: 40 TABLET, DELAYED RELEASE ORAL at 06:08

## 2021-02-13 RX ADMIN — DAPTOMYCIN 480.6 MG: 500 INJECTION, POWDER, LYOPHILIZED, FOR SOLUTION INTRAVENOUS at 09:23

## 2021-02-13 NOTE — PROGRESS NOTES
[facility] Medicine Services  PROGRESS NOTE    Patient Name: Reg Sanchez  : 1965  MRN: [id number]    Date of Admission: 2021  Primary Care Physician: Sheila Sheikh APRN    Subjective   Subjective     CC:  Fever, right leg post op infection     HPI:  States he is having pain and his legs are twitching. Has been trying to move around more.     ROS:  Gen- No fevers, chills  CV- No chest pain, palpitations  Resp- No cough, dyspnea  GI- No N/V/D, abd pain     Objective   Objective     Vital Signs:   Temp:  [98.1 °F (36.7 °C)-98.8 °F (37.1 °C)] 98.2 °F (36.8 °C)  Heart Rate:  [60-88] 84  Resp:  [16-18] 16  BP: ()/(50-71) 112/50        Physical Exam:  Constitutional: No acute distress, awake, alert  HENT: NCAT, mucous membranes moist  Respiratory: Clear to auscultation bilaterally, respiratory effort normal   Cardiovascular: RRR, no murmurs, rubs, or gallops  Gastrointestinal: Positive bowel sounds, soft, nontender, nondistended  Musculoskeletal: right leg swelling; bandage in place; left BKA  Psychiatric: Appropriate affect, cooperative  Neurologic: Oriented x 3, strength symmetric in all extremities, Cranial Nerves grossly intact to confrontation, speech clear  Skin: No rashes    Results Reviewed:  Results from last 7 days   Lab Units 21  0721  1110 02/10/21  1510 02/10/21  0529   WBC 10*3/mm3 8.98 8.58  --  9.05   HEMOGLOBIN g/dL 9.7* 8.5* 8.2* 6.2*   HEMATOCRIT % 30.7* 26.2* 24.8* 18.8*   PLATELETS 10*3/mm3 278 237  --  208     Results from last 7 days   Lab Units 21  0721  1110 02/10/21  0529   SODIUM mmol/L 131* 132* 130*   POTASSIUM mmol/L 4.8 4.8 4.4   CHLORIDE mmol/L 97* 98 95*   CO2 mmol/L 29.0 29.0 29.0   BUN mg/dL 13 15 19   CREATININE mg/dL 0.83 0.81 1.13   GLUCOSE mg/dL 151* 163* 224*   CALCIUM mg/dL 8.9 8.3* 8.2*     Estimated Creatinine Clearance: 113.9 mL/min (by C-G formula based on SCr of 0.83 mg/dL).    Microbiology  Results Abnormal     Procedure Component Value - Date/Time    Anaerobic Culture - Tissue, Groin, right [[id number]] Collected: 02/09/21 1738    Lab Status: Preliminary result Specimen: Tissue from Groin, right Updated: 02/12/21 0657     Anaerobic Culture No anaerobes isolated at 3 days    Tissue / Bone Culture - Tissue, Groin, right [[id number]] Collected: 02/09/21 1738    Lab Status: Final result Specimen: Tissue from Groin, right Updated: 02/11/21 0639     Tissue Culture Scant growth (1+) Normal Skin Celi     Gram Stain Moderate (3+) WBCs seen      No organisms seen    Blood Culture - Blood, Arm, Left [[id number]] Collected: 02/05/21 1806    Lab Status: Final result Specimen: Blood from Arm, Left Updated: 02/10/21 1931     Blood Culture No growth at 5 days    Narrative:      Aerobic bottle only      Blood Culture - Blood, Hand, Left [[id number]] Collected: 02/05/21 1806    Lab Status: Final result Specimen: Blood from Hand, Left Updated: 02/10/21 1931     Blood Culture No growth at 5 days    Wound Culture - Wound, Leg, Right [[id number]]  (Abnormal)  (Susceptibility) Collected: 02/06/21 1748    Lab Status: Edited Result - FINAL Specimen: Wound from Leg, Right Updated: 02/10/21 1603     Wound Culture Moderate growth (3+) Staphylococcus aureus, MRSA     Comment: Methicillin resistant Staphylococcus aureus, Patient may be an isolation risk.        Gram Stain Few (2+) WBCs seen      No organisms seen    Susceptibility      Staphylococcus aureus, MRSA     AZIZA     Clindamycin Susceptible     Daptomycin Susceptible (C) [1]      Erythromycin Susceptible     Inducible Clindamycin Resistance Negative     Oxacillin Resistant     Penicillin G Resistant     Rifampin Susceptible     Tetracycline Susceptible     Trimethoprim + Sulfamethoxazole Susceptible     Vancomycin Susceptible            [1]   Appended report. These results have been appended to a previously final verified report.             Susceptibility Comments      Staphylococcus aureus, MRSA    Daptomycin susceptibility requested by Inderjit ID    This isolate does not demonstrate inducible clindamycin resistance in vitro.               AFB Culture - Tissue, Groin, right [[id number]] Collected: 02/09/21 7061    Lab Status: Preliminary result Specimen: Tissue from Groin, right Updated: 02/10/21 0167     AFB Stain No acid fast bacilli seen on concentrated smear          Imaging Results (Last 24 Hours)     ** No results found for the last 24 hours. **          Results for orders placed during the hospital encounter of 01/25/21   Adult Transthoracic Echo Complete W/ Cont if Necessary Per Protocol    Narrative · Left ventricular ejection fraction appears to be 66 - 70%. Left   ventricular systolic function is normal.  · The right ventricular cavity is borderline dilated.  · Left ventricular diastolic function was normal.  · No significant structural or functional valvular disease.          I have reviewed the medications:  Scheduled Meds:arformoterol, 15 mcg, Nebulization, BID - RT  aspirin, 81 mg, Oral, Daily  atorvastatin, 40 mg, Oral, Nightly  budesonide, 0.5 mg, Nebulization, BID - RT  DAPTOmycin, 6 mg/kg (Adjusted), Intravenous, Q24H  doxycycline, 100 mg, Oral, Q12H  DULoxetine, 60 mg, Oral, BID  enoxaparin, 40 mg, Subcutaneous, Daily  furosemide, 20 mg, Oral, Daily  insulin detemir, 8 Units, Subcutaneous, Daily  insulin lispro, 0-7 Units, Subcutaneous, 4x Daily With Meals & Nightly  isosorbide mononitrate, 30 mg, Oral, Daily  lisinopril, 10 mg, Oral, Daily  metoprolol tartrate, 50 mg, Oral, BID  nicotine, 1 patch, Transdermal, Q24H  pantoprazole, 40 mg, Oral, QAM  pregabalin, 200 mg, Oral, TID  rOPINIRole, 4 mg, Oral, Nightly  sodium chloride, 10 mL, Intravenous, Q12H  sodium chloride, 10 mL, Intravenous, Q12H  sodium hypochlorite, , Topical, Q12H      Continuous Infusions:   PRN Meds:.•  acetaminophen **OR** acetaminophen **OR** acetaminophen  •  albuterol  •  dextrose  •   dextrose  •  glucagon (human recombinant)  •  HYDROmorphone  •  hydrOXYzine  •  LORazepam  •  magnesium sulfate **OR** magnesium sulfate **OR** magnesium sulfate  •  Morphine  •  naloxone  •  naloxone  •  naloxone  •  ondansetron **OR** ondansetron  •  oxyCODONE-acetaminophen  •  potassium & sodium phosphates **OR** potassium & sodium phosphates  •  sodium chloride  •  sodium chloride    Assessment/Plan   Assessment & Plan     Active Hospital Problems    Diagnosis  POA   • **Postoperative infection [T81.40XA]  Unknown   • Post-operative infection [T81.40XA]  Yes   • S/P L leg amputation  [S88.919A]  Yes   • PVD  [I73.9]  Yes   • Tobacco use [Z72.0]  Yes      Resolved Hospital Problems   No resolved problems to display.        Brief Hospital Course to date:  Reg Sanchez is a 55 y.o. male with past medical history significant for tobacco abuse, diabetes mellitus, peripheral vascular disease, congestive heart failure, coronary artery disease status post CABG, left leg osteomyelitis status post left leg amputation, peripheral arterial disease and most recently right SFA and popliteal artery occlusion underwent a right femoral artery bypass with Dr. Trevizo 1/27/2021.  The patient developed fever on 2/2/2021. On 2/3/21 he developed right leg swelling and redness. On 2/4/21, he presented to outside hospital where he had lab work indicating inflammatory/infectious process and a CT angiogram of the leg which revealed soft tissue stranding and fluid surrounding the recent right femoral/distal artery graft.  Patent runoff with adequate flow.  Patient was taken to the OR on 2/9 with Dr Trevizo for surgical drainage of right groin and knee. Patient was monitored in the ICU Following surgery, transferred back to floor 2/11.      This patient's problems and plans were partially entered by my partner and updated as appropriate by me 02/13/21.     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  s/p I&D with CTS 2/9  --ID (Dr Washburn) is following for assistance with abx- continue to follow cx- continue dapto/po doxy- 6 week course of IV abx anticipated, PICC placed 2/9  --continue PRN pain control   --MRSA from wound culture (2/10)- susceptibilities noted  --CTS for wound management; possible wound vac next week   --PT/OT pending   --follow up with Dr Washburn ID 1 week after DC   - Wound vac pending     - IV daptomycin 500 mg daily   - PO doxycycline 100 mg BID  - Weekly sterile PICC dressing changes  - Weekly labs CBC w/diff, CMP, CK, CRP faxed to [facility] at [phone] attn: Dr Washburn  - Follow up with Dr Washburn 1 week of discharge    Chronic CHF -- unspecified   CAD Hx CABG  PVD   - follows with cardiology; uncertain if heart failure in the past but current ECHO with normal EF and diastolic function   - Continue home indur, metoprolol, lasix, ASA, statin      Hyperkalemia  --stop K+ replacement -- improved      Leukocytosis-resolved     COPD  --PRN nebs     Diabetes mellitus   -SSI, A1C 7.2 -control adequate at this time     Hyperlipidemia  -Lipitor 40mg     Tobacco abuse  -nicotine patch     etoh abuse  - ativan PRN     Hep C previously treated with interferon/ribaviron then reinfected      DVT Prophylaxis:  SALIMA     Disposition: TBD. Patient currently declining rehab -- will need wound vac and IV antibiotics at home     CODE STATUS:   Code Status and Medical Interventions:   Ordered at: 02/05/21 1505     Code Status:    CPR     Medical Interventions (Level of Support Prior to Arrest):    Full       Diane Aragon,   02/13/21

## 2021-02-13 NOTE — PROGRESS NOTES
LOS: 8 days   Patient Care Team:  Sheila Sheikh APRN as PCP - General (Nurse Practitioner)    Chief complaint:    Subjective   Denies chest pain, denies shortness of breath  Right leg pain continues but he does sleep comfortably  Objective    Vital Signs  Temp:  [98.1 °F (36.7 °C)-98.8 °F (37.1 °C)] 98.2 °F (36.8 °C)  Heart Rate:  [60-88] 84  Resp:  [16-18] 16  BP: ()/(50-71) 112/50    Physical Exam:   General Appearance: alert, appears stated age and cooperative   Lungs: clear bilaterally   Heart: Regular rate and rhythm   Wound covered     Results   Results from last 7 days   Lab Units 02/12/21  0722   WBC 10*3/mm3 8.98   HEMOGLOBIN g/dL 9.7*   HEMATOCRIT % 30.7*   PLATELETS 10*3/mm3 278     Results from last 7 days   Lab Units 02/12/21  0722   SODIUM mmol/L 131*   POTASSIUM mmol/L 4.8   CHLORIDE mmol/L 97*   CO2 mmol/L 29.0   BUN mg/dL 13   CREATININE mg/dL 0.83   GLUCOSE mg/dL 151*   CALCIUM mg/dL 8.9               Assessment      Postoperative infection    Tobacco use    PVD     S/P L leg amputation     Post-operative infection        Plan   DC a controlling his pain  Await wound VAC placement    Abdullahi Sebastian PA-C  02/13/21  10:04 EST

## 2021-02-14 LAB
ANION GAP SERPL CALCULATED.3IONS-SCNC: 7 MMOL/L (ref 5–15)
BACTERIA SPEC ANAEROBE CULT: NORMAL
BUN SERPL-MCNC: 12 MG/DL (ref 6–20)
BUN/CREAT SERPL: 16.9 (ref 7–25)
CALCIUM SPEC-SCNC: 8.7 MG/DL (ref 8.6–10.5)
CHLORIDE SERPL-SCNC: 98 MMOL/L (ref 98–107)
CO2 SERPL-SCNC: 25 MMOL/L (ref 22–29)
CREAT SERPL-MCNC: 0.71 MG/DL (ref 0.76–1.27)
DEPRECATED RDW RBC AUTO: 54.5 FL (ref 37–54)
ERYTHROCYTE [DISTWIDTH] IN BLOOD BY AUTOMATED COUNT: 15.4 % (ref 12.3–15.4)
GFR SERPL CREATININE-BSD FRML MDRD: 115 ML/MIN/1.73
GLUCOSE BLDC GLUCOMTR-MCNC: 169 MG/DL (ref 70–130)
GLUCOSE BLDC GLUCOMTR-MCNC: 203 MG/DL (ref 70–130)
GLUCOSE BLDC GLUCOMTR-MCNC: 218 MG/DL (ref 70–130)
GLUCOSE BLDC GLUCOMTR-MCNC: 228 MG/DL (ref 70–130)
GLUCOSE SERPL-MCNC: 204 MG/DL (ref 65–99)
HCT VFR BLD AUTO: 29.2 % (ref 37.5–51)
HGB BLD-MCNC: 9.3 G/DL (ref 13–17.7)
MCH RBC QN AUTO: 32.1 PG (ref 26.6–33)
MCHC RBC AUTO-ENTMCNC: 31.8 G/DL (ref 31.5–35.7)
MCV RBC AUTO: 100.7 FL (ref 79–97)
PLATELET # BLD AUTO: 314 10*3/MM3 (ref 140–450)
PMV BLD AUTO: 11.9 FL (ref 6–12)
POTASSIUM SERPL-SCNC: 4.7 MMOL/L (ref 3.5–5.2)
RBC # BLD AUTO: 2.9 10*6/MM3 (ref 4.14–5.8)
SODIUM SERPL-SCNC: 130 MMOL/L (ref 136–145)
WBC # BLD AUTO: 9.1 10*3/MM3 (ref 3.4–10.8)

## 2021-02-14 PROCEDURE — 25010000002 HYDROMORPHONE 1 MG/ML SOLUTION: Performed by: INTERNAL MEDICINE

## 2021-02-14 PROCEDURE — 25010000002 HYDROMORPHONE PER 4 MG: Performed by: INTERNAL MEDICINE

## 2021-02-14 PROCEDURE — 94799 UNLISTED PULMONARY SVC/PX: CPT

## 2021-02-14 PROCEDURE — 99232 SBSQ HOSP IP/OBS MODERATE 35: CPT | Performed by: INTERNAL MEDICINE

## 2021-02-14 PROCEDURE — 63710000001 INSULIN DETEMIR PER 5 UNITS: Performed by: INTERNAL MEDICINE

## 2021-02-14 PROCEDURE — 97116 GAIT TRAINING THERAPY: CPT

## 2021-02-14 PROCEDURE — 82962 GLUCOSE BLOOD TEST: CPT

## 2021-02-14 PROCEDURE — 25010000002 ENOXAPARIN PER 10 MG: Performed by: INTERNAL MEDICINE

## 2021-02-14 PROCEDURE — 97166 OT EVAL MOD COMPLEX 45 MIN: CPT

## 2021-02-14 PROCEDURE — 97162 PT EVAL MOD COMPLEX 30 MIN: CPT

## 2021-02-14 PROCEDURE — 63710000001 INSULIN LISPRO (HUMAN) PER 5 UNITS: Performed by: INTERNAL MEDICINE

## 2021-02-14 PROCEDURE — 97605 NEG PRS WND THER DME<=50SQCM: CPT

## 2021-02-14 PROCEDURE — 97597 DBRDMT OPN WND 1ST 20 CM/<: CPT

## 2021-02-14 PROCEDURE — 85027 COMPLETE CBC AUTOMATED: CPT | Performed by: INTERNAL MEDICINE

## 2021-02-14 PROCEDURE — 97164 PT RE-EVAL EST PLAN CARE: CPT

## 2021-02-14 PROCEDURE — 25010000002 MORPHINE PER 10 MG: Performed by: INTERNAL MEDICINE

## 2021-02-14 PROCEDURE — 97530 THERAPEUTIC ACTIVITIES: CPT

## 2021-02-14 PROCEDURE — 97110 THERAPEUTIC EXERCISES: CPT

## 2021-02-14 PROCEDURE — 80048 BASIC METABOLIC PNL TOTAL CA: CPT | Performed by: INTERNAL MEDICINE

## 2021-02-14 PROCEDURE — 25010000002 DAPTOMYCIN PER 1 MG: Performed by: INTERNAL MEDICINE

## 2021-02-14 RX ORDER — HYDROMORPHONE HYDROCHLORIDE 1 MG/ML
0.5 INJECTION, SOLUTION INTRAMUSCULAR; INTRAVENOUS; SUBCUTANEOUS ONCE
Status: COMPLETED | OUTPATIENT
Start: 2021-02-14 | End: 2021-02-14

## 2021-02-14 RX ADMIN — DOXYCYCLINE 100 MG: 100 CAPSULE ORAL at 20:33

## 2021-02-14 RX ADMIN — INSULIN LISPRO 3 UNITS: 100 INJECTION, SOLUTION INTRAVENOUS; SUBCUTANEOUS at 12:29

## 2021-02-14 RX ADMIN — ARFORMOTEROL TARTRATE 15 MCG: 15 SOLUTION RESPIRATORY (INHALATION) at 09:30

## 2021-02-14 RX ADMIN — OXYCODONE AND ACETAMINOPHEN 1 TABLET: 5; 325 TABLET ORAL at 03:25

## 2021-02-14 RX ADMIN — HYDROMORPHONE HYDROCHLORIDE 1 MG: 1 INJECTION, SOLUTION INTRAMUSCULAR; INTRAVENOUS; SUBCUTANEOUS at 10:09

## 2021-02-14 RX ADMIN — INSULIN DETEMIR 8 UNITS: 100 INJECTION, SOLUTION SUBCUTANEOUS at 08:29

## 2021-02-14 RX ADMIN — HYDROMORPHONE HYDROCHLORIDE 0.5 MG: 1 INJECTION, SOLUTION INTRAMUSCULAR; INTRAVENOUS; SUBCUTANEOUS at 11:39

## 2021-02-14 RX ADMIN — INSULIN LISPRO 3 UNITS: 100 INJECTION, SOLUTION INTRAVENOUS; SUBCUTANEOUS at 08:28

## 2021-02-14 RX ADMIN — SODIUM HYPOCHLORITE: 1.25 SOLUTION TOPICAL at 08:27

## 2021-02-14 RX ADMIN — BUDESONIDE 0.5 MG: 0.5 INHALANT ORAL at 09:30

## 2021-02-14 RX ADMIN — ENOXAPARIN SODIUM 40 MG: 40 INJECTION SUBCUTANEOUS at 08:27

## 2021-02-14 RX ADMIN — PREGABALIN 200 MG: 100 CAPSULE ORAL at 16:20

## 2021-02-14 RX ADMIN — MORPHINE SULFATE 2 MG: 2 INJECTION, SOLUTION INTRAMUSCULAR; INTRAVENOUS at 22:05

## 2021-02-14 RX ADMIN — OXYCODONE AND ACETAMINOPHEN 1 TABLET: 5; 325 TABLET ORAL at 12:28

## 2021-02-14 RX ADMIN — PANTOPRAZOLE SODIUM 40 MG: 40 TABLET, DELAYED RELEASE ORAL at 06:14

## 2021-02-14 RX ADMIN — METOPROLOL TARTRATE 50 MG: 50 TABLET, FILM COATED ORAL at 08:26

## 2021-02-14 RX ADMIN — HYDROMORPHONE HYDROCHLORIDE 1 MG: 1 INJECTION, SOLUTION INTRAMUSCULAR; INTRAVENOUS; SUBCUTANEOUS at 05:03

## 2021-02-14 RX ADMIN — FUROSEMIDE 20 MG: 20 TABLET ORAL at 08:26

## 2021-02-14 RX ADMIN — INSULIN LISPRO 2 UNITS: 100 INJECTION, SOLUTION INTRAVENOUS; SUBCUTANEOUS at 18:19

## 2021-02-14 RX ADMIN — ASPIRIN 81 MG: 81 TABLET, CHEWABLE ORAL at 08:26

## 2021-02-14 RX ADMIN — ATORVASTATIN CALCIUM 40 MG: 40 TABLET, FILM COATED ORAL at 20:33

## 2021-02-14 RX ADMIN — MORPHINE SULFATE 2 MG: 2 INJECTION, SOLUTION INTRAMUSCULAR; INTRAVENOUS at 10:33

## 2021-02-14 RX ADMIN — Medication 1 PATCH: at 08:27

## 2021-02-14 RX ADMIN — OXYCODONE AND ACETAMINOPHEN 1 TABLET: 5; 325 TABLET ORAL at 20:33

## 2021-02-14 RX ADMIN — HYDROMORPHONE HYDROCHLORIDE 1 MG: 1 INJECTION, SOLUTION INTRAMUSCULAR; INTRAVENOUS; SUBCUTANEOUS at 01:07

## 2021-02-14 RX ADMIN — DULOXETINE HYDROCHLORIDE 60 MG: 60 CAPSULE, DELAYED RELEASE ORAL at 20:33

## 2021-02-14 RX ADMIN — SODIUM HYPOCHLORITE: 1.25 SOLUTION TOPICAL at 20:35

## 2021-02-14 RX ADMIN — SODIUM CHLORIDE, PRESERVATIVE FREE 10 ML: 5 INJECTION INTRAVENOUS at 20:33

## 2021-02-14 RX ADMIN — DOXYCYCLINE 100 MG: 100 CAPSULE ORAL at 08:26

## 2021-02-14 RX ADMIN — HYDROMORPHONE HYDROCHLORIDE 1 MG: 1 INJECTION, SOLUTION INTRAMUSCULAR; INTRAVENOUS; SUBCUTANEOUS at 18:40

## 2021-02-14 RX ADMIN — LISINOPRIL 10 MG: 10 TABLET ORAL at 08:26

## 2021-02-14 RX ADMIN — HYDROMORPHONE HYDROCHLORIDE 1 MG: 1 INJECTION, SOLUTION INTRAMUSCULAR; INTRAVENOUS; SUBCUTANEOUS at 14:12

## 2021-02-14 RX ADMIN — METOPROLOL TARTRATE 50 MG: 50 TABLET, FILM COATED ORAL at 20:33

## 2021-02-14 RX ADMIN — BUDESONIDE 0.5 MG: 0.5 INHALANT ORAL at 19:25

## 2021-02-14 RX ADMIN — DULOXETINE HYDROCHLORIDE 60 MG: 60 CAPSULE, DELAYED RELEASE ORAL at 08:26

## 2021-02-14 RX ADMIN — PREGABALIN 200 MG: 100 CAPSULE ORAL at 08:27

## 2021-02-14 RX ADMIN — LORAZEPAM 1 MG: 1 TABLET ORAL at 10:39

## 2021-02-14 RX ADMIN — HYDROXYZINE HYDROCHLORIDE 25 MG: 25 TABLET, FILM COATED ORAL at 10:39

## 2021-02-14 RX ADMIN — INSULIN LISPRO 3 UNITS: 100 INJECTION, SOLUTION INTRAVENOUS; SUBCUTANEOUS at 21:55

## 2021-02-14 RX ADMIN — OXYCODONE AND ACETAMINOPHEN 1 TABLET: 5; 325 TABLET ORAL at 16:20

## 2021-02-14 RX ADMIN — PREGABALIN 200 MG: 100 CAPSULE ORAL at 20:33

## 2021-02-14 RX ADMIN — DAPTOMYCIN 480.6 MG: 500 INJECTION, POWDER, LYOPHILIZED, FOR SOLUTION INTRAVENOUS at 08:27

## 2021-02-14 RX ADMIN — SODIUM CHLORIDE, PRESERVATIVE FREE 10 ML: 5 INJECTION INTRAVENOUS at 08:28

## 2021-02-14 RX ADMIN — ISOSORBIDE MONONITRATE 30 MG: 30 TABLET, EXTENDED RELEASE ORAL at 08:26

## 2021-02-14 RX ADMIN — OXYCODONE AND ACETAMINOPHEN 1 TABLET: 5; 325 TABLET ORAL at 08:26

## 2021-02-14 RX ADMIN — ROPINIROLE HYDROCHLORIDE 4 MG: 2 TABLET, FILM COATED ORAL at 20:33

## 2021-02-14 NOTE — NURSING NOTE
Spoke with RN.  PT wound care consulted for wound vac placement on right leg wound..  Please contact [facility] for any other skin care needs you may have.    Thank you.

## 2021-02-14 NOTE — PROGRESS NOTES
LOS: 9 days   Patient Care Team:  Sheila Sheikh APRN as PCP - General (Nurse Practitioner)    Chief complaint:    Subjective   Denies chest pain, denies shortness of breath  Right leg pain continues but he does sleep comfortably  Objective    Vital Signs  Temp:  [98 °F (36.7 °C)-98.5 °F (36.9 °C)] 98 °F (36.7 °C)  Heart Rate:  [65-93] 71  Resp:  [16-18] 16  BP: (102-157)/(41-71) 128/61    Physical Exam:   General Appearance: alert, appears stated age and cooperative   Lungs: clear bilaterally   Heart: Regular rate and rhythm   Wound covered     Results     Results from last 7 days   Lab Units 02/14/21  0656   WBC 10*3/mm3 9.10   HEMOGLOBIN g/dL 9.3*   HEMATOCRIT % 29.2*   PLATELETS 10*3/mm3 314     Results from last 7 days   Lab Units 02/14/21  0656   SODIUM mmol/L 130*   POTASSIUM mmol/L 4.7   CHLORIDE mmol/L 98   CO2 mmol/L 25.0   BUN mg/dL 12   CREATININE mg/dL 0.71*   GLUCOSE mg/dL 204*   CALCIUM mg/dL 8.7               Assessment      Postoperative infection    Tobacco use    PVD     S/P L leg amputation     Post-operative infection        Plan     Await wound VAC placement    Abdullahi Sebastian PA-C  02/14/21  12:06 EST

## 2021-02-14 NOTE — PROGRESS NOTES
[facility] Medicine Services  PROGRESS NOTE    Patient Name: Reg Sanchez  : 1965  MRN: [id number]    Date of Admission: 2021  Primary Care Physician: Sheila Sheikh APRN    Subjective   Subjective     CC:  Post op infection     HPI:  States he is doing ok. Wound vac placed which was painful. He does have transportation to Dr. hollingsworth and would like to use this to get to outpatient wound care. Feels comfortable with IV antibiotics at home.     ROS:  Gen- No fevers, chills  CV- No chest pain, palpitations  Resp- No cough, dyspnea  GI- No N/V/D, abd pain       Objective   Objective     Vital Signs:   Temp:  [98 °F (36.7 °C)-98.5 °F (36.9 °C)] 98.5 °F (36.9 °C)  Heart Rate:  [65-93] 75  Resp:  [16-18] 18  BP: ()/(41-68) 157/56        Physical Exam:  Constitutional: No acute distress, awake, alert  HENT: NCAT, mucous membranes moist  Respiratory: Clear to auscultation bilaterally, respiratory effort normal   Cardiovascular: RRR, no murmurs, rubs, or gallops  Gastrointestinal: Positive bowel sounds, soft, nontender, nondistended  Musculoskeletal: left BKA; right leg with wound vac in place medial leg   Psychiatric: Appropriate affect, cooperative  Neurologic: Oriented x 3, strength symmetric in all extremities, Cranial Nerves grossly intact to confrontation, speech clear  Skin: No rashes    Results Reviewed:  Results from last 7 days   Lab Units 21  0656 21  0721  1110   WBC 10*3/mm3 9.10 8.98 8.58   HEMOGLOBIN g/dL 9.3* 9.7* 8.5*   HEMATOCRIT % 29.2* 30.7* 26.2*   PLATELETS 10*3/mm3 314 278 237     Results from last 7 days   Lab Units 21  0656 21  0722 21  1110   SODIUM mmol/L 130* 131* 132*   POTASSIUM mmol/L 4.7 4.8 4.8   CHLORIDE mmol/L 98 97* 98   CO2 mmol/L 25.0 29.0 29.0   BUN mg/dL 12 13 15   CREATININE mg/dL 0.71* 0.83 0.81   GLUCOSE mg/dL 204* 151* 163*   CALCIUM mg/dL 8.7 8.9 8.3*     Estimated Creatinine Clearance: 133.2  mL/min (A) (by C-G formula based on SCr of 0.71 mg/dL (L)).    Microbiology Results Abnormal     Procedure Component Value - Date/Time    Anaerobic Culture - Tissue, Groin, right [[id number]] Collected: 02/09/21 1738    Lab Status: Final result Specimen: Tissue from Groin, right Updated: 02/14/21 0650     Anaerobic Culture No anaerobes isolated at 5 days    Tissue / Bone Culture - Tissue, Groin, right [[id number]] Collected: 02/09/21 1738    Lab Status: Final result Specimen: Tissue from Groin, right Updated: 02/11/21 0639     Tissue Culture Scant growth (1+) Normal Skin Celi     Gram Stain Moderate (3+) WBCs seen      No organisms seen    Blood Culture - Blood, Arm, Left [[id number]] Collected: 02/05/21 1806    Lab Status: Final result Specimen: Blood from Arm, Left Updated: 02/10/21 1931     Blood Culture No growth at 5 days    Narrative:      Aerobic bottle only      Blood Culture - Blood, Hand, Left [[id number]] Collected: 02/05/21 1806    Lab Status: Final result Specimen: Blood from Hand, Left Updated: 02/10/21 1931     Blood Culture No growth at 5 days    Wound Culture - Wound, Leg, Right [[id number]]  (Abnormal)  (Susceptibility) Collected: 02/06/21 1748    Lab Status: Edited Result - FINAL Specimen: Wound from Leg, Right Updated: 02/10/21 1603     Wound Culture Moderate growth (3+) Staphylococcus aureus, MRSA     Comment: Methicillin resistant Staphylococcus aureus, Patient may be an isolation risk.        Gram Stain Few (2+) WBCs seen      No organisms seen    Susceptibility      Staphylococcus aureus, MRSA     AZIZA     Clindamycin Susceptible     Daptomycin Susceptible (C) [1]      Erythromycin Susceptible     Inducible Clindamycin Resistance Negative     Oxacillin Resistant     Penicillin G Resistant     Rifampin Susceptible     Tetracycline Susceptible     Trimethoprim + Sulfamethoxazole Susceptible     Vancomycin Susceptible            [1]   Appended report. These results have been appended to a previously  final verified report.             Susceptibility Comments     Staphylococcus aureus, MRSA    Daptomycin susceptibility requested by Inderjit ID    This isolate does not demonstrate inducible clindamycin resistance in vitro.               AFB Culture - Tissue, Groin, right [[id number]] Collected: 02/09/21 5209    Lab Status: Preliminary result Specimen: Tissue from Groin, right Updated: 02/10/21 9642     AFB Stain No acid fast bacilli seen on concentrated smear          Imaging Results (Last 24 Hours)     ** No results found for the last 24 hours. **          Results for orders placed during the hospital encounter of 01/25/21   Adult Transthoracic Echo Complete W/ Cont if Necessary Per Protocol    Narrative · Left ventricular ejection fraction appears to be 66 - 70%. Left   ventricular systolic function is normal.  · The right ventricular cavity is borderline dilated.  · Left ventricular diastolic function was normal.  · No significant structural or functional valvular disease.          I have reviewed the medications:  Scheduled Meds:arformoterol, 15 mcg, Nebulization, BID - RT  aspirin, 81 mg, Oral, Daily  atorvastatin, 40 mg, Oral, Nightly  budesonide, 0.5 mg, Nebulization, BID - RT  DAPTOmycin, 6 mg/kg (Adjusted), Intravenous, Q24H  doxycycline, 100 mg, Oral, Q12H  DULoxetine, 60 mg, Oral, BID  enoxaparin, 40 mg, Subcutaneous, Daily  furosemide, 20 mg, Oral, Daily  insulin detemir, 8 Units, Subcutaneous, Daily  insulin lispro, 0-7 Units, Subcutaneous, 4x Daily With Meals & Nightly  isosorbide mononitrate, 30 mg, Oral, Daily  lisinopril, 10 mg, Oral, Daily  metoprolol tartrate, 50 mg, Oral, BID  nicotine, 1 patch, Transdermal, Q24H  pantoprazole, 40 mg, Oral, QAM  pregabalin, 200 mg, Oral, TID  rOPINIRole, 4 mg, Oral, Nightly  sodium chloride, 10 mL, Intravenous, Q12H  sodium chloride, 10 mL, Intravenous, Q12H  sodium hypochlorite, , Topical, Q12H      Continuous Infusions:   PRN Meds:.•  acetaminophen **OR**  acetaminophen **OR** acetaminophen  •  albuterol  •  dextrose  •  dextrose  •  glucagon (human recombinant)  •  HYDROmorphone  •  hydrOXYzine  •  LORazepam  •  magnesium sulfate **OR** magnesium sulfate **OR** magnesium sulfate  •  Morphine  •  naloxone  •  naloxone  •  naloxone  •  ondansetron **OR** ondansetron  •  oxyCODONE-acetaminophen  •  potassium & sodium phosphates **OR** potassium & sodium phosphates  •  sodium chloride  •  sodium chloride    Assessment/Plan   Assessment & Plan     Active Hospital Problems    Diagnosis  POA   • **Postoperative infection [T81.40XA]  Unknown   • Post-operative infection [T81.40XA]  Yes   • S/P L leg amputation  [S88.919A]  Yes   • PVD  [I73.9]  Yes   • Tobacco use [Z72.0]  Yes      Resolved Hospital Problems   No resolved problems to display.        Brief Hospital Course to date:  Reg Sanchez is a 55 y.o. male with past medical history significant for tobacco abuse, diabetes mellitus, peripheral vascular disease, congestive heart failure, coronary artery disease status post CABG, left leg osteomyelitis status post left leg amputation, peripheral arterial disease and most recently right SFA and popliteal artery occlusion underwent a right femoral artery bypass with Dr. Trevizo 1/27/2021.  The patient developed fever on 2/2/2021. On 2/3/21 he developed right leg swelling and redness. On 2/4/21, he presented to outside hospital where he had lab work indicating inflammatory/infectious process and a CT angiogram of the leg which revealed soft tissue stranding and fluid surrounding the recent right femoral/distal artery graft.  Patent runoff with adequate flow.  Patient was taken to the OR on 2/9 with Dr Trevizo for surgical drainage of right groin and knee. Patient was monitored in the ICU Following surgery, transferred back to floor 2/11.      This patient's problems and plans were partially entered by my partner and updated as appropriate by me 02/14/21.     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 s/p I&D with CTS 2/9  --ID (Dr Washburn) is following for assistance with abx- continue to follow cx- continue dapto/po doxy- 6 week course of IV abx anticipated, PICC placed 2/9  --continue PRN pain control   --MRSA from wound culture (2/10)- susceptibilities noted  --CTS for wound management; wound vac placed 2/14  --PT/OT pending   --follow up with Dr Washburn ID 1 week after DC      - IV daptomycin 500 mg daily   - PO doxycycline 100 mg BID  - Weekly sterile PICC dressing changes  - Weekly labs CBC w/diff, CMP, CK, CRP faxed to [facility] at [phone] attn: Dr Washburn  - Follow up with Dr Washburn 1 week of discharge     Chronic CHF -- unspecified   CAD Hx CABG  PVD   - follows with cardiology; uncertain if heart failure in the past but current ECHO with normal EF and diastolic function   - Continue home indur, metoprolol, lasix, ASA, statin      Hyperkalemia  --stop K+ replacement -- improved      Leukocytosis-resolved     COPD  --PRN nebs     Diabetes mellitus   -SSI, A1C 7.2 -control adequate at this time     Hyperlipidemia  -Lipitor 40mg     Tobacco abuse  -nicotine patch     etoh abuse  - ativan PRN     Hep C previously treated with interferon/ribaviron then reinfected      DVT Prophylaxis:  SALIMA     Disposition: TBD. Patient currently declining rehab -- will need wound vac and IV antibiotics at home   CODE STATUS:   Code Status and Medical Interventions:   Ordered at: 02/05/21 6555     Code Status:    CPR     Medical Interventions (Level of Support Prior to Arrest):    Full       Diane Aragon,   02/14/21

## 2021-02-14 NOTE — DISCHARGE SUMMARY
CTS Discharge Summary    Patient Care Team:  Sheila Sheikh APRN as PCP - General (Nurse Practitioner)  Consults:   Consults     No orders found from 12/27/2020 to 1/26/2021.          Date of Admission: 1/25/2021 10:26 AM  Date of Discharge:  2/1/2021    Discharge Diagnosis  Past Medical History:   Diagnosis Date   • Anxiety    • CHF (congestive heart failure) (CMS/Formerly [facility])    • COPD (chronic obstructive pulmonary disease) (CMS/Formerly [facility])    • Coronary artery disease involving native coronary artery of native heart without angina pectoris 1/12/2021    Added automatically from request for surgery [id number]   • Diabetes mellitus (CMS/Formerly [facility])    • Hepatitis C    • Hyperlipidemia    • Hypertension    • Sleep apnea    • T2DM on metformin  1/27/2021     Patient Active Problem List   Diagnosis   • PAD (peripheral artery disease) (CMS/Formerly [facility])   • 100% R SFA occlusion    • Atherosclerosis of native artery of right lower extremity with intermittent claudication (CMS/Formerly [facility])   • Tobacco use   • PVD    • CAD s/p stents and CABG X5    • S/P L leg amputation    • Smoker   • Daily marijuana use   • Suspected ETOH withdrawal    • T2DM on metformin    • Class 1 obesity in adult   • Macrocytosis without anemia   • Post-operative infection   • Postoperative infection       Coronary artery disease [I25.10]  Peripheral vascular disease (CMS/Formerly [facility]) [I73.9]     Procedures Performed  Procedure(s):  FEMORAL DISTAL BYPASS       History of Present Illness  Patient is a 55 y.o. male who presented be evaluated for severe peripheral arterial vascular disease.  Patient states he has rest pain in the right foot and he essentially says it keeps him up all night and because he cannot sleep secondary to the pain.  He saw a vascular surgeon in [place] approximately 1 year ago who indicated that there was not good options for repair gave him some pain medications and blood thinners.  Patient does not walk fast because he has a previous left leg amputation secondary to  trauma in 2013.  But the patient is also had a 5 vessel coronary bypass grafting surgery as well as a 70% right-sided carotid artery stenosis.  Recently this patient underwent attempted catheter-based intervention in [facility].  They were unable to cross the chronically occluded distal superficial femoral and popliteal artery and there is also trifurcation disease.  He does have single-vessel runoff through the posterior tibial artery.  Patient also has some ulcerations in the ankle area which have not healed      Hospital Course   Patient was taken to the operating room on 1/27/21 for right femoral to posterior tibial distal bypass with reverse saphenous vein graft.  Patient was extubated in the operating room and transported to recovery in stable condition.   POD 1:  Feet warm and viable with strong quality doppler signal.  Transferred to telemetry.  Plavix 75 mg daily started.  POD 2:  Pain management. BP control. Ambulating.  POD 5:  Patient met discharge criteria and was discharged to home with Plavix rx.    Discharge Medications     Discharge Medications      New Medications      Instructions Start Date   clopidogrel 75 MG tablet  Commonly known as: PLAVIX  Notes to patient: For your heart to prevent blockages   75 mg, Oral, Daily      hydrOXYzine 25 MG tablet  Commonly known as: ATARAX   25 mg, Oral, 3 Times Daily PRN      oxyCODONE-acetaminophen 7.5-325 MG per tablet  Commonly known as: PERCOCET  Notes to patient: For pain   1 tablet, Oral, Every 6 Hours PRN         Continue These Medications      Instructions Start Date   albuterol sulfate  (90 Base) MCG/ACT inhaler  Commonly known as: PROVENTIL HFA;VENTOLIN HFA;PROAIR HFA  Notes to patient: For your breathing   1 puff, Inhalation, Every 6 Hours PRN, Prior to [employer] Admission, Patient was on: 1 to 2 puffs every 4 to 6 hours as needed      albuterol (2.5 MG/3ML) 0.083% nebulizer solution  Commonly known as: PROVENTIL  Notes to patient: For your  breathing   2.5 mg, Nebulization, Every 4 Hours PRN      aspirin 81 MG chewable tablet  Notes to patient: For your heart to prevent blockages   81 mg, Oral, Daily      atorvastatin 40 MG tablet  Commonly known as: LIPITOR  Notes to patient: To lower your cholesterol   40 mg, Oral, Daily      BASAGLAR KWIKPEN SC  Notes to patient: To lower your blood sugar   20 Units, Subcutaneous, 2 Times Daily PRN      budesonide-formoterol 160-4.5 MCG/ACT inhaler  Commonly known as: SYMBICORT  Notes to patient: For your breathing   2 puffs, Inhalation, 2 Times Daily PRN      Diclofenac Sodium 1 % gel gel  Commonly known as: VOLTAREN  Notes to patient: For joint pain and swelling   2 g, Topical, Every 6 Hours PRN, Prior to [employer] Admission, Patient was on: apply 2G to upper body & 4G to lower body every 6 hours as needed      Diclofenac Sodium 1 % gel gel  Commonly known as: VOLTAREN  Notes to patient: For joint pain and swelling   4 g, Topical, Every 6 Hours PRN, Prior to [employer] Admission, Patient was on: apply 2G to upper body & 4G to lower body every 6 hours as needed      DULoxetine 60 MG capsule  Commonly known as: CYMBALTA   60 mg, Oral, 2 Times Daily      furosemide 20 MG tablet  Commonly known as: LASIX  Notes to patient: To decrease fluid and swelling   20 mg, Oral, Daily      isosorbide mononitrate 30 MG 24 hr tablet  Commonly known as: IMDUR  Notes to patient: To lower your blood pressure   30 mg, Oral, Daily      lisinopril 10 MG tablet  Commonly known as: PRINIVIL,ZESTRIL  Notes to patient: To lower your blood pressure   10 mg, Oral, Daily      metFORMIN 1000 MG tablet  Commonly known as: GLUCOPHAGE  Notes to patient: To lower your blood sugar   1,000 mg, Oral, 2 Times Daily With Meals      metoprolol tartrate 50 MG tablet  Commonly known as: LOPRESSOR  Notes to patient: To lower your heart rate and blood pressure   50 mg, Oral, 2 Times Daily      multivitamin tablet tablet  Notes to patient: Supplement    1 tablet,  Oral, Daily      nitroglycerin 0.4 MG SL tablet  Commonly known as: NITROSTAT  Notes to patient: For chest pain   1 under the tongue as needed for angina, may repeat q5mins for up three doses      omeprazole 20 MG capsule  Commonly known as: priLOSEC  Notes to patient: For your stomach   20 mg, Oral, Daily      potassium chloride 10 MEQ CR tablet  Notes to patient: supplement   10 mEq, Oral, Daily      pregabalin 200 MG capsule  Commonly known as: LYRICA  Notes to patient: For nerve pain   200 mg, Oral, 3 Times Daily      rOPINIRole 4 MG tablet  Commonly known as: REQUIP  Notes to patient: For restless leg   4 mg, Oral, Nightly      sulindac 200 MG tablet  Commonly known as: CLINORIL  Notes to patient: For joint pain and swelling   200 mg, Oral, 2 Times Daily         Stop These Medications    amoxicillin-clavulanate 875-125 MG per tablet  Commonly known as: Augmentin     nicotine 21 MG/24HR patch  Commonly known as: NICODERM CQ            Discharge Diet:   Diet Instructions     Diet: Consistent Carbohydrate, Cardiac      Discharge Diet:  Consistent Carbohydrate  Cardiac             Activity at Discharge:   Activity Instructions     Bathing Restrictions      Type of Restriction: Bathing    Bathing Restrictions: No Tub Bath    Driving Restrictions      Type of Restriction: Driving    Driving Restrictions: No Driving Until Next Appointment    Lifting Restrictions      Type of Restriction: Lifting    Lifting Restrictions: Lifting Restriction (Indicate Limit)    Weight Limit (Pounds): 15    Length of Lifting Restriction: until next appointment          Follow-up Appointments  Future Appointments   Date Time Provider Department Center   3/11/2021 10:30 AM Roseanne Abebe APRN [facility] None   4/12/2021  2:00 PM Devon Kwan APRN [facility] None           Flavia June PA-C  02/14/21  13:58 EST

## 2021-02-14 NOTE — THERAPY EVALUATION
Acute Care - Wound/Debridement Initial Evaluation  [facility]     Patient Name: Reg Sanchez  : 1965  MRN: [id number]  Today's Date: 2021                Admit Date: 2021    Visit Dx:    ICD-10-CM ICD-9-CM   1. Postoperative infection, unspecified type, initial encounter  T81.40XA 998.59       Patient Active Problem List   Diagnosis   • PAD (peripheral artery disease) (CMS/[facility])   • 100% R SFA occlusion    • Atherosclerosis of native artery of right lower extremity with intermittent claudication (CMS/[facility])   • Tobacco use   • PVD    • CAD s/p stents and CABG X5    • S/P L leg amputation    • Smoker   • Daily marijuana use   • Suspected ETOH withdrawal    • T2DM on metformin    • Class 1 obesity in adult   • Macrocytosis without anemia   • Post-operative infection   • Postoperative infection        Past Medical History:   Diagnosis Date   • Anxiety    • CHF (congestive heart failure) (CMS/[facility])    • COPD (chronic obstructive pulmonary disease) (CMS/[facility])    • Coronary artery disease involving native coronary artery of native heart without angina pectoris 2021    Added automatically from request for surgery [id number]   • Diabetes mellitus (CMS/[facility])    • Hepatitis C    • Hyperlipidemia    • Hypertension    • Sleep apnea    • T2DM on metformin  2021        Past Surgical History:   Procedure Laterality Date   • CARDIAC CATHETERIZATION  11/20/2020    x 2 stents   • CARDIAC CATHETERIZATION Right 2021    Procedure: Peripheral angiography;  Surgeon: Wesly Lund MD;  Location: [facility] INVASIVE LOCATION;  Service: Cardiovascular;  Laterality: Right;   • CORONARY ARTERY BYPASS GRAFT      x 5   • CORONARY STENT PLACEMENT  2020    Dr Mortensen x 2 stents   • FEMORAL DISTAL BYPASS N/A 2021    Procedure: FEMORAL DISTAL BYPASS;  Surgeon: Jeremy Trevizo MD;  Location: [facility];  Service: Vascular;  Laterality: N/A;   • FINGER SURGERY      left   • INCISION AND DRAINAGE LEG Right  2/9/2021    Procedure: INCISION AND DRAINAGE RIGHT LOWER EXTREMITY, EVACUATION OF HEMATOMA, LIGATION OF FEM DISTAL BYPASS;  Surgeon: Jeremy Trevizo MD;  Location: [facility];  Service: Cardiothoracic;  Laterality: Right;   • LEG AMPUTATION Left     left lower leg   • SHOULDER SURGERY Bilateral            Wound 01/27/21 1322 Right anterior greater trochanter Incision (Active)   Wound Image   02/14/21 0945   Dressing Appearance intact;moist drainage 02/14/21 0945   Closure None 02/14/21 0400   Base clean;moist;pink;subcutaneous;yellow 02/14/21 0945   Periwound intact;dry;indurated;pink 02/14/21 0945   Periwound Temperature warm 02/14/21 0945   Periwound Skin Turgor firm 02/14/21 0945   Edges open 02/14/21 0945   Wound Length (cm) 6.8 cm 02/14/21 0945   Wound Width (cm) 4.5 cm 02/14/21 0945   Wound Depth (cm) 2.3 cm 02/14/21 0945   Drainage Characteristics/Odor serosanguineous 02/14/21 0945   Drainage Amount small 02/14/21 0945   Care, Wound irrigated with;sterile normal saline;negative pressure wound therapy 02/14/21 0945   Dressing Care dressing applied 02/14/21 0945   Periwound Care cleansed with pH balanced cleanser;dry periwound area maintained;barrier film applied 02/14/21 0945       Wound 01/27/21 1322 Right proximal leg Incision (Active)   Wound Image   02/14/21 0945   Dressing Appearance intact;moist drainage 02/14/21 0945   Closure None 02/14/21 0400   Base moist;pink;red;slough;subcutaneous;yellow 02/14/21 0945   Periwound intact;dry;redness;warm;indurated 02/14/21 0945   Periwound Temperature warm 02/14/21 0945   Periwound Skin Turgor firm 02/14/21 0945   Edges open 02/14/21 0945   Wound Length (cm) 1.4 cm 02/14/21 0945   Wound Width (cm) 4.4 cm 02/14/21 0945   Wound Depth (cm) 1.7 cm 02/14/21 0945   Drainage Characteristics/Odor serosanguineous 02/14/21 0945   Drainage Amount small 02/14/21 0945   Care, Wound cleansed with;wound cleanser;debrided;negative pressure wound therapy 02/14/21 0945   Dressing  Care dressing applied 02/14/21 0945   Periwound Care cleansed with pH balanced cleanser;dry periwound area maintained;barrier film applied 02/14/21 0945       Wound 01/27/21 1322 Right distal leg Incision (Active)   Wound Image   02/14/21 0945   Dressing Appearance dry;intact 02/14/21 0945   Closure Staples 02/14/21 0945   Base moist;pink;slough;yellow 02/14/21 0945   Periwound intact;redness;indurated;moist 02/14/21 0945   Periwound Temperature warm 02/14/21 0945   Periwound Skin Turgor firm 02/14/21 0945   Edges irregular 02/14/21 0945   Wound Length (cm) 10 cm 02/14/21 0945   Wound Width (cm) 0.8 cm 02/14/21 0945   Drainage Characteristics/Odor serosanguineous 02/14/21 0945   Drainage Amount scant 02/14/21 0945   Care, Wound cleansed with;wound cleanser;debrided;honey applied 02/14/21 0945   Dressing Care dressing applied;low-adherent;foam 02/14/21 0945   Periwound Care cleansed with pH balanced cleanser;dry periwound area maintained 02/14/21 0945       Wound 02/09/21 1604 Right lower leg Incision (Active)   Wound Image   02/14/21 0945   Dressing Appearance intact;moist drainage 02/14/21 0945   Closure Staples 02/14/21 0826   Base moist;pink;yellow;slough;exposed structure 02/14/21 0945   Periwound intact;indurated;redness;warm 02/14/21 0945   Periwound Temperature warm 02/14/21 0945   Periwound Skin Turgor firm 02/14/21 0945   Edges open 02/14/21 0945   Wound Length (cm) 1.2 cm 02/14/21 0945   Wound Width (cm) 2.8 cm 02/14/21 0945   Wound Depth (cm) 1 cm 02/14/21 0945   Drainage Characteristics/Odor serosanguineous 02/14/21 0945   Drainage Amount scant 02/14/21 0945   Care, Wound cleansed with;wound cleanser;negative pressure wound therapy 02/14/21 0945   Dressing Care dressing applied 02/14/21 0945   Periwound Care cleansed with pH balanced cleanser;dry periwound area maintained;barrier film applied 02/14/21 0945       Wound 02/09/21 1737 Right groin  (Active)   Wound Image    02/14/21 0945   Dressing  Appearance intact;copious drainage 02/14/21 0945   Closure None 02/14/21 0400   Base moist;pink;red;subcutaneous;yellow 02/14/21 0945   Periwound intact;redness;indurated 02/14/21 0945   Periwound Temperature warm 02/14/21 0945   Periwound Skin Turgor soft 02/14/21 0945   Edges open 02/14/21 0945   Wound Length (cm) 1.5 cm 02/14/21 0945   Wound Width (cm) 7 cm 02/14/21 0945   Wound Depth (cm) 4.5 cm 02/14/21 0945   Drainage Characteristics/Odor serosanguineous 02/14/21 0945   Drainage Amount copious 02/14/21 0945   Care, Wound irrigated with;sterile normal saline;negative pressure wound therapy 02/14/21 0945   Dressing Care dressing applied 02/14/21 0945   Periwound Care barrier film applied;cleansed with pH balanced cleanser;dry periwound area maintained 02/14/21 0945       Wound 02/09/21 1751 Right anterior popliteal Incision (Active)   Wound Image   02/14/21 0945   Dressing Appearance intact;moist drainage 02/14/21 0945   Closure None 02/14/21 0400   Base moist;pink;subcutaneous;yellow;red 02/14/21 0945   Periwound intact;dry;pink;indurated 02/14/21 0945   Periwound Temperature warm 02/14/21 0945   Periwound Skin Turgor firm 02/14/21 0945   Edges open 02/14/21 0945   Wound Length (cm) 1.4 cm 02/14/21 0945   Wound Width (cm) 4.4 cm 02/14/21 0945   Wound Depth (cm) 1.7 cm 02/14/21 0945   Drainage Characteristics/Odor serosanguineous 02/14/21 0945   Drainage Amount small 02/14/21 0945   Care, Wound irrigated with;sterile normal saline;negative pressure wound therapy 02/14/21 0945   Dressing Care dressing applied 02/14/21 0945   Periwound Care cleansed with pH balanced cleanser;dry periwound area maintained;barrier film applied 02/14/21 0945         WOUND DEBRIDEMENT  Total area of Debridement: 8cmsq  Debridement Site 1  Location- Site 1: RLE wounds  Selective Debridement- Site 1: Wound Surface <20cmsq  Instruments- Site 1: tweezers  Excised Tissue Description- Site 1: minimum, slough  Bleeding- Site 1: none                   PT Assessment (last 12 hours)      PT Evaluation and Treatment     Row Name 02/14/21 0945          Physical Therapy Time and Intention    Subjective Information  complains of;pain  -JM     Document Type  wound care;evaluation  -     Mode of Treatment  individual therapy  -     Patient Effort  good  -     Symptoms Noted During/After Treatment  increased pain  -     Comment  Pre-medicated for tx  -     Row Name 02/14/21 0945          General Information    Patient Profile Reviewed  yes  -     Patient Observations  alert;cooperative;agree to therapy  -     Pertinent History of Current Functional Problem  Pt admitted with RLE infection s/p femoral bypass 1/27/21, now s/p I&D 2/9/21.  PT consulted for wound vac  -     Existing Precautions/Restrictions  fall;other (see comments) wound vac RLE, L BKA (has prosthesis)  -     Risks Reviewed  patient:;increased discomfort;increased drainage  -     Benefits Reviewed  patient:;improve skin integrity  -     Barriers to Rehab  medically complex  -     Row Name 02/14/21 0945          Cognition    Affect/Mental Status (Cognitive)  WFL;anxious  -     Orientation Status (Cognition)  oriented x 4  -     Follows Commands (Cognition)  WNL  -     Row Name 02/14/21 0945          Pain    Additional Documentation  Pain Scale: Numbers Pre/Post-Treatment (Group)  -     Row Name 02/14/21 0945          Pain Scale: Numbers Pre/Post-Treatment    Pretreatment Pain Rating  8/10  -     Posttreatment Pain Rating  8/10  -     Pain Location - Side  Right  -     Pain Location - Orientation  incisional  -     Pain Location  extremity;groin  -     Pre/Posttreatment Pain Comment  pain 10/10 during wound vac placement, RN gave pain meds  -     Row Name 02/14/21 0945          Wound 01/27/21 1322 Right distal leg Incision    Wound - Properties Group Placement Date: 01/27/21  -ER Placement Time: 1322  -ER Present on Hospital Admission: N  -ER Side: Right   -ER Orientation: distal  -ER Location: leg  -ER Primary Wound Type: Incision  -ER Additional Comments: medial R ankle  -    Wound Image  Images linked: 1  -JM     Dressing Appearance  dry;intact  -JM     Closure  Staples  -JM     Base  moist;pink;slough;yellow  -JM     Periwound  intact;redness;indurated;moist  -JM     Periwound Temperature  warm  -JM     Periwound Skin Turgor  firm  -JM     Edges  irregular  -JM     Wound Length (cm)  10 cm  -JM     Wound Width (cm)  0.8 cm  -JM     Wound Depth (cm)  -- obscured by slough  -JM     Drainage Characteristics/Odor  serosanguineous  -JM     Drainage Amount  scant  -JM     Care, Wound  cleansed with;wound cleanser;debrided;honey applied  -     Dressing Care  dressing applied;low-adherent;foam therahoney, mepilex border  -JM     Periwound Care  cleansed with pH balanced cleanser;dry periwound area maintained  -JM     Retired Wound - Properties Group Date first assessed: 01/27/21  -ER Time first assessed: 1322  -ER Present on Hospital Admission: N  -ER Side: Right  -ER Location: leg  -ER Primary Wound Type: Incision  -ER Additional Comments: medial R ankle  -    Row Name 02/14/21 0945          Wound 01/27/21 1322 Right proximal leg Incision    Wound - Properties Group Placement Date: 01/27/21  -ER Placement Time: 1322  -ER Present on Hospital Admission: N  -ER Side: Right  -ER Orientation: proximal  -ER Location: leg  -ER Primary Wound Type: Incision  -ER Additional Comments: medial proximal calf  -    Wound Image  Images linked: 1  -JM     Dressing Appearance  intact;moist drainage  -JM     Base  moist;pink;red;slough;subcutaneous;yellow  -JM     Periwound  intact;dry;redness;warm;indurated  -     Periwound Temperature  warm  -JM     Periwound Skin Turgor  firm  -     Edges  open  -JM     Wound Length (cm)  1.4 cm  -JM     Wound Width (cm)  4.4 cm  -JM     Wound Depth (cm)  1.7 cm  -JM     Drainage Characteristics/Odor  serosanguineous  -JM     Drainage Amount   small  -     Care, Wound  cleansed with;wound cleanser;debrided;negative pressure wound therapy  -     Dressing Care  dressing applied vac  -     Periwound Care  cleansed with pH balanced cleanser;dry periwound area maintained;barrier film applied no-sting, drape border  -     Retired Wound - Properties Group Date first assessed: 01/27/21  -ER Time first assessed: 1322  -ER Present on Hospital Admission: N  -ER Side: Right  -ER Location: leg  -ER Primary Wound Type: Incision  -ER Additional Comments: medial proximal calf  -    Row Name 02/14/21 0945          Wound 01/27/21 1322 Right anterior greater trochanter Incision    Wound - Properties Group Placement Date: 01/27/21  -ER Placement Time: 1322  -ER Present on Hospital Admission: N  -ER Side: Right  -ER Orientation: anterior  -ER Location: greater trochanter  -ER Primary Wound Type: Incision  -ER Additional Comments: medial proximal thigh  -    Wound Image  Images linked: 1  -     Dressing Appearance  intact;moist drainage  -     Base  clean;moist;pink;subcutaneous;yellow  -     Periwound  intact;dry;indurated;pink  -     Periwound Temperature  warm  -     Periwound Skin Turgor  firm  -     Edges  open  -     Wound Length (cm)  6.8 cm  -     Wound Width (cm)  4.5 cm  -     Wound Depth (cm)  2.3 cm  -     Drainage Characteristics/Odor  serosanguineous  -     Drainage Amount  small  -     Care, Wound  irrigated with;sterile normal saline;negative pressure wound therapy  -     Dressing Care  dressing applied vac  -     Periwound Care  cleansed with pH balanced cleanser;dry periwound area maintained;barrier film applied no-sting, drape border  -     Retired Wound - Properties Group Date first assessed: 01/27/21  -ER Time first assessed: 1322  -ER Present on Hospital Admission: N  -ER Side: Right  -ER Location: greater trochanter  -ER Primary Wound Type: Incision  -ER Additional Comments: medial proximal thigh  -    Row  Name 02/14/21 0945          Wound 02/09/21 1604 Right lower leg Incision    Wound - Properties Group Placement Date: 02/09/21  -LS Placement Time: 1604  -LS, Present upon admission to preop  Side: Right  -LS Orientation: lower  -LS Location: leg  -LS Primary Wound Type: Incision  -LS Additional Comments: medial distal R calf  -JM    Wound Image  Images linked: 1  -JM     Dressing Appearance  intact;moist drainage  -JM     Closure  --  -JM     Base  moist;pink;yellow;slough;exposed structure exposed vein anterior aspect  -JM     Periwound  intact;indurated;redness;warm  -JM     Periwound Temperature  warm  -JM     Periwound Skin Turgor  firm  -JM     Edges  open  -JM     Wound Length (cm)  1.2 cm  -JM     Wound Width (cm)  2.8 cm  -JM     Wound Depth (cm)  1 cm  -JM     Drainage Characteristics/Odor  serosanguineous  -JM     Drainage Amount  scant  -JM     Care, Wound  cleansed with;wound cleanser;negative pressure wound therapy  -JM     Dressing Care  dressing applied vac  -JM     Periwound Care  cleansed with pH balanced cleanser;dry periwound area maintained;barrier film applied no-sting, drape border  -JM     Retired Wound - Properties Group Date first assessed: 02/09/21  -LS Time first assessed: 1604  -LS, Present upon admission to preop  Side: Right  -LS Location: leg  -LS Primary Wound Type: Incision  -LS Additional Comments: medial distal R calf  -JM    Row Name 02/14/21 0945          Wound 02/09/21 1737 Right groin     Wound - Properties Group Placement Date: 02/09/21  -ER Placement Time: 1737  -ER Side: Right  -ER Location: groin  -ER Primary Wound Type: --  -ER, OPEN WOUND     Wound Image  Images linked: 2  -JM     Dressing Appearance  intact;copious drainage  -JM     Base  moist;pink;red;subcutaneous;yellow  -JM     Periwound  intact;redness;indurated  -JM     Periwound Temperature  warm  -JM     Periwound Skin Turgor  soft  -JM     Edges  open  -JM     Wound Length (cm)  1.5 cm  -JM     Wound Width (cm)   7 cm  -     Wound Depth (cm)  4.5 cm  -JM     Drainage Characteristics/Odor  serosanguineous  -JM     Drainage Amount  copious  -JM     Care, Wound  irrigated with;sterile normal saline;negative pressure wound therapy  -     Dressing Care  dressing applied vac  -JM     Periwound Care  barrier film applied;cleansed with pH balanced cleanser;dry periwound area maintained no-sting, drape/paste border  -     Retired Wound - Properties Group Date first assessed: 02/09/21  -ER Time first assessed: 1737  -ER Side: Right  -ER Location: groin  -ER Primary Wound Type: --  -ER, OPEN WOUND     Row Name 02/14/21 0945          Wound 02/09/21 1751 Right anterior popliteal Incision    Wound - Properties Group Placement Date: 02/09/21  -ER Placement Time: 1751  -ER Present on Hospital Admission: N  -ER Side: Right  -ER Orientation: anterior  -ER Location: popliteal  -ER Primary Wound Type: Incision  -ER, OPEN WOUND  Additional Comments: medial distal thigh  -    Wound Image  Images linked: 1  -     Dressing Appearance  intact;moist drainage  -     Base  moist;pink;subcutaneous;yellow;red  -     Periwound  intact;dry;pink;indurated  -     Periwound Temperature  warm  -     Periwound Skin Turgor  firm  -     Edges  open  -     Wound Length (cm)  1.4 cm  -     Wound Width (cm)  4.4 cm  -     Wound Depth (cm)  1.7 cm  -     Drainage Characteristics/Odor  serosanguineous  -     Drainage Amount  small  -JM     Care, Wound  irrigated with;sterile normal saline;negative pressure wound therapy  -     Dressing Care  dressing applied vac  -JM     Periwound Care  cleansed with pH balanced cleanser;dry periwound area maintained;barrier film applied no-sting, drape border  -     Retired Wound - Properties Group Date first assessed: 02/09/21  -ER Time first assessed: 1751  -ER Present on Hospital Admission: N  -ER Side: Right  -ER Location: popliteal  -ER Primary Wound Type: Incision  -ER, OPEN WOUND  Additional  Comments: medial distal thigh  -JM    Row Name 02/14/21 0945          NPWT (Negative Pressure Wound Therapy) 02/14/21 0945 R groin    NPWT (Negative Pressure Wound Therapy) - Properties Group Placement Date: 02/14/21  - Placement Time: 0945 -JM Location: R groin  -JM Additional Comments: Rehab rental unit  -    Therapy Setting  continuous therapy  -JM     Dressing  foam, black;foam, white  -JM     Pressure Setting  150 mmHg  -JM     Sponges Inserted  2 1 white, 1 black  -JM     Sponges Removed  other (see comments) conform roll packing  -JM     Finger sweep complete  Yes  -JM     Retired NPWT (Negative Pressure Wound Therapy) - Properties Group Placement Date: 02/14/21  - Placement Time: 0945 - Location: R groin  -JM Additional Comments: Rehab rental unit  -    Row Name 02/14/21 0945          NPWT (Negative Pressure Wound Therapy) 02/14/21 0945 RLE wounds    NPWT (Negative Pressure Wound Therapy) - Properties Group Placement Date: 02/14/21  - Placement Time: 0945 - Location: RLE wounds  -JM Additional Comments: Rental unit [id number]  -    Therapy Setting  continuous therapy  -JM     Dressing  foam, black;foam, white  -JM     Contact Layer  other (see comments) xeroform to medial R calf vein  -     Pressure Setting  150 mmHg  -JM     Sponges Inserted  6 3 white, 3 black total  -JM     Sponges Removed  other (see comments) coform roll packing, covaderms  -JM     Finger sweep complete  Yes  -JM     Retired NPWT (Negative Pressure Wound Therapy) - Properties Group Placement Date: 02/14/21  - Placement Time: 0945 -JM Location: RLE wounds  -JM Additional Comments: Rental unit [id number]  -    Row Name 02/14/21 0945          Coping    Observed Emotional State  cooperative  -     Row Name 02/14/21 0945          Plan of Care Review    Plan of Care Reviewed With  patient  -     Outcome Summary  PT wound care evaluation completed.  Wound vac initiated to R groin and RLE wounds.  Most distal medial  R ankle wound mostly slough and staples, so PT lightly debrided and applied therahoney with mepilex border to assist with debriding residual slough, may add vac over next couple days once wound bed .  Remainder of wounds mostly moist clean pink tissue with SQ tissue, periwounds mildly indurated and red.  Exposed vein noted in distal medial R calf wound, and PT protected vessel with folded xeroform prior to placing white vac foam.  Highly complicated wounds requiring multiple lines and 2 vac pumps with Y-connectors.  PT discussed concerns for d/c plans with pt.  Pt refusing placement at this time but is agreeable to coming to OP PT wound care at [facility] 2x/week.  Due to complexity of wound vac dressings, HH may have difficulty with dressing changes.  PT will change vac dressing every 2-3 days.  Will initiate order for home vac pump, which will require insurance approval.  Pt also requiring IV pain medications to tolerate wound vac tx, with report of 10/10 pain.  Photos taken for MD to review (see Medial tab in EPIC.)  Please contact PT wound care [phone] for any issues with wound vac.  -     Row Name 02/14/21 0945          Physical Therapy Goals    Wound Care Goal Selection (PT)  wound care, PT goal 1;wound care, PT goal 2  -     Row Name 02/14/21 0945          Wound Care Goal 1 (PT)    Wound Care Goal 1 (PT)  Pt to tolerate wound vac change with less than 5/10 pain to promote d/c.  -     Time Frame (Wound Care Goal 1, PT)  long term goal (LTG);10 days  -     Row Name 02/14/21 0992          Wound Care Goal 2 (PT)    Wound Care Goal 2 (PT)  Reduce wound dimensions by 10% as evidence of wound healing.  -     Time Frame (Wound Care Goal 2, PT)  long term goal (LTG);10 days  -     Row Name 02/14/21 0945          Positioning and Restraints    Pre-Treatment Position  in bed  -     Post Treatment Position  bed  -     In Bed  notified nsg;supine;call light within reach;encouraged to call for assist  -      Row Name 02/14/21 0945          Therapy Assessment/Plan (PT)    Criteria for Skilled Interventions Met (PT)  yes;skilled treatment is necessary  -     Row Name 02/14/21 0945          PT Evaluation Complexity    History, PT Evaluation Complexity  3 or more personal factors and/or comorbidities  -     Examination of Body Systems (PT Eval Complexity)  total of 4 or more elements  -     Clinical Presentation (PT Evaluation Complexity)  evolving  -     Clinical Decision Making (PT Evaluation Complexity)  high complexity  -     Overall Complexity (PT Evaluation Complexity)  moderate complexity  -       User Key  (r) = Recorded By, (t) = Taken By, (c) = Cosigned By    Initials Name Provider Type    Irina Anaya, RN Registered Nurse    Afua Oneill, PT Physical Therapist    Orly Campa, RN Registered Nurse            Recommendation and Plan     Plan of Care Reviewed With: patient           Outcome Summary: PT wound care evaluation completed.  Wound vac initiated to R groin and RLE wounds.  Most distal medial R ankle wound mostly slough and staples, so PT lightly debrided and applied therahoney with mepilex border to assist with debriding residual slough, may add vac over next couple days once wound bed .  Remainder of wounds mostly moist clean pink tissue with SQ tissue, periwounds mildly indurated and red.  Exposed vein noted in distal medial R calf wound, and PT protected vessel with folded xeroform prior to placing white vac foam.  Highly complicated wounds requiring multiple lines and 2 vac pumps with Y-connectors.  PT discussed concerns for d/c plans with pt.  Pt refusing placement at this time but is agreeable to coming to OP PT wound care at [facility] 2x/week.  Due to complexity of wound vac dressings, HH may have difficulty with dressing changes.  PT will change vac dressing every 2-3 days.  Will initiate order for home vac pump, which will require insurance approval.  Pt also  requiring IV pain medications to tolerate wound vac tx, with report of 10/10 pain.  Photos taken for MD to review (see Medial tab in EPIC.)  Please contact PT wound care [phone] for any issues with wound vac.  Plan of Care Reviewed With: patient            Time Calculation  PT Charges     Row Name 02/14/21 0945             Time Calculation    Start Time  0945  -DANIEL      PT Goal Re-Cert Due Date  02/24/21  -DANIEL        User Key  (r) = Recorded By, (t) = Taken By, (c) = Cosigned By    Initials Name Provider Type    Afua Oneill, PT Physical Therapist            Therapy Charges for Today     Code Description Service Date Service Provider Modifiers Qty    [id number] HC PT NEG PRESS WOUND TO 50SQCM DME4 2/14/2021 Afua Zapata, PT  1    [id number] HC PT EVAL MOD COMPLEXITY 4 2/14/2021 Afua Zapata, PT GP 1    [id number] HC STEVE DEBRIDE OPEN WOUND UP TO 20CM 2/14/2021 Afua Zapata, PT GP 1            PT G-Codes  AM-PAC 6 Clicks Score (PT): 20       Afua Zapata, PT  2/14/2021

## 2021-02-14 NOTE — THERAPY EVALUATION
Patient Name: Reg Sanchez  : 1965    MRN: [id number]                              Today's Date: 2021       Admit Date: 2021    Visit Dx:     ICD-10-CM ICD-9-CM   1. Postoperative infection, unspecified type, initial encounter  T81.40XA 998.59     Patient Active Problem List   Diagnosis   • PAD (peripheral artery disease) (CMS/[facility])   • 100% R SFA occlusion    • Atherosclerosis of native artery of right lower extremity with intermittent claudication (CMS/[facility])   • Tobacco use   • PVD    • CAD s/p stents and CABG X5    • S/P L leg amputation    • Smoker   • Daily marijuana use   • Suspected ETOH withdrawal    • T2DM on metformin    • Class 1 obesity in adult   • Macrocytosis without anemia   • Post-operative infection   • Postoperative infection     Past Medical History:   Diagnosis Date   • Anxiety    • CHF (congestive heart failure) (CMS/[facility])    • COPD (chronic obstructive pulmonary disease) (CMS/[facility])    • Coronary artery disease involving native coronary artery of native heart without angina pectoris 2021    Added automatically from request for surgery [id number]   • Diabetes mellitus (CMS/[facility])    • Hepatitis C    • Hyperlipidemia    • Hypertension    • Sleep apnea    • T2DM on metformin  2021     Past Surgical History:   Procedure Laterality Date   • CARDIAC CATHETERIZATION  11/20/2020    x 2 stents   • CARDIAC CATHETERIZATION Right 2021    Procedure: Peripheral angiography;  Surgeon: Wesly Lund MD;  Location: [facility] INVASIVE LOCATION;  Service: Cardiovascular;  Laterality: Right;   • CORONARY ARTERY BYPASS GRAFT      x 5   • CORONARY STENT PLACEMENT  2020    Dr Mortensen x 2 stents   • FEMORAL DISTAL BYPASS N/A 2021    Procedure: FEMORAL DISTAL BYPASS;  Surgeon: Jeremy Trevizo MD;  Location: [facility] OR;  Service: Vascular;  Laterality: N/A;   • FINGER SURGERY      left   • INCISION AND DRAINAGE LEG Right 2021    Procedure: INCISION AND DRAINAGE RIGHT  LOWER EXTREMITY, EVACUATION OF HEMATOMA, LIGATION OF FEM DISTAL BYPASS;  Surgeon: Jeremy Trevizo MD;  Location: [facility];  Service: Cardiothoracic;  Laterality: Right;   • LEG AMPUTATION Left     left lower leg   • SHOULDER SURGERY Bilateral      General Information     Row Name 02/14/21 1448          OT Time and Intention    Document Type  evaluation  -OCTAVIANO     Mode of Treatment  occupational therapy;co-treatment cotx per WOC PT recommendation due to multiple wounds and 2 wound vacs and prothesis.  -OCTAVIANO     Row Name 02/14/21 1448          General Information    Prior Level of Function  independent:;all household mobility;ADL's;home management Prior to recent Popliteal bypass 1/27/21  -OCTAVIANO     Existing Precautions/Restrictions  fall wouond vac RLE, prothesis LLE for L BKA  -OCTAVIANO     Barriers to Rehab  medically complex  -OCTAVIANO     Row Name 02/14/21 1448          Living Environment    Lives With  significant other  -OCTAVIANO     Row Name 02/14/21 1448          Home Main Entrance    Number of Stairs, Main Entrance  none  -OCTAVIANO     Row Name 02/14/21 1448          Stairs Within Home, Primary    Number of Stairs, Within Home, Primary  none  -OCTAVIANO     Stairs Comment, Within Home, Primary  Tub shower with no seat and low toilets.  -OCTAVIANO     Row Name 02/14/21 1448          Cognition    Orientation Status (Cognition)  oriented x 4  -OCTAVIANO     Row Name 02/14/21 1448          Safety Issues, Functional Mobility    Impairments Affecting Function (Mobility)  endurance/activity tolerance;sensation/sensory awareness;strength  -OCTAVIANO       User Key  (r) = Recorded By, (t) = Taken By, (c) = Cosigned By    Initials Name Provider Type    OCTAVIANO Colleen Gallegos, OT Occupational Therapist          Mobility/ADL's     Row Name 02/14/21 1452          Bed Mobility    Bed Mobility  supine-sit  -OCTAVIANO     Supine-Sit [place] (Bed Mobility)  standby assist  -OCTAVIANO     Assistive Device (Bed Mobility)  bed rails  -OCTAVIANO     Comment (Bed Mobility)  pt. with good speed without  difficulty.  -     Row Name 02/14/21 1452          Transfers    Transfers  sit-stand transfer  -     Sit-Stand [place] (Transfers)  contact guard;verbal cues;2 person assist  -     Row Name 02/14/21 1452          Sit-Stand Transfer    Assistive Device (Sit-Stand Transfers)  other (see comments) gait belt on only  -     Row Name 02/14/21 1452          Functional Mobility    Functional Mobility- Ind. Level  contact guard assist;1 person + 1 person to manage equipment  -     Functional Mobility- Device  other (see comments) gait belt on only  -     Functional Mobility-Distance (Feet)  60  -     Functional Mobility- Comment  two wound vac with 3 lines to RLE  -     Row Name 02/14/21 1452          Activities of Daily Living    BADL Assessment/Intervention  lower body dressing;upper body dressing  -     Row Name 02/14/21 1452          Lower Body Dressing Assessment/Training    [place] Level (Lower Body Dressing)  don;socks;dependent (less than 25% patient effort)  -OCTAVIANO     Position (Lower Body Dressing)  edge of bed sitting  -     Comment (Lower Body Dressing)  donned L prothesis I post setup, R sock unable to reach foot.  -     Row Name 02/14/21 1452          Upper Body Dressing Assessment/Training    [place] Level (Upper Body Dressing)  don;pajama/robe;moderate assist (50% patient effort)  -OCTAVIANO     Position (Upper Body Dressing)  edge of bed sitting  -       User Key  (r) = Recorded By, (t) = Taken By, (c) = Cosigned By    Initials Name Provider Type    Colleen Montano, OT Occupational Therapist        Obj/Interventions     Row Name 02/14/21 1454          Sensory Assessment (Somatosensory)    Sensory Assessment (Somatosensory)  bilateral UE see PT note LE  -     Sensory Subjective Reports  tingling in hands, but good ligh touch  -     Row Name 02/14/21 1454          Vision Assessment/Intervention    Visual Impairment/Limitations  WFL  -[facility] Name 02/14/21 1453           Range of Motion Comprehensive    General Range of Motion  upper extremity range of motion deficits identified  -OCTAVIANO     Comment, General Range of Motion  Pt. with prior shoulder surgeries and only able to raise in shouler flexion 80-90 degrees other AROM intact.  -[facility] Name 02/14/21 1454          Strength Comprehensive (MMT)    General Manual Muscle Testing (MMT) Assessment  no strength deficits identified  -OCTAVIANO     Comment, General Manual Muscle Testing (MMT) Assessment  BUE  -[facility] Name 02/14/21 1454          Balance    Balance Assessment  sitting static balance;sitting dynamic balance;standing static balance;standing dynamic balance  -OCTAVIANO     Static Sitting Balance  WFL;sitting, edge of bed  -OCTAVIANO     Dynamic Sitting Balance  WFL;sitting, edge of bed  -OCTAVIANO     Static Standing Balance  WFL;unsupported;standing  -OCTAVIANO     Dynamic Standing Balance  mild impairment;supported;standing  -OCTAVIANO       User Key  (r) = Recorded By, (t) = Taken By, (c) = Cosigned By    Initials Name Provider Type    Colleen Montano, OT Occupational Therapist        Goals/Plan     Row Name 02/14/21 1500          Transfer Goal 1 (OT)    Activity/Assistive Device (Transfer Goal 1, OT)  toilet;commode, bedside without drop arms;commode grab bar  -OCTAVIANO     [place] Level/Cues Needed (Transfer Goal 1, OT)  standby assist  -OCTAVIANO     Time Frame (Transfer Goal 1, OT)  long term goal (LTG);10 days  -OCTAVIANO     Progress/Outcome (Transfer Goal 1, OT)  goal ongoing  -OCTAVIANO     Row Name 02/14/21 1500          Dressing Goal 1 (OT)    Activity/Device (Dressing Goal 1, OT)  lower body dressing;reacher;sock-aid;long-handled shoe horn sock aid prn, debby and doff socks and R shoe  -OCTAVIANO     [place]/Cues Needed (Dressing Goal 1, OT)  standby assist  -OCTAVIANO     Time Frame (Dressing Goal 1, OT)  long term goal (LTG);10 days  -OCTAVIANO     Progress/Outcome (Dressing Goal 1, OT)  goal ongoing  -OCTAVIANO     Row Name 02/14/21 1500          Toileting Goal 1 (OT)    Activity/Device  (Toileting Goal 1, OT)  toileting skills, all;grab bar/safety frame;commode  -OCTAVIANO     [place] Level/Cues Needed (Toileting Goal 1, OT)  standby assist  -OCTAVIANO     Time Frame (Toileting Goal 1, OT)  long term goal (LTG);10 days  -OCTAVIANO     Progress/Outcome (Toileting Goal 1, OT)  goal ongoing  -OCTAVIANO     Row Name 02/14/21 1500          Therapy Assessment/Plan (OT)    Planned Therapy Interventions (OT)  activity tolerance training;BADL retraining;functional balance retraining;occupation/activity based interventions;patient/caregiver education/training;transfer/mobility retraining  -OCTAVIANO       User Key  (r) = Recorded By, (t) = Taken By, (c) = Cosigned By    Initials Name Provider Type    Colleen Montano, OT Occupational Therapist        Clinical Impression     Row Name 02/14/21 1458          Pain Scale: Numbers Pre/Post-Treatment    Pretreatment Pain Rating  6/10  -OCTAVIANO     Posttreatment Pain Rating  6/10  -OCTAVIANO     Pain Location - Side  Right  -OCTAVIANO     Pain Location - Orientation  lower  -OCTAVIANO     Pain Location  extremity  -OCTAVIANO     Row Name 02/14/21 1454          Plan of Care Review    Plan of Care Reviewed With  patient  -OCTAVIANO     Progress  improving  -OCTAVIANO     Outcome Summary  OT evaluation completed.  Pt. with 2 wound vacs RLE.  Pt. SBA for lines supine to sit, donned prothesis post setup, stood CGA and mobilized in hallway and into room 60 ft. CGA and assist for lines.  Pt. dependent donning R socks and may benefit from AE use.  Pt. appropriate for skilled OT services for AE education, ADL retraining and endurance building.  Pt. plans home with possible OP wound care and assist from fiance.  -OCTAVIANO     Row Name 02/14/21 1452          Therapy Assessment/Plan (OT)    Patient/Family Therapy Goal Statement (OT)  return home and return to PLOF  -OCTAVIANO     Rehab Potential (OT)  good, to achieve stated therapy goals  -OCTAVIANO     Criteria for Skilled Therapeutic Interventions Met (OT)  yes;meets criteria;skilled treatment is necessary  -OCTAVIANO      Therapy Frequency (OT)  daily  -OCTAVIANO     Row Name 02/14/21 1456          Therapy Plan Review/Discharge Plan (OT)    Equipment Needs Upon Discharge (OT)  dressing equipment;shower chair;tub bench  -OCTAVIANO     Anticipated Discharge Disposition (OT)  home with assist;home with outpatient therapy services  -OCTAVIANO     Row Name 02/14/21 1456          Vital Signs    Pre Systolic BP Rehab  128  -OCTAVIANO     Pre Treatment Diastolic BP  61  -OCTAVIANO     Pretreatment Heart Rate (beats/min)  69  -OCTAVIANO     Posttreatment Heart Rate (beats/min)  74  -OCTAVIANO     O2 Delivery Pre Treatment  room air  -OCTAVIANO     O2 Delivery Intra Treatment  room air  -OCTAVIANO     Post SpO2 (%)  99  -OCTAVIANO     O2 Delivery Post Treatment  room air  -OCTAVIANO     Pre Patient Position  Supine  -OCTAVIANO     Intra Patient Position  Standing  -OCTAVIANO     Post Patient Position  Sitting  -OCTAVIANO     Row Name 02/14/21 1456          Positioning and Restraints    Pre-Treatment Position  in bed  -OCTAVIANO     Post Treatment Position  chair  -OCTAVIANO     In Chair  reclined;call light within reach;encouraged to call for assist;exit alarm on;waffle cushion  -OCTAVIANO       User Key  (r) = Recorded By, (t) = Taken By, (c) = Cosigned By    Initials Name Provider Type    Colleen Montano, OT Occupational Therapist        Outcome Measures     Row Name 02/14/21 0839          How much help from another is currently needed...    Putting on and taking off regular lower body clothing?  2  -OCTAVIANO     Bathing (including washing, rinsing, and drying)  2  -OCTAVIANO     Toileting (which includes using toilet bed pan or urinal)  3  -OCTAVIANO     Putting on and taking off regular upper body clothing  2  -OCTAVIANO     Taking care of personal grooming (such as brushing teeth)  4 sitting post setup  -OCTAVIANO     Eating meals  4  -OCTAVIANO     AM-PAC 6 Clicks Score (OT)  17  -OCTAVIANO     Row Name 02/14/21 1503          Functional Assessment    Outcome Measure Options  AM-PAC 6 Clicks Daily Activity (OT)  -OCTAVIANO       User Key  (r) = Recorded By, (t) = Taken By, (c) = Cosigned By    Initials  Name Provider Type    Colleen Montano, OT Occupational Therapist        Occupational Therapy Education                 Title: PT OT SLP Therapies (In Progress)     Topic: Occupational Therapy (In Progress)     Point: ADL training (Done)     Description:   Instruct learner(s) on proper safety adaptation and remediation techniques during self care or transfers.   Instruct in proper use of assistive devices.              Learning Progress Summary           Patient Acceptance, E, VU,NR by OCTAVIANO at 2/14/2021 [id number]    Comment: reason for consult, noted deficit, AE available                   Point: Home exercise program (Not Started)     Description:   Instruct learner(s) on appropriate technique for monitoring, assisting and/or progressing therapeutic exercises/activities.              Learner Progress:  Not documented in this visit.          Point: Precautions (Not Started)     Description:   Instruct learner(s) on prescribed precautions during self-care and functional transfers.              Learner Progress:  Not documented in this visit.          Point: Body mechanics (Not Started)     Description:   Instruct learner(s) on proper positioning and spine alignment during self-care, functional mobility activities and/or exercises.              Learner Progress:  Not documented in this visit.                      User Key     Initials Effective Dates Name Provider Type Discipline    OCTAVIANO 06/08/18 -  Colleen Gallegos OT Occupational Therapist OT              OT Recommendation and Plan  Planned Therapy Interventions (OT): activity tolerance training, BADL retraining, functional balance retraining, occupation/activity based interventions, patient/caregiver education/training, transfer/mobility retraining  Therapy Frequency (OT): daily  Plan of Care Review  Plan of Care Reviewed With: patient  Progress: improving  Outcome Summary: OT evaluation completed.  Pt. with 2 wound vacs RLE.  Pt. SBA for lines supine to sit, donned  prothesis post setup, stood CGA and mobilized in hallway and into room 60 ft. CGA and assist for lines.  Pt. dependent donning R socks and may benefit from AE use.  Pt. appropriate for skilled OT services for AE education, ADL retraining and endurance building.  Pt. plans home with possible OP wound care and assist from fiance.     Time Calculation:   Time Calculation- OT     Row Name 02/14/21 1506             Time Calculation- OT    OT Start Time  1404  -OCTAVIANO      OT Received On  02/14/21  -OCTAVIANO      OT Goal Re-Cert Due Date  02/24/21  -OCTAVIANO        User Key  (r) = Recorded By, (t) = Taken By, (c) = Cosigned By    Initials Name Provider Type    Colleen Montano, OT Occupational Therapist        Therapy Charges for Today     Code Description Service Date Service Provider Modifiers Qty    [id number] HC OT EVAL MOD COMPLEXITY 4 2/14/2021 Colleen Gallegos OT GO 1               Colleen Gallegos OT  2/14/2021

## 2021-02-14 NOTE — THERAPY RE-EVALUATION
Patient Name: Reg Sanchez  : 1965    MRN: [id number]                              Today's Date: 2021       Admit Date: 2021    Visit Dx:     ICD-10-CM ICD-9-CM   1. Postoperative infection, unspecified type, initial encounter  T81.40XA 998.59     Patient Active Problem List   Diagnosis   • PAD (peripheral artery disease) (CMS/[facility])   • 100% R SFA occlusion    • Atherosclerosis of native artery of right lower extremity with intermittent claudication (CMS/[facility])   • Tobacco use   • PVD    • CAD s/p stents and CABG X5    • S/P L leg amputation    • Smoker   • Daily marijuana use   • Suspected ETOH withdrawal    • T2DM on metformin    • Class 1 obesity in adult   • Macrocytosis without anemia   • Post-operative infection   • Postoperative infection     Past Medical History:   Diagnosis Date   • Anxiety    • CHF (congestive heart failure) (CMS/[facility])    • COPD (chronic obstructive pulmonary disease) (CMS/[facility])    • Coronary artery disease involving native coronary artery of native heart without angina pectoris 2021    Added automatically from request for surgery [id number]   • Diabetes mellitus (CMS/[facility])    • Hepatitis C    • Hyperlipidemia    • Hypertension    • Sleep apnea    • T2DM on metformin  2021     Past Surgical History:   Procedure Laterality Date   • CARDIAC CATHETERIZATION  11/20/2020    x 2 stents   • CARDIAC CATHETERIZATION Right 2021    Procedure: Peripheral angiography;  Surgeon: Wesly Lund MD;  Location: [facility] INVASIVE LOCATION;  Service: Cardiovascular;  Laterality: Right;   • CORONARY ARTERY BYPASS GRAFT      x 5   • CORONARY STENT PLACEMENT  2020    Dr Mortensen x 2 stents   • FEMORAL DISTAL BYPASS N/A 2021    Procedure: FEMORAL DISTAL BYPASS;  Surgeon: Jeremy Trevizo MD;  Location: [facility] OR;  Service: Vascular;  Laterality: N/A;   • FINGER SURGERY      left   • INCISION AND DRAINAGE LEG Right 2021    Procedure: INCISION AND DRAINAGE RIGHT  LOWER EXTREMITY, EVACUATION OF HEMATOMA, LIGATION OF FEM DISTAL BYPASS;  Surgeon: Jeremy Trevizo MD;  Location: [facility];  Service: Cardiothoracic;  Laterality: Right;   • LEG AMPUTATION Left     left lower leg   • SHOULDER SURGERY Bilateral      General Information     Row Name 02/14/21 1405          Physical Therapy Time and Intention    Document Type  evaluation  -     Mode of Treatment  co-treatment;physical therapy WOC advised co-treat due to wound vacs, L BKA and R LE incisions  -     Row Name 02/14/21 1405          General Information    Patient Profile Reviewed  yes  -     Prior Level of Function  independent:;all household mobility Prior to initial surgery on R LE, pt able to ambulate without an AD in the home and used an electric scooter outside the home due to pain in RLE. Prior to pain in R LE pt reported living independently and being independent with home management and ADLs.  -     Existing Precautions/Restrictions  fall;other (see comments) 2 wound vacs R LE, L BKA with prosthesis  -     Barriers to Rehab  medically complex  -     Row Name 02/14/21 1405          Living Environment    Lives With  significant other  -     Row Name 02/14/21 1405          Home Main Entrance    Number of Stairs, Main Entrance  none  -     Row Name 02/14/21 1405          Stairs Within Home, Primary    Number of Stairs, Within Home, Primary  none  -     Row Name 02/14/21 1405          Cognition    Orientation Status (Cognition)  oriented x 4  -     Row Name 02/14/21 1405          Safety Issues, Functional Mobility    Impairments Affecting Function (Mobility)  endurance/activity tolerance;pain;sensation/sensory awareness;strength  -       User Key  (r) = Recorded By, (t) = Taken By, (c) = Cosigned By    Initials Name Provider Type     Rosalind Atkins, PT Physical Therapist        Mobility     Row Name 02/14/21 1410          Bed Mobility    Bed Mobility  supine-sit  -     Supine-Sit  Bennett (Bed Mobility)  standby assist  -     Assistive Device (Bed Mobility)  bed rails  -     Comment (Bed Mobility)  Pt able to perform supine to sit without difficulty with use of bed rail.  -SH     Row Name 02/14/21 1410          Transfers    Comment (Transfers)  Pt performed sit to stand from EOB with CGA without an AD.  -SH     Row Name 02/14/21 1410          Sit-Stand Transfer    Sit-Stand Bennett (Transfers)  verbal cues;contact guard  -SH     Row Name 02/14/21 1410          Gait/Stairs (Locomotion)    Bennett Level (Gait)  other (see comments) none  -     Distance in Feet (Gait)  60'  -     Deviations/Abnormal Patterns (Gait)  bilateral deviations;weight shifting decreased;antalgic  -     Right Sided Gait Deviations  heel strike decreased  -       User Key  (r) = Recorded By, (t) = Taken By, (c) = Cosigned By    Initials Name Provider Type     Rosalind Atkins, PT Physical Therapist        Obj/Interventions     Row Name 02/14/21 1410          Range of Motion Comprehensive    Comment, General Range of Motion  R LE ROM deferred to protect integrity of wound vacs. L residual limb ROM WFL.  -SH     Row Name 02/14/21 1410          Strength Comprehensive (MMT)    Comment, General Manual Muscle Testing (MMT) Assessment  R LE MMT deferred due to wounds, wound vacs, and pain at rest. L residual limb 2+/5.  -SH     Row Name 02/14/21 1410          Balance    Balance Assessment  sitting static balance;sitting dynamic balance;standing static balance;standing dynamic balance  -     Static Sitting Balance  WFL;sitting, edge of bed  -     Dynamic Sitting Balance  WFL;sitting, edge of bed  -     Static Standing Balance  WFL  -     Dynamic Standing Balance  mild impairment  -SH     Row Name 02/14/21 1410          Sensory Assessment (Somatosensory)    Sensory Assessment (Somatosensory)  other (see comments) Pt reported tingling in fingers.  -       User Key  (r) = Recorded By, (t) =  Taken By, (c) = Cosigned By    Initials Name Provider Type    Rosalind Mata PT Physical Therapist        Goals/Plan     Row Name 02/14/21 1405          Bed Mobility Goal 1 (PT)    Activity/Assistive Device (Bed Mobility Goal 1, PT)  bed mobility activities, all  -SH     Hutchinson Level/Cues Needed (Bed Mobility Goal 1, PT)  modified independence  -SH     Time Frame (Bed Mobility Goal 1, PT)  10 days  -SH     Progress/Outcomes (Bed Mobility Goal 1, PT)  goal ongoing  -     Row Name 02/14/21 1405          Transfer Goal 1 (PT)    Activity/Assistive Device (Transfer Goal 1, PT)  transfers, all  -SH     Hutchinson Level/Cues Needed (Transfer Goal 1, PT)  modified independence  -SH     Time Frame (Transfer Goal 1, PT)  10 days  -SH     Progress/Outcome (Transfer Goal 1, PT)  goal ongoing  -     Row Name 02/14/21 140          Gait Training Goal 1 (PT)    Activity/Assistive Device (Gait Training Goal 1, PT)  gait (walking locomotion)  -SH     Hutchinson Level (Gait Training Goal 1, PT)  supervision required  -SH     Distance (Gait Training Goal 1, PT)  250'  -SH     Time Frame (Gait Training Goal 1, PT)  10 days  -SH     Progress/Outcome (Gait Training Goal 1, PT)  goal ongoing  -       User Key  (r) = Recorded By, (t) = Taken By, (c) = Cosigned By    Initials Name Provider Type    Rosalind Mata PT Physical Therapist        Clinical Impression     Row Name 02/14/21 1410          Pain    Additional Documentation  Pain Scale: FACES Pre/Post-Treatment (Group)  -     Row Name 02/14/21 1410          Pain Scale: Numbers Pre/Post-Treatment    Pain Location - Side  Right  -     Pain Location - Orientation  lower  -SH     Pain Location  flank  -     Row Name 02/14/21 1410          Plan of Care Review    Plan of Care Reviewed With  patient  -     Outcome Summary  PT mobility evaluation performed. Pt performed bed mobility with SBA, sit to stand with CGA, and ambulated 60' without an AD with LLE  prosthesis and assist to manage vacs.  -     Row Name 02/14/21 1410          Therapy Assessment/Plan (PT)    Patient/Family Therapy Goals Statement (PT)  Get better  -     Rehab Potential (PT)  good, to achieve stated therapy goals  -     Criteria for Skilled Interventions Met (PT)  yes;skilled treatment is necessary  -     Predicted Duration of Therapy Intervention (PT)  10 days  -     Row Name 02/14/21 1410          Vital Signs    O2 Delivery Pre Treatment  room air  -     O2 Delivery Intra Treatment  room air  -     O2 Delivery Post Treatment  room air  -SH     Pre Patient Position  Supine  -     Intra Patient Position  Standing  -     Post Patient Position  Sitting  -     Row Name 02/14/21 1410          Positioning and Restraints    Pre-Treatment Position  in bed  -     Post Treatment Position  chair  -SH     In Chair  notified nsg;reclined;call light within reach;encouraged to call for assist;exit alarm on;on mechanical lift sling;waffle cushion  -       User Key  (r) = Recorded By, (t) = Taken By, (c) = Cosigned By    Initials Name Provider Type     Rosalind Atkins, PT Physical Therapist        Outcome Measures     Row Name 02/14/21 1405          How much help from another person do you currently need...    Turning from your back to your side while in flat bed without using bedrails?  3  -SH     Moving from lying on back to sitting on the side of a flat bed without bedrails?  3  -SH     Moving to and from a bed to a chair (including a wheelchair)?  4  -SH     Standing up from a chair using your arms (e.g., wheelchair, bedside chair)?  4  -SH     Climbing 3-5 steps with a railing?  3  -SH     To walk in hospital room?  3  -SH     AM-PAC 6 Clicks Score (PT)  20  -     Row Name 02/14/21 1405          Functional Assessment    Outcome Measure Options  AM-PAC 6 Clicks Basic Mobility (PT)  -       User Key  (r) = Recorded By, (t) = Taken By, (c) = Cosigned By    Initials Name Provider  Type     Rosalind Atkins, PT Physical Therapist        Physical Therapy Education                 Title: PT OT SLP Therapies (In Progress)     Topic: Physical Therapy (Done)     Point: Mobility training (Done)     Learning Progress Summary           Patient LONDON BaezROBIN, VU by  at 2/14/2021 1405    Comment: POC, gait, safety                               User Key     Initials Effective Dates Name Provider Type Discipline     06/19/15 -  Rosalind Atkins, PT Physical Therapist PT              PT Recommendation and Plan     Plan of Care Reviewed With: patient  Outcome Summary: PT mobility evaluation performed. Pt performed bed mobility with SBA, sit to stand with CGA, and ambulated 60' without an AD with LLE prosthesis and assist to manage vacs.     Time Calculation:   PT Charges     Row Name 02/14/21 1513 02/14/21 1405 02/14/21 0945       Time Calculation    Start Time  1405  -  1405  -  0945  -    PT Received On  02/14/21  -  02/14/21  -  --    PT Goal Re-Cert Due Date  02/24/21  -  02/24/21  -  02/24/21  -       Time Calculation- PT    Total Timed Code Minutes- PT  23 minute(s)  -  23 minute(s)  -  --       Timed Charges    85590 - Gait Training Minutes   15  -SH  15  -SH  --    12449 - PT Therapeutic Activity Minutes  8  -SH  8  -SH  --      User Key  (r) = Recorded By, (t) = Taken By, (c) = Cosigned By    Initials Name Provider Type     Rosalind Atkins, PT Physical Therapist    Afua Oneill, PT Physical Therapist        Therapy Charges for Today     Code Description Service Date Service Provider Modifiers Qty    02374593780 HC PT RE-EVAL ESTABLISHED PLAN 2 2/14/2021 Rosalind Atkins, PT GP 1    07892141645 HC GAIT TRAINING EA 15 MIN 2/14/2021 Rosalind Atkins, PT GP 1    14356249673 HC PT THERAPEUTIC ACT EA 15 MIN 2/14/2021 Rosalind Atkins, PT GP 1          PT G-Codes  Outcome Measure Options: AM-PAC 6 Clicks Daily Activity (OT)  AM-PAC 6 Clicks Score (PT): 20  AM-PAC 6  Clicks Score (OT): 17    Rosalind Atkins, PT  2/14/2021

## 2021-02-15 LAB
ALBUMIN SERPL-MCNC: 3.1 G/DL (ref 3.5–5.2)
ALBUMIN/GLOB SERPL: 0.9 G/DL
ALP SERPL-CCNC: 72 U/L (ref 39–117)
ALT SERPL W P-5'-P-CCNC: 33 U/L (ref 1–41)
ANION GAP SERPL CALCULATED.3IONS-SCNC: 7 MMOL/L (ref 5–15)
AST SERPL-CCNC: 32 U/L (ref 1–40)
BASOPHILS # BLD AUTO: 0.06 10*3/MM3 (ref 0–0.2)
BASOPHILS NFR BLD AUTO: 0.7 % (ref 0–1.5)
BILIRUB SERPL-MCNC: 0.6 MG/DL (ref 0–1.2)
BUN SERPL-MCNC: 13 MG/DL (ref 6–20)
BUN/CREAT SERPL: 18.6 (ref 7–25)
CALCIUM SPEC-SCNC: 8.9 MG/DL (ref 8.6–10.5)
CHLORIDE SERPL-SCNC: 96 MMOL/L (ref 98–107)
CK SERPL-CCNC: 36 U/L (ref 20–200)
CO2 SERPL-SCNC: 24 MMOL/L (ref 22–29)
CREAT SERPL-MCNC: 0.7 MG/DL (ref 0.76–1.27)
DEPRECATED RDW RBC AUTO: 55.4 FL (ref 37–54)
EOSINOPHIL # BLD AUTO: 0.21 10*3/MM3 (ref 0–0.4)
EOSINOPHIL NFR BLD AUTO: 2.4 % (ref 0.3–6.2)
ERYTHROCYTE [DISTWIDTH] IN BLOOD BY AUTOMATED COUNT: 15.2 % (ref 12.3–15.4)
GFR SERPL CREATININE-BSD FRML MDRD: 117 ML/MIN/1.73
GLOBULIN UR ELPH-MCNC: 3.4 GM/DL
GLUCOSE BLDC GLUCOMTR-MCNC: 157 MG/DL (ref 70–130)
GLUCOSE BLDC GLUCOMTR-MCNC: 182 MG/DL (ref 70–130)
GLUCOSE BLDC GLUCOMTR-MCNC: 190 MG/DL (ref 70–130)
GLUCOSE BLDC GLUCOMTR-MCNC: 326 MG/DL (ref 70–130)
GLUCOSE SERPL-MCNC: 169 MG/DL (ref 65–99)
HCT VFR BLD AUTO: 32.2 % (ref 37.5–51)
HGB BLD-MCNC: 10.1 G/DL (ref 13–17.7)
IMM GRANULOCYTES # BLD AUTO: 0.21 10*3/MM3 (ref 0–0.05)
IMM GRANULOCYTES NFR BLD AUTO: 2.4 % (ref 0–0.5)
LYMPHOCYTES # BLD AUTO: 1.93 10*3/MM3 (ref 0.7–3.1)
LYMPHOCYTES NFR BLD AUTO: 22.3 % (ref 19.6–45.3)
MCH RBC QN AUTO: 32.3 PG (ref 26.6–33)
MCHC RBC AUTO-ENTMCNC: 31.4 G/DL (ref 31.5–35.7)
MCV RBC AUTO: 102.9 FL (ref 79–97)
MONOCYTES # BLD AUTO: 0.82 10*3/MM3 (ref 0.1–0.9)
MONOCYTES NFR BLD AUTO: 9.5 % (ref 5–12)
NEUTROPHILS NFR BLD AUTO: 5.44 10*3/MM3 (ref 1.7–7)
NEUTROPHILS NFR BLD AUTO: 62.7 % (ref 42.7–76)
NRBC BLD AUTO-RTO: 0 /100 WBC (ref 0–0.2)
PLATELET # BLD AUTO: 339 10*3/MM3 (ref 140–450)
PMV BLD AUTO: 11.7 FL (ref 6–12)
POTASSIUM SERPL-SCNC: 4.4 MMOL/L (ref 3.5–5.2)
PROT SERPL-MCNC: 6.5 G/DL (ref 6–8.5)
QT INTERVAL: 386 MS
QTC INTERVAL: 431 MS
RBC # BLD AUTO: 3.13 10*6/MM3 (ref 4.14–5.8)
SODIUM SERPL-SCNC: 127 MMOL/L (ref 136–145)
WBC # BLD AUTO: 8.67 10*3/MM3 (ref 3.4–10.8)

## 2021-02-15 PROCEDURE — 80053 COMPREHEN METABOLIC PANEL: CPT | Performed by: INTERNAL MEDICINE

## 2021-02-15 PROCEDURE — 25010000002 DAPTOMYCIN PER 1 MG: Performed by: INTERNAL MEDICINE

## 2021-02-15 PROCEDURE — 94799 UNLISTED PULMONARY SVC/PX: CPT

## 2021-02-15 PROCEDURE — 93005 ELECTROCARDIOGRAM TRACING: CPT | Performed by: INTERNAL MEDICINE

## 2021-02-15 PROCEDURE — 25010000002 FUROSEMIDE PER 20 MG: Performed by: PHYSICIAN ASSISTANT

## 2021-02-15 PROCEDURE — 82962 GLUCOSE BLOOD TEST: CPT

## 2021-02-15 PROCEDURE — 99024 POSTOP FOLLOW-UP VISIT: CPT | Performed by: THORACIC SURGERY (CARDIOTHORACIC VASCULAR SURGERY)

## 2021-02-15 PROCEDURE — 85025 COMPLETE CBC W/AUTO DIFF WBC: CPT | Performed by: INTERNAL MEDICINE

## 2021-02-15 PROCEDURE — 97605 NEG PRS WND THER DME<=50SQCM: CPT

## 2021-02-15 PROCEDURE — 25010000002 ENOXAPARIN PER 10 MG: Performed by: INTERNAL MEDICINE

## 2021-02-15 PROCEDURE — 63710000001 INSULIN DETEMIR PER 5 UNITS: Performed by: INTERNAL MEDICINE

## 2021-02-15 PROCEDURE — 63710000001 INSULIN LISPRO (HUMAN) PER 5 UNITS: Performed by: INTERNAL MEDICINE

## 2021-02-15 PROCEDURE — 25010000002 MORPHINE PER 10 MG: Performed by: INTERNAL MEDICINE

## 2021-02-15 PROCEDURE — 99232 SBSQ HOSP IP/OBS MODERATE 35: CPT | Performed by: NURSE PRACTITIONER

## 2021-02-15 PROCEDURE — 25010000002 HYDROMORPHONE 1 MG/ML SOLUTION: Performed by: INTERNAL MEDICINE

## 2021-02-15 PROCEDURE — 97116 GAIT TRAINING THERAPY: CPT

## 2021-02-15 PROCEDURE — 82550 ASSAY OF CK (CPK): CPT | Performed by: INTERNAL MEDICINE

## 2021-02-15 PROCEDURE — 97530 THERAPEUTIC ACTIVITIES: CPT

## 2021-02-15 RX ORDER — FUROSEMIDE 10 MG/ML
80 INJECTION INTRAMUSCULAR; INTRAVENOUS ONCE
Status: COMPLETED | OUTPATIENT
Start: 2021-02-15 | End: 2021-02-15

## 2021-02-15 RX ADMIN — METOPROLOL TARTRATE 50 MG: 50 TABLET, FILM COATED ORAL at 22:07

## 2021-02-15 RX ADMIN — INSULIN LISPRO 5 UNITS: 100 INJECTION, SOLUTION INTRAVENOUS; SUBCUTANEOUS at 12:13

## 2021-02-15 RX ADMIN — SODIUM CHLORIDE, PRESERVATIVE FREE 10 ML: 5 INJECTION INTRAVENOUS at 22:06

## 2021-02-15 RX ADMIN — ATORVASTATIN CALCIUM 40 MG: 40 TABLET, FILM COATED ORAL at 22:07

## 2021-02-15 RX ADMIN — INSULIN LISPRO 2 UNITS: 100 INJECTION, SOLUTION INTRAVENOUS; SUBCUTANEOUS at 22:18

## 2021-02-15 RX ADMIN — PREGABALIN 200 MG: 100 CAPSULE ORAL at 22:07

## 2021-02-15 RX ADMIN — ENOXAPARIN SODIUM 40 MG: 40 INJECTION SUBCUTANEOUS at 09:38

## 2021-02-15 RX ADMIN — FUROSEMIDE 20 MG: 20 TABLET ORAL at 09:39

## 2021-02-15 RX ADMIN — DOXYCYCLINE 100 MG: 100 CAPSULE ORAL at 09:38

## 2021-02-15 RX ADMIN — SODIUM HYPOCHLORITE: 1.25 SOLUTION TOPICAL at 22:19

## 2021-02-15 RX ADMIN — INSULIN LISPRO 2 UNITS: 100 INJECTION, SOLUTION INTRAVENOUS; SUBCUTANEOUS at 09:40

## 2021-02-15 RX ADMIN — PANTOPRAZOLE SODIUM 40 MG: 40 TABLET, DELAYED RELEASE ORAL at 06:10

## 2021-02-15 RX ADMIN — OXYCODONE AND ACETAMINOPHEN 1 TABLET: 5; 325 TABLET ORAL at 06:10

## 2021-02-15 RX ADMIN — DAPTOMYCIN 480.6 MG: 500 INJECTION, POWDER, LYOPHILIZED, FOR SOLUTION INTRAVENOUS at 09:53

## 2021-02-15 RX ADMIN — BUDESONIDE 0.5 MG: 0.5 INHALANT ORAL at 20:34

## 2021-02-15 RX ADMIN — OXYCODONE AND ACETAMINOPHEN 1 TABLET: 5; 325 TABLET ORAL at 19:00

## 2021-02-15 RX ADMIN — ARFORMOTEROL TARTRATE 15 MCG: 15 SOLUTION RESPIRATORY (INHALATION) at 20:34

## 2021-02-15 RX ADMIN — ISOSORBIDE MONONITRATE 30 MG: 30 TABLET, EXTENDED RELEASE ORAL at 09:39

## 2021-02-15 RX ADMIN — HYDROMORPHONE HYDROCHLORIDE 1 MG: 1 INJECTION, SOLUTION INTRAMUSCULAR; INTRAVENOUS; SUBCUTANEOUS at 00:20

## 2021-02-15 RX ADMIN — DOXYCYCLINE 100 MG: 100 CAPSULE ORAL at 22:06

## 2021-02-15 RX ADMIN — INSULIN DETEMIR 8 UNITS: 100 INJECTION, SOLUTION SUBCUTANEOUS at 09:41

## 2021-02-15 RX ADMIN — MORPHINE SULFATE 2 MG: 2 INJECTION, SOLUTION INTRAMUSCULAR; INTRAVENOUS at 12:13

## 2021-02-15 RX ADMIN — PREGABALIN 200 MG: 100 CAPSULE ORAL at 09:39

## 2021-02-15 RX ADMIN — ARFORMOTEROL TARTRATE 15 MCG: 15 SOLUTION RESPIRATORY (INHALATION) at 12:47

## 2021-02-15 RX ADMIN — ASPIRIN 81 MG: 81 TABLET, CHEWABLE ORAL at 09:39

## 2021-02-15 RX ADMIN — SODIUM CHLORIDE, PRESERVATIVE FREE 10 ML: 5 INJECTION INTRAVENOUS at 09:41

## 2021-02-15 RX ADMIN — MORPHINE SULFATE 2 MG: 2 INJECTION, SOLUTION INTRAMUSCULAR; INTRAVENOUS at 17:29

## 2021-02-15 RX ADMIN — BUDESONIDE 0.5 MG: 0.5 INHALANT ORAL at 12:47

## 2021-02-15 RX ADMIN — OXYCODONE AND ACETAMINOPHEN 1 TABLET: 5; 325 TABLET ORAL at 09:53

## 2021-02-15 RX ADMIN — DULOXETINE HYDROCHLORIDE 60 MG: 60 CAPSULE, DELAYED RELEASE ORAL at 09:39

## 2021-02-15 RX ADMIN — LISINOPRIL 10 MG: 10 TABLET ORAL at 09:39

## 2021-02-15 RX ADMIN — MORPHINE SULFATE 2 MG: 2 INJECTION, SOLUTION INTRAMUSCULAR; INTRAVENOUS at 22:06

## 2021-02-15 RX ADMIN — DULOXETINE HYDROCHLORIDE 60 MG: 60 CAPSULE, DELAYED RELEASE ORAL at 22:07

## 2021-02-15 RX ADMIN — Medication 1 PATCH: at 09:40

## 2021-02-15 RX ADMIN — HYDROMORPHONE HYDROCHLORIDE 1 MG: 1 INJECTION, SOLUTION INTRAMUSCULAR; INTRAVENOUS; SUBCUTANEOUS at 09:39

## 2021-02-15 RX ADMIN — HYDROMORPHONE HYDROCHLORIDE 1 MG: 1 INJECTION, SOLUTION INTRAMUSCULAR; INTRAVENOUS; SUBCUTANEOUS at 15:38

## 2021-02-15 RX ADMIN — METOPROLOL TARTRATE 50 MG: 50 TABLET, FILM COATED ORAL at 09:38

## 2021-02-15 RX ADMIN — INSULIN LISPRO 2 UNITS: 100 INJECTION, SOLUTION INTRAVENOUS; SUBCUTANEOUS at 17:29

## 2021-02-15 RX ADMIN — ROPINIROLE HYDROCHLORIDE 4 MG: 2 TABLET, FILM COATED ORAL at 22:06

## 2021-02-15 RX ADMIN — OXYCODONE AND ACETAMINOPHEN 1 TABLET: 5; 325 TABLET ORAL at 13:45

## 2021-02-15 RX ADMIN — PREGABALIN 200 MG: 100 CAPSULE ORAL at 15:38

## 2021-02-15 RX ADMIN — FUROSEMIDE 80 MG: 10 INJECTION INTRAMUSCULAR; INTRAVENOUS at 09:39

## 2021-02-15 RX ADMIN — MORPHINE SULFATE 2 MG: 2 INJECTION, SOLUTION INTRAMUSCULAR; INTRAVENOUS at 04:23

## 2021-02-15 NOTE — PROGRESS NOTES
CTS Progress Note       LOS: 10 days   Patient Care Team:  Sheila Sheikh APRN as PCP - General (Nurse Practitioner)    Chief Complaint: Postoperative infection    Vital Signs:  Temp:  [98 °F (36.7 °C)-98.4 °F (36.9 °C)] 98.1 °F (36.7 °C)  Heart Rate:  [] 71  Resp:  [16] 16  BP: (102-135)/(55-71) 135/68    Physical Exam: Patient in better spirits.  Right foot is viable and wound vacs are in place       Results:   Results from last 7 days   Lab Units 02/14/21  0656   WBC 10*3/mm3 9.10   HEMOGLOBIN g/dL 9.3*   HEMATOCRIT % 29.2*   PLATELETS 10*3/mm3 314     Results from last 7 days   Lab Units 02/14/21  0656   SODIUM mmol/L 130*   POTASSIUM mmol/L 4.7   CHLORIDE mmol/L 98   CO2 mmol/L 25.0   BUN mg/dL 12   CREATININE mg/dL 0.71*   GLUCOSE mg/dL 204*   CALCIUM mg/dL 8.7           Imaging Results (Last 24 Hours)     ** No results found for the last 24 hours. **          Assessment      Postoperative infection    Tobacco use    PVD     S/P L leg amputation     Post-operative infection    Patient ambulated 60 feet yesterday with his prosthetic leg.  Wound vacuums are in place.  I suspect patient will be here until Wednesday or Thursday for intravenous antibiotics.  Also he says transportation is difficult for him.  So anticipate discharge Wednesday or Thursday.  Patient also is receiving significant fluid volume with antibiotics and his right leg looks somewhat edematous  Plan   Lasix 80 mg IV x1    Please note that portions of this note were completed with a voice recognition program. Efforts were made to edit the dictations, but occasionally words are mistranscribed.    Jeremy Trevizo MD  02/15/21  06:40 EST

## 2021-02-15 NOTE — PROGRESS NOTES
INFECTIOUS DISEASE Progress Note    Reg Sanchez  1965  [id number]     Reason for Consultation: right lower extremity celluilitis    History of present illness:    Patient is a 55 y.o. male, with Diabetes, PAD, CHF, Hep C,  s/p right femoral to posterior tib bypass, with reverse saphenous vein graft 1/21/21- discharged on 2/1/21.  On 2/3/21 he developed fever up to 103 degrees,  chills, with pain and swelling, redness of right lower extremity.  CT angio with runfoff revealed soft tissue stranding and perigraft fluid identifiabl ein the groin extending to the level of the knee with patent graft.  He was transferred to [facility] from [place]. Tmax of 102.6 degrees.  Admitting labs with a leukocytosis of 18,000, CRP 2.29, LAC 2.1, PCT 0.41, blood cultures no growth so far.  He was started on Vancomycin and Zosyn  and we were consulted for evaluation and treatment.  Complains of significant swelling from the thigh region down to the knee.  There is some clear weeping fluid near the most distal lesion.    2/7/21: Afebrile overnight.  Hemodynamically stable.  Tolerating current antibiotics.  Complains of significant leg pain from the thigh down towards the foot.  There appears to be less serous drainage on the dressing    2/8/21: Still complains of leg pain, swelling and drainage although overall slightly improved.  Wound cultures with MRSA.  Dr. Trevizo planning I&D tomorrow    2/9/21: Afebrile overnight.  Tolerating current antibodics.  Have an episode where he had worsening swelling and bruising of the right upper thigh last night but today it has improved.  Tolerating her antibiotics.  Awaiting surgery later today    2/10/2021: Status post I&D yesterday.  Operative cultures pending.  Recovering in the ICU.  Still significant pain but swelling is better.  Tolerating current antibiotics    2/12/2021: Patient transferred to the floor.  Complains of dressing changes not be done frequent enough.  Still having a  moderate amount of pain despite IV narcotics.  PICC line in place and working. MRSA only on cultures.     2/15/21: Patient resting comfortably.  Wound VAC placed today.  Still complains of some swelling and pain but overall improved.  Tolerating IV antibiotics via PICC line.  No fevers or chills    ROS:  No fevers or chills. No diarrhea. Poor appetite. No rash. No new ADR to Abx.       No Known Allergies      Medication:    Current Facility-Administered Medications:   •  acetaminophen (TYLENOL) tablet 650 mg, 650 mg, Oral, Q4H PRN, 650 mg at 02/07/21 0352 **OR** acetaminophen (TYLENOL) 160 MG/5ML solution 650 mg, 650 mg, Oral, Q4H PRN **OR** acetaminophen (TYLENOL) suppository 650 mg, 650 mg, Rectal, Q4H PRN, Case, Cecy V., DO  •  albuterol (PROVENTIL) nebulizer solution 0.083% 2.5 mg/3mL, 2.5 mg, Nebulization, Q4H PRN, Case, Cecy V., DO, 2.5 mg at 02/09/21 2344  •  arformoterol (BROVANA) nebulizer solution 15 mcg, 15 mcg, Nebulization, BID - RT, Case, Cecy V., DO, 15 mcg at 02/15/21 1247  •  aspirin chewable tablet 81 mg, 81 mg, Oral, Daily, Case, Cecy V., DO, 81 mg at 02/15/21 0939  •  atorvastatin (LIPITOR) tablet 40 mg, 40 mg, Oral, Nightly, Case, Cecy V., DO, 40 mg at 02/14/21 2033  •  budesonide (PULMICORT) nebulizer solution 0.5 mg, 0.5 mg, Nebulization, BID - RT, Case, Cecy V., DO, 0.5 mg at 02/15/21 1247  •  DAPTOmycin (CUBICIN) 500 mg/50 mL in sodium chloride, 6 mg/kg (Adjusted), Intravenous, Q24H, Trav Washburn MD, 480.6 mg at 02/15/21 0953  •  dextrose (D50W) 25 g/ 50mL Intravenous Solution 25 g, 25 g, Intravenous, Q15 Min PRN, Case, Cecy V., DO  •  dextrose (GLUTOSE) oral gel 15 g, 15 g, Oral, Q15 Min PRN, Lon, Cecy V., DO  •  doxycycline (MONODOX) capsule 100 mg, 100 mg, Oral, Q12H, Trav Washburn MD, 100 mg at 02/15/21 0938  •  DULoxetine (CYMBALTA) DR capsule 60 mg, 60 mg, Oral, BID, Lon, Cecy BEE DO, 60 mg at 02/15/21 0939  •  enoxaparin (LOVENOX) syringe 40  mg, 40 mg, Subcutaneous, Daily, Case, Cecy V., DO, 40 mg at 02/15/21 0938  •  furosemide (LASIX) tablet 20 mg, 20 mg, Oral, Daily, Case, Cecy V., DO, 20 mg at 02/15/21 0939  •  glucagon (human recombinant) (GLUCAGEN DIAGNOSTIC) injection 1 mg, 1 mg, Subcutaneous, Q15 Min PRN, Case, Cecy V., DO  •  HYDROmorphone (DILAUDID) injection 1 mg, 1 mg, Intravenous, Q4H PRN, Case, Cecy V., DO, 1 mg at 02/15/21 0939  •  hydrOXYzine (ATARAX) tablet 25 mg, 25 mg, Oral, TID PRN, Case, Cecy V., DO, 25 mg at 02/14/21 1039  •  insulin detemir (LEVEMIR) injection 8 Units, 8 Units, Subcutaneous, Daily, Case, Cecy V., DO, 8 Units at 02/15/21 0941  •  insulin lispro (humaLOG, ADMELOG) injection 0-7 Units, 0-7 Units, Subcutaneous, 4x Daily With Meals & Nightly, Case, Cecy V., DO, 5 Units at 02/15/21 1213  •  isosorbide mononitrate (IMDUR) 24 hr tablet 30 mg, 30 mg, Oral, Daily, Case, Cecy V., DO, 30 mg at 02/15/21 0939  •  lisinopril (PRINIVIL,ZESTRIL) tablet 10 mg, 10 mg, Oral, Daily, Case, Cecy V., DO, 10 mg at 02/15/21 0939  •  Magnesium Sulfate 2 gram Bolus, followed by 8 gram infusion (total Mg dose 10 grams)- Mg less than or equal to 1mg/dL, 2 g, Intravenous, PRN **OR** Magnesium Sulfate 2 gram / 50mL Infusion (GIVE X 3 BAGS TO EQUAL 6GM TOTAL DOSE) - Mg 1.1 - 1.5 mg/dl, 2 g, Intravenous, PRN **OR** Magnesium Sulfate 4 gram infusion- Mg 1.6-1.9 mg/dL, 4 g, Intravenous, PRN, Case, Cecy V., DO, Last Rate: 25 mL/hr at 02/10/21 1615, 4 g at 02/10/21 1615  •  metoprolol tartrate (LOPRESSOR) tablet 50 mg, 50 mg, Oral, BID, Cecy Forrest, , 50 mg at 02/15/21 0938  •  morphine injection 2 mg, 2 mg, Intravenous, Q4H PRN, Nikkie Vivas MD, 2 mg at 02/15/21 1213  •  naloxone (NARCAN) injection 0.1 mg, 0.1 mg, Intravenous, Q5 Min PRN, Cecy Forrest, DO  •  naloxone (NARCAN) injection 0.1 mg, 0.1 mg, Intravenous, Q5 Min PRN, Cecy Forrest., DO  •  naloxone (NARCAN) injection 0.2 mg, 0.2 mg,  Intravenous, Q5 Min PRN, Case, Cecy V., DO  •  nicotine (NICODERM CQ) 21 MG/24HR patch 1 patch, 1 patch, Transdermal, Q24H, Case, Cecy V., DO, 1 patch at 02/15/21 0940  •  ondansetron (ZOFRAN) tablet 4 mg, 4 mg, Oral, Q6H PRN **OR** ondansetron (ZOFRAN) injection 4 mg, 4 mg, Intravenous, Q6H PRN, Case, Cecy V., DO  •  oxyCODONE-acetaminophen (PERCOCET) 5-325 MG per tablet 1 tablet, 1 tablet, Oral, Q4H PRN, Nikkie Vivas MD, 1 tablet at 02/15/21 1345  •  pantoprazole (PROTONIX) EC tablet 40 mg, 40 mg, Oral, QAM, Case, Cecy V., DO, 40 mg at 02/15/21 0610  •  potassium & sodium phosphates (PHOS-NAK) 280-160-250 MG packet - for Phosphorus less than 1.25 mg/dL, 2 packet, Oral, Q6H PRN, 2 packet at 02/06/21 1613 **OR** potassium & sodium phosphates (PHOS-NAK) 280-160-250 MG packet - for Phosphorus 1.25 - 2.5 mg/dL, 2 packet, Oral, Q6H PRN, Case, Cecy V., DO, 2 packet at 02/05/21 2123  •  pregabalin (LYRICA) capsule 200 mg, 200 mg, Oral, TID, Case, Cecy V., DO, 200 mg at 02/15/21 0939  •  rOPINIRole (REQUIP) tablet 4 mg, 4 mg, Oral, Nightly, Case, Cecy V., DO, 4 mg at 02/14/21 2033  •  sodium chloride 0.9 % flush 10 mL, 10 mL, Intravenous, Q12H, Case, Cecy V., DO, 10 mL at 02/14/21 0828  •  sodium chloride 0.9 % flush 10 mL, 10 mL, Intravenous, PRN, Case, Cecy V., DO  •  sodium chloride 0.9 % flush 10 mL, 10 mL, Intravenous, Q12H, Case, Cecy V., DO, 10 mL at 02/15/21 0941  •  sodium chloride 0.9 % flush 10 mL, 10 mL, Intravenous, PRN, Case, Cecy V., DO, 10 mL at 02/12/21 1516  •  sodium hypochlorite (DAKIN'S 1/4 STRENGTH) 0.125 % topical solution 0.125% solution, , Topical, Q12H, Case, Cecy V., DO, Stopped at 02/15/21 0940    Antibiotics:  Anti-Infectives (From admission, onward)    Ordered     Dose/Rate Route Frequency Start Stop    02/10/21 1556  doxycycline (MONODOX) capsule 100 mg     Rafal Leonard [facility] reviewed the order on 02/11/21 1518.   Ordering Provider: Trav Washburn,  MD    100 mg Oral Every 12 Hours Scheduled 02/10/21 2100 21 0744  DAPTOmycin (CUBICIN) 500 mg/50 mL in sodium chloride     Trav Washburn MD reviewed the order on 21 1034.   Ordering Provider: Trav Washburn MD    6 mg/kg × 80.1 kg (Adjusted)  over 30 Minutes Intravenous Every 24 Hours 21 0830 21 0829         Physical Exam:   Vital Signs  Temp (24hrs), Av.3 °F (36.8 °C), Min:98.1 °F (36.7 °C), Max:98.4 °F (36.9 °C)    Temp  Min: 98.1 °F (36.7 °C)  Max: 98.4 °F (36.9 °C)  BP  Min: 108/64  Max: 135/68  Pulse  Min: 69  Max: 115  Resp  Min: 14  Max: 18  SpO2  Min: 97 %  Max: 98 %    GENERAL: Awake and alert, moderate discomfort, sitting up  HEENT: Normocephalic, atraumatic.  EOMI. No conjunctival injection. No icterus.    HEART: RRR; No murmur   LUNGS: Clear to auscultation bilaterally without wheezing, rales, rhonchi. Normal respiratory effort. Nonlabored.   ABDOMEN: Soft, nontender, nondistended.   EXT:   Left BKA site well healed   :  Without Sheridan catheter.  MSK:  Right lower extremity dressings CDI.  Wound VAC on 2 more proximal incisions.  Less tender.     SKIN: Warm and dry, except on RLE  NEURO: Oriented to PPT.  PSYCHIATRIC: Normal insight and judgement. Cooperative with PE  PICC line in place    Laboratory Data    Results from last 7 days   Lab Units 02/15/21  0653 21  0656 21  0722   WBC 10*3/mm3 8.67 9.10 8.98   HEMOGLOBIN g/dL 10.1* 9.3* 9.7*   HEMATOCRIT % 32.2* 29.2* 30.7*   PLATELETS 10*3/mm3 339 314 278     Results from last 7 days   Lab Units 02/15/21  0653   SODIUM mmol/L 127*   POTASSIUM mmol/L 4.4   CHLORIDE mmol/L 96*   CO2 mmol/L 24.0   BUN mg/dL 13   CREATININE mg/dL 0.70*   GLUCOSE mg/dL 169*   CALCIUM mg/dL 8.9     Results from last 7 days   Lab Units 02/15/21  0653   ALK PHOS U/L 72   BILIRUBIN mg/dL 0.6   ALT (SGPT) U/L 33   AST (SGOT) U/L 32             Results from last 7 days   Lab Units 02/10/21  0529   LACTATE mmol/L  2.5*     Results from last 7 days   Lab Units 02/15/21  0653 02/12/21  0722 02/10/21  0529   CK TOTAL U/L 36 48 37         Estimated Creatinine Clearance: 135.1 mL/min (A) (by C-G formula based on SCr of 0.7 mg/dL (L)).      Microbiology:  Covid negative  Blood cultures negative to date  2/6 Wound drainage culture with MRSA  2/9 OR cultures grew only skin maribel    Radiology:  Imaging Results (Last 72 Hours)     ** No results found for the last 72 hours. **        2/4/21 CTA RLE:  1. Soft tissue stranding and fluid is seen surrounding the patient's recent right femoral/distal arterial graft. It is uncertain to what extent this may represent postoperative fluid versus infection. The graft itself appears patent with adequate runoff  to the level of the smaller plantar vessels.       Impression:   1. Right lower extremity cellulitis, severe deep skin and soft tissue infection complicated by recent graft placement due to MRSA:  right femoral to posterior tibial distal bypass with reverse saphenous vein graft 1/21/21. Fluid and stranding on CT scan.  S/p I&D on 2/9. OR cultures negative.  Vein graft was tenuous per surgery., but overall improving better than expected post-op  2. Leukocytosis, sepsis: improved  3. Lactic acidosis: Improved  4. PAD  5. T2DM: A1c 7.2  6. Hep C, previously treated with Interferon/Ribaviron, then reinfected  7.  CHF   8. 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 well healed    PLAN/RECOMMENDATIONS:   - Follow CBC, CMP, CK     - Continue IV Daptomycin 6mg/kg q24 hrs for MRSA  - PO doxycycline 100 mg BID to double cover MRSA     I anticipate a 6 week course of IV antibiotics through March 23. PICC placed.  Patient says he is willing to make the 2-hour drive to [place] up to 3 times a week for wound care.  Could also do sterile PICC line dressing changes with a dose of IV antibiotics in our office versus an outpatient oncology on wound care days.    Discharge  Orders:  - IV daptomycin 500 mg daily through March 23  - PO doxycycline 100 mg BID  - Weekly sterile PICC dressing changes  - Weekly labs CBC w/diff, CMP, CK, CRP faxed to [facility] at [phone] attn: Dr Washburn  - Follow up with Dr Washburn 1 week of discharge      High risk for limb loss if not treated aggressively. I will follow.      Trav Washburn MD  2/15/2021  14:36 EST

## 2021-02-15 NOTE — PROGRESS NOTES
Continued Stay Note  [facility]     Patient Name: Reg Sanchez  MRN: [id number]  Today's Date: 2/15/2021    Admit Date: 2/5/2021    Discharge Plan     Row Name 02/15/21 1310       Plan    Plan  Home with family    Patient/Family in Agreement with Plan  yes    Plan Comments  Spoke with Say in PT wound care.  He states that patient will need to be seen 3X weekly for wound care.  Plan is to change wound vac to home unit on Wednesday or Thursday prior to discharge.  Will have MD sign paperwork for home unit so that insurance approval can be obtained.    Final Discharge Disposition Code  01 - home or self-care    Row Name 02/15/21 1258       Plan    Plan  Home with family    Patient/Family in Agreement with Plan  yes    Plan Comments  Spoke with patient at bedside.  MD recommending and requesting patient to be followed by outpatient PT at [facility] for wound vac. management.  Patient states that he is agreeable to outpatient wound care if it can be arranged for T/TH.  States that he has transportation for those days provided by private service, and daughter can bring to appointment on additional day if needed.  Message left for Say with PT wound care to determine how frequently patient will need to be seen in outpatient clinic as well as availability for appointments.  Patient will need wound vac changed for home wound vac when ready for discharge.  If patient will have outpatient wound care, he will not be able to to have home health for IV antibiotics.  Patient states that he and his wife can manage IV antibiotics when discharged.  Spoke with Vivienne in [facility] home infusion.  She will run a precert on IV Daptomycin and will contact  with cost of medication.  States that family will transport when medically ready for discharge.  Will continue to follow for discharge planning.    Final Discharge Disposition Code  01 - home or self-care        Discharge Codes    No documentation.       Expected Discharge  Date and Time     Expected Discharge Date Expected Discharge Time    Feb 17, 2021             Shaina Cramer RN

## 2021-02-15 NOTE — THERAPY TREATMENT NOTE
Patient Name: Reg Sanchez  : 1965    MRN: [id number]                              Today's Date: 2/15/2021       Admit Date: 2021    Visit Dx:     ICD-10-CM ICD-9-CM   1. Postoperative infection, unspecified type, initial encounter  T81.40XA 998.59     Patient Active Problem List   Diagnosis   • PAD (peripheral artery disease) (CMS/[facility])   • 100% R SFA occlusion    • Atherosclerosis of native artery of right lower extremity with intermittent claudication (CMS/[facility])   • Tobacco use   • PVD    • CAD s/p stents and CABG X5    • S/P L leg amputation    • Smoker   • Daily marijuana use   • Suspected ETOH withdrawal    • T2DM on metformin    • Class 1 obesity in adult   • Macrocytosis without anemia   • Post-operative infection   • Postoperative infection     Past Medical History:   Diagnosis Date   • Anxiety    • CHF (congestive heart failure) (CMS/[facility])    • COPD (chronic obstructive pulmonary disease) (CMS/[facility])    • Coronary artery disease involving native coronary artery of native heart without angina pectoris 2021    Added automatically from request for surgery [id number]   • Diabetes mellitus (CMS/[facility])    • Hepatitis C    • Hyperlipidemia    • Hypertension    • Sleep apnea    • T2DM on metformin  2021     Past Surgical History:   Procedure Laterality Date   • CARDIAC CATHETERIZATION  11/20/2020    x 2 stents   • CARDIAC CATHETERIZATION Right 2021    Procedure: Peripheral angiography;  Surgeon: Wesly Lund MD;  Location: [facility] INVASIVE LOCATION;  Service: Cardiovascular;  Laterality: Right;   • CORONARY ARTERY BYPASS GRAFT      x 5   • CORONARY STENT PLACEMENT  2020    Dr Mortensen x 2 stents   • FEMORAL DISTAL BYPASS N/A 2021    Procedure: FEMORAL DISTAL BYPASS;  Surgeon: Jeremy Trevizo MD;  Location: [facility] OR;  Service: Vascular;  Laterality: N/A;   • FINGER SURGERY      left   • INCISION AND DRAINAGE LEG Right 2021    Procedure: INCISION AND DRAINAGE RIGHT  LOWER EXTREMITY, EVACUATION OF HEMATOMA, LIGATION OF FEM DISTAL BYPASS;  Surgeon: Jeremy Trevizo MD;  Location: [facility] of [facility];  Service: Cardiothoracic;  Laterality: Right;   • LEG AMPUTATION Left     left lower leg   • SHOULDER SURGERY Bilateral      General Information     Row Name 02/15/21 1540          Physical Therapy Time and Intention    Document Type  therapy note (daily note)  -LO     Mode of Treatment  individual therapy;physical therapy  -     Row Name 02/15/21 1540          General Information    Patient Profile Reviewed  yes  -LO     Existing Precautions/Restrictions  fall  -LO     Row Name 02/15/21 1540          Cognition    Orientation Status (Cognition)  oriented x 4  -LO     Row Name 02/15/21 1540          Safety Issues, Functional Mobility    Impairments Affecting Function (Mobility)  endurance/activity tolerance;sensation/sensory awareness;strength  -       User Key  (r) = Recorded By, (t) = Taken By, (c) = Cosigned By    Initials Name Provider Type    LO Joselin Preston, PT Physical Therapist        Mobility     Row Name 02/15/21 1540          Bed Mobility    Comment (Bed Mobility)  sitting edge of bed upon arrival  -     Row Name 02/15/21 1540          Transfers    Comment (Transfers)  CGA from EOB, no AD needed. Less than 25%vc required for sequencing  -     Row Name 02/15/21 1540          Sit-Stand Transfer    Sit-Stand [place] (Transfers)  contact guard;verbal cues  -     Assistive Device (Sit-Stand Transfers)  other (see comments) uses IV pole  -     Row Name 02/15/21 1540          Gait/Stairs (Locomotion)    [place] Level (Gait)  other (see comments) hold to IV pole  -LO     Distance in Feet (Gait)  250'  -LO     Deviations/Abnormal Patterns (Gait)  bilateral deviations;weight shifting decreased;antalgic  -LO     Right Sided Gait Deviations  heel strike decreased  -LO     Comment (Gait/Stairs)  Patient ambulates x 250' holding IV pole for support and with CGA. Requires  brief standing rest breaks x3 for mild fatigue  -LO       User Key  (r) = Recorded By, (t) = Taken By, (c) = Cosigned By    Initials Name Provider Type    Joselin Milian PT Physical Therapist        Obj/Interventions     Row Name 02/15/21 1543          Balance    Balance Assessment  sitting static balance;sitting dynamic balance;standing static balance;standing dynamic balance  -LO     Static Sitting Balance  WNL;supported;sitting, edge of bed  -LO     Dynamic Sitting Balance  WNL;supported;sitting, edge of bed  -LO     Static Standing Balance  WNL;supported;standing  -LO     Dynamic Standing Balance  mild impairment;supported;standing  -LO     Comment, Balance  uses unilateral support for stability  -LO       User Key  (r) = Recorded By, (t) = Taken By, (c) = Cosigned By    Initials Name Provider Type    Joselin Milian, KRUNAL Physical Therapist        Goals/Plan    No documentation.       Clinical Impression     Row Name 02/15/21 1540          Pain Scale: Numbers Pre/Post-Treatment    Pretreatment Pain Rating  3/10  -LO     Posttreatment Pain Rating  4/10  -LO     Pain Location - Side  Right  -LO     Pain Location - Orientation  lower  -LO     Pain Location  extremity  -LO     Pre/Posttreatment Pain Comment  pain decreased at rest with RLE elevated  -LO     Row Name 02/15/21 1544          Plan of Care Review    Plan of Care Reviewed With  patient  -LO     Progress  improving  -LO     Outcome Summary  Patient demonstrates improvements in functional mobility this session. Performs bed mobility with Luigi, transfers with SBA, and ambulates with unilateral support (IV pole) x 250' with brief standing rest breaks x3 for fatigue. Cont PT POC.  -LO     Row Name 02/15/21 1544          Therapy Assessment/Plan (PT)    Rehab Potential (PT)  good, to achieve stated therapy goals  -LO     Criteria for Skilled Interventions Met (PT)  yes;skilled treatment is necessary  -LO     Row Name 02/15/21 5188          Vital Signs    O2  Delivery Pre Treatment  room air  -LO     O2 Delivery Intra Treatment  room air  -LO     O2 Delivery Post Treatment  room air  -LO     Pre Patient Position  Sitting  -LO     Intra Patient Position  Standing  -LO     Post Patient Position  Sitting  -LO     Row Name 02/15/21 1544          Positioning and Restraints    Pre-Treatment Position  in bed  -LO     Post Treatment Position  chair  -LO     In Chair  with nsg;call light within reach;encouraged to call for assist;exit alarm on  -LO       User Key  (r) = Recorded By, (t) = Taken By, (c) = Cosigned By    Initials Name Provider Type    Joselin Milian, PT Physical Therapist        Outcome Measures     Row Name 02/15/21 1546          How much help from another person do you currently need...    Turning from your back to your side while in flat bed without using bedrails?  3  -LO     Moving from lying on back to sitting on the side of a flat bed without bedrails?  3  -LO     Moving to and from a bed to a chair (including a wheelchair)?  4  -LO     Standing up from a chair using your arms (e.g., wheelchair, bedside chair)?  4  -LO     Climbing 3-5 steps with a railing?  3  -LO     To walk in hospital room?  3  -LO     AM-PAC 6 Clicks Score (PT)  20  -LO       User Key  (r) = Recorded By, (t) = Taken By, (c) = Cosigned By    Initials Name Provider Type    Joselin Milian, PT Physical Therapist        Physical Therapy Education                 Title: PT OT SLP Therapies (In Progress)     Topic: Physical Therapy (In Progress)     Point: Mobility training (Done)     Learning Progress Summary           Patient LONDON Henry VU by JUSTINA at 2/15/2021 1502    Comment: Patient education regarding sequencing for safe gait and transfers.    LONDON Baez D, VU by  at 2/14/2021 1405    Comment: POC, gait, safety                   Point: Home exercise program (Not Started)     Learner Progress:  Not documented in this visit.          Point: Body mechanics (Not Started)     Learner  Progress:  Not documented in this visit.          Point: Precautions (Not Started)     Learner Progress:  Not documented in this visit.                      User Key     Initials Effective Dates Name Provider Type [facility] 06/19/15 -  Rosalind Atkins, PT Physical Therapist PT     03/11/20 -  Joselin Preston, PT Physical Therapist PT              PT Recommendation and Plan     Plan of Care Reviewed With: patient  Progress: improving  Outcome Summary: Patient demonstrates improvements in functional mobility this session. Performs bed mobility with Luigi, transfers with SBA, and ambulates with unilateral support (IV pole) x 250' with brief standing rest breaks x3 for fatigue. Cont PT POC.     Time Calculation:   PT Charges     Row Name 02/15/21 1502 02/15/21 0900          Time Calculation    Start Time  1502  -LO  0900  -     PT Received On  02/15/21  -LO  --     PT Goal Re-Cert Due Date  02/24/21  -LO  02/24/21  -        Timed Charges    08302 - Gait Training Minutes   20  -LO  --     70316 - PT Therapeutic Activity Minutes  8  -LO  --       User Key  (r) = Recorded By, (t) = Taken By, (c) = Cosigned By    Initials Name Provider Type     Say Mason, PT Physical Therapist     Joselin Preston, PT Physical Therapist        Therapy Charges for Today     Code Description Service Date Service Provider Modifiers Qty    46579809021 HC GAIT TRAINING EA 15 MIN 2/15/2021 Joselin Preston, PT GP 1    00981620679 HC PT THERAPEUTIC ACT EA 15 MIN 2/15/2021 Joselin Preston, PT GP 1          PT G-Codes  Outcome Measure Options: AM-PAC 6 Clicks Daily Activity (OT)  AM-PAC 6 Clicks Score (PT): 20  AM-PAC 6 Clicks Score (OT): 17    Joselin Preston PT  2/15/2021

## 2021-02-15 NOTE — PROGRESS NOTES
"    [facility] Medicine Services  PROGRESS NOTE    Patient Name: Reg Sanchez  : 1965  MRN: [id number]    Date of Admission: 2021  Primary Care Physician: Sheila Sheikh APRN    Subjective   Subjective     CC:  Post op infection     HPI:  Having pain in upper right thigh wound vac. States that two old wounds on right lower extremity have healed. He is happy about being a \"nonsmoker\". Plans on taking much better care of himself.     ROS:  Gen- No fevers, chills  CV- No chest pain, palpitations  Resp- No cough, dyspnea  GI- No N/V/D, abd pain       Objective   Objective     Vital Signs:   Temp:  [98.1 °F (36.7 °C)-98.4 °F (36.9 °C)] 98.4 °F (36.9 °C)  Heart Rate:  [] 75  Resp:  [14-18] 18  BP: (108-135)/() 118/67        Physical Exam:  Constitutional: No acute distress, awake, alert  HENT: NCAT, mucous membranes moist  Respiratory: Clear to auscultation bilaterally, respiratory effort normal   Cardiovascular: RRR, no murmurs, rubs, or gallops  Gastrointestinal: Positive bowel sounds, soft, nontender, nondistended, obese  Musculoskeletal: left BKA, right lower extremity with wound vac x 2 and mild edema   Psychiatric: Appropriate affect, cooperative  Neurologic: Oriented x 3, strength symmetric in all extremities, Cranial Nerves grossly intact to confrontation, speech clear  Skin: No rashes     Results Reviewed:  Results from last 7 days   Lab Units 02/15/21  0653 21  0656 21  0722   WBC 10*3/mm3 8.67 9.10 8.98   HEMOGLOBIN g/dL 10.1* 9.3* 9.7*   HEMATOCRIT % 32.2* 29.2* 30.7*   PLATELETS 10*3/mm3 339 314 278     Results from last 7 days   Lab Units 02/15/21  0653 21  0656 21  0722   SODIUM mmol/L 127* 130* 131*   POTASSIUM mmol/L 4.4 4.7 4.8   CHLORIDE mmol/L 96* 98 97*   CO2 mmol/L 24.0 25.0 29.0   BUN mg/dL 13 12 13   CREATININE mg/dL 0.70* 0.71* 0.83   GLUCOSE mg/dL 169* 204* 151*   CALCIUM mg/dL 8.9 8.7 8.9   ALT (SGPT) U/L 33  --   --  "   AST (SGOT) U/L 32  --   --      Estimated Creatinine Clearance: 135.1 mL/min (A) (by C-G formula based on SCr of 0.7 mg/dL (L)).    Microbiology Results Abnormal     Procedure Component Value - Date/Time    Fungus Culture - Tissue, Groin, right [[id number]] Collected: 02/09/21 1738    Lab Status: Preliminary result Specimen: Tissue from Groin, right Updated: 02/14/21 1815     Fungus Culture No fungus isolated at less than 1 week    AFB Culture - Tissue, Groin, right [[id number]] Collected: 02/09/21 1738    Lab Status: Preliminary result Specimen: Tissue from Groin, right Updated: 02/14/21 1815     AFB Culture No AFB isolated at less than 1 week     AFB Stain No acid fast bacilli seen on concentrated smear    Anaerobic Culture - Tissue, Groin, right [[id number]] Collected: 02/09/21 1738    Lab Status: Final result Specimen: Tissue from Groin, right Updated: 02/14/21 0650     Anaerobic Culture No anaerobes isolated at 5 days    Tissue / Bone Culture - Tissue, Groin, right [[id number]] Collected: 02/09/21 1738    Lab Status: Final result Specimen: Tissue from Groin, right Updated: 02/11/21 0639     Tissue Culture Scant growth (1+) Normal Skin Celi     Gram Stain Moderate (3+) WBCs seen      No organisms seen    Blood Culture - Blood, Arm, Left [[id number]] Collected: 02/05/21 1806    Lab Status: Final result Specimen: Blood from Arm, Left Updated: 02/10/21 1931     Blood Culture No growth at 5 days    Narrative:      Aerobic bottle only      Blood Culture - Blood, Hand, Left [[id number]] Collected: 02/05/21 1806    Lab Status: Final result Specimen: Blood from Hand, Left Updated: 02/10/21 1931     Blood Culture No growth at 5 days    Wound Culture - Wound, Leg, Right [[id number]]  (Abnormal)  (Susceptibility) Collected: 02/06/21 1748    Lab Status: Edited Result - FINAL Specimen: Wound from Leg, Right Updated: 02/10/21 1603     Wound Culture Moderate growth (3+) Staphylococcus aureus, MRSA     Comment: Methicillin  resistant Staphylococcus aureus, Patient may be an isolation risk.        Gram Stain Few (2+) WBCs seen      No organisms seen    Susceptibility      Staphylococcus aureus, MRSA     AZIZA     Clindamycin Susceptible     Daptomycin Susceptible (C) [1]      Erythromycin Susceptible     Inducible Clindamycin Resistance Negative     Oxacillin Resistant     Penicillin G Resistant     Rifampin Susceptible     Tetracycline Susceptible     Trimethoprim + Sulfamethoxazole Susceptible     Vancomycin Susceptible            [1]   Appended report. These results have been appended to a previously final verified report.             Susceptibility Comments     Staphylococcus aureus, MRSA    Daptomycin susceptibility requested by Inderjit ID    This isolate does not demonstrate inducible clindamycin resistance in vitro.                     Imaging Results (Last 24 Hours)     ** No results found for the last 24 hours. **          Results for orders placed during the hospital encounter of 01/25/21   Adult Transthoracic Echo Complete W/ Cont if Necessary Per Protocol    Narrative · Left ventricular ejection fraction appears to be 66 - 70%. Left   ventricular systolic function is normal.  · The right ventricular cavity is borderline dilated.  · Left ventricular diastolic function was normal.  · No significant structural or functional valvular disease.          I have reviewed the medications:  Scheduled Meds:arformoterol, 15 mcg, Nebulization, BID - RT  aspirin, 81 mg, Oral, Daily  atorvastatin, 40 mg, Oral, Nightly  budesonide, 0.5 mg, Nebulization, BID - RT  DAPTOmycin, 6 mg/kg (Adjusted), Intravenous, Q24H  doxycycline, 100 mg, Oral, Q12H  DULoxetine, 60 mg, Oral, BID  enoxaparin, 40 mg, Subcutaneous, Daily  furosemide, 20 mg, Oral, Daily  insulin detemir, 8 Units, Subcutaneous, Daily  insulin lispro, 0-7 Units, Subcutaneous, 4x Daily With Meals & Nightly  isosorbide mononitrate, 30 mg, Oral, Daily  lisinopril, 10 mg, Oral,  Daily  metoprolol tartrate, 50 mg, Oral, BID  nicotine, 1 patch, Transdermal, Q24H  pantoprazole, 40 mg, Oral, QAM  pregabalin, 200 mg, Oral, TID  rOPINIRole, 4 mg, Oral, Nightly  sodium chloride, 10 mL, Intravenous, Q12H  sodium chloride, 10 mL, Intravenous, Q12H  sodium hypochlorite, , Topical, Q12H      Continuous Infusions:   PRN Meds:.•  acetaminophen **OR** acetaminophen **OR** acetaminophen  •  albuterol  •  dextrose  •  dextrose  •  glucagon (human recombinant)  •  HYDROmorphone  •  hydrOXYzine  •  magnesium sulfate **OR** magnesium sulfate **OR** magnesium sulfate  •  Morphine  •  naloxone  •  naloxone  •  naloxone  •  ondansetron **OR** ondansetron  •  oxyCODONE-acetaminophen  •  potassium & sodium phosphates **OR** potassium & sodium phosphates  •  sodium chloride  •  sodium chloride    Assessment/Plan   Assessment & Plan     Active Hospital Problems    Diagnosis  POA   • **Postoperative infection [T81.40XA]  Unknown   • Post-operative infection [T81.40XA]  Yes   • S/P L leg amputation  [S88.919A]  Yes   • PVD  [I73.9]  Yes   • Tobacco use [Z72.0]  Yes      Resolved Hospital Problems   No resolved problems to display.        Brief Hospital Course to date:  Reg Sanchez is a 55 y.o. male with past medical history significant for tobacco abuse, diabetes mellitus, peripheral vascular disease, congestive heart failure, coronary artery disease status post CABG, left leg osteomyelitis status post left leg amputation, peripheral arterial disease and most recently right SFA and popliteal artery occlusion underwent a right femoral artery bypass with Dr. Trevizo 1/27/2021.  The patient developed fever on 2/2/2021. On 2/3/21 he developed right leg swelling and redness. On 2/4/21, he presented to outside hospital where he had lab work indicating inflammatory/infectious process and a CT angiogram of the leg which revealed soft tissue stranding and fluid surrounding the recent right femoral/distal artery graft.   Patent runoff with adequate flow.  Patient was taken to the OR on 2/9 with Dr Trevizo for surgical drainage of right groin and knee. Patient was monitored in the ICU Following surgery, transferred back to floor 2/11.      This patient's problems and plans were partially entered by my partner and updated as appropriate by me 02/14/21.     Right leg cellulitis   Right femoral artery bypass graft infection with MRSA s/p I&D with CTS 2/9  --ID (Dr Washburn) is following for assistance with abx- continue to follow cx- continue dapto/po doxy- 6 week course of IV abx anticipated, PICC placed 2/9  --continue PRN pain control   --MRSA from wound culture (2/10)- susceptibilities noted  --CTS for wound management; wound vac placed 2/14 and will need home unit placed in next 2-3 days for DC home, will need to have changed three times weekly outpatient   --PT/OT pending   --follow up with Dr Washburn ID 1 week after DC      - IV daptomycin 500 mg daily   - PO doxycycline 100 mg BID  - Weekly sterile PICC dressing changes  - Weekly labs CBC w/diff, CMP, CK, CRP faxed to [facility] at [phone] attn: Dr Washburn  - Follow up with Dr Washburn 1 week of discharge     Chronic CHF -- unspecified   CAD Hx CABG  PVD   - follows with cardiology; uncertain if heart failure in the past but current ECHO with normal EF and diastolic function   - Continue home indur, metoprolol, lasix, ASA, statin      Hyperkalemia  --stop K+ replacement -- improved      Leukocytosis-resolved     COPD  --PRN nebs     Diabetes mellitus   -SSI, A1C 7.2 -control adequate at this time     Hyperlipidemia  -Lipitor 40mg     Tobacco abuse  -nicotine patch     etoh abuse  - ativan PRN     Hep C previously treated with interferon/ribaviron then reinfected      DVT Prophylaxis:  SALIMA     Disposition: TBD. Patient currently declining rehab -- will need wound vac and IV antibiotics at home likely will go home in 2-3 days     CODE  STATUS:   Code Status and Medical Interventions:   Ordered at: 02/05/21 1505     Code Status:    CPR     Medical Interventions (Level of Support Prior to Arrest):    Full       Stephanie Gold, APRN  02/15/21

## 2021-02-15 NOTE — THERAPY WOUND CARE TREATMENT
Acute Care - Wound/Debridement Treatment Note  [facility]     Patient Name: Reg Sanchez  : 1965  MRN: [id number]  Today's Date: 2/15/2021                Admit Date: 2021    Visit Dx:    ICD-10-CM ICD-9-CM   1. Postoperative infection, unspecified type, initial encounter  T81.40XA 998.59       Patient Active Problem List   Diagnosis   • PAD (peripheral artery disease) (CMS/[facility])   • 100% R SFA occlusion    • Atherosclerosis of native artery of right lower extremity with intermittent claudication (CMS/[facility])   • Tobacco use   • PVD    • CAD s/p stents and CABG X5    • S/P L leg amputation    • Smoker   • Daily marijuana use   • Suspected ETOH withdrawal    • T2DM on metformin    • Class 1 obesity in adult   • Macrocytosis without anemia   • Post-operative infection   • Postoperative infection        Past Medical History:   Diagnosis Date   • Anxiety    • CHF (congestive heart failure) (CMS/[facility])    • COPD (chronic obstructive pulmonary disease) (CMS/[facility])    • Coronary artery disease involving native coronary artery of native heart without angina pectoris 2021    Added automatically from request for surgery [id number]   • Diabetes mellitus (CMS/[facility])    • Hepatitis C    • Hyperlipidemia    • Hypertension    • Sleep apnea    • T2DM on metformin  2021        Past Surgical History:   Procedure Laterality Date   • CARDIAC CATHETERIZATION  11/20/2020    x 2 stents   • CARDIAC CATHETERIZATION Right 2021    Procedure: Peripheral angiography;  Surgeon: Wesly Lund MD;  Location: [facility] INVASIVE LOCATION;  Service: Cardiovascular;  Laterality: Right;   • CORONARY ARTERY BYPASS GRAFT      x 5   • CORONARY STENT PLACEMENT  2020    Dr Mortensen x 2 stents   • FEMORAL DISTAL BYPASS N/A 2021    Procedure: FEMORAL DISTAL BYPASS;  Surgeon: Jeremy Trevizo MD;  Location: [facility];  Service: Vascular;  Laterality: N/A;   • FINGER SURGERY      left   • INCISION AND DRAINAGE LEG Right  2/9/2021    Procedure: INCISION AND DRAINAGE RIGHT LOWER EXTREMITY, EVACUATION OF HEMATOMA, LIGATION OF FEM DISTAL BYPASS;  Surgeon: Jeremy Trevizo MD;  Location: [facility];  Service: Cardiothoracic;  Laterality: Right;   • LEG AMPUTATION Left     left lower leg   • SHOULDER SURGERY Bilateral            Wound 01/27/21 1322 Right anterior greater trochanter Incision (Active)   Dressing Appearance intact 02/15/21 0900   Closure TAHIR 02/14/21 2000   Base dressing in place, unable to visualize 02/14/21 2000   Periwound redness 02/14/21 2000   Periwound Temperature warm 02/14/21 2000   Periwound Skin Turgor soft 02/14/21 2000   Drainage Amount none 02/14/21 2000   Dressing Care other (see comments) 02/14/21 2000   Periwound Care dry periwound area maintained 02/14/21 2000       Wound 01/27/21 1322 Right proximal leg Incision (Active)   Dressing Appearance intact 02/15/21 0900   Closure TAHIR 02/14/21 2000   Base dressing in place, unable to visualize 02/14/21 2000   Periwound redness 02/14/21 2000   Periwound Temperature warm 02/14/21 2000   Periwound Skin Turgor soft 02/14/21 2000   Drainage Amount none 02/14/21 2000   Dressing Care other (see comments) 02/14/21 2000   Periwound Care dry periwound area maintained 02/14/21 2000       Wound 01/27/21 1322 Right distal leg Incision (Active)   Dressing Appearance intact 02/15/21 0900   Closure Staples 02/14/21 2000   Base dressing in place, unable to visualize 02/14/21 2000   Periwound pink 02/14/21 2000   Periwound Temperature warm 02/14/21 2000   Periwound Skin Turgor firm 02/14/21 2000   Drainage Amount none 02/14/21 2000   Dressing Care other (see comments) 02/14/21 2000   Periwound Care dry periwound area maintained 02/14/21 2000       Wound 02/09/21 1604 Right lower leg Incision (Active)   Dressing Appearance intact 02/15/21 0900   Closure Staples 02/14/21 2000   Base dressing in place, unable to visualize 02/14/21 2000   Periwound redness;warm;blanchable 02/14/21  2000   Periwound Temperature warm 02/14/21 2000   Periwound Skin Turgor firm;soft 02/14/21 2000   Drainage Amount none 02/14/21 2000   Dressing Care low-adherent;foam 02/14/21 2000   Periwound Care dry periwound area maintained 02/14/21 2000       Wound 02/09/21 1737 Right groin  (Active)   Dressing Appearance intact 02/15/21 0900   Closure TAHIR 02/14/21 2000   Base dressing in place, unable to visualize 02/14/21 2000   Periwound redness;blanchable 02/14/21 2000   Periwound Temperature warm 02/14/21 2000   Periwound Skin Turgor soft 02/14/21 2000   Edges open 02/14/21 2000   Drainage Characteristics/Odor serosanguineous 02/14/21 2000   Drainage Amount moderate 02/14/21 2000   Care, Wound other (see comments) 02/14/21 2000   Dressing Care other (see comments) 02/14/21 2000   Periwound Care dry periwound area maintained 02/14/21 2000       Wound 02/09/21 1751 Right anterior popliteal Incision (Active)   Dressing Appearance intact 02/15/21 0900   Closure TAHIR 02/14/21 2000   Base dressing in place, unable to visualize 02/14/21 2000   Periwound redness;blanchable 02/14/21 2000   Periwound Temperature warm 02/14/21 2000   Periwound Skin Turgor firm 02/14/21 2000   Drainage Amount none 02/14/21 2000   Care, Wound other (see comments) 02/14/21 2000   Dressing Care other (see comments) 02/14/21 2000   Periwound Care dry periwound area maintained 02/14/21 2000       NPWT (Negative Pressure Wound Therapy) 02/14/21 0945 R groin (Active)   Therapy Setting continuous therapy 02/15/21 0900   Dressing foam, black;foam, white 02/14/21 2000   Pressure Setting 150 mmHg 02/15/21 0900   Sponges Inserted 2 02/14/21 2000   Finger sweep complete Yes 02/14/21 2000       NPWT (Negative Pressure Wound Therapy) 02/14/21 0945 RLE wounds (Active)   Therapy Setting continuous therapy 02/15/21 0900   Dressing foam, black;foam, white 02/14/21 2000   Contact Layer other (see comments) 02/14/21 2000   Pressure Setting 150 mmHg 02/15/21 0900   Finger  sweep complete Yes 02/14/21 2000         WOUND DEBRIDEMENT                        PT Assessment (last 12 hours)      PT Evaluation and Treatment     Row Name 02/15/21 0900          Physical Therapy Time and Intention    Subjective Information  no complaints  -     Document Type  therapy note (daily note);wound care  -     Mode of Treatment  individual therapy;physical therapy  -     Row Name 02/15/21 0900          Pain    Additional Documentation  Pain Scale: FACES Pre/Post-Treatment (Group)  -     Row Name 02/15/21 0900          Pain Scale: Numbers Pre/Post-Treatment    Pretreatment Pain Rating  0/10 - no pain  -     Posttreatment Pain Rating  0/10 - no pain pt sleeping  -     Row Name 02/15/21 0900          Wound 01/27/21 1322 Right distal leg Incision    Wound - Properties Group Placement Date: 01/27/21  -ER Placement Time: 1322  -ER Present on Hospital Admission: N  -ER Side: Right  -ER Orientation: distal  -ER Location: leg  -ER Primary Wound Type: Incision  -ER Additional Comments: medial R ankle  -JM    Dressing Appearance  intact  -MF     Retired Wound - Properties Group Date first assessed: 01/27/21  -ER Time first assessed: 1322  -ER Present on Hospital Admission: N  -ER Side: Right  -ER Location: leg  -ER Primary Wound Type: Incision  -ER Additional Comments: medial R ankle  -JM    Row Name 02/15/21 0900          Wound 01/27/21 1322 Right proximal leg Incision    Wound - Properties Group Placement Date: 01/27/21  -ER Placement Time: 1322  -ER Present on Hospital Admission: N  -ER Side: Right  -ER Orientation: proximal  -ER Location: leg  -ER Primary Wound Type: Incision  -ER Additional Comments: medial proximal calf  -JM    Dressing Appearance  intact  -MF     Retired Wound - Properties Group Date first assessed: 01/27/21  -ER Time first assessed: 1322  -ER Present on Hospital Admission: N  -ER Side: Right  -ER Location: leg  -ER Primary Wound Type: Incision  -ER Additional Comments: medial  proximal calf  -JM    Row Name 02/15/21 0900          Wound 01/27/21 1322 Right anterior greater trochanter Incision    Wound - Properties Group Placement Date: 01/27/21  -ER Placement Time: 1322  -ER Present on Hospital Admission: N  -ER Side: Right  -ER Orientation: anterior  -ER Location: greater trochanter  -ER Primary Wound Type: Incision  -ER Additional Comments: medial proximal thigh  -JM    Dressing Appearance  intact  -MF     Retired Wound - Properties Group Date first assessed: 01/27/21  -ER Time first assessed: 1322  -ER Present on Hospital Admission: N  -ER Side: Right  -ER Location: greater trochanter  -ER Primary Wound Type: Incision  -ER Additional Comments: medial proximal thigh  -JM    Row Name 02/15/21 0900          Wound 02/09/21 1604 Right lower leg Incision    Wound - Properties Group Placement Date: 02/09/21  -LS Placement Time: 1604  -LS, Present upon admission to preop  Side: Right  -LS Orientation: lower  -LS Location: leg  -LS Primary Wound Type: Incision  -LS Additional Comments: medial distal R calf  -JM    Dressing Appearance  intact  -MF     Retired Wound - Properties Group Date first assessed: 02/09/21  -LS Time first assessed: 1604  -LS, Present upon admission to preop  Side: Right  -LS Location: leg  -LS Primary Wound Type: Incision  -LS Additional Comments: medial distal R calf  -JM    Row Name 02/15/21 0900          Wound 02/09/21 1737 Right groin     Wound - Properties Group Placement Date: 02/09/21  -ER Placement Time: 1737  -ER Side: Right  -ER Location: groin  -ER Primary Wound Type: --  -ER, OPEN WOUND     Dressing Appearance  intact  -MF     Retired Wound - Properties Group Date first assessed: 02/09/21  -ER Time first assessed: 1737  -ER Side: Right  -ER Location: groin  -ER Primary Wound Type: --  -ER, OPEN WOUND     Row Name 02/15/21 0900          Wound 02/09/21 1751 Right anterior popliteal Incision    Wound - Properties Group Placement Date: 02/09/21  -ER Placement Time:  1751  -ER Present on Hospital Admission: N  -ER Side: Right  -ER Orientation: anterior  -ER Location: popliteal  -ER Primary Wound Type: Incision  -ER, OPEN WOUND  Additional Comments: medial distal thigh  -JM    Dressing Appearance  intact  -MF     Retired Wound - Properties Group Date first assessed: 02/09/21  -ER Time first assessed: 1751  -ER Present on Hospital Admission: N  -ER Side: Right  -ER Location: popliteal  -ER Primary Wound Type: Incision  -ER, OPEN WOUND  Additional Comments: medial distal thigh  -JM    Row Name 02/15/21 0900          NPWT (Negative Pressure Wound Therapy) 02/14/21 0945 R groin    NPWT (Negative Pressure Wound Therapy) - Properties Group Placement Date: 02/14/21  - Placement Time: 0945 -JM Location: R groin  -JM Additional Comments: Rehab rental unit  -    Therapy Setting  continuous therapy  -MF     Pressure Setting  150 mmHg  -MF     Retired NPWT (Negative Pressure Wound Therapy) - Properties Group Placement Date: 02/14/21  - Placement Time: 0945 -JM Location: R groin  -JM Additional Comments: Rehab rental unit  -    Row Name 02/15/21 0900          NPWT (Negative Pressure Wound Therapy) 02/14/21 0945 RLE wounds    NPWT (Negative Pressure Wound Therapy) - Properties Group Placement Date: 02/14/21  - Placement Time: 0945 -JM Location: RLE wounds  -JM Additional Comments: Rental unit [id number]  -JM    Therapy Setting  continuous therapy  -MF     Pressure Setting  150 mmHg  -MF     Retired NPWT (Negative Pressure Wound Therapy) - Properties Group Placement Date: 02/14/21  - Placement Time: 0945 -JM Location: RLE wounds  -JM Additional Comments: Rental unit [id number]  -    Row Name 02/15/21 0900          Coping    Observed Emotional State  calm;cooperative;pleasant  -     Verbalized Emotional State  acceptance  -     Row Name 02/15/21 0900          Plan of Care Review    Plan of Care Reviewed With  patient  -     Outcome Summary  wound vac intact with no issues  noted. PT to change dressings in 1-2 days.   -       User Key  (r) = Recorded By, (t) = Taken By, (c) = Cosigned By    Initials Name Provider Type     Say Mason, PT Physical Therapist    Irina Anaya, RN Registered Nurse    Afua Oneill, PT Physical Therapist    Orly Campa, RN Registered Nurse        Physical Therapy Education                 Title: PT OT SLP Therapies (In Progress)     Topic: Physical Therapy (Done)     Point: Mobility training (Done)     Learning Progress Summary           Patient Eager, LONDON,ROBIN, VU by  at 2/14/2021 1405    Comment: POC, gait, safety                               User Key     Initials Effective Dates Name Provider Type [facility] 06/19/15 -  Rosalind Atkins, PT Physical Therapist PT                Recommendation and Plan  Anticipated Discharge Disposition (PT): home with assist  Therapy Frequency (PT): daily  Plan of Care Reviewed With: patient           Outcome Summary: wound vac intact with no issues noted. PT to change dressings in 1-2 days.   Plan of Care Reviewed With: patient            Time Calculation  PT Charges     Row Name 02/15/21 0900             Time Calculation    Start Time  0900  -      PT Goal Re-Cert Due Date  02/24/21  -        User Key  (r) = Recorded By, (t) = Taken By, (c) = Cosigned By    Initials Name Provider Type     Say Mason, PT Physical Therapist            Therapy Charges for Today     Code Description Service Date Service Provider Modifiers Qty    [id number]  PT NEG PRESS WOUND TO 50SQCM DME1 2/15/2021 Say Mason, PT  1            PT G-Codes  Outcome Measure Options: AM-PAC 6 Clicks Daily Activity (OT)  AM-PAC 6 Clicks Score (PT): 20  AM-PAC 6 Clicks Score (OT): 17       Say Mason, PT  2/15/2021

## 2021-02-15 NOTE — PROGRESS NOTES
Continued Stay Note  [facility]     Patient Name: Reg Sanchez  MRN: [id number]  Today's Date: 2/15/2021    Admit Date: 2/5/2021    Discharge Plan     Row Name 02/15/21 1258       Plan    Plan  Home with family    Patient/Family in Agreement with Plan  yes    Plan Comments  Spoke with patient at bedside.  MD recommending and requesting patient to be followed by outpatient PT at [facility] for wound vac. management.  Patient states that he is agreeable to outpatient wound care if it can be arranged for T/TH.  States that he has transportation for those days provided by private service, and daughter can bring to appointment on additional day if needed.  Message left for Say with PT wound care to determine how frequently patient will need to be seen in outpatient clinic as well as availability for appointments.  Patient will need wound vac changed for home wound vac when ready for discharge.  If patient will have outpatient wound care, he will not be able to to have home health for IV antibiotics.  Patient states that he and his wife can manage IV antibiotics when discharged.  Spoke with Vivienne in [facility] home infusion.  She will run a precert on IV Daptomycin and will contact  with cost of medication.  States that family will transport when medically ready for discharge.  Will continue to follow for discharge planning.    Final Discharge Disposition Code  01 - home or self-care        Discharge Codes    No documentation.       Expected Discharge Date and Time     Expected Discharge Date Expected Discharge Time    Feb 17, 2021             Shaina Cramer RN

## 2021-02-16 LAB
ANION GAP SERPL CALCULATED.3IONS-SCNC: 7 MMOL/L (ref 5–15)
BUN SERPL-MCNC: 17 MG/DL (ref 6–20)
BUN/CREAT SERPL: 25.4 (ref 7–25)
CALCIUM SPEC-SCNC: 9.2 MG/DL (ref 8.6–10.5)
CHLORIDE SERPL-SCNC: 96 MMOL/L (ref 98–107)
CO2 SERPL-SCNC: 29 MMOL/L (ref 22–29)
CREAT SERPL-MCNC: 0.67 MG/DL (ref 0.76–1.27)
GFR SERPL CREATININE-BSD FRML MDRD: 123 ML/MIN/1.73
GLUCOSE BLDC GLUCOMTR-MCNC: 149 MG/DL (ref 70–130)
GLUCOSE BLDC GLUCOMTR-MCNC: 172 MG/DL (ref 70–130)
GLUCOSE BLDC GLUCOMTR-MCNC: 207 MG/DL (ref 70–130)
GLUCOSE BLDC GLUCOMTR-MCNC: 217 MG/DL (ref 70–130)
GLUCOSE SERPL-MCNC: 146 MG/DL (ref 65–99)
POTASSIUM SERPL-SCNC: 4.5 MMOL/L (ref 3.5–5.2)
SODIUM SERPL-SCNC: 132 MMOL/L (ref 136–145)

## 2021-02-16 PROCEDURE — 94799 UNLISTED PULMONARY SVC/PX: CPT

## 2021-02-16 PROCEDURE — 63710000001 INSULIN DETEMIR PER 5 UNITS: Performed by: INTERNAL MEDICINE

## 2021-02-16 PROCEDURE — 97530 THERAPEUTIC ACTIVITIES: CPT

## 2021-02-16 PROCEDURE — 97605 NEG PRS WND THER DME<=50SQCM: CPT

## 2021-02-16 PROCEDURE — 80048 BASIC METABOLIC PNL TOTAL CA: CPT | Performed by: PHYSICIAN ASSISTANT

## 2021-02-16 PROCEDURE — 25010000002 MORPHINE PER 10 MG: Performed by: INTERNAL MEDICINE

## 2021-02-16 PROCEDURE — 99232 SBSQ HOSP IP/OBS MODERATE 35: CPT | Performed by: NURSE PRACTITIONER

## 2021-02-16 PROCEDURE — 82962 GLUCOSE BLOOD TEST: CPT

## 2021-02-16 PROCEDURE — 99024 POSTOP FOLLOW-UP VISIT: CPT | Performed by: THORACIC SURGERY (CARDIOTHORACIC VASCULAR SURGERY)

## 2021-02-16 PROCEDURE — 25010000002 ENOXAPARIN PER 10 MG: Performed by: INTERNAL MEDICINE

## 2021-02-16 PROCEDURE — 63710000001 INSULIN LISPRO (HUMAN) PER 5 UNITS: Performed by: INTERNAL MEDICINE

## 2021-02-16 PROCEDURE — 25010000002 DAPTOMYCIN PER 1 MG: Performed by: INTERNAL MEDICINE

## 2021-02-16 PROCEDURE — 25010000002 FUROSEMIDE PER 20 MG: Performed by: PHYSICIAN ASSISTANT

## 2021-02-16 RX ORDER — OXYCODONE HYDROCHLORIDE AND ACETAMINOPHEN 5; 325 MG/1; MG/1
2 TABLET ORAL EVERY 4 HOURS PRN
Status: DISCONTINUED | OUTPATIENT
Start: 2021-02-16 | End: 2021-02-17 | Stop reason: HOSPADM

## 2021-02-16 RX ORDER — FUROSEMIDE 10 MG/ML
40 INJECTION INTRAMUSCULAR; INTRAVENOUS ONCE
Status: COMPLETED | OUTPATIENT
Start: 2021-02-16 | End: 2021-02-16

## 2021-02-16 RX ADMIN — OXYCODONE AND ACETAMINOPHEN 1 TABLET: 5; 325 TABLET ORAL at 01:27

## 2021-02-16 RX ADMIN — ROPINIROLE HYDROCHLORIDE 4 MG: 2 TABLET, FILM COATED ORAL at 20:45

## 2021-02-16 RX ADMIN — ARFORMOTEROL TARTRATE 15 MCG: 15 SOLUTION RESPIRATORY (INHALATION) at 21:20

## 2021-02-16 RX ADMIN — SODIUM CHLORIDE, PRESERVATIVE FREE 10 ML: 5 INJECTION INTRAVENOUS at 20:46

## 2021-02-16 RX ADMIN — INSULIN LISPRO 3 UNITS: 100 INJECTION, SOLUTION INTRAVENOUS; SUBCUTANEOUS at 20:52

## 2021-02-16 RX ADMIN — OXYCODONE HYDROCHLORIDE AND ACETAMINOPHEN 2 TABLET: 5; 325 TABLET ORAL at 15:53

## 2021-02-16 RX ADMIN — PREGABALIN 200 MG: 100 CAPSULE ORAL at 08:29

## 2021-02-16 RX ADMIN — FUROSEMIDE 40 MG: 10 INJECTION, SOLUTION INTRAVENOUS at 08:29

## 2021-02-16 RX ADMIN — OXYCODONE HYDROCHLORIDE AND ACETAMINOPHEN 2 TABLET: 5; 325 TABLET ORAL at 20:45

## 2021-02-16 RX ADMIN — DULOXETINE HYDROCHLORIDE 60 MG: 60 CAPSULE, DELAYED RELEASE ORAL at 08:29

## 2021-02-16 RX ADMIN — METOPROLOL TARTRATE 50 MG: 50 TABLET, FILM COATED ORAL at 20:45

## 2021-02-16 RX ADMIN — PREGABALIN 200 MG: 100 CAPSULE ORAL at 20:45

## 2021-02-16 RX ADMIN — METOPROLOL TARTRATE 50 MG: 50 TABLET, FILM COATED ORAL at 08:29

## 2021-02-16 RX ADMIN — DAPTOMYCIN 480.6 MG: 500 INJECTION, POWDER, LYOPHILIZED, FOR SOLUTION INTRAVENOUS at 12:03

## 2021-02-16 RX ADMIN — PREGABALIN 200 MG: 100 CAPSULE ORAL at 15:53

## 2021-02-16 RX ADMIN — MORPHINE SULFATE 2 MG: 2 INJECTION, SOLUTION INTRAMUSCULAR; INTRAVENOUS at 08:27

## 2021-02-16 RX ADMIN — PANTOPRAZOLE SODIUM 40 MG: 40 TABLET, DELAYED RELEASE ORAL at 06:13

## 2021-02-16 RX ADMIN — ENOXAPARIN SODIUM 40 MG: 40 INJECTION SUBCUTANEOUS at 08:29

## 2021-02-16 RX ADMIN — ARFORMOTEROL TARTRATE 15 MCG: 15 SOLUTION RESPIRATORY (INHALATION) at 08:25

## 2021-02-16 RX ADMIN — ASPIRIN 81 MG: 81 TABLET, CHEWABLE ORAL at 08:29

## 2021-02-16 RX ADMIN — BUDESONIDE 0.5 MG: 0.5 INHALANT ORAL at 21:20

## 2021-02-16 RX ADMIN — ISOSORBIDE MONONITRATE 30 MG: 30 TABLET, EXTENDED RELEASE ORAL at 08:29

## 2021-02-16 RX ADMIN — ATORVASTATIN CALCIUM 40 MG: 40 TABLET, FILM COATED ORAL at 20:46

## 2021-02-16 RX ADMIN — HYDROXYZINE HYDROCHLORIDE 25 MG: 25 TABLET, FILM COATED ORAL at 20:45

## 2021-02-16 RX ADMIN — INSULIN LISPRO 3 UNITS: 100 INJECTION, SOLUTION INTRAVENOUS; SUBCUTANEOUS at 12:03

## 2021-02-16 RX ADMIN — DOXYCYCLINE 100 MG: 100 CAPSULE ORAL at 20:45

## 2021-02-16 RX ADMIN — INSULIN LISPRO 2 UNITS: 100 INJECTION, SOLUTION INTRAVENOUS; SUBCUTANEOUS at 17:19

## 2021-02-16 RX ADMIN — OXYCODONE AND ACETAMINOPHEN 1 TABLET: 5; 325 TABLET ORAL at 06:13

## 2021-02-16 RX ADMIN — LISINOPRIL 10 MG: 10 TABLET ORAL at 08:29

## 2021-02-16 RX ADMIN — DULOXETINE HYDROCHLORIDE 60 MG: 60 CAPSULE, DELAYED RELEASE ORAL at 20:46

## 2021-02-16 RX ADMIN — BUDESONIDE 0.5 MG: 0.5 INHALANT ORAL at 08:25

## 2021-02-16 RX ADMIN — SODIUM CHLORIDE, PRESERVATIVE FREE 10 ML: 5 INJECTION INTRAVENOUS at 20:47

## 2021-02-16 RX ADMIN — INSULIN DETEMIR 8 UNITS: 100 INJECTION, SOLUTION SUBCUTANEOUS at 08:28

## 2021-02-16 RX ADMIN — OXYCODONE AND ACETAMINOPHEN 1 TABLET: 5; 325 TABLET ORAL at 12:09

## 2021-02-16 RX ADMIN — DOXYCYCLINE 100 MG: 100 CAPSULE ORAL at 08:30

## 2021-02-16 RX ADMIN — Medication 1 PATCH: at 08:29

## 2021-02-16 RX ADMIN — MORPHINE SULFATE 2 MG: 2 INJECTION, SOLUTION INTRAMUSCULAR; INTRAVENOUS at 04:06

## 2021-02-16 RX ADMIN — SODIUM HYPOCHLORITE: 1.25 SOLUTION TOPICAL at 20:46

## 2021-02-16 RX ADMIN — ACETAMINOPHEN 650 MG: 325 TABLET, FILM COATED ORAL at 20:45

## 2021-02-16 NOTE — THERAPY WOUND CARE TREATMENT
Acute Care - Wound/Debridement Treatment Note  [facility]     Patient Name: Reg Sanchez  : 1965  MRN: [id number]  Today's Date: 2021                Admit Date: 2021    Visit Dx:    ICD-10-CM ICD-9-CM   1. Postoperative infection, unspecified type, initial encounter  T81.40XA 998.59       Patient Active Problem List   Diagnosis   • PAD (peripheral artery disease) (CMS/[facility])   • 100% R SFA occlusion    • Atherosclerosis of native artery of right lower extremity with intermittent claudication (CMS/[facility])   • Tobacco use   • PVD    • CAD s/p stents and CABG X5    • S/P L leg amputation    • Smoker   • Daily marijuana use   • Suspected ETOH withdrawal    • T2DM on metformin    • Class 1 obesity in adult   • Macrocytosis without anemia   • Post-operative infection   • Postoperative infection        Past Medical History:   Diagnosis Date   • Anxiety    • CHF (congestive heart failure) (CMS/[facility])    • COPD (chronic obstructive pulmonary disease) (CMS/[facility])    • Coronary artery disease involving native coronary artery of native heart without angina pectoris 2021    Added automatically from request for surgery [id number]   • Diabetes mellitus (CMS/[facility])    • Hepatitis C    • Hyperlipidemia    • Hypertension    • Sleep apnea    • T2DM on metformin  2021        Past Surgical History:   Procedure Laterality Date   • CARDIAC CATHETERIZATION  11/20/2020    x 2 stents   • CARDIAC CATHETERIZATION Right 2021    Procedure: Peripheral angiography;  Surgeon: Wesly Lund MD;  Location: [facility] INVASIVE LOCATION;  Service: Cardiovascular;  Laterality: Right;   • CORONARY ARTERY BYPASS GRAFT      x 5   • CORONARY STENT PLACEMENT  2020    Dr Mortensen x 2 stents   • FEMORAL DISTAL BYPASS N/A 2021    Procedure: FEMORAL DISTAL BYPASS;  Surgeon: Jeremy Trevizo MD;  Location: [facility];  Service: Vascular;  Laterality: N/A;   • FINGER SURGERY      left   • INCISION AND DRAINAGE LEG Right  2/9/2021    Procedure: INCISION AND DRAINAGE RIGHT LOWER EXTREMITY, EVACUATION OF HEMATOMA, LIGATION OF FEM DISTAL BYPASS;  Surgeon: Jeremy Trevizo MD;  Location: [facility];  Service: Cardiothoracic;  Laterality: Right;   • LEG AMPUTATION Left     left lower leg   • SHOULDER SURGERY Bilateral            Wound 01/27/21 1322 Right anterior greater trochanter Incision (Active)   Dressing Appearance intact 02/16/21 1500   Closure TAHIR 02/16/21 0800   Base dressing in place, unable to visualize 02/16/21 0800   Periwound redness 02/16/21 0800   Periwound Temperature warm 02/16/21 0800   Periwound Skin Turgor soft 02/16/21 0800   Drainage Amount none 02/16/21 0800   Dressing Care other (see comments) 02/15/21 2000   Periwound Care dry periwound area maintained 02/15/21 2000       Wound 01/27/21 1322 Right proximal leg Incision (Active)   Dressing Appearance intact 02/16/21 1500   Closure TAHIR 02/16/21 0800   Base dressing in place, unable to visualize 02/16/21 0800   Periwound redness 02/16/21 0800   Periwound Temperature warm 02/16/21 0800   Periwound Skin Turgor soft 02/16/21 0800   Drainage Amount none 02/16/21 0800   Dressing Care other (see comments) 02/15/21 2000   Periwound Care dry periwound area maintained 02/15/21 2000       Wound 01/27/21 1322 Right distal leg Incision (Active)   Dressing Appearance dry;intact 02/16/21 1500   Closure [place]ples 02/16/21 0800   Base dressing in place, unable to visualize 02/16/21 0800   Periwound pink 02/16/21 0800   Periwound Temperature warm 02/16/21 0800   Periwound Skin Turgor firm 02/16/21 0800   Drainage Amount none 02/16/21 0800   Dressing Care other (see comments) 02/15/21 2000   Periwound Care dry periwound area maintained 02/15/21 2000       Wound 02/09/21 1604 Right lower leg Incision (Active)   Dressing Appearance dry;intact 02/16/21 1500   Closure [place]ples 02/16/21 0800   Base dressing in place, unable to visualize 02/16/21 0800   Periwound redness;warm;blanchable  02/16/21 0800   Periwound Temperature warm 02/16/21 0800   Periwound Skin Turgor firm;soft 02/16/21 0800   Drainage Amount none 02/16/21 0800   Dressing Care border dressing 02/15/21 2000   Periwound Care dry periwound area maintained 02/15/21 2000       Wound 02/09/21 1737 Right groin  (Active)   Dressing Appearance dry;intact 02/16/21 1500   Closure TAHIR 02/16/21 0800   Base dressing in place, unable to visualize 02/16/21 0800   Periwound redness;blanchable 02/16/21 0800   Periwound Temperature warm 02/16/21 0800   Periwound Skin Turgor soft 02/16/21 0800   Edges open 02/16/21 0800   Drainage Characteristics/Odor serosanguineous 02/16/21 0800   Drainage Amount moderate 02/16/21 0800   Care, Wound other (see comments) 02/15/21 2000   Dressing Care other (see comments) 02/15/21 2000   Periwound Care dry periwound area maintained 02/15/21 2000       Wound 02/09/21 1751 Right anterior popliteal Incision (Active)   Dressing Appearance other (see comments) 02/16/21 1500   Closure TAHIR 02/16/21 0800   Base dressing in place, unable to visualize 02/16/21 0800   Periwound redness;blanchable 02/16/21 0800   Periwound Temperature warm 02/16/21 0800   Periwound Skin Turgor firm 02/16/21 0800   Drainage Amount none 02/16/21 0800   Care, Wound other (see comments) 02/15/21 2000   Dressing Care other (see comments) 02/15/21 2000   Periwound Care dry periwound area maintained 02/15/21 2000       NPWT (Negative Pressure Wound Therapy) 02/14/21 0945 R groin (Active)   Therapy Setting continuous therapy 02/16/21 1500   Dressing foam, black;foam, white;transparent dressing 02/15/21 2000   Pressure Setting 150 mmHg 02/16/21 1500       NPWT (Negative Pressure Wound Therapy) 02/14/21 0945 RLE wounds (Active)   Therapy Setting continuous therapy 02/16/21 1500   Dressing foam, black;foam, white;transparent dressing 02/15/21 2000   Contact Layer other (see comments) 02/15/21 2000   Pressure Setting 150 mmHg 02/16/21 1500         WOUND  DEBRIDEMENT                        PT Assessment (last 12 hours)      PT Evaluation and Treatment     Row Name 02/16/21 1500          Physical Therapy Time and Intention    Subjective Information  no complaints  -     Document Type  therapy note (daily note);wound care  -     Mode of Treatment  individual therapy;physical therapy  -     Row Name 02/16/21 1500          Pain    Additional Documentation  Pain Scale: FACES Pre/Post-Treatment (Group)  -     Row Name 02/16/21 1500          Pain Scale: Numbers Pre/Post-Treatment    Pretreatment Pain Rating  0/10 - no pain  -     Posttreatment Pain Rating  0/10 - no pain  -     Row Name 02/16/21 1500          Wound 01/27/21 1322 Right distal leg Incision    Wound - Properties Group Placement Date: 01/27/21  -ER Placement Time: 1322  -ER Present on Hospital Admission: N  -ER Side: Right  -ER Orientation: distal  -ER Location: leg  -ER Primary Wound Type: Incision  -ER Additional Comments: medial R ankle  -JM    Dressing Appearance  dry;intact  -MF     Retired Wound - Properties Group Date first assessed: 01/27/21  -ER Time first assessed: 1322  -ER Present on Hospital Admission: N  -ER Side: Right  -ER Location: leg  -ER Primary Wound Type: Incision  -ER Additional Comments: medial R ankle  -JM    Row Name 02/16/21 1500          Wound 01/27/21 1322 Right proximal leg Incision    Wound - Properties Group Placement Date: 01/27/21  -ER Placement Time: 1322  -ER Present on Hospital Admission: N  -ER Side: Right  -ER Orientation: proximal  -ER Location: leg  -ER Primary Wound Type: Incision  -ER Additional Comments: medial proximal calf  -JM    Dressing Appearance  intact  -MF     Retired Wound - Properties Group Date first assessed: 01/27/21  -ER Time first assessed: 1322  -ER Present on Hospital Admission: N  -ER Side: Right  -ER Location: leg  -ER Primary Wound Type: Incision  -ER Additional Comments: medial proximal calf  -JM    Row Name 02/16/21 1500           Wound 01/27/21 1322 Right anterior greater trochanter Incision    Wound - Properties Group Placement Date: 01/27/21  -ER Placement Time: 1322  -ER Present on Hospital Admission: N  -ER Side: Right  -ER Orientation: anterior  -ER Location: greater trochanter  -ER Primary Wound Type: Incision  -ER Additional Comments: medial proximal thigh  -JM    Dressing Appearance  intact  -MF     Retired Wound - Properties Group Date first assessed: 01/27/21  -ER Time first assessed: 1322  -ER Present on Hospital Admission: N  -ER Side: Right  -ER Location: greater trochanter  -ER Primary Wound Type: Incision  -ER Additional Comments: medial proximal thigh  -JM    Row Name 02/16/21 1500          Wound 02/09/21 1604 Right lower leg Incision    Wound - Properties Group Placement Date: 02/09/21  -LS Placement Time: 1604  -LS, Present upon admission to preop  Side: Right  -LS Orientation: lower  -LS Location: leg  -LS Primary Wound Type: Incision  -LS Additional Comments: medial distal R calf  -JM    Dressing Appearance  dry;intact  -MF     Retired Wound - Properties Group Date first assessed: 02/09/21  -LS Time first assessed: 1604  -LS, Present upon admission to preop  Side: Right  -LS Location: leg  -LS Primary Wound Type: Incision  -LS Additional Comments: medial distal R calf  -JM    Row Name 02/16/21 1500          Wound 02/09/21 1737 Right groin     Wound - Properties Group Placement Date: 02/09/21  -ER Placement Time: 1737  -ER Side: Right  -ER Location: groin  -ER Primary Wound Type: --  -ER, OPEN WOUND     Dressing Appearance  dry;intact  -MF     Retired Wound - Properties Group Date first assessed: 02/09/21  -ER Time first assessed: 1737  -ER Side: Right  -ER Location: groin  -ER Primary Wound Type: --  -ER, OPEN WOUND     Row Name 02/16/21 1500          Wound 02/09/21 1751 Right anterior popliteal Incision    Wound - Properties Group Placement Date: 02/09/21  -ER Placement Time: 1751  -ER Present on Hospital Admission: N   -ER Side: Right  -ER Orientation: anterior  -ER Location: popliteal  -ER Primary Wound Type: Incision  -ER, OPEN WOUND  Additional Comments: medial distal thigh  -JM    Dressing Appearance  other (see comments) vac leaking  -MF     Retired Wound - Properties Group Date first assessed: 02/09/21  -ER Time first assessed: 1751  -ER Present on Hospital Admission: N  -ER Side: Right  -ER Location: popliteal  -ER Primary Wound Type: Incision  -ER, OPEN WOUND  Additional Comments: medial distal thigh  -JM    Row Name 02/16/21 1500          NPWT (Negative Pressure Wound Therapy) 02/14/21 0945 R groin    NPWT (Negative Pressure Wound Therapy) - Properties Group Placement Date: 02/14/21  - Placement Time: 0945 - Location: R groin  -JM Additional Comments: Rehab rental unit  -    Therapy Setting  continuous therapy  -MF     Pressure Setting  150 mmHg  -MF     Retired NPWT (Negative Pressure Wound Therapy) - Properties Group Placement Date: 02/14/21  - Placement Time: 0945 -JM Location: R groin  -JM Additional Comments: Rehab rental unit  -    Row Name 02/16/21 1500          NPWT (Negative Pressure Wound Therapy) 02/14/21 0945 RLE wounds    NPWT (Negative Pressure Wound Therapy) - Properties Group Placement Date: 02/14/21  - Placement Time: 0945 -JM Location: RLE wounds  -JM Additional Comments: Rental unit [id number]  -    Therapy Setting  continuous therapy  -MF     Pressure Setting  150 mmHg  -MF     Retired NPWT (Negative Pressure Wound Therapy) - Properties Group Placement Date: 02/14/21  - Placement Time: 0945 -JM Location: RLE wounds  -JM Additional Comments: Rental unit [id number]  -    Row Name 02/16/21 1500          Coping    Observed Emotional State  calm;cooperative;pleasant  -     Verbalized Emotional State  acceptance  -MF     Trust Relationship/Rapport  care explained  -     Row Name 02/16/21 1500          Plan of Care Review    Plan of Care Reviewed With  patient  -MF     Outcome Summary   wound vac leaking slightly. PT applied tape and educated pt on home management of wound vac in prep for d/c.  -       User Key  (r) = Recorded By, (t) = Taken By, (c) = Cosigned By    Initials Name Provider Type     Say Mason, PT Physical Therapist    Irina Anaya, RN Registered Nurse    Afua Oneill, PT Physical Therapist    Orly Campa RN Registered Nurse        Physical Therapy Education                 Title: PT OT SLP Therapies (In Progress)     Topic: Physical Therapy (In Progress)     Point: Mobility training (Done)     Learning Progress Summary           Patient Acceptance, E, VU by  at 2/15/2021 1502    Comment: Patient education regarding sequencing for safe gait and transfers.    LONDON BaezD, VU by  at 2/14/2021 1405    Comment: POC, gait, safety                   Point: Home exercise program (Not Started)     Learner Progress:  Not documented in this visit.          Point: Body mechanics (Not Started)     Learner Progress:  Not documented in this visit.          Point: Precautions (Not Started)     Learner Progress:  Not documented in this visit.                      User Key     Initials Effective Dates Name Provider Type [facility] 06/19/15 -  Rosalind Atkins PT Physical Therapist PT     03/11/20 -  Joselin Preston PT Physical Therapist PT                Recommendation and Plan  Anticipated Discharge Disposition (PT): home with assist  Therapy Frequency (PT): daily  Plan of Care Reviewed With: patient           Outcome Summary: wound vac leaking slightly. PT applied tape and educated pt on home management of wound vac in prep for d/c.  Plan of Care Reviewed With: patient            Time Calculation        Therapy Charges for Today     Code Description Service Date Service Provider Modifiers Qty    [id number]  PT NEG PRESS WOUND TO 50SQCM DME1 2/15/2021 Say Mason, PT  1    [id number] HC PT NEG PRESS WOUND TO 50SQCM DME1 2/16/2021 Isabella  Say ORO, PT  1            PT G-Codes  Outcome Measure Options: AM-PAC 6 Clicks Daily Activity (OT)  AM-PAC 6 Clicks Score (PT): 20  AM-PAC 6 Clicks Score (OT): 17       Say Mason, PT  2/16/2021

## 2021-02-16 NOTE — THERAPY TREATMENT NOTE
Patient Name: Reg Sanchez  : 1965    MRN: [id number]                              Today's Date: 2021       Admit Date: 2021    Visit Dx:     ICD-10-CM ICD-9-CM   1. Postoperative infection, unspecified type, initial encounter  T81.40XA 998.59     Patient Active Problem List   Diagnosis   • PAD (peripheral artery disease) (CMS/[facility])   • 100% R SFA occlusion    • Atherosclerosis of native artery of right lower extremity with intermittent claudication (CMS/[facility])   • Tobacco use   • PVD    • CAD s/p stents and CABG X5    • S/P L leg amputation    • Smoker   • Daily marijuana use   • Suspected ETOH withdrawal    • T2DM on metformin    • Class 1 obesity in adult   • Macrocytosis without anemia   • Post-operative infection   • Postoperative infection     Past Medical History:   Diagnosis Date   • Anxiety    • CHF (congestive heart failure) (CMS/[facility])    • COPD (chronic obstructive pulmonary disease) (CMS/[facility])    • Coronary artery disease involving native coronary artery of native heart without angina pectoris 2021    Added automatically from request for surgery [id number]   • Diabetes mellitus (CMS/[facility])    • Hepatitis C    • Hyperlipidemia    • Hypertension    • Sleep apnea    • T2DM on metformin  2021     Past Surgical History:   Procedure Laterality Date   • CARDIAC CATHETERIZATION  11/20/2020    x 2 stents   • CARDIAC CATHETERIZATION Right 2021    Procedure: Peripheral angiography;  Surgeon: Wesly Lund MD;  Location: [facility] INVASIVE LOCATION;  Service: Cardiovascular;  Laterality: Right;   • CORONARY ARTERY BYPASS GRAFT      x 5   • CORONARY STENT PLACEMENT  2020    Dr Mortensen x 2 stents   • FEMORAL DISTAL BYPASS N/A 2021    Procedure: FEMORAL DISTAL BYPASS;  Surgeon: Jeremy Trevizo MD;  Location: [facility] OR;  Service: Vascular;  Laterality: N/A;   • FINGER SURGERY      left   • INCISION AND DRAINAGE LEG Right 2021    Procedure: INCISION AND DRAINAGE RIGHT  LOWER EXTREMITY, EVACUATION OF HEMATOMA, LIGATION OF FEM DISTAL BYPASS;  Surgeon: Jeremy Trevizo MD;  [facility];  Service: Cardiothoracic;  Laterality: Right;   • LEG AMPUTATION Left     left lower leg   • SHOULDER SURGERY Bilateral      General Information     Row Name 02/16/21 1447          OT Time and Intention    Document Type  therapy note (daily note)  -TB     Mode of Treatment  occupational therapy;individual therapy  -TB     Row Name 02/16/21 1447          General Information    Patient Profile Reviewed  yes  -TB     Existing Precautions/Restrictions  fall;other (see comments) Remote L BKA, RLE wound vacs x2  -TB     Barriers to Rehab  medically complex  -TB     Row Name 02/16/21 1447          Occupational Profile    Reason for Services/Referral (Occupational Profile)  to facilitate return to occupational independence  -TB     Row Name 02/16/21 1447          Cognition    Orientation Status (Cognition)  oriented x 4  -TB     Row Name 02/16/21 1447          Safety Issues, Functional Mobility    Impairments Affecting Function (Mobility)  balance;endurance/activity tolerance;strength;sensation/sensory awareness  -TB     Comment, Safety Issues/Impairments (Mobility)  Pt transfers with SBA  -TB       User Key  (r) = Recorded By, (t) = Taken By, (c) = Cosigned By    Initials Name Provider Type    TB Danielle Viera, OT Occupational Therapist          Mobility/ADL's     Row Name 02/16/21 1449          Bed Mobility    Bed Mobility  bed mobility (all) activities  -TB     All Activities, [place] (Bed Mobility)  independent  -TB     Assistive Device (Bed Mobility)  bed rails  -TB     Comment (Bed Mobility)  Pt moves in/out of bed efficiently  -TB     Row Name 02/16/21 1447          Transfers    Transfers  sit-stand transfer;bed-chair transfer  -TB     Comment (Transfers)  SBA stand pivot EOB<>bed, moves efficiently  -TB     Bed-Chair [place] (Transfers)  standby assist  -TB     Sit-Stand  [place] (Transfers)  standby assist  -     Row Name 02/16/21 1449          Functional Mobility    Functional Mobility- Comment  Defer to PT  -TB     [facility] Name 02/16/21 1449          Activities of Daily Living    BADL Assessment/Intervention  lower body dressing;upper body dressing;bathing  -     Row Name 02/16/21 1449          Lower Body Dressing Assessment/Training    [place] Level (Lower Body Dressing)  don;socks;moderate assist (50% patient effort)  -TB     Position (Lower Body Dressing)  unsupported sitting  -TB     Comment (Lower Body Dressing)  OT assists with initiating R sock. AE contraindicated at this time due to risk for additional wounds. Wound vacs x2 in place RLE.  -     Row Name 02/16/21 1449          Upper Body Dressing Assessment/Training    [place] Level (Upper Body Dressing)  don;pajama/robe;independent  -TB     Position (Upper Body Dressing)  edge of bed sitting  -[facility] Name 02/16/21 1449          Bathing Assessment/Intervention    [place] Level (Bathing)  distal lower extremities/feet;dependent (less than 25% patient effort) R foot  -TB     Position (Bathing)  unsupported sitting  -TB       User Key  (r) = Recorded By, (t) = Taken By, (c) = Cosigned By    Initials Name Provider Type    TB Danielle Viera OT Occupational Therapist        Obj/Interventions     [facility] Name 02/16/21 1452          Balance    Dynamic Sitting Balance  WNL;unsupported;sitting in chair;sitting, edge of bed  -TB     Dynamic Standing Balance  mild impairment;supported;standing  -TB     Balance Interventions  sitting;standing;sit to stand;supported;dynamic;occupation based/functional task;weight shifting activity;dynamic reaching;UE activity with balance activity  -[facility] Name 02/16/21 1452          Therapeutic Exercise    Therapeutic Exercise  -- Education reinforced for BUE HEP to support transfers and self-care. Pt complete 3x10 reps chair push-ups with long rest breaks between  sets. Mild SOA on exertion.  -TB       User Key  (r) = Recorded By, (t) = Taken By, (c) = Cosigned By    Initials Name Provider Type    TB Danielle Viera, OT Occupational Therapist        Goals/Plan    No documentation.       Clinical Impression     Row Name 02/16/21 1454          Pain Assessment    Additional Documentation  Pain Scale: Numbers Pre/Post-Treatment (Group)  -TB     Row Name 02/16/21 1454          Pain Scale: Numbers Pre/Post-Treatment    Pain Location - Side  Right  -TB     Pain Location - Orientation  lower  -TB     Pain Location  extremity  -TB     Pre/Posttreatment Pain Comment  Increased RLE pain with activity  -TB     Pain Intervention(s)  Repositioned  -TB     Row Name 02/16/21 1459          Plan of Care Review    Plan of Care Reviewed With  patient  -TB     Outcome Summary  Pt motivated to work with OT. BUE HEP completed for 3x10 reps chair push-ups to support transfers and self-care. OT assisted pt with RLE/foot bathing and donning sock. AE contraindicated at this time due to risk for additional wounds. OT will continue to follow IP.  -TB     Row Name 02/16/21 1451          Therapy Plan Review/Discharge Plan (OT)    Equipment Needs Upon Discharge (OT)  -- Pt denies DME needs.  -TB     Anticipated Discharge Disposition (OT)  home with assist;home with outpatient therapy services  -TB     Row Name 02/16/21 1452          Vital Signs    Pre Systolic BP Rehab  -- RN cleared OT  -TB     O2 Delivery Pre Treatment  room air  -TB     O2 Delivery Intra Treatment  room air  -TB     O2 Delivery Post Treatment  room air  -TB     Pre Patient Position  Supine  -TB     Intra Patient Position  Standing  -TB     Post Patient Position  Sitting  -TB     Row Name 02/16/21 1456          Positioning and Restraints    Pre-Treatment Position  in bed  -TB     Post Treatment Position  chair  -TB     In Chair  sitting;call light within reach;encouraged to call for assist  -TB       User Key  (r) = Recorded By,  (t) = Taken By, (c) = Cosigned By    Initials Name Provider Type    Danielle Huber OT Occupational Therapist        Outcome Measures     Row Name 02/16/21 1457          How much help from another is currently needed...    Putting on and taking off regular lower body clothing?  2  -TB     Bathing (including washing, rinsing, and drying)  2  -TB     Toileting (which includes using toilet bed pan or urinal)  3  -TB     Putting on and taking off regular upper body clothing  3  -TB     Taking care of personal grooming (such as brushing teeth)  3  -TB     Eating meals  4  -TB     AM-PAC 6 Clicks Score (OT)  17  -TB     Row Name 02/16/21 1457          Functional Assessment    Outcome Measure Options  AM-PAC 6 Clicks Daily Activity (OT)  -TB       User Key  (r) = Recorded By, (t) = Taken By, (c) = Cosigned By    Initials Name Provider Type    Danielle Huber OT Occupational Therapist        Occupational Therapy Education                 Title: PT OT SLP Therapies (In Progress)     Topic: Occupational Therapy (In Progress)     Point: ADL training (Done)     Description:   Instruct learner(s) on proper safety adaptation and remediation techniques during self care or transfers.   Instruct in proper use of assistive devices.              Learning Progress Summary           Patient Acceptance, E, VU,NR by OCTAVIANO at 2/14/2021 9014    Comment: reason for consult, noted deficit, AE available                   Point: Home exercise program (Not Started)     Description:   Instruct learner(s) on appropriate technique for monitoring, assisting and/or progressing therapeutic exercises/activities.              Learner Progress:  Not documented in this visit.          Point: Precautions (Not Started)     Description:   Instruct learner(s) on prescribed precautions during self-care and functional transfers.              Learner Progress:  Not documented in this visit.          Point: Body mechanics (Not Started)      Description:   Instruct learner(s) on proper positioning and spine alignment during self-care, functional mobility activities and/or exercises.              Learner Progress:  Not documented in this visit.                      User Key     Initials Effective Dates Name Provider Type Discipline    OCTAVIANO 06/08/18 -  Colleen Gallegos OT Occupational Therapist OT              OT Recommendation and Plan     Plan of Care Review  Plan of Care Reviewed With: patient  Outcome Summary: Pt motivated to work with OT. BUE HEP completed for 3x10 reps chair push-ups to support transfers and self-care. OT assisted pt with RLE/foot bathing and donning sock. AE contraindicated at this time due to risk for additional wounds. OT will continue to follow IP.     Time Calculation:   Time Calculation- OT     Row Name 02/16/21 1410             Time Calculation- OT    OT Start Time  1410  -TB      OT Received On  02/16/21  -TB      OT Goal Re-Cert Due Date  02/24/21  -TB         Timed Charges    41364 - OT Therapeutic Activity Minutes  28  -TB        User Key  (r) = Recorded By, (t) = Taken By, (c) = Cosigned By    Initials Name Provider Type    TB Danielle Viera OT Occupational Therapist        Therapy Charges for Today     Code Description Service Date Service Provider Modifiers Qty    68782566122 HC OT THERAPEUTIC ACT EA 15 MIN 2/16/2021 Danielle Viera OT GO 2               Danielle Viera OT  2/16/2021

## 2021-02-16 NOTE — PROGRESS NOTES
Continued Stay Note   [place]     Patient Name: Reg Sanchez  MRN: [id number]  Today's Date: 2/16/2021    Admit Date: 2/5/2021    Discharge Plan     Row Name 02/16/21 1432       Plan    Plan  Home with outpatient wound care with [facility]    Patient/Family in Agreement with Plan  yes    Plan Comments  Spoke with patient at bedside.  Plan is to discharge home with family tomorrow or Thursday.  Patient will need home wound vac unit applied prior to discharge.  PT wound care aware, and have started paperwork for wound vac.  Appointment has been arranged to follow up with outpatient wound care on Saturday 2/20 at 1300.  This visit has been added to AVS.  Spoke with Vivienne in [facility] home infusion.  She states that patient's IV Daptomycin will cost $150  per week, however patient has likely met deductible with hospital stay, and will only cost $10 per week.  [facility] will bill for medications.  Patient has been scheduled with follow up at [facility] as well as PICC dressing changes, and labs on 2/23 at 1345.  This visit has been added to AVS.  Patient states family or friend will transport at time of dishcarge.  Will continue to follow for discharge planning    Final Discharge Disposition Code  01 - home or self-care        Discharge Codes    No documentation.       Expected Discharge Date and Time     Expected Discharge Date Expected Discharge Time    Feb 17, 2021             Shaina Cramer RN

## 2021-02-16 NOTE — PROGRESS NOTES
CTS Progress Note       LOS: 11 days   Patient Care Team:  Sheila Sheikh APRN as PCP - General (Nurse Practitioner)    Chief Complaint: Postoperative infection    Vital Signs:  Temp:  [98.1 °F (36.7 °C)-98.9 °F (37.2 °C)] 98.1 °F (36.7 °C)  Heart Rate:  [68-93] 68  Resp:  [14-20] 18  BP: (111-136)/() 122/53    Physical Exam:       Results:   Results from last 7 days   Lab Units 02/15/21  0653   WBC 10*3/mm3 8.67   HEMOGLOBIN g/dL 10.1*   HEMATOCRIT % 32.2*   PLATELETS 10*3/mm3 339     Results from last 7 days   Lab Units 02/15/21  0653   SODIUM mmol/L 127*   POTASSIUM mmol/L 4.4   CHLORIDE mmol/L 96*   CO2 mmol/L 24.0   BUN mg/dL 13   CREATININE mg/dL 0.70*   GLUCOSE mg/dL 169*   CALCIUM mg/dL 8.9           Imaging Results (Last 24 Hours)     ** No results found for the last 24 hours. **          Assessment      Postoperative infection    Tobacco use    PVD     S/P L leg amputation     Post-operative infection    Ambulatory 200 feet yesterday.  Anticipate discharge home tomorrow.  With oral antibiotics per infectious disease and wound VAC in place    Plan   Lasix 40 mg IV x1.  Anticipate discharge home tomorrow with antibiotic prescription per infectious disease.    Please note that portions of this note were completed with a voice recognition program. Efforts were made to edit the dictations, but occasionally words are mistranscribed.    Jeremy Trevizo MD  02/16/21  07:07 EST

## 2021-02-16 NOTE — DISCHARGE INSTR - APPOINTMENTS
You have been scheduled with Dr. Washburn with [facility] for a follow up appointment, labs and PICC line dressing change on Tuesday Feb. 23rd at 1345.  Please arrive 15 minutes early for appointment to fill out paperwork.

## 2021-02-16 NOTE — PROGRESS NOTES
"    [facility] Medicine Services  PROGRESS NOTE    Patient Name: Reg Sanchez  : 1965  MRN: [id number]    Date of Admission: 2021  Primary Care Physician: Sheila Sheikh APRN    Subjective   Subjective     CC:  Post op infection     HPI:  Feels well today, very eager for discharge, likely tomorrow. Still having pain at proximal wound vac site, \"tightness\". Eating well, having BM's and urinating well. Got another does of Lasix this morning.     ROS:  Gen- No fevers, chills  CV- No chest pain, palpitations  Resp- No cough, dyspnea  GI- No N/V/D, abd pain       Objective   Objective     Vital Signs:   Temp:  [98.1 °F (36.7 °C)-98.9 °F (37.2 °C)] 98.4 °F (36.9 °C)  Heart Rate:  [61-93] 61  Resp:  [16-20] 16  BP: (111-136)/(53-61) 127/57        Physical Exam:  Constitutional: No acute distress, awake, alert  HENT: NCAT, mucous membranes moist  Respiratory: Clear to auscultation bilaterally, respiratory effort normal   Cardiovascular: RRR, no murmurs, rubs, or gallops  Gastrointestinal: Positive bowel sounds, soft, nontender, nondistended, obese  Musculoskeletal: left BKA, right lower extremity with wound vac x 2 and mild edema   Psychiatric: Appropriate affect, cooperative  Neurologic: Oriented x 3, strength symmetric in all extremities, Cranial Nerves grossly intact to confrontation, speech clear  Skin: No rashes   No changes from     Results Reviewed:  Results from last 7 days   Lab Units 02/15/21  0653 21  0656 21  0722   WBC 10*3/mm3 8.67 9.10 8.98   HEMOGLOBIN g/dL 10.1* 9.3* 9.7*   HEMATOCRIT % 32.2* 29.2* 30.7*   PLATELETS 10*3/mm3 339 314 278     Results from last 7 days   Lab Units 21  0708 02/15/21  0653 21  0656   SODIUM mmol/L 132* 127* 130*   POTASSIUM mmol/L 4.5 4.4 4.7   CHLORIDE mmol/L 96* 96* 98   CO2 mmol/L 29.0 24.0 25.0   BUN mg/dL 17 13 12   CREATININE mg/dL 0.67* 0.70* 0.71*   GLUCOSE mg/dL 146* 169* 204*   CALCIUM mg/dL 9.2 8.9 " 8.7   ALT (SGPT) U/L  --  33  --    AST (SGOT) U/L  --  32  --      Estimated Creatinine Clearance: 141.1 mL/min (A) (by C-G formula based on SCr of 0.67 mg/dL (L)).    Microbiology Results Abnormal     Procedure Component Value - Date/Time    Fungus Culture - Tissue, Groin, right [[id number]] Collected: 02/09/21 1738    Lab Status: Preliminary result Specimen: Tissue from Groin, right Updated: 02/14/21 1815     Fungus Culture No fungus isolated at less than 1 week    AFB Culture - Tissue, Groin, right [[id number]] Collected: 02/09/21 1738    Lab Status: Preliminary result Specimen: Tissue from Groin, right Updated: 02/14/21 1815     AFB Culture No AFB isolated at less than 1 week     AFB Stain No acid fast bacilli seen on concentrated smear    Anaerobic Culture - Tissue, Groin, right [[id number]] Collected: 02/09/21 1738    Lab Status: Final result Specimen: Tissue from Groin, right Updated: 02/14/21 0650     Anaerobic Culture No anaerobes isolated at 5 days    Tissue / Bone Culture - Tissue, Groin, right [[id number]] Collected: 02/09/21 1738    Lab Status: Final result Specimen: Tissue from Groin, right Updated: 02/11/21 0639     Tissue Culture Scant growth (1+) Normal Skin Celi     Gram Stain Moderate (3+) WBCs seen      No organisms seen    Blood Culture - Blood, Arm, Left [[id number]] Collected: 02/05/21 1806    Lab Status: Final result Specimen: Blood from Arm, Left Updated: 02/10/21 1931     Blood Culture No growth at 5 days    Narrative:      Aerobic bottle only      Blood Culture - Blood, Hand, Left [[id number]] Collected: 02/05/21 1806    Lab Status: Final result Specimen: Blood from Hand, Left Updated: 02/10/21 1931     Blood Culture No growth at 5 days    Wound Culture - Wound, Leg, Right [[id number]]  (Abnormal)  (Susceptibility) Collected: 02/06/21 1748    Lab Status: Edited Result - FINAL Specimen: Wound from Leg, Right Updated: 02/10/21 1603     Wound Culture Moderate growth (3+) Staphylococcus aureus,  MRSA     Comment: Methicillin resistant Staphylococcus aureus, Patient may be an isolation risk.        Gram Stain Few (2+) WBCs seen      No organisms seen    Susceptibility      Staphylococcus aureus, MRSA     AZIZA     Clindamycin Susceptible     Daptomycin Susceptible (C) [1]      Erythromycin Susceptible     Inducible Clindamycin Resistance Negative     Oxacillin Resistant     Penicillin G Resistant     Rifampin Susceptible     Tetracycline Susceptible     Trimethoprim + Sulfamethoxazole Susceptible     Vancomycin Susceptible            [1]   Appended report. These results have been appended to a previously final verified report.             Susceptibility Comments     Staphylococcus aureus, MRSA    Daptomycin susceptibility requested by Inderjit ID    This isolate does not demonstrate inducible clindamycin resistance in vitro.                     Imaging Results (Last 24 Hours)     ** No results found for the last 24 hours. **          Results for orders placed during the hospital encounter of 01/25/21   Adult Transthoracic Echo Complete W/ Cont if Necessary Per Protocol    Narrative · Left ventricular ejection fraction appears to be 66 - 70%. Left   ventricular systolic function is normal.  · The right ventricular cavity is borderline dilated.  · Left ventricular diastolic function was normal.  · No significant structural or functional valvular disease.          I have reviewed the medications:  Scheduled Meds:arformoterol, 15 mcg, Nebulization, BID - RT  aspirin, 81 mg, Oral, Daily  atorvastatin, 40 mg, Oral, Nightly  budesonide, 0.5 mg, Nebulization, BID - RT  DAPTOmycin, 6 mg/kg (Adjusted), Intravenous, Q24H  doxycycline, 100 mg, Oral, Q12H  DULoxetine, 60 mg, Oral, BID  enoxaparin, 40 mg, Subcutaneous, Daily  furosemide, 20 mg, Oral, Daily  insulin detemir, 8 Units, Subcutaneous, Daily  insulin lispro, 0-7 Units, Subcutaneous, 4x Daily With Meals & Nightly  isosorbide mononitrate, 30 mg, Oral, Daily  lisinopril,  10 mg, Oral, Daily  metoprolol tartrate, 50 mg, Oral, BID  nicotine, 1 patch, Transdermal, Q24H  pantoprazole, 40 mg, Oral, QAM  pregabalin, 200 mg, Oral, TID  rOPINIRole, 4 mg, Oral, Nightly  sodium chloride, 10 mL, Intravenous, Q12H  sodium chloride, 10 mL, Intravenous, Q12H  sodium hypochlorite, , Topical, Q12H      Continuous Infusions:   PRN Meds:.•  acetaminophen **OR** acetaminophen **OR** acetaminophen  •  albuterol  •  dextrose  •  dextrose  •  glucagon (human recombinant)  •  hydrOXYzine  •  magnesium sulfate **OR** magnesium sulfate **OR** magnesium sulfate  •  Morphine  •  naloxone  •  ondansetron **OR** ondansetron  •  oxyCODONE-acetaminophen  •  potassium & sodium phosphates **OR** potassium & sodium phosphates  •  sodium chloride  •  sodium chloride    Assessment/Plan   Assessment & Plan     Active Hospital Problems    Diagnosis  POA   • **Postoperative infection [T81.40XA]  Unknown   • Post-operative infection [T81.40XA]  Yes   • S/P L leg amputation  [S88.919A]  Yes   • PVD  [I73.9]  Yes   • Tobacco use [Z72.0]  Yes      Resolved Hospital Problems   No resolved problems to display.        Brief Hospital Course to date:  Reg Sanchez is a 55 y.o. male with past medical history significant for tobacco abuse, diabetes mellitus, peripheral vascular disease, congestive heart failure, coronary artery disease status post CABG, left leg osteomyelitis status post left leg amputation, peripheral arterial disease and most recently right SFA and popliteal artery occlusion underwent a right femoral artery bypass with Dr. Trevizo 1/27/2021.  The patient developed fever on 2/2/2021. On 2/3/21 he developed right leg swelling and redness. On 2/4/21, he presented to outside hospital where he had lab work indicating inflammatory/infectious process and a CT angiogram of the leg which revealed soft tissue stranding and fluid surrounding the recent right femoral/distal artery graft.  Patent runoff with adequate  flow.  Patient was taken to the OR on 2/9 with Dr Trevizo for surgical drainage of right groin and knee. Patient was monitored in the ICU Following surgery, transferred back to floor 2/11.      This patient's problems and plans were partially entered by my partner and updated as appropriate by me 02/14/21.     Right leg cellulitis   Right femoral artery bypass graft infection with MRSA s/p I&D with CTS 2/9  --ID (Dr Washburn) is following for assistance with abx- continue to follow cx- continue dapto/po doxy- 6 week course of IV abx anticipated, PICC placed 2/9  --continue PRN pain control   --MRSA from wound culture (2/10)- susceptibilities noted  --CTS for wound management; wound vac placed 2/14 and will need home unit placed in next 2-3 days for DC home, will need to have changed three times weekly outpatient   --PT/OT pending   --follow up with Dr Washburn ID 1 week after DC      - IV daptomycin 500 mg daily   - PO doxycycline 100 mg BID  - Weekly sterile PICC dressing changes  - Weekly labs CBC w/diff, CMP, CK, CRP faxed to [facility] at [phone] attn: Dr Washburn  - Follow up with Dr Washburn 1 week of discharge     Chronic CHF -- unspecified   CAD Hx CABG  PVD   - follows with cardiology; uncertain if heart failure in the past but current ECHO with normal EF and diastolic function   - Continue home indur, metoprolol, lasix, ASA, statin      Hyperkalemia  --stop K+ replacement -- improved      Leukocytosis-resolved     COPD  --PRN nebs     Diabetes mellitus   -SSI, A1C 7.2 -control adequate at this time     Hyperlipidemia  -Lipitor 40mg     Tobacco abuse  -nicotine patch     etoh abuse  - ativan PRN     Hep C previously treated with interferon/ribaviron then reinfected      DVT Prophylaxis:  SALIMA     Disposition: TBD. Patient currently declining rehab -- will need wound vac and IV antibiotics at home likely will go home in 1-2 days     CODE STATUS:   Code Status and  Medical Interventions:   Ordered at: 02/05/21 1505     Code Status:    CPR     Medical Interventions (Level of Support Prior to Arrest):    Full       Stephanie Gold, APRN  02/16/21

## 2021-02-17 VITALS
SYSTOLIC BLOOD PRESSURE: 164 MMHG | TEMPERATURE: 98.2 F | DIASTOLIC BLOOD PRESSURE: 64 MMHG | RESPIRATION RATE: 16 BRPM | BODY MASS INDEX: 34.93 KG/M2 | HEIGHT: 67 IN | HEART RATE: 72 BPM | OXYGEN SATURATION: 95 %

## 2021-02-17 LAB
ANION GAP SERPL CALCULATED.3IONS-SCNC: 7 MMOL/L (ref 5–15)
BUN SERPL-MCNC: 14 MG/DL (ref 6–20)
BUN/CREAT SERPL: 20.3 (ref 7–25)
CALCIUM SPEC-SCNC: 9.2 MG/DL (ref 8.6–10.5)
CHLORIDE SERPL-SCNC: 97 MMOL/L (ref 98–107)
CO2 SERPL-SCNC: 29 MMOL/L (ref 22–29)
CREAT SERPL-MCNC: 0.69 MG/DL (ref 0.76–1.27)
DEPRECATED RDW RBC AUTO: 53.8 FL (ref 37–54)
ERYTHROCYTE [DISTWIDTH] IN BLOOD BY AUTOMATED COUNT: 15 % (ref 12.3–15.4)
GFR SERPL CREATININE-BSD FRML MDRD: 119 ML/MIN/1.73
GLUCOSE BLDC GLUCOMTR-MCNC: 176 MG/DL (ref 70–130)
GLUCOSE BLDC GLUCOMTR-MCNC: 229 MG/DL (ref 70–130)
GLUCOSE SERPL-MCNC: 160 MG/DL (ref 65–99)
HCT VFR BLD AUTO: 33.7 % (ref 37.5–51)
HGB BLD-MCNC: 10.6 G/DL (ref 13–17.7)
MCH RBC QN AUTO: 31.5 PG (ref 26.6–33)
MCHC RBC AUTO-ENTMCNC: 31.5 G/DL (ref 31.5–35.7)
MCV RBC AUTO: 100 FL (ref 79–97)
PLATELET # BLD AUTO: 404 10*3/MM3 (ref 140–450)
PMV BLD AUTO: 12.1 FL (ref 6–12)
POTASSIUM SERPL-SCNC: 4.6 MMOL/L (ref 3.5–5.2)
RBC # BLD AUTO: 3.37 10*6/MM3 (ref 4.14–5.8)
SODIUM SERPL-SCNC: 133 MMOL/L (ref 136–145)
WBC # BLD AUTO: 8.31 10*3/MM3 (ref 3.4–10.8)

## 2021-02-17 PROCEDURE — 63710000001 INSULIN DETEMIR PER 5 UNITS: Performed by: INTERNAL MEDICINE

## 2021-02-17 PROCEDURE — 85027 COMPLETE CBC AUTOMATED: CPT | Performed by: PHYSICIAN ASSISTANT

## 2021-02-17 PROCEDURE — 82962 GLUCOSE BLOOD TEST: CPT

## 2021-02-17 PROCEDURE — 63710000001 INSULIN LISPRO (HUMAN) PER 5 UNITS: Performed by: INTERNAL MEDICINE

## 2021-02-17 PROCEDURE — 25010000002 DAPTOMYCIN PER 1 MG: Performed by: INTERNAL MEDICINE

## 2021-02-17 PROCEDURE — 99239 HOSP IP/OBS DSCHRG MGMT >30: CPT | Performed by: NURSE PRACTITIONER

## 2021-02-17 PROCEDURE — 80048 BASIC METABOLIC PNL TOTAL CA: CPT | Performed by: PHYSICIAN ASSISTANT

## 2021-02-17 PROCEDURE — 97605 NEG PRS WND THER DME<=50SQCM: CPT

## 2021-02-17 PROCEDURE — 97597 DBRDMT OPN WND 1ST 20 CM/<: CPT

## 2021-02-17 PROCEDURE — 25010000002 ENOXAPARIN PER 10 MG: Performed by: INTERNAL MEDICINE

## 2021-02-17 PROCEDURE — 94799 UNLISTED PULMONARY SVC/PX: CPT

## 2021-02-17 PROCEDURE — 97530 THERAPEUTIC ACTIVITIES: CPT

## 2021-02-17 PROCEDURE — 97535 SELF CARE MNGMENT TRAINING: CPT

## 2021-02-17 RX ORDER — DOXYCYCLINE 100 MG/1
100 CAPSULE ORAL EVERY 12 HOURS SCHEDULED
Qty: 14 CAPSULE | Refills: 0 | Status: SHIPPED | OUTPATIENT
Start: 2021-02-17 | End: 2021-02-17

## 2021-02-17 RX ORDER — HYDROCODONE BITARTRATE AND ACETAMINOPHEN 7.5; 325 MG/1; MG/1
1 TABLET ORAL EVERY 6 HOURS PRN
Qty: 24 TABLET | Refills: 0 | Status: SHIPPED | OUTPATIENT
Start: 2021-02-17 | End: 2021-04-12

## 2021-02-17 RX ORDER — NICOTINE 21 MG/24HR
1 PATCH, TRANSDERMAL 24 HOURS TRANSDERMAL
Start: 2021-02-18

## 2021-02-17 RX ADMIN — ENOXAPARIN SODIUM 40 MG: 40 INJECTION SUBCUTANEOUS at 08:58

## 2021-02-17 RX ADMIN — OXYCODONE HYDROCHLORIDE AND ACETAMINOPHEN 2 TABLET: 5; 325 TABLET ORAL at 06:11

## 2021-02-17 RX ADMIN — OXYCODONE HYDROCHLORIDE AND ACETAMINOPHEN 2 TABLET: 5; 325 TABLET ORAL at 10:20

## 2021-02-17 RX ADMIN — OXYCODONE HYDROCHLORIDE AND ACETAMINOPHEN 2 TABLET: 5; 325 TABLET ORAL at 01:36

## 2021-02-17 RX ADMIN — ASPIRIN 81 MG: 81 TABLET, CHEWABLE ORAL at 08:58

## 2021-02-17 RX ADMIN — DAPTOMYCIN 480.6 MG: 500 INJECTION, POWDER, LYOPHILIZED, FOR SOLUTION INTRAVENOUS at 08:58

## 2021-02-17 RX ADMIN — ISOSORBIDE MONONITRATE 30 MG: 30 TABLET, EXTENDED RELEASE ORAL at 08:58

## 2021-02-17 RX ADMIN — DULOXETINE HYDROCHLORIDE 60 MG: 60 CAPSULE, DELAYED RELEASE ORAL at 08:58

## 2021-02-17 RX ADMIN — OXYCODONE HYDROCHLORIDE AND ACETAMINOPHEN 2 TABLET: 5; 325 TABLET ORAL at 14:43

## 2021-02-17 RX ADMIN — DOXYCYCLINE 100 MG: 100 CAPSULE ORAL at 08:58

## 2021-02-17 RX ADMIN — LISINOPRIL 10 MG: 10 TABLET ORAL at 08:58

## 2021-02-17 RX ADMIN — INSULIN LISPRO 2 UNITS: 100 INJECTION, SOLUTION INTRAVENOUS; SUBCUTANEOUS at 08:59

## 2021-02-17 RX ADMIN — METOPROLOL TARTRATE 50 MG: 50 TABLET, FILM COATED ORAL at 08:58

## 2021-02-17 RX ADMIN — ARFORMOTEROL TARTRATE 15 MCG: 15 SOLUTION RESPIRATORY (INHALATION) at 08:00

## 2021-02-17 RX ADMIN — INSULIN LISPRO 3 UNITS: 100 INJECTION, SOLUTION INTRAVENOUS; SUBCUTANEOUS at 12:13

## 2021-02-17 RX ADMIN — INSULIN DETEMIR 8 UNITS: 100 INJECTION, SOLUTION SUBCUTANEOUS at 08:57

## 2021-02-17 RX ADMIN — PANTOPRAZOLE SODIUM 40 MG: 40 TABLET, DELAYED RELEASE ORAL at 06:12

## 2021-02-17 RX ADMIN — PREGABALIN 200 MG: 100 CAPSULE ORAL at 08:58

## 2021-02-17 RX ADMIN — Medication 1 PATCH: at 08:58

## 2021-02-17 RX ADMIN — PREGABALIN 200 MG: 100 CAPSULE ORAL at 16:59

## 2021-02-17 RX ADMIN — FUROSEMIDE 20 MG: 20 TABLET ORAL at 08:58

## 2021-02-17 RX ADMIN — BUDESONIDE 0.5 MG: 0.5 INHALANT ORAL at 08:00

## 2021-02-17 NOTE — PROGRESS NOTES
CTS Progress Note      POD 8 s/p  Incision and drainage of right groin incision.  Opening of right knee incision for drainage.  Incision on the right medial thigh for drainage.         Subjective  Sitting up in bed.  Pleasant.  Having breakfast.  Unsure of discharge plans      Objective    Physical Exam:   Vital Signs   Temp:  [98.3 °F (36.8 °C)-98.6 °F (37 °C)] 98.3 °F (36.8 °C)  Heart Rate:  [61-86] 68  Resp:  [16] 16  BP: (105-137)/(57-75) 137/72   GEN: NAD   CV: Regular rate and rhythm no murmur rub or gallop    RESP: Scattered coarse rhonchi at the bases   EXT: Warm to the touch, wound vacs in place x3.  Improving erythema and swelling to the right thigh   Incision: Distal right lower extremity was staples still in place and continued drainage     Results     Results from last 7 days   Lab Units 02/17/21  0539   WBC 10*3/mm3 8.31   HEMOGLOBIN g/dL 10.6*   HEMATOCRIT % 33.7*   PLATELETS 10*3/mm3 404     Results from last 7 days   Lab Units 02/16/21  0708   SODIUM mmol/L 132*   POTASSIUM mmol/L 4.5   CHLORIDE mmol/L 96*   CO2 mmol/L 29.0   BUN mg/dL 17   CREATININE mg/dL 0.67*   GLUCOSE mg/dL 146*   CALCIUM mg/dL 9.2       Assessment/Plan Postop day 8   Incision and drainage of right groin incision.  Opening of right knee incision for drainage.  Incision on the right medial thigh for drainage.             Postoperative infection    Tobacco use    PVD     S/P L leg amputation     Post-operative infection        Plan   Continue wound VAC, wound care and IV antibiotics  Per Dr. Trevizo's note patient okay to discharge home when antibiotics and wound care, wound VAC can be arranged    MADISON Lemos  02/17/21  07:29 EST

## 2021-02-17 NOTE — PROGRESS NOTES
INFECTIOUS DISEASE Progress Note    Reg Sanchez  1965  [id number]     Reason for Consultation: right lower extremity celluilitis    History of present illness:    Patient is a 55 y.o. male, with Diabetes, PAD, CHF, Hep C,  s/p right femoral to posterior tib bypass, with reverse saphenous vein graft 1/21/21- discharged on 2/1/21.  On 2/3/21 he developed fever up to 103 degrees,  chills, with pain and swelling, redness of right lower extremity.  CT angio with runfoff revealed soft tissue stranding and perigraft fluid identifiabl ein the groin extending to the level of the knee with patent graft.  He was transferred to [facility] from [place]. Tmax of 102.6 degrees.  Admitting labs with a leukocytosis of 18,000, CRP 2.29, LAC 2.1, PCT 0.41, blood cultures no growth so far.  He was started on Vancomycin and Zosyn  and we were consulted for evaluation and treatment.  Complains of significant swelling from the thigh region down to the knee.  There is some clear weeping fluid near the most distal lesion.    2/7/21: Afebrile overnight.  Hemodynamically stable.  Tolerating current antibiotics.  Complains of significant leg pain from the thigh down towards the foot.  There appears to be less serous drainage on the dressing    2/8/21: Still complains of leg pain, swelling and drainage although overall slightly improved.  Wound cultures with MRSA.  Dr. Trevizo planning I&D tomorrow    2/9/21: Afebrile overnight.  Tolerating current antibodics.  Have an episode where he had worsening swelling and bruising of the right upper thigh last night but today it has improved.  Tolerating her antibiotics.  Awaiting surgery later today    2/10/2021: Status post I&D yesterday.  Operative cultures pending.  Recovering in the ICU.  Still significant pain but swelling is better.  Tolerating current antibiotics    2/12/2021: Patient transferred to the floor.  Complains of dressing changes not be done frequent enough.  Still having a  moderate amount of pain despite IV narcotics.  PICC line in place and working. MRSA only on cultures.     2/15/21: Patient resting comfortably.  Wound VAC placed today.  Still complains of some swelling and pain but overall improved.  Tolerating IV antibiotics via PICC line.  No fevers or chills    2/17/21: Sitting up, comfortable.  Discharge planning in process.  See below discussion.  Patient tolerating IV antibiotics via PICC line.  Wound VAC in place    ROS:  No fevers or chills. No diarrhea. Poor appetite. No rash. No new ADR to Abx.       No Known Allergies      Medication:    Current Facility-Administered Medications:   •  acetaminophen (TYLENOL) tablet 650 mg, 650 mg, Oral, Q4H PRN, 650 mg at 02/16/21 2045 **OR** acetaminophen (TYLENOL) 160 MG/5ML solution 650 mg, 650 mg, Oral, Q4H PRN **OR** acetaminophen (TYLENOL) suppository 650 mg, 650 mg, Rectal, Q4H PRN, Case, Cecy V., DO  •  albuterol (PROVENTIL) nebulizer solution 0.083% 2.5 mg/3mL, 2.5 mg, Nebulization, Q4H PRN, Case, Cecy V., DO, 2.5 mg at 02/09/21 2344  •  arformoterol (BROVANA) nebulizer solution 15 mcg, 15 mcg, Nebulization, BID - RT, Case, Cecy V., DO, 15 mcg at 02/17/21 0800  •  aspirin chewable tablet 81 mg, 81 mg, Oral, Daily, Case, Cecy V., DO, 81 mg at 02/17/21 0858  •  atorvastatin (LIPITOR) tablet 40 mg, 40 mg, Oral, Nightly, Case, Cecy V., DO, 40 mg at 02/16/21 2046  •  budesonide (PULMICORT) nebulizer solution 0.5 mg, 0.5 mg, Nebulization, BID - RT, Case, Cecy V., DO, 0.5 mg at 02/17/21 0800  •  DAPTOmycin (CUBICIN) 500 mg/50 mL in sodium chloride, 6 mg/kg (Adjusted), Intravenous, Q24H, Trav Washburn MD, 480.6 mg at 02/17/21 0858  •  dextrose (D50W) 25 g/ 50mL Intravenous Solution 25 g, 25 g, Intravenous, Q15 Min PRN, Case, Cecy V., DO  •  dextrose (GLUTOSE) oral gel 15 g, 15 g, Oral, Q15 Min PRN, Case, Cecy V., DO  •  doxycycline (MONODOX) capsule 100 mg, 100 mg, Oral, Q12H, Trav Washburn MD, 100  mg at 02/17/21 0858  •  DULoxetine (CYMBALTA) DR capsule 60 mg, 60 mg, Oral, BID, Case, Cecy V., DO, 60 mg at 02/17/21 0858  •  enoxaparin (LOVENOX) syringe 40 mg, 40 mg, Subcutaneous, Daily, Case, Cecy V., DO, 40 mg at 02/17/21 0858  •  furosemide (LASIX) tablet 20 mg, 20 mg, Oral, Daily, Case, Cecy V., DO, 20 mg at 02/17/21 0858  •  glucagon (human recombinant) (GLUCAGEN DIAGNOSTIC) injection 1 mg, 1 mg, Subcutaneous, Q15 Min PRN, Case, Cecy V., DO  •  hydrOXYzine (ATARAX) tablet 25 mg, 25 mg, Oral, TID PRN, Case, Cecy V., DO, 25 mg at 02/16/21 2045  •  insulin detemir (LEVEMIR) injection 8 Units, 8 Units, Subcutaneous, Daily, Case, Cecy V., DO, 8 Units at 02/17/21 0857  •  insulin lispro (humaLOG, ADMELOG) injection 0-7 Units, 0-7 Units, Subcutaneous, 4x Daily With Meals & Nightly, Case, Cecy V., DO, 2 Units at 02/17/21 0859  •  isosorbide mononitrate (IMDUR) 24 hr tablet 30 mg, 30 mg, Oral, Daily, Case, Cecy V., DO, 30 mg at 02/17/21 0858  •  lisinopril (PRINIVIL,ZESTRIL) tablet 10 mg, 10 mg, Oral, Daily, Case, Cecy V., DO, 10 mg at 02/17/21 0858  •  Magnesium Sulfate 2 gram Bolus, followed by 8 gram infusion (total Mg dose 10 grams)- Mg less than or equal to 1mg/dL, 2 g, Intravenous, PRN **OR** Magnesium Sulfate 2 gram / 50mL Infusion (GIVE X 3 BAGS TO EQUAL 6GM TOTAL DOSE) - Mg 1.1 - 1.5 mg/dl, 2 g, Intravenous, PRN **OR** Magnesium Sulfate 4 gram infusion- Mg 1.6-1.9 mg/dL, 4 g, Intravenous, PRN, Case, Cecy V., DO, Last Rate: 25 mL/hr at 02/10/21 1615, 4 g at 02/10/21 1615  •  metoprolol tartrate (LOPRESSOR) tablet 50 mg, 50 mg, Oral, BID, Case, Cecy V., DO, 50 mg at 02/17/21 0858  •  naloxone (NARCAN) injection 0.1 mg, 0.1 mg, Intravenous, Q5 Min PRN, Case, Cecy V., DO  •  nicotine (NICODERM CQ) 21 MG/24HR patch 1 patch, 1 patch, Transdermal, Q24H, Case, Cecy V., DO, 1 patch at 02/17/21 0858  •  ondansetron (ZOFRAN) tablet 4 mg, 4 mg, Oral, Q6H PRN **OR** ondansetron  (ZOFRAN) injection 4 mg, 4 mg, Intravenous, Q6H PRN, Case, Cecy V., DO  •  oxyCODONE-acetaminophen (PERCOCET) 5-325 MG per tablet 2 tablet, 2 tablet, Oral, Q4H PRN, Diane Aragon, DO, 2 tablet at 02/17/21 1020  •  pantoprazole (PROTONIX) EC tablet 40 mg, 40 mg, Oral, QAM, Case, Cecy V., DO, 40 mg at 02/17/21 0612  •  potassium & sodium phosphates (PHOS-NAK) 280-160-250 MG packet - for Phosphorus less than 1.25 mg/dL, 2 packet, Oral, Q6H PRN, 2 packet at 02/06/21 1613 **OR** potassium & sodium phosphates (PHOS-NAK) 280-160-250 MG packet - for Phosphorus 1.25 - 2.5 mg/dL, 2 packet, Oral, Q6H PRN, Case, Cecy V., DO, 2 packet at 02/05/21 2123  •  pregabalin (LYRICA) capsule 200 mg, 200 mg, Oral, TID, Case, Cecy V., DO, 200 mg at 02/17/21 0858  •  rOPINIRole (REQUIP) tablet 4 mg, 4 mg, Oral, Nightly, Case, Cecy V., DO, 4 mg at 02/16/21 2045  •  sodium chloride 0.9 % flush 10 mL, 10 mL, Intravenous, Q12H, Case, Cecy V., DO, 10 mL at 02/16/21 2047  •  sodium chloride 0.9 % flush 10 mL, 10 mL, Intravenous, PRN, Case, Cecy V., DO  •  sodium chloride 0.9 % flush 10 mL, 10 mL, Intravenous, Q12H, Case, Cecy V., DO, 10 mL at 02/16/21 2046  •  sodium chloride 0.9 % flush 10 mL, 10 mL, Intravenous, PRN, Case, Cecy V., DO, 10 mL at 02/12/21 1516  •  sodium hypochlorite (DAKIN'S 1/4 STRENGTH) 0.125 % topical solution 0.125% solution, , Topical, Q12H, Case, Cecy V., DO, Given at 02/16/21 2046    Antibiotics:  Anti-Infectives (From admission, onward)    Ordered     Dose/Rate Route Frequency Start Stop    02/10/21 1556  doxycycline (MONODOX) capsule 100 mg     Rafal Leonard [facility] reviewed the order on 02/11/21 1518.   Ordering Provider: Trav Washburn MD    100 mg Oral Every 12 Hours Scheduled 02/10/21 2100 02/24/21 2059 02/06/21 0744  DAPTOmycin (CUBICIN) 500 mg/50 mL in sodium chloride     Trav Washburn MD reviewed the order on 02/11/21 1034.   Ordering Provider: Trav Washburn MD     6 mg/kg × 80.1 kg (Adjusted)  over 30 Minutes Intravenous Every 24 Hours 21 0830 21 0829         Physical Exam:   Vital Signs  Temp (24hrs), Av.4 °F (36.9 °C), Min:98.2 °F (36.8 °C), Max:98.6 °F (37 °C)    Temp  Min: 98.2 °F (36.8 °C)  Max: 98.6 °F (37 °C)  BP  Min: 105/57  Max: 164/64  Pulse  Min: 61  Max: 86  Resp  Min: 16  Max: 16  No data recorded    GENERAL: Awake and alert, comfortable, sitting up  HEENT: Normocephalic, atraumatic.  EOMI. No conjunctival injection. No icterus.    HEART: RRR; No murmur   LUNGS: Clear to auscultation bilaterally without wheezing, rales, rhonchi. Normal respiratory effort. Nonlabored.   ABDOMEN: Soft, nontender, nondistended.   EXT:   Left BKA site well healed   :  Without Sheridan catheter.  MSK:  Right lower extremity dressings CDI.  Wound VAC on 2 more proximal incisions.  Less tender and swollen  SKIN: Warm and dry, except on RLE  NEURO: Oriented to PPT.  PSYCHIATRIC: Normal insight and judgement. Cooperative with PE  PICC line in place in RUE w/o swelling or tenderness     Laboratory Data    Results from last 7 days   Lab Units 21  0539 02/15/21  0653 21  0656   WBC 10*3/mm3 8.31 8.67 9.10   HEMOGLOBIN g/dL 10.6* 10.1* 9.3*   HEMATOCRIT % 33.7* 32.2* 29.2*   PLATELETS 10*3/mm3 404 339 314     Results from last 7 days   Lab Units 21  0539   SODIUM mmol/L 133*   POTASSIUM mmol/L 4.6   CHLORIDE mmol/L 97*   CO2 mmol/L 29.0   BUN mg/dL 14   CREATININE mg/dL 0.69*   GLUCOSE mg/dL 160*   CALCIUM mg/dL 9.2     Results from last 7 days   Lab Units 02/15/21  0653   ALK PHOS U/L 72   BILIRUBIN mg/dL 0.6   ALT (SGPT) U/L 33   AST (SGOT) U/L 32                 Results from last 7 days   Lab Units 02/15/21  0653 21  0722   CK TOTAL U/L 36 48         Estimated Creatinine Clearance: 137 mL/min (A) (by C-G formula based on SCr of 0.69 mg/dL (L)).      Microbiology:  Covid negative  Blood cultures negative to date   Wound drainage culture with  MRSA  2/9 OR cultures grew only skin maribel    Radiology:  Imaging Results (Last 72 Hours)     ** No results found for the last 72 hours. **        2/4/21 CTA RLE:  1. Soft tissue stranding and fluid is seen surrounding the patient's recent right femoral/distal arterial graft. It is uncertain to what extent this may represent postoperative fluid versus infection. The graft itself appears patent with adequate runoff  to the level of the smaller plantar vessels.       Impression:   1. Right lower extremity cellulitis, severe deep skin and soft tissue infection complicated by recent graft placement due to MRSA:  right femoral to posterior tibial distal bypass with reverse saphenous vein graft 1/21/21. Fluid and stranding on CT scan.  S/p I&D on 2/9. OR cultures negative.  Vein graft was tenuous per surgery., but overall improving better than expected post-op.  2. Leukocytosis, sepsis: resolved  3. PAD  4. T2DM: A1c 7.2  5. Hep C, previously treated with Interferon/Ribaviron, then reinfected  6.  CHF   7. 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 well healed    PLAN/RECOMMENDATIONS:   - Follow CBC, CMP, CK     - Continue IV Daptomycin 6mg/kg q24 hrs for MRSA  - PO doxycycline 100 mg BID to double cover MRSA     I anticipate a 6 week course of IV antibiotics through March 23. PICC placed.    Discharge planning in process.  Discussed with patient yesterday and he said he had reliable transportation come to [place] 3 times weekly for dressing changes and had requested NOT to have home health set up.  He was set up for outpatient wound care with wound VAC changes 3 times weekly and IV antibiotics through [facility] infusion weekly PICC line dressing changes at [facility] office on Tuesdays.    Today patient is now saying he may not have reliable transportation.  If that is the case, we will have to rethink his discharge plan and discharge will be delayed.  Discussed with patient, RN and wound  care team. Ok to discharge from my perspective once all is set up.     Discharge Orders:  - IV daptomycin 500 mg daily through March 23  - PO doxycycline 100 mg BID. Rx written  - Weekly sterile PICC dressing changes at [facility] office on Tuesdays  - Weekly labs CBC w/diff, CMP, CK, CRP done stat prior to office visits on Tuesday  - Follow up with Dr Washburn next Tuesday at 1:45. Appointment has been made      High risk for limb loss if not treated aggressively.      Trav Washburn MD  2/17/2021  10:41 EST

## 2021-02-17 NOTE — PLAN OF CARE
Problem: Adult Inpatient Plan of Care  Goal: Plan of Care Review  Recent Flowsheet Documentation  Taken 2/16/2021 6513 by Danielle Viera OT  Plan of Care Reviewed With: patient  Outcome Summary: Pt motivated to work with OT. BUE HEP completed for 3x10 reps chair push-ups to support transfers and self-care. OT assisted pt with RLE/foot bathing and donning sock. AE contraindicated at this time due to risk for additional wounds. OT will continue to follow IP.     
  Problem: Adult Inpatient Plan of Care  Goal: Plan of Care Review  Recent Flowsheet Documentation  Taken 2/17/2021 8368 by Danielle Viera OT  Plan of Care Reviewed With: patient  Outcome Summary: Pt is set to d/c home today with SO assist and HH. Pt denies DME needs. AE contraindicated for LB dressing due to risk of additional wounds. SO to assist with ADLs as needed. OT will d/c at this time.     
Goal Outcome Evaluation:           
Goal Outcome Evaluation:     Progress: improving  Outcome Summary: Arrived from PACU at 2000. Right leg wrapped in kerlix, paged Harpreet Whatley for dressing instructions, ordered to just reinforce dressing until next day and will reevaluate. Copious amounts of serous drainage from incisions. Patient noted to have tremor around 2330 and does have a history of alcohol use. Ativan 1mg ordered once and seems to have helped. Bryant ordered for hypotension but has not been needed. Dilaudid PCA for pain, oxycodone given x2 and toradol x1.  
Goal Outcome Evaluation:  Plan of Care Reviewed With: patient     Outcome Summary: PT mobility evaluation performed. Pt performed bed mobility with SBA, sit to stand with CGA, and ambulated 60' without an AD with LLE prosthesis and assist to manage vacs.  
Goal Outcome Evaluation:  Plan of Care Reviewed With: patient     Outcome Summary: PT wound care evaluation completed.  Wound vac initiated to R groin and RLE wounds.  Most distal medial R ankle wound mostly slough and staples, so PT lightly debrided and applied therahoney with mepilex border to assist with debriding residual slough, may add vac over next couple days once wound bed .  Remainder of wounds mostly moist clean pink tissue with SQ tissue, periwounds mildly indurated and red.  Exposed vein noted in distal medial R calf wound, and PT protected vessel with folded xeroform prior to placing white vac foam.  Highly complicated wounds requiring multiple lines and 2 vac pumps with Y-connectors.  PT discussed concerns for d/c plans with pt.  Pt refusing placement at this time but is agreeable to coming to OP PT wound care at [facility] 2x/week.  Due to complexity of wound vac dressings, HH may have difficulty with dressing changes.  PT will change vac dressing every 2-3 days.  Will initiate order for home vac pump, which will require insurance approval.  Pt also requiring IV pain medications to tolerate wound vac tx, with report of 10/10 pain.  Photos taken for MD to review.  Please contact PT wound care [phone] for any issues with wound vac.    R groin:    Medial/prox R thigh:    Medial/distal R thigh:    Medial R knee:    Medial R calf:    Medial R ankle:    
Goal Outcome Evaluation:  Plan of Care Reviewed With: patient     Outcome Summary: RLE dressings converted to home wound vac to allow for d/c home with HH f/u.  PT educated pt on home management of dressings and canister changes.   
Goal Outcome Evaluation:  Plan of Care Reviewed With: patient     Outcome Summary: VSS, AO x 4, SR on monitor.  Patient very pleasant and cooperative with care.  Patient continues to express complaints of 7-8/10 right leg pain despite presence of PCA.  Patient states that PCA is effective when patient remembers to use it.  Dressing changes completed per orders.  Patient has slept intermittently this shift.  Patient currently resting comfortably in bed.  Will continue to monitor.  
Goal Outcome Evaluation:  Plan of Care Reviewed With: patient     Outcome Summary: wound vac intact with no issues noted. PT to change dressings in 1-2 days.   
Goal Outcome Evaluation:  Plan of Care Reviewed With: patient     Outcome Summary: wound vac leaking slightly. PT applied tape and educated pt on home management of wound vac in prep for d/c.  
Goal Outcome Evaluation:  Plan of Care Reviewed With: patient  Progress: improving  Outcome Summary: OT evaluation completed.  Pt. with 2 wound vacs RLE.  Pt. SBA for lines supine to sit, donned prothesis post setup, stood CGA and mobilized in hallway and into room 60 ft. CGA and assist for lines.  Pt. dependent donning R socks and may benefit from AE use.  Pt. appropriate for skilled OT services for AE education, ADL retraining and endurance building.  Pt. plans home with possible OP wound care and assist from [facility].  
Goal Outcome Evaluation:  Plan of Care Reviewed With: patient  Progress: improving  Outcome Summary: PT changed RLE dressings today with pre-medication.  Pt reporting 8/10 pain with vac dressing change.  All wounds improved with increased granulation.  PT able to debride moderate amount of slough from medial R ankle wound, base moist and non-granulating.  Pt still with min residual induration and erythema around wounds, improving per pt report.  Pt's home vac has been approved and can be placed when pt ready to d/c today.  Plan is to have HH services for IV abx and wound vac dressing changes.  Please call PT at [phone] when pt ready for d/c.  
Goal Outcome Evaluation:  Plan of Care Reviewed With: patient  Progress: improving  Outcome Summary: Patient demonstrates improvements in functional mobility this session. Performs bed mobility with Luigi, transfers with SBA, and ambulates with unilateral support (IV pole) x 250' with brief standing rest breaks x3 for fatigue. Cont PT POC.  
Goal Outcome Evaluation:  Plan of Care Reviewed With: patient  Progress: improving  Outcome Summary: VSS, AO x 4, SR on monitor.  Patient pleasant and cooperative with care.  Patient continues to request pain medication throughout shift for 6 to 8/10 right leg pain.  Patient has slept more tonight than previous night.  Dressing changes to right leg completed per orders.  Patient currently resting comfortably in bed.  Will continue to monitor.  
Goal Outcome Evaluation:  Plan of Care Reviewed With: patient  Progress: no change  Outcome Summary: a/o x 4; VSS, RA; Rt pedal doppler, pain controlled with PRNs otherwise rested comfortably; pleasant; no acute changes overnight; will continue to monitor  
Goal Outcome Evaluation:  Plan of Care Reviewed With: patient  VSS, AO x 4, SR on monitor.  Patient pleasant and cooperative with care. Ambulated to bathroom with standby assist.  Patient continues to request pain medication throughout shift for R. leg pain.  Wound vacs in place.   Patient currently resting comfortably in bed.   
no depression/no anxiety

## 2021-02-17 NOTE — THERAPY TREATMENT NOTE
Patient Name: Reg Sanchez  : 1965    MRN: [id number]                              Today's Date: 2021       Admit Date: 2021    Visit Dx:     ICD-10-CM ICD-9-CM   1. Postoperative infection, unspecified type, initial encounter  T81.40XA 998.59   2. PAD (peripheral artery disease) (CMS/HCC)  I73.9 443.9   3. Type 2 diabetes mellitus with diabetic peripheral angiopathy without gangrene, with long-term current use of insulin (CMS/HCC)  E11.51 250.70    Z79.4 443.81     V58.67     Patient Active Problem List   Diagnosis   • PAD (peripheral artery disease) (CMS/HCC)   • 100% R SFA occlusion    • Atherosclerosis of native artery of right lower extremity with intermittent claudication (CMS/HCC)   • Tobacco use   • PVD    • CAD s/p stents and CABG X5    • S/P L leg amputation    • Smoker   • Daily marijuana use   • Suspected ETOH withdrawal    • T2DM on metformin    • Class 1 obesity in adult   • Macrocytosis without anemia   • Post-operative infection   • Postoperative infection     Past Medical History:   Diagnosis Date   • Anxiety    • CHF (congestive heart failure) (CMS/HCC)    • COPD (chronic obstructive pulmonary disease) (CMS/HCC)    • Coronary artery disease involving native coronary artery of native heart without angina pectoris 2021    Added automatically from request for surgery [id number]   • Diabetes mellitus (CMS/HCC)    • Hepatitis C    • Hyperlipidemia    • Hypertension    • Sleep apnea    • T2DM on metformin  2021     Past Surgical History:   Procedure Laterality Date   • CARDIAC CATHETERIZATION  11/20/2020    x 2 stents   • CARDIAC CATHETERIZATION Right 2021    Procedure: Peripheral angiography;  Surgeon: Wesly Lund MD;  Location: [facility] INVAS[facility] LOC[facility];  Service: Cardiovascular;  Laterality: Right;   • CORONARY ARTERY BYPASS GRAFT      x 5   • CORONARY STENT PLACEMENT  2020    Dr Mortensen x 2 stents   • FEMORAL DISTAL BYPASS N/A 2021    Procedure:  FEMORAL DISTAL BYPASS;  Surgeon: Jeremy Trevizo MD;  Location:  [facility] OR;  Service: Vascular;  Laterality: N/A;   • FINGER SURGERY      left   • INCISION AND DRAINAGE LEG Right 2/9/2021    Procedure: INCISION AND DRAINAGE RIGHT LOWER EXTREMITY, EVACUATION OF HEMATOMA, LIGATION OF FEM DISTAL BYPASS;  Surgeon: Jeremy Trevizo MD;  Location:  [facility] OR;  Service: Cardiothoracic;  Laterality: Right;   • LEG AMPUTATION Left     left lower leg   • SHOULDER SURGERY Bilateral      General Information     Row Name 02/17/21 [facility]          OT Time and Intention    Document Type  therapy note (daily note);discharge evaluation/summary  -TB     Mode of Treatment  occupational therapy  -TB     Row Name 02/17/21 1355          General Information    Patient Profile Reviewed  yes  -TB     Existing Precautions/Restrictions  fall;other (see comments) Remote L BKA, RLE wound vacs x2  -TB     Barriers to Rehab  medically complex  -TB     Row Name 02/17/21 1355          Living Environment    Lives With  significant other  -TB     Row Name 02/17/21 1355          Cognition    Orientation Status (Cognition)  oriented x 4  -TB     Row Name 02/17/21 [facility]5          Safety Issues, Functional Mobility    Impairments Affecting Function (Mobility)  balance;endurance/activity tolerance;strength;sensation/sensory awareness  -TB     Comment, Safety Issues/Impairments (Mobility)  Pt up with SBAx1 transfer  -TB       User Key  (r) = Recorded By, (t) = Taken By, (c) = Cosigned By    Initials Name Provider Type    TB Danielle Viera OT Occupational Therapist          Mobility/ADL's     Row Name 02/17/21 1356          Bed Mobility    Bed Mobility  bed mobility (all) activities  -TB     All Activities, [place] (Bed Mobility)  independent  -TB     Row Name 02/17/21 1356          Transfers    Comment (Transfers)  Pt up with SBA x1 for transfers  -TB     Row Name 02/17/21 1356          Functional Mobility    Functional Mobility- Comment   Deferred  -TB     Row Name 02/17/21 [facility]6          Lower Body Dressing Assessment/Training    [place] Level (Lower Body Dressing)  lower body dressing skills;moderate assist (50% patient effort)  -TB     Row Name 02/17/21 135          Upper Body Dressing Assessment/Training    [place] Level (Upper Body Dressing)  upper body dressing skills;independent  -TB       User Key  (r) = Recorded By, (t) = Taken By, (c) = Cosigned By    Initials Name Provider Type    Danielle Huber OT Occupational Therapist        Obj/Interventions     Row Name 02/17/21 [facility]          Balance    Balance Assessment  sitting dynamic balance  -TB     Dynamic Sitting Balance  WNL;unsupported  -TB     Comment, Balance  Pt up with SBAx1 transfer  -TB       User Key  (r) = Recorded By, (t) = Taken By, (c) = Cosigned By    Initials Name Provider Type    Danielle Huber OT Occupational Therapist        Goals/Plan    No documentation.       Clinical Impression     Row Name 02/17/21 [facility]8          Pain Assessment    Additional Documentation  Pain Scale: Numbers Pre/Post-Treatment (Group)  -TB     Row Name 02/17/21 [facility]          Pain Scale: Numbers Pre/Post-Treatment    Pretreatment Pain Rating  8/10  -TB     Posttreatment Pain Rating  8/10  -TB     Pain Location - Side  Right  -TB     Pain Location - Orientation  lower  -TB     Pain Location  extremity  -TB     Pre/Posttreatment Pain Comment  Increased pain with wound care  -TB     Row Name 02/17/21 1358          Plan of Care Review    Plan of Care Reviewed With  patient  -TB     Outcome Summary  Pt is set to d/c home today with SO assist and HH. Pt denies DME needs. AE contraindicated for LB dressing due to risk of additional wounds. SO to assist with ADLs as needed. OT will d/c at this time.  -TB     Row Name 02/17/21 1358          Therapy Plan Review/Discharge Plan (OT)    Anticipated Discharge Disposition (OT)  home with assist  -TB     Row Name 02/17/21 1355           Vital Signs    Pre Systolic BP Rehab  -- RN cleared OT  -TB     O2 Delivery Pre Treatment  room air  -TB     O2 Delivery Intra Treatment  room air  -TB     O2 Delivery Post Treatment  room air  -TB     Pre Patient Position  Sitting  -TB     Post Patient Position  Sitting  -TB     Row Name 02/17/21 1358          Positioning and Restraints    Pre-Treatment Position  in bed  -TB     Post Treatment Position  bed  -TB     In Bed  fowlers;call light within reach;encouraged to call for assist;with PT  -TB       User Key  (r) = Recorded By, (t) = Taken By, (c) = Cosigned By    Initials Name Provider Type    Danielle Huber OT Occupational Therapist        Outcome Measures     Row Name 02/17/21 1401          How much help from another is currently needed...    Putting on and taking off regular lower body clothing?  2  -TB     Bathing (including washing, rinsing, and drying)  2  -TB     Toileting (which includes using toilet bed pan or urinal)  3  -TB     Putting on and taking off regular upper body clothing  3  -TB     Taking care of personal grooming (such as brushing teeth)  3  -TB     Eating meals  4  -TB     AM-PAC 6 Clicks Score (OT)  17  -TB     Row Name 02/17/21 1401          Functional Assessment    Outcome Measure Options  AM-PAC 6 Clicks Daily Activity (OT)  -TB       User Key  (r) = Recorded By, (t) = Taken By, (c) = Cosigned By    Initials Name Provider Type    Danielle Huber OT Occupational Therapist        Occupational Therapy Education                 Title: PT OT SLP Therapies (In Progress)     Topic: Occupational Therapy (In Progress)     Point: ADL training (Done)     Description:   Instruct learner(s) on proper safety adaptation and remediation techniques during self care or transfers.   Instruct in proper use of assistive devices.              Learning Progress Summary           Patient Acceptance, E, VU by MARITZA at 2/17/2021 1401    Comment: Education completed for benefits of OOB  activity/therapy, role of OT and HEP to support transfers and return to PLOF    Acceptance, E, VU,NR by  at 2/14/2021 1301    Comment: reason for consult, noted deficit, AE available                   Point: Home exercise program (Not Started)     Description:   Instruct learner(s) on appropriate technique for monitoring, assisting and/or progressing therapeutic exercises/activities.              Learner Progress:  Not documented in this visit.          Point: Precautions (Not Started)     Description:   Instruct learner(s) on prescribed precautions during self-care and functional transfers.              Learner Progress:  Not documented in this visit.          Point: Body mechanics (Not Started)     Description:   Instruct learner(s) on proper positioning and spine alignment during self-care, functional mobility activities and/or exercises.              Learner Progress:  Not documented in this visit.                      User Key     Initials Effective Dates Name Provider Type Discipline     06/08/18 -  Danielle Viera OT Occupational Therapist OT    OCTAVIANO 06/08/18 -  Colleen Gallegos OT Occupational Therapist OT              OT Recommendation and Plan     Plan of Care Review  Plan of Care Reviewed With: patient  Outcome Summary: Pt is set to d/c home today with SO assist and HH. Pt denies DME needs. AE contraindicated for LB dressing due to risk of additional wounds. SO to assist with ADLs as needed. OT will d/c at this time.     Time Calculation:   Time Calculation- OT     Row Name 02/17/21 1132             Time Calculation- OT    OT Start Time  1132  -TB      OT Received On  02/17/21  -         Timed Charges    96721 - OT Therapeutic Activity Minutes  8  -TB        User Key  (r) = Recorded By, (t) = Taken By, (c) = Cosigned By    Initials Name Provider Type     Danielle Viera, OT Occupational Therapist        Therapy Charges for Today     Code Description Service Date Service Provider  Modifiers Qty    03175499260 HC OT THERAPEUTIC ACT EA 15 MIN 2/16/2021 Danielle Viera, OT GO 2    91986911564 HC OT THERAPEUTIC ACT EA 15 MIN 2/17/2021 Danielle Viera OT GO 1               Danielle Viera OT  2/17/2021

## 2021-02-17 NOTE — PROGRESS NOTES
"Case Management Discharge Note      Final Note: Spoke with patient at bedside.  Patient discharging home today.  Patient now states that he is unable to come to outpatient appointments with [facility] wound care, and would like home health.  Spoke with Kadi at [facility] wound care, and cancelled appointments.  Spoke with Ashwini at [facility] in [facility].  She states that patient is current with home health, and they can accept for home health services at discharge.  They will see patient on Saturday for wound vac change and/or before dependent on weather conditions.  Orders for home health nursing faxed to [facility] at [phone].  Orders include instructions for lab draws, PICC line dressing changes and wound care for wound vac.  Patient will be instructed on IV antibiotics today per [facility] home infusion.  They will deliver medications to patient's bedside prior to discharge home today.  Patient agreeable to copay for medications and will be billed by home infusion.  Wound vac to be changed to home unit today by PT wound care prior to discharge, and has been approved by insurance.  Patient has asked that \"comfort care transportation\" be provided for home.  Spoke with [facility] to care transportation.  They are unable to provide transport for patient as patient's insurance will not provide transport for greater than 60 miles.  Patient will call daughter to transport home today.         Selected Continued Care - Admitted Since 2/5/2021     Destination    No services have been selected for the patient.              Durable Medical Equipment    No services have been selected for the patient.              Dialysis/Infusion Coordination complete    Service Provider Selected Services Address Phone Fax Patient Preferred    [facility]  Infusion and IV Therapy [address] [id number] [phone] --          Home Medical Care Coordination complete    Service Provider " Selected Services Address Phone Fax Patient Preferred    [facility] [address] , [facility] [address] [phone] -- --          Therapy    No services have been selected for the patient.              Community Resources    No services have been selected for the patient.                Selected Continued Care - Prior Encounters Includes selections from prior encounters from 11/7/2020 to 2/17/2021    Discharged on 2/1/2021 Admission date: 1/25/2021 - Discharge disposition: Home or Self Care    Home Medical Care     Service Provider Selected Services Address Phone Fax Patient Preferred    [facility] [address] , [facility] [address] [phone] -- --                         Final Discharge Disposition Code: 06 - home with home health care

## 2021-02-17 NOTE — DISCHARGE SUMMARY
[facility] Medicine Services  DISCHARGE SUMMARY    Patient Name: Reg Sanchez  : 1965  MRN: [id number]    Date of Admission: 2021  2:44 PM  Date of Discharge:  2021  Primary Care Physician: Sheila Sheikh APRN    Consults     Date and Time Order Name Status Description    2021 1653 Inpatient Infectious Diseases Consult Completed     2021 1530 Inpatient Cardiothoracic Surgery Consult            Hospital Course     Presenting Problem:   Surgical wound infection [T81.49XA]  Surgical wound infection [T81.49XA]  Surgical wound infection [T81.49XA]    Active Hospital Problems    Diagnosis  POA   • **Postoperative infection [T81.40XA]  Unknown   • Post-operative infection [T81.40XA]  Yes   • S/P L leg amputation  [S88.919A]  Yes   • PVD  [I73.9]  Yes   • Tobacco use [Z72.0]  Yes      Resolved Hospital Problems   No resolved problems to display.          Hospital Course:  Reg Sanchez is a 55 y.o. male with past medical history significant for tobacco abuse, diabetes mellitus, peripheral vascular disease, congestive heart failure, coronary artery disease status post CABG, left leg osteomyelitis status post left leg amputation, peripheral arterial disease and most recently right SFA and popliteal artery occlusion underwent a right femoral artery bypass with Dr. Trevizo 2021.  The patient developed fever on 2021. On 2/3/21 he developed right leg swelling and redness. On 21, he presented to outside hospital where he had lab work indicating inflammatory/infectious process and a CT angiogram of the leg which revealed soft tissue stranding and fluid surrounding the recent right femoral/distal artery graft.  Patent runoff with adequate flow.  Patient was taken to the OR on  with Dr Trevizo for surgical drainage of right groin and knee. Patient was monitored in the ICU Following surgery, transferred back to floor .      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 s/p I&D with [facility] 2/9  --ID (Dr Washburn) is following for assistance with abx- continue to follow cx- continue dapto/po doxy- 6 week course of IV abx anticipated, PICC placed 2/9  --continue PRN pain control   --MRSA from wound culture (2/10)- susceptibilities noted  --CTS for wound management; wound vac placed 2/14 and will need home unit placed in next 2-3 days for DC home, will need to have changed three times weekly outpatient   --PT/OT pending   --follow up with Dr Washburn ID 1 week after DC      - IV daptomycin 500 mg daily   - PO doxycycline 100 mg BID  - Weekly sterile PICC dressing changes  - Weekly labs CBC w/diff, CMP, CK, CRP faxed to [facility] at [phone] attn: Dr Washburn  - Follow up with Dr Washburn 1 week of discharge  --Case management arranged for home health.  Wound VAC to right lower extremity prior to discharge      Chronic CHF -- unspecified   CAD Hx CABG  PVD   - follows with cardiology; uncertain if heart failure in the past but current ECHO with normal EF and diastolic function   - Continue home indur, metoprolol, lasix, ASA, statin   --Was on Plavix outpatient.  Held during hospital course.  I spoke with MADISON Montana with CT surgery who states it is okay to resume at discharge.     Hyperkalemia  --stop K+ replacement -- improved      Leukocytosis-resolved     COPD  --PRN nebs     Diabetes mellitus   -SSI, A1C 7.2 -control adequate at this time  --Resume home regimen at discharge.     Hyperlipidemia  -Lipitor 40mg     Tobacco abuse  -nicotine patch  --Assist/advise.  Nicotine patch OTC.     etoh abuse  - ativan PRN  --Encouraged cessation     Hep C previously treated with interferon/ribaviron then reinfected     Today pt is seen resting up in bed in no acute distress.  No visitors at bedside.  Has his PICC line to his right upper extremity intact.  Tolerating diet.  Tolerating IV  antibiotics.  Pain controlled on current regimen.  Case management arranging for home health for wound VAC management.  Patient also with right upper extremity PICC line for home daptomycin infusions daily.  Currently hemodynamically stable and afebrile.  Is cleared for discharge home today.  Follow-up with [facility], [place] wound care clinic, CT surgery, infectious disease, PCP all as below.      Discharge Follow Up Recommendations for outpatient labs/diagnostics:  Patient is cleared for discharge home today by all services.  I spoke with mike Pope  With CT surgery and they are prescribing patient pain medications.  He is to follow-up with them on 3/11 at 10:30 AM.  Spoke with MADISON Montana for CT surgery and it is okay to resume patient's Plavix and discharge today.    Follow-up PCP 1 week from discharge  Follow-up Dr. Washburn with ID in 1 week  Follow-up Dr. Trevizo 3/11/2021 at 1030 as scheduled  Follow-up at [facility], [place] outpatient wound care clinic 2/20/2021 as scheduled  Case management has arranged for home health.  Home antibiotics and supplies to be brought to bedside prior to discharge.  Patient will be taught home infusion.      Day of Discharge     HPI:   Patient was seen at 9:05 AM today resting up in bed awake and alert.  No visitors at bedside.  Just finished breakfast.  States he feels okay.  Pain is controlled on current pain regimen.  Tolerating meds and antibiotics.  Has right PICC line in place.  Wound VAC x3 to right lower extremity incisions.  Awaiting antibiotics, supplies and teaching as well as home wound vacs to be placed for discharge home today.  Otherwise no nausea, vomiting.  No new issues.    Review of Systems  Gen- No fevers, chills  CV- No chest pain, palpitations  Resp- No cough, dyspnea  GI- No N/V/D, abd pain    Vital Signs:   Temp:  [98.2 °F (36.8 °C)-98.6 °F (37 °C)] 98.2 °F (36.8 °C)  Heart Rate:  [68-86] 72  Resp:  [16] 16  BP: (105-164)/(57-75) 164/64     Physical  Exam:  Constitutional: No acute distress, awake, alert.  Sitting up in bed.  No visitors at bedside.  HENT: NCAT, mucous membranes moist  Respiratory: Clear to auscultation bilaterally slightly decreased at bases, respiratory effort normal on RA.  Sats WNL.  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  Right lower extremity with wound vac x 3 in place and mild edema.  Small Mepilex dressing to inner lower right leg.  PENN spontaneously.  Left BKA stump prosthesis at bedside.  Psychiatric: Appropriate affect, cooperative  Neurologic: Oriented x 3, strength symmetric in all extremities, Cranial Nerves grossly intact to confrontation, speech clear and appropriate.  Follows commands.  Skin: No rashes.  Right upper extremity PICC line in place dressing C/C/I.        Pertinent  and/or Most Recent Results     LAB RESULTS:      Lab 02/17/21  0539 02/15/21  0653 02/14/21  0656 02/12/21  0722 02/11/21  1110   WBC 8.31 8.67 9.10 8.98 8.58   HEMOGLOBIN 10.6* 10.1* 9.3* 9.7* 8.5*   HEMATOCRIT 33.7* 32.2* 29.2* 30.7* 26.2*   PLATELETS 404 339 314 278 237   NEUTROS ABS  --  5.44  --  5.64  --    IMMATURE GRANS (ABS)  --  0.21*  --  0.37*  --    LYMPHS ABS  --  1.93  --  1.92  --    MONOS ABS  --  0.82  --  0.78  --    EOS ABS  --  0.21  --  0.20  --    .0* 102.9* 100.7* 103.7* 99.2*         Lab 02/17/21  0539 02/16/21  0708 02/15/21  0653 02/14/21  0656 02/12/21  0722 02/11/21  1110   SODIUM 133* 132* 127* 130* 131* 132*   POTASSIUM 4.6 4.5 4.4 4.7 4.8 4.8   CHLORIDE 97* 96* 96* 98 97* 98   CO2 29.0 29.0 24.0 25.0 29.0 29.0   ANION GAP 7.0 7.0 7.0 7.0 5.0 5.0   BUN 14 17 13 12 13 15   CREATININE 0.69* 0.67* 0.70* 0.71* 0.83 0.81   GLUCOSE 160* 146* 169* 204* 151* 163*   CALCIUM 9.2 9.2 8.9 8.7 8.9 8.3*   MAGNESIUM  --   --   --   --   --  2.0   PHOSPHORUS  --   --   --   --   --  2.6         Lab 02/15/21  0653    TOTAL PROTEIN 6.5   ALBUMIN 3.10*   GLOBULIN 3.4   ALT (SGPT) 33   AST (SGOT) 32   BILIRUBIN 0.6   ALK PHOS 72                     Brief Urine Lab Results     None        Microbiology Results (last 10 days)     Procedure Component Value - Date/Time    Anaerobic Culture - Tissue, Groin, right [[id number]] Collected: 02/09/21 1738    Lab Status: Final result Specimen: Tissue from Groin, right Updated: 02/14/21 0650     Anaerobic Culture No anaerobes isolated at 5 days    Fungus Culture - Tissue, Groin, right [[id number]] Collected: 02/09/21 1738    Lab Status: Preliminary result Specimen: Tissue from Groin, right Updated: 02/16/21 1815     Fungus Culture No fungus isolated at 1 week    Tissue / Bone Culture - Tissue, Groin, right [[id number]] Collected: 02/09/21 1738    Lab Status: Final result Specimen: Tissue from Groin, right Updated: 02/11/21 0639     Tissue Culture Scant growth (1+) Normal Skin Celi     Gram Stain Moderate (3+) WBCs seen      No organisms seen    AFB Culture - Tissue, Groin, right [[id number]] Collected: 02/09/21 1738    Lab Status: Preliminary result Specimen: Tissue from Groin, right Updated: 02/16/21 1815     AFB Culture No AFB isolated at 1 week     AFB Stain No acid fast bacilli seen on concentrated smear          No radiology results for the last 10 days    Results for orders placed during the hospital encounter of 01/25/21   Duplex Vein Mapping Study Lower Extremity - Right CAR    Narrative · Right greater saphenous vein reportedly harvested for CABG in 2011.  · Remnant of the saphenous vein in the thigh to the level of the knee is   patent and of adequate caliber.  · Remnant of the saphenous vein in the distal calf and ankle is patent and   of adequate caliber  · Lesser saphenous vein is of small caliber but is patent          Results for orders placed during the hospital encounter of 01/25/21   Duplex Vein Mapping Study Lower Extremity - Right CAR    Narrative · Right greater saphenous  vein reportedly harvested for CABG in 2011.  · Remnant of the saphenous vein in the thigh to the level of the knee is   patent and of adequate caliber.  · Remnant of the saphenous vein in the distal calf and ankle is patent and   of adequate caliber  · Lesser saphenous vein is of small caliber but is patent          Results for orders placed during the hospital encounter of 01/25/21   Adult Transthoracic Echo Complete W/ Cont if Necessary Per Protocol    Narrative · Left ventricular ejection fraction appears to be 66 - 70%. Left   ventricular systolic function is normal.  · The right ventricular cavity is borderline dilated.  · Left ventricular diastolic function was normal.  · No significant structural or functional valvular disease.          Plan for Follow-up of Pending Labs/Results:   Pending Labs     Order Current Status    AFB Culture - Tissue, Groin, right Preliminary result    Fungus Culture - Tissue, Groin, right Preliminary result        Discharge Details        Discharge Medications      New Medications      Instructions Start Date   daptomycin  IVPB  Commonly known as: CUBICIN   500 mg, Intravenous, Every 24 Hours   Start Date: February 18, 2021     doxycycline 100 MG capsule  Commonly known as: MONODOX   100 mg, Oral, Every 12 Hours Scheduled      HYDROcodone-acetaminophen 7.5-325 MG per tablet  Commonly known as: Norco   1 tablet, Oral, Every 6 Hours PRN      nicotine 21 MG/24HR patch  Commonly known as: NICODERM CQ   1 patch, Transdermal, Every 24 Hours Scheduled   Start Date: February 18, 2021        Continue These Medications      Instructions Start Date   albuterol sulfate  (90 Base) MCG/ACT inhaler  Commonly known as: PROVENTIL HFA;VENTOLIN HFA;PROAIR HFA   1 puff, Inhalation, Every 6 Hours PRN, Prior to [facility] Admission, Patient was on: 1 to 2 puffs every 4 to 6 hours as needed      albuterol (2.5 MG/3ML) 0.083% nebulizer solution  Commonly known as: PROVENTIL   2.5 mg, Nebulization,  Every 4 Hours PRN      aspirin 81 MG chewable tablet   81 mg, Oral, Daily      atorvastatin 40 MG tablet  Commonly known as: LIPITOR   40 mg, Oral, Daily      BASAGLAR KWIKPEN SC   20 Units, Subcutaneous, 2 Times Daily PRN      budesonide-formoterol 160-4.5 MCG/ACT inhaler  Commonly known as: SYMBICORT   2 puffs, Inhalation, 2 Times Daily PRN      clopidogrel 75 MG tablet  Commonly known as: PLAVIX   75 mg, Oral, Daily      DULoxetine 60 MG capsule  Commonly known as: CYMBALTA   60 mg, Oral, 2 Times Daily      furosemide 20 MG tablet  Commonly known as: LASIX   20 mg, Oral, Daily      hydrOXYzine 25 MG tablet  Commonly known as: ATARAX   25 mg, Oral, 3 Times Daily PRN      isosorbide mononitrate 30 MG 24 hr tablet  Commonly known as: IMDUR   30 mg, Oral, Daily      lisinopril 10 MG tablet  Commonly known as: PRINIVIL,ZESTRIL   10 mg, Oral, Daily      metFORMIN 1000 MG tablet  Commonly known as: GLUCOPHAGE   1,000 mg, Oral, 2 Times Daily With Meals      metoprolol tartrate 50 MG tablet  Commonly known as: LOPRESSOR   50 mg, Oral, 2 Times Daily      multivitamin tablet tablet   1 tablet, Oral, Daily      nitroglycerin 0.4 MG SL tablet  Commonly known as: NITROSTAT   1 under the tongue as needed for angina, may repeat q5mins for up three doses      omeprazole 20 MG capsule  Commonly known as: priLOSEC   20 mg, Oral, Daily      potassium chloride 10 MEQ CR tablet   10 mEq, Oral, Daily      pregabalin 200 MG capsule  Commonly known as: LYRICA   200 mg, Oral, 3 Times Daily      rOPINIRole 4 MG tablet  Commonly known as: REQUIP   4 mg, Oral, Nightly         Stop These Medications    Diclofenac Sodium 1 % gel gel  Commonly known as: VOLTAREN     oxyCODONE-acetaminophen 7.5-325 MG per tablet  Commonly known as: PERCOCET     sulindac 200 MG tablet  Commonly known as: CLINORIL            No Known Allergies      Discharge Disposition:  Home or Self Care    Diet:  Hospital:  Diet Order   Procedures   • Diet Regular; Consistent  Carbohydrate, Cardiac       Activity:  Activity Instructions     Activity as Tolerated      Measure Blood Pressure            Restrictions or Other Recommendations:       CODE STATUS:    Code Status and Medical Interventions:   Ordered at: 02/05/21 1505     Code Status:    CPR     Medical Interventions (Level of Support Prior to Arrest):    Full       Future Appointments   Date Time Provider Department Center   2/26/2021  8:45 AM [facility]   3/11/2021 10:30 AM Roseanne Abebe APRN [facility] None   4/12/2021  2:00 PM Devon Kwan APRN [facility] None       Additional Instructions for the Follow-ups that You Need to Schedule     Ambulatory Referral to Home Health   As directed      Face to Face Visit Date: 2/17/2021    Follow-up provider for Plan of Care?: I treated the patient in an acute care facility and will not continue treatment after discharge.    Follow-up provider: MARIAA VELASCO [[id number]]    Reason/Clinical Findings: Postoperative infection    Describe mobility limitations that make leaving home difficult: Impaired functional mobility, balance and gait.  Decreased strength and generalized weakness.  Wound vac and IV antibiotics with PICC line dressing changes and weekly labs.    Nursing/Therapeutic Services Requested: Skilled Nursing    Skilled nursing orders: Medication education Wound vac application and instruction Wound care dressing/changes Infusion therapy PICC line care/instruction Cardiopulmonary assessments Neurovascular assessments         Ambulatory Referral to Physical Therapy Wound Care   As directed      Specialty needed: Wound Care         Discharge Follow-up with PCP   As directed       Currently Documented PCP:    Mariaa Velasco APRN    PCP Phone Number:    [phone]     Follow Up Details: PCP 1 week at discharge (please schedule appointment prior to DC home today)         Discharge Follow-up with Specialty: Follow-up with Dr. Trveizo with [facility] on  3/11/2021 at 10:30 AM per their instructions   As directed      Specialty: Follow-up with Dr. Trevizo with [facility] on 3/11/2021 at 10:30 AM per their instructions         Discharge Follow-up with Specified Provider: [facility]up [facility] on 2/20 as scheduled.   As directed      To: Follow-up [facility] on 2/20 as scheduled.         Discharge Follow-up with Specified Provider: Follow-up Dr. Washburn with [facility] in 1 week (please assure appointment is scheduled prior to discharge)   As directed      To: Follow-up Dr. Washburn with [facility] in 1 week (please assure appointment is scheduled prior to discharge)                 Vaishali Montero, APRN  02/17/21    Time Spent on Discharge:  I spent 50 minutes on this discharge activity which included: face-to-face encounter with the patient, reviewing the data in the system, coordination of the care with the nursing staff as well as consultants, documentation, and entering orders.

## 2021-02-17 NOTE — THERAPY DISCHARGE NOTE
Acute Care - Wound/Debridement Treatment Note/Discharge   [place]     Patient Name: Reg Sanchez  : 1965  MRN: [id number]  Today's Date: 2021                    Admit Date: 2021    Visit Dx:    ICD-10-CM ICD-9-CM   1. Postoperative infection, unspecified type, initial encounter  T81.40XA 998.59   2. PAD (peripheral artery disease) (CMS/HCC)  I73.9 443.9   3. Type 2 diabetes mellitus with diabetic peripheral angiopathy without gangrene, with long-term current use of insulin (CMS/HCC)  E11.51 250.70    Z79.4 443.81     V58.67       Patient Active Problem List   Diagnosis   • PAD (peripheral artery disease) (CMS/[facility])   • 100% R SFA occlusion    • Atherosclerosis of native artery of right lower extremity with intermittent claudication (CMS/HCC)   • Tobacco use   • PVD    • CAD s/p stents and CABG X5    • S/P L leg amputation    • Smoker   • Daily marijuana use   • Suspected ETOH withdrawal    • T2DM on metformin    • Class 1 obesity in adult   • Macrocytosis without anemia   • Post-operative infection   • Postoperative infection        Past Medical History:   Diagnosis Date   • Anxiety    • CHF (congestive heart failure) (CMS/[facility])    • COPD (chronic obstructive pulmonary disease) (CMS/HCC)    • Coronary artery disease involving native coronary artery of native heart without angina pectoris 2021    Added automatically from request for surgery [id number]   • Diabetes mellitus (CMS/HCC)    • Hepatitis C    • Hyperlipidemia    • Hypertension    • Sleep apnea    • T2DM on metformin  2021        Past Surgical History:   Procedure Laterality Date   • CARDIAC CATHETERIZATION  11/20/2020    x 2 stents   • CARDIAC CATHETERIZATION Right 2021    Procedure: Peripheral angiography;  Surgeon: Wesly Lund MD;  Location: [facility] INVASIVE LOCATION;  Service: Cardiovascular;  Laterality: Right;   • CORONARY ARTERY BYPASS GRAFT      x 5   • CORONARY STENT PLACEMENT  2020    Dr Mortensen x 2  stents   • FEMORAL DISTAL BYPASS N/A 1/27/2021    Procedure: FEMORAL DISTAL BYPASS;  Surgeon: Jeremy Trevizo MD;  Location:  [facility] OR;  Service: Vascular;  Laterality: N/A;   • FINGER SURGERY      left   • INCISION AND DRAINAGE LEG Right 2/9/2021    Procedure: INCISION AND DRAINAGE RIGHT LOWER EXTREMITY, EVACUATION OF HEMATOMA, LIGATION OF FEM DISTAL BYPASS;  Surgeon: Jeremy Trevizo MD;  Location:  [facility] OR;  Service: Cardiothoracic;  Laterality: Right;   • LEG AMPUTATION Left     left lower leg   • SHOULDER SURGERY Bilateral                WOUND DEBRIDEMENT                        PT Assessment (last 12 hours)      PT Evaluation and Treatment     Row Name 02/17/21 1400 02/17/21 1030       Physical Therapy Time and Intention    Subjective Information  complains of;weakness;fatigue  -  complains of;pain  -    Document Type  therapy note (daily note);wound care;discharge treatment  -  wound care;therapy note (daily note)  -    Mode of Treatment  physical therapy  -  --    Patient Effort  --  excellent  -    Symptoms Noted During/After Treatment  --  increased pain  -    Comment  --  pre-medicated for wound vac dressing change  -    Row Name 02/17/21 1030          General Information    Patient Observations  alert;cooperative;agree to therapy  -     Existing Precautions/Restrictions  fall;other (see comments) remote L BKA, wound vac x2 to RLE  -     Risks Reviewed  patient:;increased discomfort;increased drainage  -     Benefits Reviewed  patient:;improve skin integrity  -     Barriers to Rehab  medically complex  -     Row Name 02/17/21 1030          Cognition    Affect/Mental Status (Cognitive)  WFL  -     Orientation Status (Cognition)  oriented x 4  -     Follows Commands (Cognition)  WNL  -     Row Name 02/17/21 1400          Pain    Additional Documentation  Pain Scale: FACES Pre/Post-Treatment (Group)  -     Row Name 02/17/21 1030          Pain Scale: Numbers  Pre/Post-Treatment    Pretreatment Pain Rating  6/10  -     Posttreatment Pain Rating  8/10  -     Pain Location - Side  Right  -     Pain Location - Orientation  lower  -     Pain Location  extremity  -     Pre/Posttreatment Pain Comment  increased pain with dressing changes, premedicated  -     Row Name 02/17/21 1400          Pain Scale: Word Pre/Post-Treatment    Pain Intervention(s)  Repositioned  -     Row Name 02/17/21 1400          Pain Scale: FACES Pre/Post-Treatment    Pain: FACES Scale, Pretreatment  8-->hurts whole lot  -MF     Posttreatment Pain Rating  8-->hurts whole lot  -MF     Row Name 02/17/21 1030          Wound 01/27/21 1322 Right distal leg Incision    Wound - Properties Group Placement Date: 01/27/21  -ER Placement Time: 1322  -ER Present on Hospital Admission: N  -ER Side: Right  -ER Orientation: distal  -ER Location: leg  -ER Primary Wound Type: Incision  -ER Additional Comments: medial R ankle  -    Dressing Appearance  intact;moist drainage  -     Closure  Staples  -     Base  non-granulating;moist;necrotic;yellow;slough;pink  -     Periwound  intact;moist;pink  -     Periwound Temperature  warm  -     Periwound Skin Turgor  firm  -     Edges  irregular;open  -     Drainage Characteristics/Odor  serosanguineous  -     Drainage Amount  small  -     Care, Wound  cleansed with;wound cleanser;debrided  -     Dressing Care  dressing changed;petroleum-based;gauze;border dressing xeroform, mepilex border  -     Periwound Care  cleansed with pH balanced cleanser;dry periwound area maintained  -     Retired Wound - Properties Group Date first assessed: 01/27/21  -ER Time first assessed: 1322  -ER Present on Hospital Admission: N  -ER Side: Right  -ER Location: leg  -ER Primary Wound Type: Incision  -ER Additional Comments: medial R ankle  -    Row Name 02/17/21 1030          Wound 01/27/21 1322 Right proximal leg Incision    Wound - Properties Group  Placement Date: 01/27/21  -ER Placement Time: 1322  -ER Present on Hospital Admission: N  -ER Side: Right  -ER Orientation: proximal  -ER Location: leg  -ER Primary Wound Type: Incision  -ER Additional Comments: medial proximal calf  -JM    Dressing Appearance  dry;intact;other (see comments) drape reinforced superiorly  -JM     Base  granulating;moist;pink;red;subcutaneous;yellow;slough  -JM     Periwound  intact;indurated;redness  -JM     Periwound Temperature  warm  -JM     Periwound Skin Turgor  firm  -JM     Edges  open  -JM     Drainage Characteristics/Odor  serosanguineous  -JM     Drainage Amount  small  -JM     Care, Wound  cleansed with;wound cleanser;debrided;negative pressure wound therapy  -JM     Dressing Care  dressing changed vac  -JM     Periwound Care  barrier film applied;cleansed with pH balanced cleanser;dry periwound area maintained no-sting, drape border  -JM     Wound Output (mL)  300  -JM     Retired Wound - Properties Group Date first assessed: 01/27/21  -ER Time first assessed: 1322  -ER Present on Hospital Admission: N  -ER Side: Right  -ER Location: leg  -ER Primary Wound Type: Incision  -ER Additional Comments: medial proximal calf  -JM    Row Name 02/17/21 1030          Wound 01/27/21 1322 Right anterior greater trochanter Incision    Wound - Properties Group Placement Date: 01/27/21  -ER Placement Time: 1322  -ER Present on Hospital Admission: N  -ER Side: Right  -ER Orientation: anterior  -ER Location: greater trochanter  -ER Primary Wound Type: Incision  -ER Additional Comments: medial proximal thigh  -JM    Dressing Appearance  dry;intact  -JM     Base  granulating;moist;pink;red;slough;subcutaneous;yellow  -JM     Periwound  intact;indurated;pink;redness  -JM     Periwound Temperature  warm  -JM     Periwound Skin Turgor  soft  -JM     Edges  open  -JM     Drainage Characteristics/Odor  serosanguineous  -JM     Drainage Amount  small  -JM     Care, Wound  cleansed with;wound  cleanser;debrided;negative pressure wound therapy  -     Dressing Care  dressing changed vac  -     Periwound Care  barrier film applied;cleansed with pH balanced cleanser;dry periwound area maintained no-sting, drape border  -     Retired Wound - Properties Group Date first assessed: 01/27/21  -ER Time first assessed: 1322  -ER Present on Hospital Admission: N  -ER Side: Right  -ER Location: greater trochanter  -ER Primary Wound Type: Incision  -ER Additional Comments: medial proximal thigh  -    Row Name 02/17/21 1030          Wound 02/09/21 1604 Right lower leg Incision    Wound - Properties Group Placement Date: 02/09/21  -LS Placement Time: 1604  -LS, Present upon admission to preop  Side: Right  -LS Orientation: lower  -LS Location: leg  -LS Primary Wound Type: Incision  -LS Additional Comments: medial distal R calf  -    Dressing Appearance  dry;intact  -     Base  granulating;moist;red;yellow;slough;exposed structure ?vein in lateral aspect  -     Periwound  intact;indurated;pink;warm  -     Periwound Temperature  warm  -     Periwound Skin Turgor  soft  -     Edges  open  -     Drainage Characteristics/Odor  serosanguineous  -     Drainage Amount  small  -     Care, Wound  cleansed with;wound cleanser;debrided;negative pressure wound therapy  -     Dressing Care  dressing changed vac  -     Periwound Care  barrier film applied;cleansed with pH balanced cleanser;dry periwound area maintained  -     Retired Wound - Properties Group Date first assessed: 02/09/21  -LS Time first assessed: 1604  -LS, Present upon admission to preop  Side: Right  -LS Location: leg  -LS Primary Wound Type: Incision  -LS Additional Comments: medial distal R calf  -    Row Name 02/17/21 1030          Wound 02/09/21 1737 Right groin     Wound - Properties Group Placement Date: 02/09/21  -ER Placement Time: 1737  -ER Side: Right  -ER Location: groin  -ER Primary Wound Type: --  -ER, OPEN WOUND      Dressing Appearance  dry;intact  -JM     Base  granulating;moist;pink;red;slough;subcutaneous;yellow  -JM     Periwound  intact;indurated;moist;redness  -JM     Periwound Temperature  warm  -JM     Periwound Skin Turgor  firm  -JM     Edges  open  -JM     Drainage Characteristics/Odor  serosanguineous  -JM     Drainage Amount  large  -JM     Care, Wound  cleansed with;wound cleanser;debrided;negative pressure wound therapy  -     Dressing Care  dressing changed vac  -JM     Periwound Care  cleansed with pH balanced cleanser;dry periwound area maintained;barrier film applied no-sting, stomapaste/drape border  -     Wound Output (mL)  400  -JM     Retired Wound - Properties Group Date first assessed: 02/09/21  -ER Time first assessed: 1737  -ER Side: Right  -ER Location: groin  -ER Primary Wound Type: --  -ER, OPEN WOUND     Row Name 02/17/21 1030          Wound 02/09/21 1751 Right anterior popliteal Incision    Wound - Properties Group Placement Date: 02/09/21  -ER Placement Time: 1751  -ER Present on Hospital Admission: N  -ER Side: Right  -ER Orientation: anterior  -ER Location: popliteal  -ER Primary Wound Type: Incision  -ER, OPEN WOUND  Additional Comments: medial distal thigh  -JM    Dressing Appearance  dry;intact  -JM     Base  clean;granulating;moist;red;subcutaneous;yellow  -JM     Periwound  intact;indurated;redness  -JM     Periwound Temperature  warm  -JM     Periwound Skin Turgor  firm  -JM     Edges  open  -JM     Drainage Characteristics/Odor  serosanguineous  -JM     Drainage Amount  small  -JM     Care, Wound  cleansed with;wound cleanser;debrided;negative pressure wound therapy  -     Dressing Care  dressing changed vac  -JM     Periwound Care  barrier film applied;cleansed with pH balanced cleanser;dry periwound area maintained no-sting, drape border  -JM     Retired Wound - Properties Group Date first assessed: 02/09/21  -ER Time first assessed: 1751  -ER Present on Hospital Admission: N   -ER Side: Right  -ER Location: popliteal  -ER Primary Wound Type: Incision  -ER, OPEN WOUND  Additional Comments: medial distal thigh  -JM    Row Name 02/17/21 1400 02/17/21 1030       NPWT (Negative Pressure Wound Therapy) 02/14/21 0945 R groin    NPWT (Negative Pressure Wound Therapy) - Properties Group Placement Date: 02/14/21  - Placement Time: 0945 - Location: R groin  -JM Additional Comments: Rehab rental unit  -    Therapy Setting  continuous therapy converted to home wound vac  -MF  continuous therapy  -    Dressing  --  foam, black;foam, white  -JM    Pressure Setting  --  150 mmHg  -JM    Sponges Inserted  --  2 1 white, 1 black  -JM    Sponges Removed  --  2 1 white, 1 black  -JM    Finger sweep complete  --  Yes  -JM    Retired NPWT (Negative Pressure Wound Therapy) - Properties Group Placement Date: 02/14/21  - Placement Time: 0945 - Location: R groin  -JM Additional Comments: Rehab rental unit  -JM    Row Name 02/17/21 1400 02/17/21 1030       NPWT (Negative Pressure Wound Therapy) 02/14/21 0945 RLE wounds    NPWT (Negative Pressure Wound Therapy) - Properties Group Placement Date: 02/14/21  - Placement Time: 0945 - Location: RLE wounds  -JM Additional Comments: Rental unit [id number]  -    Therapy Setting  continuous therapy converted to home wound vac.   -MF  continuous therapy  -    Dressing  --  foam, black;foam, white  -    Contact Layer  --  Vaseline-embedded gauze xeroform to exposed vein distal medial R calf  -    Pressure Setting  --  150 mmHg  -JM    Sponges Inserted  --  6;other (see comments) 2white, 4black, 1 xeroform  -JM    Sponges Removed  --  6 3 white, 3 black, 1 xeroform  -JM    Finger sweep complete  --  Yes  -JM    Retired NPWT (Negative Pressure Wound Therapy) - Properties Group Placement Date: 02/14/21  - Placement Time: 0945 - Location: RLE wounds  -JM Additional Comments: [facility] unit [id number]  -    Row Name 02/17/21 1400 02/17/21 1030        Coping    Observed Emotional State  calm  -  calm;combative  -    Verbalized Emotional State  acceptance  -  acceptance  -    Row Name 02/17/21 1400 02/17/21 1030       Plan of Care Review    Plan of Care Reviewed With  patient  -MF  patient  -    Progress  --  improving  -    Outcome Summary  RLE dressings converted to home wound vac to allow for d/c home with HH f/u.  PT educated pt on home management of dressings and canister changes.   -  PT changed RLE dressings today with pre-medication.  Pt reporting 8/10 pain with vac dressing change.  All wounds improved with increased granulation.  PT able to debride moderate amount of slough from medial R ankle wound, base moist and non-granulating.  Pt still with min residual induration and erythema around wounds, improving per pt report.  Pt's home vac has been approved and can be placed when pt ready to d/c today.  Plan is to have  services for IV abx and wound vac dressing changes.  Please call PT at [phone] when pt ready for d/c.  -    Row Name 02/17/21 1400 02/17/21 1030       Positioning and Restraints    Pre-Treatment Position  in bed  -  in bed  -    Post Treatment Position  bed  -  bed  -    In Bed  supine;call light within reach  -  notified nsg;call light within reach;encouraged to call for assist  -      User Key  (r) = Recorded By, (t) = Taken By, (c) = Cosigned By    Initials Name Provider Type    MF Say Mason, PT Physical Therapist    Irina Anaya, RN Registered Nurse    Afua Oneill, PT Physical Therapist    Orly Campa, RN Registered Nurse        Physical Therapy Education                 Title: PT OT SLP Therapies (In Progress)     Topic: Physical Therapy (In Progress)     Point: Mobility training (Done)     Learning Progress Summary           Patient Acceptance, LONDON, VU by JUSTINA at 2/15/2021 1502    Comment: Patient education regarding sequencing for safe gait and transfers.    LONDON Baez D, SALVADOR by   at 2/14/2021 1405    Comment: POC, gait, safety                   Point: Home exercise program (Not Started)     Learner Progress:  Not documented in this visit.          Point: Body mechanics (Not Started)     Learner Progress:  Not documented in this visit.          Point: Precautions (Not Started)     Learner Progress:  Not documented in this visit.                      User Key     Initials Effective Dates Name Provider Type [facility] 06/19/15 -  Rosalind Atkins, PT Physical Therapist PT     03/11/20 -  Joselin Preston, PT Physical Therapist PT                  Recommendation and Plan  Anticipated Discharge Disposition (PT): home with home health  Therapy Frequency (PT): daily    Plan of Care Reviewed With: patient   Outcome Summary: RLE dressings converted to home wound vac to allow for d/c home with  f/u.  PT educated pt on home management of dressings and canister changes.                   Time Calculation  PT Charges     Row Name 02/17/21 1400 02/17/21 1030          Time Calculation    Start Time  1400  -  1030  -     PT Goal Re-Cert Due Date  02/24/21  -  02/24/21  -        Time Calculation- PT    Total Timed Code Minutes- PT  15 minute(s)  -  --       User Key  (r) = Recorded By, (t) = Taken By, (c) = Cosigned By    Initials Name Provider Type     Say Mason, PT Physical Therapist    Afua Oneill, PT Physical Therapist          Therapy Charges for Today     Code Description Service Date Service Provider Modifiers Qty    [id number]  PT NEG PRESS WOUND TO 50SQCM DME1 2/16/2021 Say Mason, PT  1    [id number]  PT SELF CARE/MGMT/TRAIN EA 15 MIN 2/17/2021 Say Mason, PT GP 1            PT G-Codes  Outcome Measure Options: AM-PAC 6 Clicks Daily Activity (OT)  AM-PAC 6 Clicks Score (PT): 20  AM-PAC 6 Clicks Score (OT): 17       PT Discharge Summary  Anticipated Discharge Disposition (PT): home with home health  Reason for Discharge: Discharge from  facility  Outcomes Achieved: Patient able to partially acheive established goals  Discharge Destination: Home with home health        Say Mason, PT  2/17/2021

## 2021-02-17 NOTE — THERAPY WOUND CARE TREATMENT
Acute Care - Wound/Debridement Treatment Note   [place]     Patient Name: Reg Sanchez  : 1965  MRN: [id number]  Today's Date: 2021                Admit Date: 2021    Visit Dx:    ICD-10-CM ICD-9-CM   1. Postoperative infection, unspecified type, initial encounter  T81.40XA 998.59   2. PAD (peripheral artery disease) (CMS/HCC)  I73.9 443.9       Patient Active Problem List   Diagnosis   • PAD (peripheral artery disease) (CMS/[facility])   • 100% R SFA occlusion    • Atherosclerosis of native artery of right lower extremity with intermittent claudication (CMS/[facility])   • Tobacco use   • PVD    • CAD s/p stents and CABG X5    • S/P L leg amputation    • Smoker   • Daily marijuana use   • Suspected ETOH withdrawal    • T2DM on metformin    • Class 1 obesity in adult   • Macrocytosis without anemia   • Post-operative infection   • Postoperative infection        Past Medical History:   Diagnosis Date   • Anxiety    • CHF (congestive heart failure) (CMS/HCC)    • COPD (chronic obstructive pulmonary disease) (CMS/HCC)    • Coronary artery disease involving native coronary artery of native heart without angina pectoris 2021    Added automatically from request for surgery [id number]   • Diabetes mellitus (CMS/HCC)    • Hepatitis C    • Hyperlipidemia    • Hypertension    • Sleep apnea    • T2DM on metformin  2021        Past Surgical History:   Procedure Laterality Date   • CARDIAC CATHETERIZATION  11/20/2020    x 2 stents   • CARDIAC CATHETERIZATION Right 2021    Procedure: Peripheral angiography;  Surgeon: Wesly Lund MD;  Location: [facility] INVASIVE LOCATION;  Service: Cardiovascular;  Laterality: Right;   • CORONARY ARTERY BYPASS GRAFT      x 5   • CORONARY STENT PLACEMENT  2020    Dr Mortensen x 2 stents   • FEMORAL DISTAL BYPASS N/A 2021    Procedure: FEMORAL DISTAL BYPASS;  Surgeon: Jeremy Trevizo MD;  Location: [facility];  Service: Vascular;  Laterality: N/A;   •  FINGER SURGERY      left   • INCISION AND DRAINAGE LEG Right 2/9/2021    Procedure: INCISION AND DRAINAGE RIGHT LOWER EXTREMITY, EVACUATION OF HEMATOMA, LIGATION OF FEM DISTAL BYPASS;  Surgeon: Jeremy Trevizo MD;  Location: [facility];  Service: Cardiothoracic;  Laterality: Right;   • LEG AMPUTATION Left     left lower leg   • SHOULDER SURGERY Bilateral            Wound 01/27/21 1322 Right anterior greater trochanter Incision (Active)   Dressing Appearance dry;intact 02/17/21 1030   Closure TAHIR 02/17/21 0800   Base granulating;moist;pink;red;slough;subcutaneous;yellow 02/17/21 1030   Periwound intact;indurated;pink;redness 02/17/21 1030   Periwound Temperature warm 02/17/21 1030   Periwound Skin Turgor soft 02/17/21 1030   Edges open 02/17/21 1030   Drainage Characteristics/Odor serosanguineous 02/17/21 1030   Drainage Amount small 02/17/21 1030   Care, Wound cleansed with;wound cleanser;debrided;negative pressure wound therapy 02/17/21 1030   Dressing Care dressing changed 02/17/21 1030   Periwound Care barrier film applied;cleansed with pH balanced cleanser;dry periwound area maintained 02/17/21 1030       Wound 01/27/21 1322 Right proximal leg Incision (Active)   Dressing Appearance dry;intact;other (see comments) 02/17/21 1030   Closure TAHIR 02/17/21 0800   Base granulating;moist;pink;red;subcutaneous;yellow;slough 02/17/21 1030   Periwound intact;indurated;redness 02/17/21 1030   Periwound Temperature warm 02/17/21 1030   Periwound Skin Turgor firm 02/17/21 1030   Edges open 02/17/21 1030   Drainage Characteristics/Odor serosanguineous 02/17/21 1030   Drainage Amount small 02/17/21 1030   Care, Wound cleansed with;wound cleanser;debrided;negative pressure wound therapy 02/17/21 1030   Dressing Care dressing changed 02/17/21 1030   Periwound Care barrier film applied;cleansed with pH balanced cleanser;dry periwound area maintained 02/17/21 1030   Wound Output (mL) 300 02/17/21 1030       Wound 01/27/21 132  Right distal leg Incision (Active)   Dressing Appearance intact;moist drainage 02/17/21 1030   Closure Staples 02/17/21 1030   Base non-granulating;moist;necrotic;yellow;slough;pink 02/17/21 1030   Periwound intact;moist;pink 02/17/21 1030   Periwound Temperature warm 02/17/21 1030   Periwound Skin Turgor firm 02/17/21 1030   Edges irregular;open 02/17/21 1030   Drainage Characteristics/Odor serosanguineous 02/17/21 1030   Drainage Amount small 02/17/21 1030   Care, Wound cleansed with;wound cleanser;debrided 02/17/21 1030   Dressing Care dressing changed;petroleum-based;gauze;border dressing 02/17/21 1030   Periwound Care cleansed with pH balanced cleanser;dry periwound area maintained 02/17/21 1030       Wound 02/09/21 1604 Right lower leg Incision (Active)   Dressing Appearance dry;intact 02/17/21 1030   Closure Staples 02/17/21 0800   Base granulating;moist;red;yellow;slough;exposed structure 02/17/21 1030   Periwound intact;indurated;pink;warm 02/17/21 1030   Periwound Temperature warm 02/17/21 1030   Periwound Skin Turgor soft 02/17/21 1030   Edges open 02/17/21 1030   Drainage Characteristics/Odor serosanguineous 02/17/21 1030   Drainage Amount small 02/17/21 1030   Care, Wound cleansed with;wound cleanser;debrided;negative pressure wound therapy 02/17/21 1030   Dressing Care dressing changed 02/17/21 1030   Periwound Care barrier film applied;cleansed with pH balanced cleanser;dry periwound area maintained 02/17/21 1030       Wound 02/09/21 1737 Right groin  (Active)   Dressing Appearance dry;intact 02/17/21 1030   Closure TAHIR 02/17/21 0800   Base granulating;moist;pink;red;slough;subcutaneous;yellow 02/17/21 1030   Periwound intact;indurated;moist;redness 02/17/21 1030   Periwound Temperature warm 02/17/21 1030   Periwound Skin Turgor firm 02/17/21 1030   Edges open 02/17/21 1030   Drainage Characteristics/Odor serosanguineous 02/17/21 1030   Drainage Amount large 02/17/21 1030   Care, Wound cleansed  with;wound cleanser;debrided;negative pressure wound therapy 02/17/21 1030   Dressing Care dressing changed 02/17/21 1030   Periwound Care cleansed with pH balanced cleanser;dry periwound area maintained;barrier film applied 02/17/21 1030   Wound Output (mL) 400 02/17/21 1030       Wound 02/09/21 1751 Right anterior popliteal Incision (Active)   Dressing Appearance dry;intact 02/17/21 1030   Closure TAHIR 02/17/21 0800   Base clean;granulating;moist;red;subcutaneous;yellow 02/17/21 1030   Periwound intact;indurated;redness 02/17/21 1030   Periwound Temperature warm 02/17/21 1030   Periwound Skin Turgor firm 02/17/21 1030   Edges open 02/17/21 1030   Drainage Characteristics/Odor serosanguineous 02/17/21 1030   Drainage Amount small 02/17/21 1030   Care, Wound cleansed with;wound cleanser;debrided;negative pressure wound therapy 02/17/21 1030   Dressing Care dressing changed 02/17/21 1030   Periwound Care barrier film applied;cleansed with pH balanced cleanser;dry periwound area maintained 02/17/21 1030       NPWT (Negative Pressure Wound Therapy) 02/14/21 0945 R groin (Active)   Therapy Setting continuous therapy 02/17/21 1030   Dressing foam, black;foam, white 02/17/21 1030   Pressure Setting 150 mmHg 02/17/21 1030   Sponges Inserted 2 02/17/21 1030   Sponges Removed 2 02/17/21 1030   Finger sweep complete Yes 02/17/21 1030       NPWT (Negative Pressure Wound Therapy) 02/14/21 0945 RLE wounds (Active)   Therapy Setting continuous therapy 02/17/21 1030   Dressing foam, black;foam, white 02/17/21 1030   Contact Layer Vaseline-embedded gauze 02/17/21 1030   Pressure Setting 150 mmHg 02/17/21 1030   Sponges Inserted 6;other (see comments) 02/17/21 1030   Sponges Removed 6 02/17/21 1030   Finger sweep complete Yes 02/17/21 1030         WOUND DEBRIDEMENT  Total area of Debridement: 8cmsq  Debridement Site 1  Location- Site 1: RLE wounds  Selective Debridement- Site 1: Wound Surface <20cmsq  Instruments- Site 1:  gaye  Excised Tissue Description- Site 1: moderate, slough  Bleeding- Site 1: scant                  PT Assessment (last 12 hours)      PT Evaluation and Treatment     Row Name 02/17/21 1030          Physical Therapy Time and Intention    Subjective Information  complains of;pain  -JM     Document Type  wound care;therapy note (daily note)  -JM     Patient Effort  excellent  -JM     Symptoms Noted During/After Treatment  increased pain  -JM     Comment  pre-medicated for wound vac dressing change  -     Row Name 02/17/21 1030          General Information    Patient Observations  alert;cooperative;agree to therapy  -     Existing Precautions/Restrictions  fall;other (see comments) remote L BKA, wound vac x2 to RLE  -     Risks Reviewed  patient:;increased discomfort;increased drainage  -     Benefits Reviewed  patient:;improve skin integrity  -     Barriers to Rehab  medically complex  -     Row Name 02/17/21 1030          Cognition    Affect/Mental Status (Cognitive)  WFL  -     Orientation Status (Cognition)  oriented x 4  -JM     Follows Commands (Cognition)  WNL  -     Row Name 02/17/21 1030          Pain Scale: Numbers Pre/Post-Treatment    Pretreatment Pain Rating  6/10  -JM     Posttreatment Pain Rating  8/10  -JM     Pain Location - Side  Right  -JM     Pain Location - Orientation  lower  -JM     Pain Location  extremity  -     Pre/Posttreatment Pain Comment  increased pain with dressing changes, premedicated  -     Row Name 02/17/21 1030          Wound 01/27/21 1322 Right distal leg Incision    Wound - Properties Group Placement Date: 01/27/21  -ER Placement Time: 1322  -ER Present on Hospital Admission: N  -ER Side: Right  -ER Orientation: distal  -ER Location: leg  -ER Primary Wound Type: Incision  -ER Additional Comments: medial R ankle  -    Dressing Appearance  intact;moist drainage  -JM     Closure  Staples  -     Base  non-granulating;moist;necrotic;yellow;slough;pink  -JM      Periwound  intact;moist;pink  -     Periwound Temperature  warm  -JM     Periwound Skin Turgor  firm  -JM     Edges  irregular;open  -JM     Drainage Characteristics/Odor  serosanguineous  -JM     Drainage Amount  small  -JM     Care, Wound  cleansed with;wound cleanser;debrided  -JM     Dressing Care  dressing changed;petroleum-based;gauze;border dressing xeroform, mepilex border  -JM     Periwound Care  cleansed with pH balanced cleanser;dry periwound area maintained  -JM     Retired Wound - Properties Group Date first assessed: 01/27/21  -ER Time first assessed: 1322  -ER Present on Hospital Admission: N  -ER Side: Right  -ER Location: leg  -ER Primary Wound Type: Incision  -ER Additional Comments: medial R ankle  -    Row Name 02/17/21 1030          Wound 01/27/21 1322 Right proximal leg Incision    Wound - Properties Group Placement Date: 01/27/21  -ER Placement Time: 1322  -ER Present on Hospital Admission: N  -ER Side: Right  -ER Orientation: proximal  -ER Location: leg  -ER Primary Wound Type: Incision  -ER Additional Comments: medial proximal calf  -    Dressing Appearance  dry;intact;other (see comments) drape reinforced superiorly  -JM     Base  granulating;moist;pink;red;subcutaneous;yellow;slough  -     Periwound  intact;indurated;redness  -     Periwound Temperature  warm  -     Periwound Skin Turgor  firm  -     Edges  open  -JM     Drainage Characteristics/Odor  serosanguineous  -JM     Drainage Amount  small  -JM     Care, Wound  cleansed with;wound cleanser;debrided;negative pressure wound therapy  -     Dressing Care  dressing changed vac  -JM     Periwound Care  barrier film applied;cleansed with pH balanced cleanser;dry periwound area maintained no-sting, drape border  -JM     Wound Output (mL)  300  -JM     Retired Wound - Properties Group Date first assessed: 01/27/21  -ER Time first assessed: 1322  -ER Present on Hospital Admission: N  -ER Side: Right  -ER Location: leg   -ER Primary Wound Type: Incision  -ER Additional Comments: medial proximal calf  -JM    Row Name 02/17/21 1030          Wound 01/27/21 1322 Right anterior greater trochanter Incision    Wound - Properties Group Placement Date: 01/27/21  -ER Placement Time: 1322  -ER Present on Hospital Admission: N  -ER Side: Right  -ER Orientation: anterior  -ER Location: greater trochanter  -ER Primary Wound Type: Incision  -ER Additional Comments: medial proximal thigh  -JM    Dressing Appearance  dry;intact  -JM     Base  granulating;moist;pink;red;slough;subcutaneous;yellow  -JM     Periwound  intact;indurated;pink;redness  -     Periwound Temperature  warm  -     Periwound Skin Turgor  soft  -     Edges  open  -     Drainage Characteristics/Odor  serosanguineous  -JM     Drainage Amount  small  -JM     Care, Wound  cleansed with;wound cleanser;debrided;negative pressure wound therapy  -JM     Dressing Care  dressing changed vac  -     Periwound Care  barrier film applied;cleansed with pH balanced cleanser;dry periwound area maintained no-sting, drape border  -JM     Retired Wound - Properties Group Date first assessed: 01/27/21  -ER Time first assessed: 1322  -ER Present on Hospital Admission: N  -ER Side: Right  -ER Location: greater trochanter  -ER Primary Wound Type: Incision  -ER Additional Comments: medial proximal thigh  -JM    Row Name 02/17/21 1030          Wound 02/09/21 1604 Right lower leg Incision    Wound - Properties Group Placement Date: 02/09/21  -LS Placement Time: 1604  -LS, Present upon admission to preop  Side: Right  -LS Orientation: lower  -LS Location: leg  -LS Primary Wound Type: Incision  -LS Additional Comments: medial distal R calf  -JM    Dressing Appearance  dry;intact  -JM     Base  granulating;moist;red;yellow;slough;exposed structure ?vein in lateral aspect  -JM     Periwound  intact;indurated;pink;warm  -     Periwound Temperature  warm  -JM     Periwound Skin Turgor  soft  -JM      Edges  open  -     Drainage Characteristics/Odor  serosanguineous  -     Drainage Amount  small  -     Care, Wound  cleansed with;wound cleanser;debrided;negative pressure wound therapy  -     Dressing Care  dressing changed vac  -     Periwound Care  barrier film applied;cleansed with pH balanced cleanser;dry periwound area maintained  -     Retired Wound - Properties Group Date first assessed: 02/09/21  -LS Time first assessed: 1604  -LS, Present upon admission to [facility]  Side: Right  -LS Location: leg  -LS Primary Wound Type: Incision  -LS Additional Comments: medial distal R calf  -JM    Row Name 02/17/21 1030          Wound 02/09/21 1737 Right groin     Wound - Properties Group Placement Date: 02/09/21  -ER Placement Time: 1737  -ER Side: Right  -ER Location: groin  -ER Primary Wound Type: --  -ER, OPEN WOUND     Dressing Appearance  dry;intact  -     Base  granulating;moist;pink;red;slough;subcutaneous;yellow  -     Periwound  intact;indurated;moist;redness  -     Periwound Temperature  warm  -     Periwound Skin Turgor  firm  -     Edges  open  -     Drainage Characteristics/Odor  serosanguineous  -     Drainage Amount  large  -     Care, Wound  cleansed with;wound cleanser;debrided;negative pressure wound therapy  -     Dressing Care  dressing changed vac  -     Periwound Care  cleansed with pH balanced cleanser;dry periwound area maintained;barrier film applied no-sting, stomapaste/drape border  -     Wound Output (mL)  400  -JM     Retired Wound - Properties Group Date first assessed: 02/09/21  -ER Time first assessed: 1737  -ER Side: Right  -ER Location: groin  -ER Primary Wound Type: --  -ER, OPEN WOUND     Row Name 02/17/21 1030          Wound 02/09/21 1751 Right anterior popliteal Incision    Wound - Properties Group Placement Date: 02/09/21  -ER Placement Time: 1751  -ER Present on Hospital Admission: N  -ER Side: Right  -ER Orientation: anterior  -ER Location:  popliteal  -ER Primary Wound Type: Incision  -ER, OPEN WOUND  Additional Comments: medial distal thigh  -    Dressing Appearance  dry;intact  -     Base  clean;granulating;moist;red;subcutaneous;yellow  -     Periwound  intact;indurated;redness  -     Periwound Temperature  warm  -     Periwound Skin Turgor  firm  -     Edges  open  -     Drainage Characteristics/Odor  serosanguineous  -     Drainage Amount  small  -     Care, Wound  cleansed with;wound cleanser;debrided;negative pressure wound therapy  -     Dressing Care  dressing changed vac  -     Periwound Care  barrier film applied;cleansed with pH balanced cleanser;dry periwound area maintained no-sting, drape border  -     Retired Wound - Properties Group Date first assessed: 02/09/21  -ER Time first assessed: 1751  -ER Present on Hospital Admission: N  -ER Side: Right  -ER Location: popliteal  -ER Primary Wound Type: Incision  -ER, OPEN WOUND  Additional Comments: medial distal thigh  -    Row Name 02/17/21 1030          NPWT (Negative Pressure Wound Therapy) 02/14/21 0945 R groin    NPWT (Negative Pressure Wound Therapy) - [facility] Group Placement Date: 02/14/21  - Placement Time: 0945 -JM Location: R groin  -JM Additional Comments: Rehab rental unit  -    Therapy Setting  continuous therapy  -     Dressing  foam, black;foam, white  -     Pressure Setting  150 mmHg  -     Sponges Inserted  2 1 white, 1 black  -     Sponges Removed  2 1 white, 1 black  -     Finger sweep complete  Yes  -     Retired NPWT (Negative Pressure Wound Therapy) - Properties Group Placement Date: 02/14/21  - Placement Time: 0945 - Location: R groin  -JM Additional Comments: Rehab rental unit  -    Row Name 02/17/21 1030          NPWT (Negative Pressure Wound Therapy) 02/14/21 0945 RLE wounds    NPWT (Negative Pressure Wound Therapy) - [facility] Group Placement Date: 02/14/21  - Placement Time: 0945 - Location: RLE wounds   - Additional Comments: Rental unit [id number]  -    Therapy Setting  continuous therapy  -     Dressing  foam, black;foam, white  -     Contact Layer  Vaseline-embedded gauze xeroform to exposed vein distal medial R calf  -     Pressure Setting  150 mmHg  -     Sponges Inserted  6;other (see comments) 2white, 4black, 1 xeroform  -     Sponges Removed  6 3 white, 3 black, 1 xeroform  -JM     Finger sweep complete  Yes  -JM     Retired NPWT (Negative Pressure Wound Therapy) - [facility] Group Placement Date: 02/14/21  - Placement Time: 0945  - Location: RLE wounds  - Additional Comments: Rental unit [id number]  -    Row Name 02/17/21 1030          Coping    Observed Emotional State  calm;combative  -     Verbalized Emotional State  acceptance  -     Row Name 02/17/21 1030          Plan of Care Review    Plan of Care Reviewed With  patient  -     Progress  improving  -     Outcome Summary  PT changed RLE dressings today with pre-medication.  Pt reporting 8/10 pain with vac dressing change.  All wounds improved with increased granulation.  PT able to debride moderate amount of slough from medial R ankle wound, base moist and non-granulating.  Pt still with min residual induration and erythema around wounds, improving per pt report.  Pt's home vac has been approved and can be placed when pt ready to d/c today.  Plan is to have  services for IV abx and wound vac dressing changes.  Please call PT at [phone] when pt ready for d/c.  -     Row Name 02/17/21 1030          Positioning and Restraints    Pre-Treatment Position  in bed  -     Post Treatment Position  bed  -     In Bed  notified nsg;call light within reach;encouraged to call for assist  -       User Key  (r) = Recorded By, (t) = Taken By, (c) = Cosigned By    Initials Name Provider Type    Irina Anaya, RN Registered Nurse    Afua Oneill, PT Physical Therapist    Orly Campa, RN Registered Nurse         Physical Therapy Education                 Title: PT OT SLP Therapies (In Progress)     Topic: Physical Therapy (In Progress)     Point: Mobility training (Done)     Learning Progress Summary           Patient LONDON Henry VU by  at 2/15/2021 1502    Comment: Patient education regarding sequencing for safe gait and transfers.    LONDON Baez D, VU by  at 2/14/2021 1405    Comment: POC, gait, safety                   Point: Home exercise program (Not Started)     Learner Progress:  Not documented in this visit.          Point: Body mechanics (Not Started)     Learner Progress:  Not documented in this visit.          Point: Precautions (Not Started)     Learner Progress:  Not documented in this visit.                      User Key     Initials Effective Dates Name Provider Type Discipline     06/19/15 -  Rosalind Atkins, PT Physical Therapist PT     03/11/20 -  Joselin Preston PT Physical Therapist PT                Recommendation and Plan  Anticipated Discharge Disposition (PT): home with assist  Therapy Frequency (PT): daily  Plan of Care Reviewed With: patient   Progress: improving       Progress: improving  Outcome Summary: PT changed RLE dressings today with pre-medication.  Pt reporting 8/10 pain with vac dressing change.  All wounds improved with increased granulation.  PT able to debride moderate amount of slough from medial R ankle wound, base moist and non-granulating.  Pt still with min residual induration and erythema around wounds, improving per pt report.  Pt's home vac has been approved and can be placed when pt ready to d/c today.  Plan is to have HH services for IV abx and wound vac dressing changes.  Please call PT at [phone] when pt ready for d/c.  Plan of Care Reviewed With: patient            Time Calculation  PT Charges     Row Name 02/17/21 1030             Time Calculation    Start Time  1030  -JM      PT Goal Re-Cert Due Date  02/24/21  -        User Key  (r) = Recorded By, (t) =  Taken By, (c) = Cosigned By    Initials Name Provider Type     Afua Zapata, PT Physical Therapist            Therapy Charges for Today     Code Description Service Date Service Provider Modifiers Qty    37899871269 HC STEVE DEBRIDE OPEN WOUND UP TO 20CM 2/17/2021 Afua Zapata, PT GP 1    [id number] HC PT NEG PRESS WOUND TO 50SQCM DME4 2/17/2021 Afua Zapata, PT  1            PT G-Codes  Outcome Measure Options: AM-PAC 6 Clicks Daily Activity (OT)  AM-PAC 6 Clicks Score (PT): 20  AM-PAC 6 Clicks Score (OT): 17       Afua Zapata PT  2/17/2021

## 2021-02-17 NOTE — DISCHARGE PLACEMENT REQUEST
"Shaina Cramer   [phone]  Order for home health nursing (weekly PICC line dressing changes, labs and 3x weekly wound vac management.  First wound vac change on Saturday 2/20)      Betty Potter (55 y.o. Male)     Date of Birth Social Security Number Address Home Phone MRN    1965  [address]  [address]  [facility] [address] [phone] [id number]    Anab[employer] Marital Status          None Legally        Admission Date Admission Type Admitting Provider Attending Provider Department, Room/Bed    2/5/21 Urgent Puja Eli MD Mini, Jocelyn, MD [facility] 3E, S342/1    Discharge Date Discharge Disposition Discharge Destination                       Attending Provider: Puja Eli MD    Allergies: No Known Allergies    Isolation: None   Infection: MRSA (02/08/21)   Code Status: CPR    Ht: 170.2 cm (67\")   Wt: 101 kg (223 lb)    Admission Cmt: None   Principal Problem: Postoperative infection [T81.40XA] More...                 Active Insurance as of 2/5/2021     Primary Coverage     Payor Plan Insurance Group Employer/Plan Group    ANTHEM MEDICARE REPLACEMENT ANTHEM MEDICARE ADVANTAGE [id number]     Payor Plan Address Payor Plan Phone Number Payor Plan Fax Number Effective Dates    [address] [phone]  2/1/2020 - None Entered    [facility] [address]       Subscriber Name Subscriber Birth Date Member ID       BETTY POTTER 1965 [id number]           Secondary Coverage     Payor Plan Insurance Group Employer/Plan Group    [place]      Payor Plan Address Payor Plan Phone Number Payor Plan Fax Number Effective Dates    [address] [phone]  2/10/2020 - None Entered    [facility] [address]       Subscriber Name Subscriber Birth Date Member ID       BETTY POTTER 1965 [id number]                 Emergency Contacts      (Rel.) Home Phone Work Phone Mobile Phone    VISHNU SEGOVIA " (Significant Other) -- -- [phone]    CARMEN HUSSEIN (Daughter) [phone] -- --          - IV daptomycin 500 mg daily   - PO doxycycline 100 mg BID   - Weekly sterile PICC dressing changes   - Weekly labs CBC w/diff, CMP, CK, CRP faxed to [facility] at [phone] attn: Dr Washburn   - Follow up with Dr Washburn 1 week of discharge  [address] [address]  Phone:  [phone]  Fax:  [phone] Date: 2021      Ambulatory Referral to Home Health     Patient:  Reg Sanchez MRN:  [id number]   [address]  [address]  [facility] [address] :  1965  SSN:    Phone: [phone] Sex:  M      INSURANCE PAYOR PLAN GROUP # SUBSCRIBER ID   Primary:  Secondary:    ANTHEM MEDICARE REPLACEMENT  [place] [id number]  [id number]    [id number]  [id number]      Referring Provider Information:  ARIADNA CARVALHO Phone: [phone] Fax: [phone]      Referral Information:   # Visits:  1 Referral Type: Home Health [42]   Urgency:  Routine Referral Reason: Specialty Services Required   Start Date: 2021 End Date:  To be determined by Insurer   Diagnosis: Postoperative infection, unspecified type, initial encounter (T81.40XA [ICD-10-CM] 998.59 [ICD-9-CM])      Refer to Dept:   Refer to Provider:   Refer to Facility:       Face to Face Visit Date: 2021  Follow-up provider for Plan of Care? I treated the patient in an acute care facility and will not continue treatment after discharge.  Follow-up provider: MARIAA VELASCO [[id number]]  Reason/Clinical Findings: Postoperative infection  Describe mobility limitations that make leaving home difficult: Impaired functional mobility, balance and gait.  Decreased strength and generalized weakness.  Wound vac and IV antibiotics with PICC line dressing changes and weekly labs.  Nursing/Therapeutic Services Requested: Skilled  Nursing  Skilled nursing orders: Medication education  Skilled nursing orders: Wound vac application and instruction  Skilled nursing orders: Wound care dressing/changes  Skilled nursing orders: Infusion therapy  Skilled nursing orders: PICC line care/instruction  Skilled nursing orders: Cardiopulmonary assessments  Skilled nursing orders: Neurovascular assessments     This document serves as a request of services and does not constitute Insurance authorization or approval of services.  To determine eligibility, please contact the members Insurance carrier to verify and review coverage.     If you have medical questions regarding this request for services. Please contact [address] at [phone] during normal business hours.       Verbal Order Mode: Telephone with readback   Authorizing Provider: Puja Eli MD  Authorizing Provider's NPI: [id number]     Order Entered By: Shaina Cramer RN 2021 11:04 AM                    History & Physical      Nikkie Sandoval DO at 21 1707              [facility]  HISTORY AND PHYSICAL    Patient Name: Reg Sanchez  : 1965  MRN: [id number]  Primary Care Physician: Sheila Sheikh APRN  Date of admission: 2021    Subjective   Subjective     Chief Complaint:  Right leg pain, fever    HPI:  Reg Sanchez is a 55 y.o. male with past medical history significant for tobacco abuse, diabetes mellitus, peripheral vascular disease, congestive heart failure, coronary artery disease status post CABG, left leg osteomyelitis status post left leg amputation, peripheral arterial disease and most recently right SFA and popliteal artery occlusion underwent a right femoral artery bypass with Dr. Trevizo 2021.  The patient developed fever on 2021. On 2/3/21 he developed right leg swelling and redness.  Yesterday, he presented to outside hospital where he had lab work indicating  inflammatory/infectious process and a CT angiogram of the leg which revealed soft tissue stranding and fluid surrounding the recent right femoral/distal artery graft.  Patent runoff with adequate flow.  Upon arrival to [facility] he reports his right leg erythema and edema is significantly worse than it was yesterday.  He reports increased pain in the thigh and redness.  He also reports increased shortness of breath and cough.  He continues to smoke tobacco.      COVID Details: [] No Symptoms  Symptoms: [] Fever []  Cough [x] Shortness of breath [] Change in taste or smell  Risks:   [] Direct Exposure [] High risk facility       Review of Systems   Constitutional: Positive for chills and fever. Negative for fatigue.   Respiratory: Positive for cough and shortness of breath. Negative for choking and chest tightness.    Cardiovascular: Positive for leg swelling (right). Negative for chest pain and palpitations.   Gastrointestinal: Negative for abdominal pain, diarrhea, nausea and vomiting.   Genitourinary: Negative for discharge, dysuria, flank pain, penile pain and scrotal swelling.   Musculoskeletal: Negative for back pain and joint swelling.   Skin: Positive for color change and wound.        Right leg swelling, erythema and oozing from lower incisions.     Neurological: Negative for speech difficulty, weakness, numbness and headaches.   All other systems reviewed and are negative.         Personal History     Past Medical History:   Diagnosis Date   • Anxiety    • CHF (congestive heart failure) (CMS/[facility])    • COPD (chronic obstructive pulmonary disease) (CMS/[facility])    • Coronary artery disease involving native coronary artery of native heart without angina pectoris 1/12/2021    Added automatically from request for surgery [id number]   • Diabetes mellitus (CMS/[facility])    • Hepatitis C    • Hyperlipidemia    • Hypertension    • Sleep apnea    • T2DM on metformin  1/27/2021       Past Surgical History:    Procedure Laterality Date   • CARDIAC CATHETERIZATION  11/20/2020    x 2 stents   • CARDIAC CATHETERIZATION Right 1/20/2021    Procedure: Peripheral angiography;  Surgeon: Wesly Lund MD;  Location: [facility] CATH INVASIVE LOCATION;  Service: Cardiovascular;  Laterality: Right;   • CORONARY ARTERY BYPASS GRAFT      x 5   • CORONARY STENT PLACEMENT  11/20/2020    Dr Mortensen x 2 stents   • FEMORAL DISTAL BYPASS N/A 1/27/2021    Procedure: FEMORAL DISTAL BYPASS;  Surgeon: Jeremy Trevizo MD;  Location:  [facility] OR;  Service: Vascular;  Laterality: N/A;   • FINGER SURGERY      left   • LEG AMPUTATION Left     left lower leg   • SHOULDER SURGERY Bilateral        Family History: family history includes Anuerysm in his father; Heart disease in his father, mother, sister, and sister. Otherwise pertinent FHx was reviewed and unremarkable.     Social History:  reports that he has been smoking cigarettes. He has a 40.00 pack-year smoking history. He has never used smokeless tobacco. He reports current alcohol use of about 3.0 standard drinks of alcohol per week. He reports current drug use. Drug: Marijuana.  Social History     Social History Narrative    Pt lives in [place], [place] with somebody else.        Medications:  DULoxetine, Diclofenac Sodium, Insulin Glargine, albuterol, albuterol sulfate HFA, aspirin, atorvastatin, budesonide-formoterol, clopidogrel, furosemide, hydrOXYzine, isosorbide mononitrate, lisinopril, metFORMIN, metoprolol tartrate, multivitamin, nitroglycerin, omeprazole, oxyCODONE-acetaminophen, potassium chloride, pregabalin, rOPINIRole, and sulindac    No Known Allergies    Objective   Objective     Vital Signs:   Temp:  [98.2 °F (36.8 °C)-102.6 °F (39.2 °C)] 99.1 °F (37.3 °C)  Heart Rate:  [] 96  BP: (112-166)/(56-99) 166/74    Physical Exam  Vitals signs and nursing note reviewed.   Constitutional:       General: He is not in acute distress.     Appearance: Normal appearance.   HENT:       Head: Normocephalic and atraumatic.      Right Ear: External ear normal.      Left Ear: External ear normal.   Cardiovascular:      Rate and Rhythm: Normal rate.      Comments: 2+ right DP pulse    Left AKA  Pulmonary:      Effort: Pulmonary effort is normal. No respiratory distress.      Breath sounds: No stridor. Wheezing present. No rhonchi or rales.   Chest:      Chest wall: No tenderness.   Abdominal:      General: There is no distension.      Palpations: Abdomen is soft. There is no mass.      Tenderness: There is no abdominal tenderness. There is no guarding.   Skin:     Comments: Right lower extremity is erythematous from the mid thigh to the foot.  There is diffuse 1+ pitting edema.  There is medial incision from recent femoral artery bypass.   Neurological:      General: No focal deficit present.      Mental Status: He is alert and oriented to person, place, and time.   Psychiatric:         Mood and Affect: Mood normal.         Thought Content: Thought content normal.            Results Reviewed:  I have personally reviewed most recent indicated data and agree with findings including:  [x]  Laboratory  [x]  Radiology  []  EKG/Telemetry  []  Pathology  []  Cardiac/Vascular Studies  [x]  Old records  []  Other:      Most pertinent findings include:    LAB RESULTS:      Lab 02/05/21  1614 02/04/21  2007 02/04/21  1520 02/04/21  1519   WBC 20.11*  --  18.69*  --    HEMOGLOBIN 12.0*  --  13.7  --    HEMATOCRIT 36.5*  --  40.5  --    PLATELETS 179  --  207  --    NEUTROS ABS 16.09*  --  15.23*  --    IMMATURE GRANS (ABS) 0.17*  --  0.15*  --    LYMPHS ABS 2.22  --  1.64  --    MONOS ABS 1.50*  --  1.60*  --    EOS ABS 0.06  --  0.01  --    .1*  --  100.2*  --    CRP  --   --  2.29*  --    LACTATE  --  2.6* 2.1*  --    PROTIME  --   --   --  14.5*   APTT  --   --   --  28.8         Lab 02/04/21  1520 02/04/21  1519   SODIUM 128*  --    POTASSIUM 4.0  --    CHLORIDE 95*  --    CO2 24.4  --    ANION GAP 8.6   --    BUN 12  --    CREATININE 0.89  --    GLUCOSE 225*  --    CALCIUM 8.9  --    IONIZED CALCIUM  --  1.17   MAGNESIUM 1.6  --    PHOSPHORUS 2.4*  --          Lab 02/04/21  1520   TOTAL PROTEIN 7.9   ALBUMIN 3.93   GLOBULIN 4.0   ALT (SGPT) 79*   AST (SGOT) 50*   BILIRUBIN 0.7   ALK PHOS 89         Lab 02/04/21  1519   PROTIME 14.5*   INR 1.14*                 Brief Urine Lab Results     None        Microbiology Results (last 10 days)     Procedure Component Value - Date/Time    Blood Culture - Blood, Arm, Left [[id number]] Collected: 02/04/21 1519    Lab Status: Preliminary result Specimen: Blood from Arm, Left Updated: 02/05/21 1601     Blood Culture No growth at 24 hours    Blood Culture - Blood, Arm, Left [[id number]] Collected: 02/04/21 1518    Lab Status: Preliminary result Specimen: Blood from Arm, Left Updated: 02/05/21 1546     Blood Culture No growth at 24 hours    COVID-19 and FLU A/B PCR - Swab, Nasopharynx [[id number]]  (Normal) Collected: 02/04/21 1432    Lab Status: Final result Specimen: Swab from Nasopharynx Updated: 02/04/21 1540     COVID19 Not Detected     Influenza A PCR Not Detected     Influenza B PCR Not Detected    Narrative:      Fact sheet for providers: https://www.fda.gov/media/705995/download    Fact sheet for patients: https://www.fda.gov/media/440480/download    Test performed by PCR.          Ct Angio Abdominal Aorta Bilateral Iliofem Runoff    Result Date: 2/4/2021  CTA ABDOMEN, PELVIS AND LOWER EXTREMITY RUNOFF, HISTORY: Right femoral bypass with runoff TECHNIQUE: CT angiogram of the abdomen and pelvis with lower extremity runoff using IV contrast. 3-D postprocessing was performed and reviewed. Radiation dose reduction techniques included automated exposure control or exposure modulation based on body size. Radiation audit for CT and nuclear cardiology exams in the last 12 months: . Findings: Abdomen/pelvis Visualized lung bases are clear. Thoracic aorta is unremarkable. Celiac trunk  is patent. There is potential severe narrowing approximately 1.2 cm from the origin with soft and calcific plaque. SMA appears patent but diseased as are its smaller branches. Renal arteries are patent. Extremities There are recent postsurgical changes of a femoral artery/distal bypass. Soft tissue stranding with fluid is seen in the patient's groin region extending down the length of the graft to the level of the knee. The graft appears patent to the level of the smaller plantar vessels. 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     Impression: 1. Soft tissue stranding and fluid is seen surrounding the patient's recent right femoral/distal arterial graft. It is uncertain to what extent this may represent postoperative fluid versus infection. The graft itself appears patent with adequate runoff to the level of the smaller plantar vessels. Signer Name: Joselin Daniel MD  Signed: 2/4/2021 11:05 PM  Workstation Name: [id number]  [facility]      Results for orders placed during the hospital encounter of 01/25/21   Adult Transthoracic Echo Complete W/ Cont if Necessary Per Protocol    Narrative · Left ventricular ejection fraction appears to be 66 - 70%. Left   ventricular systolic function is normal.  · The right ventricular cavity is borderline dilated.  · Left ventricular diastolic function was normal.  · No significant structural or functional valvular disease.          Assessment/Plan   Assessment & Plan       Tobacco use    PVD     S/P L leg amputation     Post-operative infection    Reg Sanchez is a 55 y.o. male with past medical history significant for tobacco abuse, diabetes mellitus, peripheral vascular disease, congestive heart failure, coronary artery disease status post CABG, left leg osteomyelitis status post left leg amputation, peripheral arterial disease and most recently right SFA and popliteal artery occlusion underwent a right femoral artery  bypass with Dr. Trevizo 1/27/2021.  The patient developed fever on 2/2/2021. On 2/3/21 he developed right leg swelling and redness.  Yesterday, he presented to outside hospital where he had lab work indicating inflammatory/infectious process and a CT angiogram of the leg which revealed soft tissue stranding and fluid surrounding the recent right femoral/distal artery graft.  Patent runoff with adequate flow.  Upon arrival to [facility] he reports his right leg erythema and edema is significantly worse than it was yesterday.  He reports increased pain in the thigh and redness.  He also reports increased shortness of breath and cough.  He continues to smoke tobacco.    Right leg cellulitis  Right femoral artery bypass graft infection  -WBC 20 with neutrophil predominance, elevated lactic acid  -blood cultures pending  -have concerns of fluid resuscitation due to wet sounding lungs, give small amount of fluids.  -Vanc and zosyn  -ID tomorrow  -Vascular surgery aware     Congestive heart failure  -lasix dose as patient has audible crackles on exam    COPD  -duonebs  -Symbicort  -Brovana  -incentive spirometer    Diabetes mellitus   -SSI    Hyperlipidemia  -Lipitor 40mg    Tobacco abuse  -nicotine patch      DVT prophylaxis:  Heparin       CODE STATUS:    Code Status and Medical Interventions:   Ordered at: 02/05/21 1505     Code Status:    CPR     Medical Interventions (Level of Support Prior to Arrest):    Full         This note has been completed as part of a split-shared workflow.     Signature: Electronically signed by Jayla Goyal PA-C, 02/05/21, 5:34 PM EST      Attending   Admission Attestation       I have seen and examined the patient, performing an independent face-to-face diagnostic evaluation with plan of care reviewed and developed with the advanced practice clinician (APC).      Brief Summary Statement:   Reg Sanchez is a 55 y.o. male with recent right fem bypass transferred from [facility]  with surgical wound infection. He states his pain is much worse and his leg has been getting more swollen and red.     Remainder of detailed HPI is as noted by APC and has been reviewed and/or edited by me for completeness.    Attending Physical Exam:  Constitutional: No acute distress, awake, alert  HENT: NCAT, mucous membranes moist  Respiratory: Clear to auscultation bilaterally, respiratory effort normal   Cardiovascular: RRR, no murmurs, rubs, or gallops  Gastrointestinal: Positive bowel sounds, soft, nontender, nondistended  Musculoskeletal: Left BKA,   Psychiatric: Appropriate affect, cooperative  Neurologic: Oriented x 3, strength symmetric in all extremities, Cranial Nerves grossly intact to confrontation, speech clear  Skin: No rashes, right lower extremity edema and erythema      Brief Assessment/Plan :  See detailed assessment and plan developed with APC which I have reviewed and/or edited for completeness.        Admission Status: I believe that this patient meets INPATIENT status due to surgical wound infection .  I feel patient’s risk for adverse outcomes and need for care warrant INPATIENT evaluation and I predict the patient’s care encounter to likely last beyond 2 midnights.        Nikkie Sandoval DO  02/06/21                          Electronically signed by Nikkie Sandoval DO at 02/06/21 0038

## 2021-02-17 NOTE — PROGRESS NOTES
Clinical Nutrition   Reason For Visit: LOS    Patient Name: Reg Sanchez  YOB: 1965  MRN: [id number]  Date of Encounter: 02/17/21 12:01 EST  Admission date: 2/5/2021      Nutrition Assessment     Admission Problem List:  RLE cellulitis  S/p incision and drainage of R groin incision, opening of R knee incision for drainage, incision of R medial thigh for drainage (2/9)   Wound vac    Applicable PMH:  CHF  PVD  CAD  CABG  DM  Hep C  ROLDAN  L BKA  Recent R femoral to posterior tibial bypass, with revers saphenous vein and graft (1/27/2021)  Tobacco use  EtOH use- 3-6 beers/day       Reported/Observed/Food/Nutrition Related History      Patient tolerating current diet well with good oral intake. Noted per PO intake data, patient consuming ~90% of past average documented meals.    Pt provided specific food preferences for each meal (from previous RD visit):  Breakfast: omelet (with onion, tomatoes, green bell pepper, mushrooms, cheese), toast, 2 cartons of regular milk, 2 ice cups, chocolate Boost Glucose Control  Lunch: fruit plate (more pineapple and grapes, less melon), dessert, 2 cartons chocolate milk, 2 ice cups, chocolate Boost Glucose Control  Dinner:  salad with raspberry vinaigrette, dessert, 2 cartons chocolate milk, 2 ice cups, chocolate Boost Glucose Control      Anthropometrics   Height: 67 in  Weight: 223 lb per stated weight (2/4)  BMI: N/A    Date Weight (kg) Weight (lbs) Weight Method   2/4/2021 101.152 kg 223 lb Stated   1/28/2021 105.6 kg 232 lb 12.9 oz Bed scale   1/25/2021 100.699 kg 222 lb -   1/25/2021 101 kg 222 lb 10.6 oz Stated   1/25/2021 101.152 kg 223 lb -   1/20/2021 100.699 kg 222 lb Stated   1/12/2021 102.059 kg 225 lb -   1/7/2021 101.606 kg 224 lb -   12/8/2020 100.245 kg 221 lb -   10/27/2020 100.699 kg 222 lb -   2/13/2020 98.431 kg 217 lb -   11/7/2019 94.167 kg 207 lb 9.6 oz -       Labs reviewed   Labs reviewed: Yes    Results from last 7 days   Lab Units  02/17/21  0539 02/16/21  0708 02/15/21  0653  02/11/21  1110   SODIUM mmol/L 133* 132* 127*   < > 132*   POTASSIUM mmol/L 4.6 4.5 4.4   < > 4.8   CHLORIDE mmol/L 97* 96* 96*   < > 98   CO2 mmol/L 29.0 29.0 24.0   < > 29.0   BUN mg/dL 14 17 13   < > 15   CREATININE mg/dL 0.69* 0.67* 0.70*   < > 0.81   GLUCOSE mg/dL 160* 146* 169*   < > 163*   CALCIUM mg/dL 9.2 9.2 8.9   < > 8.3*   PHOSPHORUS mg/dL  --   --   --   --  2.6   MAGNESIUM mg/dL  --   --   --   --  2.0    < > = values in this interval not displayed.     Results from last 7 days   Lab Units 02/17/21  0539 02/15/21  0653 02/14/21  0656   WBC 10*3/mm3 8.31 8.67 9.10   ALBUMIN g/dL  --  3.10*  --      Results from last 7 days   Lab Units 02/17/21  1135 02/17/21  0755 02/16/21  2043 02/16/21  1628 02/16/21  1132 02/16/21  0735   GLUCOSE mg/dL 229* 176* 207* 172* 217* 149*     Lab Results   Lab Value Date/Time    HGBA1C 7.20 (H) 01/21/2021 0438     Medications reviewed   Pertinent: brovana, antibiotics, lasix, insulin  PRN: toradol, percocet      Current Nutrition Prescription   PO: Diet Regular; Consistent Carbohydrate, Cardiac   Oral Nutrition Supplements: Boost GC x3/day  Intake: 89% x 9 meals      Nutrition Diagnosis     2/10 updated 2/17  Problem Increased nutrient needs- protein   Etiology Skin integrity   Signs/Symptoms Post-op wound infections, increased protein needs   Status: ongoing    Intervention   1. Follow treatment progress, Care plan reviewed   2. Cont to send oral nutrition supplements as ordered - Boost GC x3/day  3. Monitor PO intake/adequacy      Goal:   General: Nutrition support treatment  PO: Maintain intake       Monitoring/Evaluation:   Per protocol, PO intake, Supplement intake, Pertinent labs, Skin status, Symptoms    Savanna Iglesias RDN, LD  Time Spent: 25 minutes

## 2021-02-18 ENCOUNTER — TELEMEDICINE (OUTPATIENT)
Dept: CARDIOLOGY | Facility: CLINIC | Age: 56
End: 2021-02-18

## 2021-02-18 ENCOUNTER — READMISSION MANAGEMENT (OUTPATIENT)
Dept: CALL CENTER | Facility: HOSPITAL | Age: 56
End: 2021-02-18

## 2021-02-18 DIAGNOSIS — I70.209 SUPERFICIAL FEMORAL ARTERY OCCLUSION (HCC): Primary | Chronic | ICD-10-CM

## 2021-02-18 DIAGNOSIS — R11.0 NAUSEA: ICD-10-CM

## 2021-02-18 DIAGNOSIS — I73.9 PERIPHERAL VASCULAR DISEASE (HCC): Chronic | ICD-10-CM

## 2021-02-18 DIAGNOSIS — I25.10 CORONARY ARTERY DISEASE INVOLVING NATIVE CORONARY ARTERY OF NATIVE HEART WITHOUT ANGINA PECTORIS: ICD-10-CM

## 2021-02-18 DIAGNOSIS — I65.21 CAROTID ARTERY STENOSIS, SYMPTOMATIC, RIGHT: ICD-10-CM

## 2021-02-18 PROCEDURE — 99213 OFFICE O/P EST LOW 20 MIN: CPT | Performed by: NURSE PRACTITIONER

## 2021-02-18 RX ORDER — ONDANSETRON 4 MG/1
4 TABLET, ORALLY DISINTEGRATING ORAL EVERY 8 HOURS PRN
Qty: 15 TABLET | Refills: 1 | Status: SHIPPED | OUTPATIENT
Start: 2021-02-18 | End: 2021-04-12

## 2021-02-18 NOTE — PROGRESS NOTES
Subjective     Reg Sanchez is a 55 y.o. male who presents to day for Coronary Artery Disease, Peripheral Vascular Disease, and Carotid Artery Disease.    CHIEF COMPLIANT  Chief Complaint   Patient presents with   • Coronary Artery Disease   • Peripheral Vascular Disease   • Carotid Artery Disease       Active Problems:  Problem List Items Addressed This Visit        Cardiac and Vasculature    100% R SFA occlusion  - Primary (Chronic)    PVD  (Chronic)    CAD s/p stents and CABG X5  (Chronic)      Other Visit Diagnoses     Carotid artery stenosis, symptomatic, right        Nausea        Relevant Medications    ondansetron ODT (Zofran ODT) 4 MG disintegrating tablet      Problem list  1.  Coronary artery disease with previous bypass in [facility] 2018  1.1 left heart catheterization 3/18: Left main normal, LAD 70 to 80% proximal, 100% mid, circumflex 70% proximal but 100% occluded mid, RCA has 70% stenosis proximal 100% mid, the acute marginal branch has moderate disease but before that her percent occluded lesion, SVG to RCA widely patent SVG to OM1 and OM to the diagonal branch widely patent [place] to LAD widely patent  1.2 stress test 10/20: Mild inferior lateral ischemia post stress EF 69%  1.3 left heart catheterization 11/20: Both native coronary arteries were totally occluded proximally, SVG to PDA with no distal stenosis vein graft to the diagonal and first and second OM widely patent, EF 60%, LVEDP 20-22, LIMA LAD subtotal stenosis immediately distal to the touchdown followed by segmental 50 to 70% in which stents were placed at the subtotal stenosis as well as the 50 to 70% distal lesion.  2.  Chest pain  3.  Shortness of breath  4.  Peripheral vascular disease with right lower extremity claudication and left BKA  4.1.  Ultrasound of bilateral lower extremities 10/20: Common femoral 50%, high-grade stenosis cannot be ruled out in the right SFA, 50% or less on the left SFA, 50% or greater in the left  profunda femoris artery, popliteal diffusely diseased on the right but patent  4.2 CTA of the abdomen with bilateral lower extremity runoff 11/20: Fairly advanced atherosclerotic disease with total occlusion of the distal superficial femoral artery on the right the popliteal reconstitutes via collaterals and there is a faint 2-3 vessel runoff to the ankle no significant hemodynamically stenosis on the left thigh.  Patient also had moderate atherosclerotic calcifications of the aorta with diffuse atherosclerotic disease of the celiac and superior mesenteric arteries with multiple areas of stenosis in the range of 60 to 70% the inferior mesenteric artery is patent but severely stenotic renal arteries are without evidence of hemodynamically significant stenosis  5. Hypertension  6.  Dizziness  6.112/20 carotid duplex: Potentially hemodynamically significant stenosis in the right internal carotid artery severe segmental disease involving the right external carotid artery,    HPI  HPI  Mr. Sanchez is a 55-year-old male patient who is being followed up today via video visit  related to COVID-19. which was converted to a telephone visit due to technical complications.  Mr. Sanchez has had a peripheral angiogram with unsuccessful intervention of the right SFA which was completely occluded and then went under a right femoral to posterior tibial distal bypass with reverse saphenous vein graft on January 27, 2021.  Patient reported a significant improvement and the pain in his leg immediately after.  It then became infected in which she had to go back and be hospitalized and have other surgeries due to the infection.  Patient was found to have a postoperative infected bypass graft that required him to receive a PICC line and IV antibiotics.  He now has a wound VAC in place with 14 staples around his ankle per his report.  He is on IV antibiotics for 9 more days and oral antibiotics for a total of 6 weeks.  He otherwise reports  feeling well and denies any angina anginal equivalent symptoms.  He also was applauded for not smoking over the last couple weeks and his continued efforts of smoking cessation.  Patient does report having some nausea from the medications either the antibiotics or the pain medications in which he was prescribed by Dr. Trevizo.  He does have some dizziness some position changes in which he does have significant disease in his right carotid artery.  We will have to wait until after his infection clears before moving forth with intervention on the carotid artery.  He wishes to be referred to Dr. Trevizo for carotid artery intervention.  He denies any chest pain, shortness of breath, palpitations, lightheadedness, syncope, PND, orthopnea, or other neurological problems.  We did review his labs and notes from his hospitalization as well as his operative notes for the angiogram and the right femoral artery to posterior tibial distal bypass.  PRIOR MEDS  Current Outpatient Medications on File Prior to Visit   Medication Sig Dispense Refill   • albuterol (PROVENTIL) (2.5 MG/3ML) 0.083% nebulizer solution Take 2.5 mg by nebulization Every 4 (Four) Hours As Needed for Wheezing.     • albuterol sulfate  (90 Base) MCG/ACT inhaler Inhale 1 puff Every 6 (Six) Hours As Needed for Wheezing. Prior to [facility] Admission, Patient was on: 1 to 2 puffs every 4 to 6 hours as needed     • aspirin 81 MG chewable tablet Chew 81 mg Daily.     • atorvastatin (LIPITOR) 40 MG tablet Take 40 mg by mouth Daily.     • budesonide-formoterol (SYMBICORT) 160-4.5 MCG/ACT inhaler Inhale 2 puffs 2 (Two) Times a Day As Needed.     • clopidogrel (PLAVIX) 75 MG tablet Take 1 tablet by mouth Daily. 30 tablet 6   • daptomycin (CUBICIN) IVPB Infuse 50 mL into a venous catheter Daily for 9 doses. Indications: Infection of the Skin and/or Soft Tissue     • doxycycline (VIBRAMYCIN) 100 MG capsule Take 1 capsule by mouth 2 (two) times a day. 28 capsule 0    • DULoxetine (CYMBALTA) 60 MG capsule Take 60 mg by mouth 2 (Two) Times a Day.     • furosemide (LASIX) 20 MG tablet Take 1 tablet by mouth Daily. 30 tablet 11   • HYDROcodone-acetaminophen (Norco) 7.5-325 MG per tablet Take 1 tablet by mouth Every 6 (Six) Hours As Needed for Moderate Pain . 24 tablet 0   • hydrOXYzine (ATARAX) 25 MG tablet Take 1 tablet by mouth 3 (Three) Times a Day As Needed for Itching or Anxiety. 30 tablet 0   • Insulin Glargine (BASAGLAR KWIKPEN SC) Inject 20 Units under the skin into the appropriate area as directed 2 (Two) Times a Day As Needed.     • isosorbide mononitrate (IMDUR) 30 MG 24 hr tablet Take 1 tablet by mouth Daily. 30 tablet 11   • lisinopril (PRINIVIL,ZESTRIL) 10 MG tablet Take 10 mg by mouth Daily.     • metFORMIN (GLUCOPHAGE) 1000 MG tablet Take 1,000 mg by mouth 2 (Two) Times a Day With Meals.     • metoprolol tartrate (LOPRESSOR) 50 MG tablet Take 50 mg by mouth 2 (Two) Times a Day.     • multivitamin (multivitamin) tablet tablet Take 1 tablet by mouth Daily.     • nicotine (NICODERM CQ) 21 MG/24HR patch Place 1 patch on the skin as directed by provider Daily.     • nitroglycerin (NITROSTAT) 0.4 MG SL tablet 1 under the tongue as needed for angina, may repeat q5mins for up three doses 30 tablet 5   • omeprazole (priLOSEC) 20 MG capsule Take 20 mg by mouth Daily.     • potassium chloride 10 MEQ CR tablet Take 1 tablet by mouth Daily. 30 tablet 11   • pregabalin (LYRICA) 200 MG capsule Take 200 mg by mouth 3 (Three) Times a Day.     • rOPINIRole (REQUIP) 4 MG tablet Take 4 mg by mouth Every Night.       Current Facility-Administered Medications on File Prior to Visit   Medication Dose Route Frequency Provider Last Rate Last Admin   • [DISCONTINUED] acetaminophen (TYLENOL) 160 MG/5ML solution 650 mg  650 mg Oral Q4H PRN Case, Cecy V., DO       • [DISCONTINUED] acetaminophen (TYLENOL) suppository 650 mg  650 mg Rectal Q4H PRN Case, Cecy V., DO       • [DISCONTINUED]  acetaminophen (TYLENOL) tablet 650 mg  650 mg Oral Q4H PRN Case, Cecy V., DO   650 mg at 02/16/21 2045   • [DISCONTINUED] albuterol (PROVENTIL) nebulizer solution 0.083% 2.5 mg/3mL  2.5 mg Nebulization Q4H PRN Case, Cecy V., DO   2.5 mg at 02/09/21 2344   • [DISCONTINUED] arformoterol (BROVANA) nebulizer solution 15 mcg  15 mcg Nebulization BID - RT Case, Cecy V., DO   15 mcg at 02/17/21 0800   • [DISCONTINUED] aspirin chewable tablet 81 mg  81 mg Oral Daily Case, Cecy V., DO   81 mg at 02/17/21 0858   • [DISCONTINUED] atorvastatin (LIPITOR) tablet 40 mg  40 mg Oral Nightly Case, Cecy V., DO   40 mg at 02/16/21 2046   • [DISCONTINUED] budesonide (PULMICORT) nebulizer solution 0.5 mg  0.5 mg Nebulization BID - RT Case, Cecy V., DO   0.5 mg at 02/17/21 0800   • [DISCONTINUED] DAPTOmycin (CUBICIN) 500 mg/50 mL in sodium chloride  6 mg/kg (Adjusted) Intravenous Q24H Trav Washburn MD   480.6 mg at 02/17/21 0858   • [DISCONTINUED] dextrose (D50W) 25 g/ 50mL Intravenous Solution 25 g  25 g Intravenous Q15 Min PRN Case, Cecy V., DO       • [DISCONTINUED] dextrose (GLUTOSE) oral gel 15 g  15 g Oral Q15 Min PRN Case, Cecy V., DO       • [DISCONTINUED] doxycycline (MONODOX) capsule 100 mg  100 mg Oral Q12H Trav Washburn MD   100 mg at 02/17/21 0858   • [DISCONTINUED] DULoxetine (CYMBALTA) DR capsule 60 mg  60 mg Oral BID Case, Cecy V., DO   60 mg at 02/17/21 0858   • [DISCONTINUED] enoxaparin (LOVENOX) syringe 40 mg  40 mg Subcutaneous Daily Case, Cecy V., DO   40 mg at 02/17/21 0858   • [DISCONTINUED] furosemide (LASIX) tablet 20 mg  20 mg Oral Daily Case, Cecy V., DO   20 mg at 02/17/21 0858   • [DISCONTINUED] glucagon (human recombinant) (GLUCAGEN DIAGNOSTIC) injection 1 mg  1 mg Subcutaneous Q15 Min PRN Case, Cecy BEE DO       • [DISCONTINUED] hydrOXYzine (ATARAX) tablet 25 mg  25 mg Oral TID PRN Case, Cecy BEE DO   25 mg at 02/16/21 2045   • [DISCONTINUED] insulin  detemir (LEVEMIR) injection 8 Units  8 Units Subcutaneous Daily Case, Cecy V., DO   8 Units at 02/17/21 0857   • [DISCONTINUED] insulin lispro (humaLOG, ADMELOG) injection 0-7 Units  0-7 Units Subcutaneous 4x Daily With Meals & Nightly Case, Cecy V., DO   3 Units at 02/17/21 1213   • [DISCONTINUED] isosorbide mononitrate (IMDUR) 24 hr tablet 30 mg  30 mg Oral Daily Case, Cecy V., DO   30 mg at 02/17/21 0858   • [DISCONTINUED] lisinopril (PRINIVIL,ZESTRIL) tablet 10 mg  10 mg Oral Daily Case, Cecy V., DO   10 mg at 02/17/21 0858   • [DISCONTINUED] Magnesium Sulfate 2 gram / 50mL Infusion (GIVE X 3 BAGS TO EQUAL 6GM TOTAL DOSE) - Mg 1.1 - 1.5 mg/dl  2 g Intravenous PRN Case, Cecy V., DO       • [DISCONTINUED] Magnesium Sulfate 2 gram Bolus, followed by 8 gram infusion (total Mg dose 10 grams)- Mg less than or equal to 1mg/dL  2 g Intravenous PRN Case, Cecy V., DO       • [DISCONTINUED] Magnesium Sulfate 4 gram infusion- Mg 1.6-1.9 mg/dL  4 g Intravenous PRN Case, Cecy V., DO 25 mL/hr at 02/10/21 1615 4 g at 02/10/21 1615   • [DISCONTINUED] metoprolol tartrate (LOPRESSOR) tablet 50 mg  50 mg Oral BID Case, Cecy V., DO   50 mg at 02/17/21 0858   • [DISCONTINUED] naloxone (NARCAN) injection 0.1 mg  0.1 mg Intravenous Q5 Min PRN Case, Cecy V., DO       • [DISCONTINUED] nicotine (NICODERM CQ) 21 MG/24HR patch 1 patch  1 patch Transdermal Q24H Case, Cecy V., DO   1 patch at 02/17/21 0858   • [DISCONTINUED] ondansetron (ZOFRAN) injection 4 mg  4 mg Intravenous Q6H PRN Case, Cecy V., DO       • [DISCONTINUED] ondansetron (ZOFRAN) tablet 4 mg  4 mg Oral Q6H PRN Case, Cecy V., DO       • [DISCONTINUED] oxyCODONE-acetaminophen (PERCOCET) 5-325 MG per tablet 2 tablet  2 tablet Oral Q4H PRN Diane Aragon DO   2 tablet at 02/17/21 1443   • [DISCONTINUED] pantoprazole (PROTONIX) EC tablet 40 mg  40 mg Oral Cecy Hagen DO   40 mg at 02/17/21 0612   • [DISCONTINUED] potassium & sodium  phosphates (PHOS-NAK) 280-160-250 MG packet - for Phosphorus 1.25 - 2.5 mg/dL  2 packet Oral Q6H PRN Case, Cecy V., DO   2 packet at 02/05/21 2123   • [DISCONTINUED] potassium & sodium phosphates (PHOS-NAK) 280-160-250 MG packet - for Phosphorus less than 1.25 mg/dL  2 packet Oral Q6H PRN Case, Cecy V., DO   2 packet at 02/06/21 1613   • [DISCONTINUED] pregabalin (LYRICA) capsule 200 mg  200 mg Oral TID Case, Cecy V., DO   200 mg at 02/17/21 1659   • [DISCONTINUED] rOPINIRole (REQUIP) tablet 4 mg  4 mg Oral Nightly Case, Cecy V., DO   4 mg at 02/16/21 2045   • [DISCONTINUED] sodium chloride 0.9 % flush 10 mL  10 mL Intravenous Q12H Case, Cecy V., DO   10 mL at 02/16/21 2047   • [DISCONTINUED] sodium chloride 0.9 % flush 10 mL  10 mL Intravenous PRN Case, Cecy V., DO       • [DISCONTINUED] sodium chloride 0.9 % flush 10 mL  10 mL Intravenous Q12H Case, Cecy V., DO   10 mL at 02/16/21 2046   • [DISCONTINUED] sodium chloride 0.9 % flush 10 mL  10 mL Intravenous PRN Case, Cecy V., DO   10 mL at 02/12/21 1516   • [DISCONTINUED] sodium hypochlorite (DAKIN'S 1/4 STRENGTH) 0.125 % topical solution 0.125% solution   Topical Q12H Case, Cecy V., DO   Given at 02/16/21 2046       ALLERGIES  Patient has no known allergies.    HISTORY  Past Medical History:   Diagnosis Date   • Anxiety    • CHF (congestive heart failure) (CMS/HCC)    • COPD (chronic obstructive pulmonary disease) (CMS/[facility])    • Coronary artery disease involving native coronary artery of native heart without angina pectoris 1/12/2021    Added automatically from request for surgery [id number]   • Diabetes mellitus (CMS/[facility])    • Hepatitis C    • Hyperlipidemia    • Hypertension    • Peripheral vascular disease (CMS/[facility])    • Sleep apnea    • T2DM on metformin  1/27/2021       Social History     Socioeconomic History   • Marital status: Legally      Spouse name: Not on file   • Number of children: 3   • Years of education: Not on  file   • Highest education level: Not on file   Occupational History   • Occupation: [employer]      Comment: Disabled    Tobacco Use   • Smoking status: Current Every Day Smoker     Packs/day: 1.00     Years: 40.00     Pack years: 40.00     Types: Cigarettes   • Smokeless tobacco: Never Used   Substance and Sexual Activity   • Alcohol use: Yes     Alcohol/week: 3.0 standard drinks     Types: 2 Cans of beer, 1 Shots of liquor per week     Comment: weekly   • Drug use: Yes     Types: Marijuana     Comment: smoke Marijuana everyday   • Sexual activity: Defer   Social History Narrative    Pt lives in [place], [place] with somebody else.        Family History   Problem Relation Age of Onset   • Heart disease Mother    • Anuerysm Father    • Heart disease Father    • Heart disease Sister    • Heart disease Sister        Review of Systems   Constitutional: Positive for fatigue.   HENT: Negative for congestion, postnasal drip, rhinorrhea, sinus pressure and sinus pain.    Eyes: Positive for visual disturbance (glasses).   Respiratory: Negative for chest tightness and shortness of breath.    Cardiovascular: Positive for leg swelling (infection right). Negative for chest pain and palpitations.   Gastrointestinal: Positive for nausea. Negative for constipation, diarrhea and vomiting.   Genitourinary: Negative for difficulty urinating and dysuria.   Musculoskeletal: Positive for arthralgias, gait problem (left BKA) and myalgias.   Skin: Positive for wound (surgical).   Allergic/Immunologic: Negative for environmental allergies.   Neurological: Positive for dizziness (position changes). Negative for syncope and light-headedness.   Hematological: Does not bruise/bleed easily.   Psychiatric/Behavioral: Positive for sleep disturbance (wakes with cp).       Objective     VITALS: There were no vitals taken for this visit.    LABS:   Lab Results (most recent)     None          IMAGING:   Ct Angio Abdominal Aorta Bilateral Iliofem  Runoff    Result Date: 2/4/2021  1. Soft tissue stranding and fluid is seen surrounding the patient's recent right femoral/distal arterial graft. It is uncertain to what extent this may represent postoperative fluid versus infection. The graft itself appears patent with adequate runoff to the level of the smaller plantar vessels. Signer Name: Joselin Daniel MD  Signed: 2/4/2021 11:05 PM  Workstation Name: [id number]  [facility]      EXAM:  Physical Exam   Patient was evaluated via telephone due to the coronavirus pandemic.  Per auscultation over the telephone patient's breathing rate was within normal limits, speech was clear, no audible wheezing was noted.  Was able to speak in full sentences without difficulty.  Their mood was appropriate for the situation was able to answer questions appropriately.  There is no signs of apparent distress.    Procedure   Procedures       Assessment/Plan    Diagnosis Plan   1. 100% R SFA occlusion      2. PVD      3. Carotid artery stenosis, symptomatic, right     4. Coronary artery disease involving native coronary artery of native heart without angina pectoris     5. Nausea  ondansetron ODT (Zofran ODT) 4 MG disintegrating tablet   1.  Patient did have 100% right SFA occlusion which he underwent a right femoral to posterior tibial distal bypass with reverse saphenous vein graft on 127/21 per Dr. Jeremy Trevizo at [facility] in [place].  Patient unfortunately developed a postoperative infection and had to undergo other surgeries including incision and drainage and had a PICC line placed for extended antibiotic therapy.  Patient is doing good on this regimen and was released from the hospital yesterday.  He is being assisted with home health.  He is going to be following back up with Dr. Trevizo for further evaluation and treatment.  2.  Patient does have carotid artery stenosis on the right side which she does experience some level of dizziness.  He  wishes to follow-up with Dr. Trevizo for potential intervention of the right carotid artery if needed.  3.  Patient has a known history of coronary artery disease in which she seems to be doing well and is stable from this standpoint.  He denies any angina anginal equivalent symptoms and he says he is doing well from the heart standpoint.  No further intervention is needed at this time.  4.  Patient has experienced some nausea either from the antibiotics or from the pain medications.  I will prescribe patient Zofran in order to help mitigate this symptom and so the medication changes can be made if needed.  5.  Informed of signs and symptoms of ACS and advised to seek emergent treatment for any new worsening symptoms.  Patient also advised sooner follow-up as needed.  Also advised to follow-up with family doctor as needed  This note is dictated utilizing voice recognition software.  Although this record has been proof read, transcriptional errors may still be present. If questions occur regarding the content of this record please do not hesitate to call our office.  I have reviewed and confirmed the accuracy of the ROS as documented by the MA/ZHEN/RN YOUNG Alvarado    No follow-ups on file.    Diagnoses and all orders for this visit:    1. 100% R SFA occlusion  (Primary)    2. PVD     3. Carotid artery stenosis, symptomatic, right    4. Coronary artery disease involving native coronary artery of native heart without angina pectoris    5. Nausea  -     ondansetron ODT (Zofran ODT) 4 MG disintegrating tablet; Place 1 tablet on the tongue Every 8 (Eight) Hours As Needed for Nausea or Vomiting.  Dispense: 15 tablet; Refill: 1        Reg Sanchez  reports that he has been smoking cigarettes. He has a 40.00 pack-year smoking history. He has never used smokeless tobacco.  Patient has not smoked for over 2 weeks and is trying to stop.       MEDS ORDERED DURING VISIT:  New Medications Ordered This Visit    Medications   • ondansetron ODT (Zofran ODT) 4 MG disintegrating tablet     Sig: Place 1 tablet on the tongue Every 8 (Eight) Hours As Needed for Nausea or Vomiting.     Dispense:  15 tablet     Refill:  1           This document has been electronically signed by Devon Kwan Jr., APRN  February 18, 2021 17:05 EST

## 2021-02-18 NOTE — OUTREACH NOTE
Prep Survey      Responses   [facility] patient discharged from?  [place]   Is LACE score < 7 ?  No   Emergency Room discharge w/ pulse ox?  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  General Surgery   Does the patient have Home health ordered?  Yes   What is the Home health agency?   [facility]   Comments regarding appointments  Per AVS   Medication alerts for this patient  Continue aspirin and plavix.  Medications per AVS.   Prep survey completed?  Yes          Ada Gregory RN

## 2021-02-18 NOTE — PATIENT INSTRUCTIONS
Acute Coronary Syndrome  Acute coronary syndrome (ACS) is a serious problem in which there is suddenly not enough blood and oxygen reaching the heart. ACS can result in chest pain or a heart attack.  This condition is a medical emergency. If you have any symptoms of this condition, get help right away.  What are the causes?  This condition may be caused by:  · A buildup of fat and cholesterol inside the arteries (atherosclerosis). This is the most common cause. The buildup (plaque) can cause blood vessels in the heart (coronary arteries) to become narrow or blocked, which reduces blood flow to the heart. Plaque can also break off and lead to a clot, which can block an artery and cause a heart attack or stroke.  · Sudden tightening of the muscles around the coronary arteries (coronary spasm).  · Tearing of a coronary artery (spontaneous coronary artery dissection).  · Very low blood pressure (hypotension).  · An abnormal heartbeat (arrhythmia).  · Other medical conditions that cause a decrease of oxygen to the heart, such as anemiaorrespiratory failure.  · Using cocaine or methamphetamine.  What increases the risk?  The following factors may make you more likely to develop this condition:  · Age. The risk for ACS increases as you get older.  · History of chest pain, heart attack, peripheral artery disease, or stroke.  · Having taken chemotherapy or immune-suppressing medicines.  · Being male.  · Family history of chest pain, heart disease, or stroke.  · Smoking.  · Not exercising enough.  · Being overweight.  · High cholesterol.  · High blood pressure (hypertension).  · Diabetes.  · Excessive alcohol use.  What are the signs or symptoms?  Common symptoms of this condition include:  · Chest pain. The pain may last a long time, or it may stop and come back (recur). It may feel like:  ? Crushing or squeezing.  ? Tightness, pressure, fullness, or heaviness.  · Arm, neck, jaw, or back pain.  · Heartburn or  indigestion.  · Shortness of breath.  · Nausea.  · Sudden cold sweats.  · Light-headedness.  · Dizziness or passing out.  · Tiredness (fatigue).  Sometimes there are no symptoms.  How is this diagnosed?  This condition may be diagnosed based on:  · Your medical history and symptoms.  · Imaging tests, such as:  ? An electrocardiogram (ECG). This measures the heart's electrical activity.  ? X-rays.  ? CT scan.  ? A coronary angiogram. For this test, dye is injected into the heart arteries and then X-rays are taken.  ? Myocardial perfusion imaging. This test shows how well blood flows through your heart muscle.  · Blood tests. These may be repeated at certain time intervals.  · Exercise stress testing.  · Echocardiogram. This is a test that uses sound waves to produce detailed images of the heart.  How is this treated?  Treatment for this condition may include:  · Oxygen therapy.  · Medicines, such as:  ? Antiplatelet medicines and blood-thinning medicines, such as aspirin. These help prevent blood clots.  ? Medicine that dissolves any blood clots (fibrinolytic therapy).  ? Blood pressure medicines.  ? Nitroglycerin. This helps widen blood vessels to improve blood flow.  ? Pain medicine.  ? Cholesterol-lowering medicine.  · Surgery, such as:  ? Coronary angioplasty with stent placement. This involves placing a small piece of metal that looks like mesh or a spring into a narrow coronary artery. This widens the artery and keeps it open.  ? Coronary artery bypass surgery. This involves taking a section of a blood vessel from a different part of your body and placing it on the blocked coronary artery to allow blood to flow around the blockage.  · Cardiac rehabilitation. This is a program that includes exercise training, education, and counseling to help you recover.  Follow these instructions at home:  Eating and drinking  · Eat a heart-healthy diet that includes whole grains, fruits and vegetables, lean proteins, and  low-fat or nonfat dairy products.  · Limit how much salt (sodium) you eat as told by your health care provider. Follow instructions from your health care provider about any other eating or drinking restrictions, such as limiting foods that are high in fat and processed sugars.  · Use healthy cooking methods such as roasting, grilling, broiling, baking, poaching, steaming, or stir-frying.  · Work with a dietitian to follow a heart-healthy eating plan.  Medicines  · Take over-the-counter and prescription medicines only as told by your health care provider.  · Do not take these medicines unless your health care provider approves:  ? Vitamin supplements that contain vitamin A or vitamin E.  ? NSAIDs, such as ibuprofen, naproxen, or celecoxib.  ? Hormone replacement therapy that contains estrogen.  · If you are taking blood thinners:  ? Talk with your health care provider before you take any medicines that contain aspirin or NSAIDs. These medicines increase your risk for dangerous bleeding.  ? Take your medicine exactly as told, at the same time every day.  ? Avoid activities that could cause injury or bruising, and follow instructions about how to prevent falls.  ? Wear a medical alert bracelet, and carry a card that lists what medicines you take.  Activity  · Follow your cardiac rehabilitation program. Do exercises as told by your physical therapist.  · Ask your health care provider what activities and exercises are safe for you. Follow his or her instructions about lifting, driving, or climbing stairs.  Lifestyle  · Do not use any products that contain nicotine or tobacco, such as cigarettes, e-cigarettes, and chewing tobacco. If you need help quitting, ask your health care provider.  · Do not drink alcohol if your health care provider tells you not to drink.  · If you drink alcohol:  ? Limit how much you have to 0-1 drink a day.  ? Be aware of how much alcohol is in your drink. In the [place] one drink equals one 12 oz  bottle of beer (355 mL), one 5 oz glass of wine (148 mL), or one 1½ oz glass of hard liquor (44 mL).  · Maintain a healthy weight. If you need to lose weight, work with your health care provider to do so safely.  General instructions  · Tell all the health care providers who provide care for you about your heart condition, including your dentist. This may affect the medicines or treatment you receive.  · Manage any other health conditions you have, such as hypertension or diabetes. These conditions affect your heart.  · Pay attention to your mental health. You may be at higher risk for depression.  ? Find ways to manage stress.  ? Talk to your health care provider about depression screening and treatment.  · Keep your vaccinations up to date.  ? Get the flu shot (influenza vaccine) every year.  ? Get the pneumococcal vaccine if you are age 65 or older.  · If directed, monitor your blood pressure at home.  · Keep all follow-up visits as told by your health care provider. This is important.  Contact a health care provider if you:  · Feel overwhelmed or sad.  · Have trouble doing your daily activities.  Get help right away if you:  · Have pain in your chest, neck, arm, jaw, stomach, or back that recurs, and:  ? It lasts for more than a few minutes.  ? It is not relieved by taking the medicineyour health care provider prescribed.  · Have unexplained:  ? Heavy sweating.  ? Heartburn or indigestion.  ? Nausea or vomiting.  ? Shortness of breath.  ? Difficulty breathing.  ? Fatigue.  ? Nervousness or anxiety.  ? Weakness.  ? Diarrhea.  ? Dark stools or blood in your stool.  · Have sudden light-headedness or dizziness.  · Have blood pressure that is higher than 180/120.  · Faint.  · Have thoughts about hurting yourself.  These symptoms may represent a serious problem that is an emergency. Do not wait to see if the symptoms will go away. Get medical help right away. Call your local emergency services (911 in the [place] Do  not drive yourself to the hospital.   Summary  · Acute coronary syndrome (ACS) is when there is not enough blood and oxygen being supplied to the heart. ACS can result in chest pain or a heart attack.  · Acute coronary syndrome is a medical emergency. If you have any symptoms of this condition, get help right away.  · Treatment includes medicines and procedures to open the blocked arteries and restore blood flow.  This information is not intended to replace advice given to you by your health care provider. Make sure you discuss any questions you have with your health care provider.  Document Revised: 05/20/2020 Document Reviewed: 12/30/2019  [employer] Patient Education © 2020 [employer]      Hypertension, Adult  Hypertension is another name for high blood pressure. High blood pressure forces your heart to work harder to pump blood. This can cause problems over time.  There are two numbers in a blood pressure reading. There is a top number (systolic) over a bottom number (diastolic). It is best to have a blood pressure that is below 120/80. Healthy choices can help lower your blood pressure, or you may need medicine to help lower it.  What are the causes?  The cause of this condition is not known. Some conditions may be related to high blood pressure.  What increases the risk?  · Smoking.  · Having type 2 diabetes mellitus, high cholesterol, or both.  · Not getting enough exercise or physical activity.  · Being overweight.  · Having too much fat, sugar, calories, or salt (sodium) in your diet.  · Drinking too much alcohol.  · Having long-term (chronic) kidney disease.  · Having a family history of high blood pressure.  · Age. Risk increases with age.  · Race. You may be at higher risk if you are .  · Gender. Men are at higher risk than women before age 45. After age 65, women are at higher risk than men.  · Having obstructive sleep apnea.  · Stress.  What are the signs or symptoms?  · High blood  pressure may not cause symptoms. Very high blood pressure (hypertensive crisis) may cause:  ? Headache.  ? Feelings of worry or nervousness (anxiety).  ? Shortness of breath.  ? Nosebleed.  ? A feeling of being sick to your stomach (nausea).  ? Throwing up (vomiting).  ? Changes in how you see.  ? Very bad chest pain.  ? Seizures.  How is this treated?  · This condition is treated by making healthy lifestyle changes, such as:  ? Eating healthy foods.  ? Exercising more.  ? Drinking less alcohol.  · Your health care provider may prescribe medicine if lifestyle changes are not enough to get your blood pressure under control, and if:  ? Your top number is above 130.  ? Your bottom number is above 80.  · Your personal target blood pressure may vary.  Follow these instructions at home:  Eating and drinking    · If told, follow the DASH eating plan. To follow this plan:  ? Fill one half of your plate at each meal with fruits and vegetables.  ? Fill one fourth of your plate at each meal with whole grains. Whole grains include whole-wheat pasta, brown rice, and whole-grain bread.  ? Eat or drink low-fat dairy products, such as skim milk or low-fat yogurt.  ? Fill one fourth of your plate at each meal with low-fat (lean) proteins. Low-fat proteins include fish, chicken without skin, eggs, beans, and tofu.  ? Avoid fatty meat, cured and processed meat, or chicken with skin.  ? Avoid pre-made or processed food.  · Eat less than 1,500 mg of salt each day.  · Do not drink alcohol if:  ? Your doctor tells you not to drink.  ? You are pregnant, may be pregnant, or are planning to become pregnant.  · If you drink alcohol:  ? Limit how much you use to:  § 0-1 drink a day for women.  § 0-2 drinks a day for men.  ? Be aware of how much alcohol is in your drink. In the [place] one drink equals one 12 oz bottle of beer (355 mL), one 5 oz glass of wine (148 mL), or one 1½ oz glass of hard liquor (44 mL).  Lifestyle    · Work with your  doctor to stay at a healthy weight or to lose weight. Ask your doctor what the best weight is for you.  · Get at least 30 minutes of exercise most days of the week. This may include walking, swimming, or biking.  · Get at least 30 minutes of exercise that strengthens your muscles (resistance exercise) at least 3 days a week. This may include lifting weights or doing Pilates.  · Do not use any products that contain nicotine or tobacco, such as cigarettes, e-cigarettes, and chewing tobacco. If you need help quitting, ask your doctor.  · Check your blood pressure at home as told by your doctor.  · Keep all follow-up visits as told by your doctor. This is important.  Medicines  · Take over-the-counter and prescription medicines only as told by your doctor. Follow directions carefully.  · Do not skip doses of blood pressure medicine. The medicine does not work as well if you skip doses. Skipping doses also puts you at risk for problems.  · Ask your doctor about side effects or reactions to medicines that you should watch for.  Contact a doctor if you:  · Think you are having a reaction to the medicine you are taking.  · Have headaches that keep coming back (recurring).  · Feel dizzy.  · Have swelling in your ankles.  · Have trouble with your vision.  Get help right away if you:  · Get a very bad headache.  · Start to feel mixed up (confused).  · Feel weak or numb.  · Feel faint.  · Have very bad pain in your:  ? Chest.  ? Belly (abdomen).  · Throw up more than once.  · Have trouble breathing.  Summary  · Hypertension is another name for high blood pressure.  · High blood pressure forces your heart to work harder to pump blood.  · For most people, a normal blood pressure is less than 120/80.  · Making healthy choices can help lower blood pressure. If your blood pressure does not get lower with healthy choices, you may need to take medicine.  This information is not intended to replace advice given to you by your health  care provider. Make sure you discuss any questions you have with your health care provider.  Document Revised: 08/28/2019 Document Reviewed: 08/28/2019  [employer] Patient Education © 2020 [employer]

## 2021-02-20 ENCOUNTER — APPOINTMENT (OUTPATIENT)
Dept: PHYSICAL THERAPY | Facility: HOSPITAL | Age: 56
End: 2021-02-20

## 2021-02-22 ENCOUNTER — READMISSION MANAGEMENT (OUTPATIENT)
Dept: CALL CENTER | Facility: HOSPITAL | Age: 56
End: 2021-02-22

## 2021-02-22 NOTE — OUTREACH NOTE
General Surgery Week 1 Survey      Responses   [facility] patient discharged from?  [place]   Does the patient have one of the following disease processes/diagnoses(primary or secondary)?  General Surgery   Week 1 attempt successful?  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  Yes   Is the patient having any side effects they believe may be caused by any medication additions or changes?  No   Does the patient have all medications related to this admission filled (includes all antibiotics, pain medications, etc.)  Yes   Is the patient taking all medications as directed (includes completed medication regime)?  Yes   Does the patient have a follow up appointment scheduled with their surgeon?  Yes   Has the patient kept scheduled appointments due by today?  N/A   What is the Home health agency?   [facility]   Has home health visited the patient within 72 hours of discharge?  Yes   Home health comments   RN PICC care and woundvac care   Psychosocial issues?  No   Comments  pt states has 3 wound vacs, each are changed 3x's/week, pt managing own PICC line infusions, daughter and sig other assist pt   Did the patient receive a copy of their discharge instructions?  Yes   Nursing interventions  Reviewed instructions with patient   What is the patient's perception of their health status since discharge?  Improving   Nursing interventions  Nurse provided patient education   Is the patient /caregiver able to teach back basic post-op care?  Continue use of incentive spirometry at least 1 week post discharge, Practice 'cough and deep breath', Drive as instructed by MD in discharge instructions, Take showers only when approved by MD-sponge bathe until then, No tub bath, swimming, or hot tub until instructed by MD,  Keep incision areas clean,dry and protected, Do not remove steri-strips, Lifting as instructed by MD in discharge instructions   Is the patient/caregiver able to teach back signs and symptoms of incisional infection?  Increased redness, swelling or pain at the incisonal site, Increased drainage or bleeding, Incisional warmth, Pus or odor from incision, Fever   Is the patient/caregiver able to teach back steps to recovery at home?  Eat a well-balance diet, Rest and rebuild strength, gradually increase activity, Set small, achievable goals for return to baseline health   If the patient is a current smoker, are they able to teach back resources for cessation?  -- [pt has quit for now, except for a few]   Is the patient/caregiver able to teach back the hierarchy of who to call/visit for symptoms/problems? PCP, Specialist, Home health nurse, Urgent Care, ED, 911  Yes   Additional teach back comments  pt states free of infection so far, pt states trying to improve diet, less sugar, more fruits and vegetables   Week 1 call completed?  Yes          Pennie Cobian RN

## 2021-02-23 ENCOUNTER — APPOINTMENT (OUTPATIENT)
Dept: PHYSICAL THERAPY | Facility: HOSPITAL | Age: 56
End: 2021-02-23

## 2021-02-25 ENCOUNTER — APPOINTMENT (OUTPATIENT)
Dept: PHYSICAL THERAPY | Facility: HOSPITAL | Age: 56
End: 2021-02-25

## 2021-02-26 ENCOUNTER — HOSPITAL ENCOUNTER (OUTPATIENT)
Dept: DIABETES SERVICES | Facility: HOSPITAL | Age: 56
Setting detail: RECURRING SERIES
Discharge: HOME | End: 2021-02-26

## 2021-02-28 ENCOUNTER — READMISSION MANAGEMENT (OUTPATIENT)
Dept: CALL CENTER | Facility: HOSPITAL | Age: 56
End: 2021-02-28

## 2021-02-28 NOTE — OUTREACH NOTE
Medical Week 1 Survey      Responses   [facility] patient discharged from?  [place]   Does the patient have one of the following disease processes/diagnoses(primary or secondary)?  Other   Call start time  0826   Call end time  0834   Medication alerts for this patient  reviewed   Meds reviewed with patient/caregiver?  Yes   Is the patient taking all medications as directed (includes completed medication regime)?  Yes   Medication comments  insurance will not cover the nicotine patches   Has the patient kept scheduled appointments due by today?  N/A   Comments  has kept appointments with PCP,ID and [place] coming up in the next week   Has home health visited the patient within 72 hours of discharge?  Yes   Home health comments  Home health comes every other day , changes bandages on wound , doing well with IV antibiotic   What is the patient's perception of their health status since discharge?  Improving [doing better, leg is looking good]   Is the patient/caregiver able to teach back the hierarchy of who to call/visit for symptoms/problems? PCP, Specialist, Home health nurse, Urgent Care, ED, 911  Yes   If the patient is a current smoker, are they able to teach back resources for cessation?  [id number] [ discussed with the patient, his insurance will not pay for the nicotine patches. suggested to speak with pharmacy about OTC meds]   Wrap up additional comments  doing better, leg is improving          Sylvie Riddle RN

## 2021-03-01 ENCOUNTER — TELEPHONE (OUTPATIENT)
Dept: CARDIAC SURGERY | Facility: CLINIC | Age: 56
End: 2021-03-01

## 2021-03-01 NOTE — TELEPHONE ENCOUNTER
Pt called saying that ID and his HH nurse said he needed to be seen earlier than 3/11/21.   Pt stated he had no concerns with his wounds at this time. He has HH coming every other day.   I called ID and there was nothing in [facility] last note that stated he needed to be seen sooner with us.   Pt was okay with keeping his appt on 3/11/21.

## 2021-03-05 ENCOUNTER — HOSPITAL ENCOUNTER (EMERGENCY)
Facility: HOSPITAL | Age: 56
Discharge: HOME | End: 2021-03-06
Attending: EMERGENCY MEDICINE | Admitting: EMERGENCY MEDICINE

## 2021-03-05 DIAGNOSIS — L03.115 CELLULITIS OF RIGHT LEG: Primary | ICD-10-CM

## 2021-03-05 LAB
ALBUMIN SERPL-MCNC: 3.71 G/DL (ref 3.5–5.2)
ALBUMIN/GLOB SERPL: 0.9 G/DL
ALP SERPL-CCNC: 94 U/L (ref 39–117)
ALT SERPL W P-5'-P-CCNC: 31 U/L (ref 1–41)
ANION GAP SERPL CALCULATED.3IONS-SCNC: 9.8 MMOL/L (ref 5–15)
AST SERPL-CCNC: 35 U/L (ref 1–40)
BASOPHILS # BLD AUTO: 0.06 10*3/MM3 (ref 0–0.2)
BASOPHILS NFR BLD AUTO: 0.7 % (ref 0–1.5)
BILIRUB SERPL-MCNC: 0.4 MG/DL (ref 0–1.2)
BUN SERPL-MCNC: 9 MG/DL (ref 6–20)
BUN/CREAT SERPL: 15.3 (ref 7–25)
CALCIUM SPEC-SCNC: 9 MG/DL (ref 8.6–10.5)
CHLORIDE SERPL-SCNC: 103 MMOL/L (ref 98–107)
CO2 SERPL-SCNC: 26.2 MMOL/L (ref 22–29)
CREAT SERPL-MCNC: 0.59 MG/DL (ref 0.76–1.27)
D-LACTATE SERPL-SCNC: 1 MMOL/L (ref 0.5–2)
DEPRECATED RDW RBC AUTO: 51.3 FL (ref 37–54)
EOSINOPHIL # BLD AUTO: 0.27 10*3/MM3 (ref 0–0.4)
EOSINOPHIL NFR BLD AUTO: 3 % (ref 0.3–6.2)
ERYTHROCYTE [DISTWIDTH] IN BLOOD BY AUTOMATED COUNT: 14.1 % (ref 12.3–15.4)
GFR SERPL CREATININE-BSD FRML MDRD: 143 ML/MIN/1.73
GLOBULIN UR ELPH-MCNC: 4 GM/DL
GLUCOSE SERPL-MCNC: 80 MG/DL (ref 65–99)
HCT VFR BLD AUTO: 37.3 % (ref 37.5–51)
HGB BLD-MCNC: 12 G/DL (ref 13–17.7)
HOLD SPECIMEN: NORMAL
HOLD SPECIMEN: NORMAL
IMM GRANULOCYTES # BLD AUTO: 0.06 10*3/MM3 (ref 0–0.05)
IMM GRANULOCYTES NFR BLD AUTO: 0.7 % (ref 0–0.5)
LYMPHOCYTES # BLD AUTO: 2.78 10*3/MM3 (ref 0.7–3.1)
LYMPHOCYTES NFR BLD AUTO: 30.9 % (ref 19.6–45.3)
MCH RBC QN AUTO: 31.7 PG (ref 26.6–33)
MCHC RBC AUTO-ENTMCNC: 32.2 G/DL (ref 31.5–35.7)
MCV RBC AUTO: 98.4 FL (ref 79–97)
MONOCYTES # BLD AUTO: 0.74 10*3/MM3 (ref 0.1–0.9)
MONOCYTES NFR BLD AUTO: 8.2 % (ref 5–12)
NEUTROPHILS NFR BLD AUTO: 5.08 10*3/MM3 (ref 1.7–7)
NEUTROPHILS NFR BLD AUTO: 56.5 % (ref 42.7–76)
NRBC BLD AUTO-RTO: 0 /100 WBC (ref 0–0.2)
PLATELET # BLD AUTO: 204 10*3/MM3 (ref 140–450)
PMV BLD AUTO: 11.2 FL (ref 6–12)
POTASSIUM SERPL-SCNC: 4 MMOL/L (ref 3.5–5.2)
PROT SERPL-MCNC: 7.7 G/DL (ref 6–8.5)
RBC # BLD AUTO: 3.79 10*6/MM3 (ref 4.14–5.8)
SODIUM SERPL-SCNC: 139 MMOL/L (ref 136–145)
WBC # BLD AUTO: 8.99 10*3/MM3 (ref 3.4–10.8)
WHOLE BLOOD HOLD SPECIMEN: NORMAL
WHOLE BLOOD HOLD SPECIMEN: NORMAL

## 2021-03-05 PROCEDURE — 87040 BLOOD CULTURE FOR BACTERIA: CPT | Performed by: EMERGENCY MEDICINE

## 2021-03-05 PROCEDURE — 80053 COMPREHEN METABOLIC PANEL: CPT | Performed by: EMERGENCY MEDICINE

## 2021-03-05 PROCEDURE — 99284 EMERGENCY DEPT VISIT MOD MDM: CPT

## 2021-03-05 PROCEDURE — 83605 ASSAY OF LACTIC ACID: CPT | Performed by: EMERGENCY MEDICINE

## 2021-03-05 PROCEDURE — 85025 COMPLETE CBC W/AUTO DIFF WBC: CPT | Performed by: EMERGENCY MEDICINE

## 2021-03-05 RX ORDER — SODIUM CHLORIDE 0.9 % (FLUSH) 0.9 %
10 SYRINGE (ML) INJECTION AS NEEDED
Status: DISCONTINUED | OUTPATIENT
Start: 2021-03-05 | End: 2021-03-06 | Stop reason: HOSPADM

## 2021-03-05 RX ORDER — HYDROCODONE BITARTRATE AND ACETAMINOPHEN 7.5; 325 MG/1; MG/1
1 TABLET ORAL ONCE
Status: COMPLETED | OUTPATIENT
Start: 2021-03-05 | End: 2021-03-05

## 2021-03-05 RX ORDER — CLONIDINE HYDROCHLORIDE 0.1 MG/1
0.1 TABLET ORAL ONCE
Status: COMPLETED | OUTPATIENT
Start: 2021-03-05 | End: 2021-03-05

## 2021-03-05 RX ORDER — CLINDAMYCIN HYDROCHLORIDE 150 MG/1
300 CAPSULE ORAL ONCE
Status: COMPLETED | OUTPATIENT
Start: 2021-03-05 | End: 2021-03-05

## 2021-03-05 RX ORDER — CLINDAMYCIN HYDROCHLORIDE 300 MG/1
300 CAPSULE ORAL 3 TIMES DAILY
Qty: 30 CAPSULE | Refills: 0 | Status: SHIPPED | OUTPATIENT
Start: 2021-03-05 | End: 2021-04-12

## 2021-03-05 RX ADMIN — CLONIDINE HYDROCHLORIDE 0.1 MG: 0.1 TABLET ORAL at 22:25

## 2021-03-05 RX ADMIN — CLINDAMYCIN HYDROCHLORIDE 300 MG: 150 CAPSULE ORAL at 23:07

## 2021-03-05 RX ADMIN — HYDROCODONE BITARTRATE AND ACETAMINOPHEN 1 TABLET: 7.5; 325 TABLET ORAL at 23:02

## 2021-03-06 VITALS
HEIGHT: 67 IN | RESPIRATION RATE: 18 BRPM | WEIGHT: 230 LBS | TEMPERATURE: 98.8 F | HEART RATE: 112 BPM | BODY MASS INDEX: 36.1 KG/M2 | DIASTOLIC BLOOD PRESSURE: 84 MMHG | OXYGEN SATURATION: 98 % | SYSTOLIC BLOOD PRESSURE: 140 MMHG

## 2021-03-06 NOTE — ED NOTES
Called the number pt provided for comfort care [phone]. Caller states he is not comfort care.      Aubree Gary  03/06/21 0808

## 2021-03-06 NOTE — ED NOTES
Patient leaving ED under care of [facility] being transported home.     Estrella Trevizo, RN  03/06/21 1567

## 2021-03-06 NOTE — ED NOTES
Called the number for comfort care back to see if he is the person who usually transport pt around. No answer. No voice message set up to leave a message.      Aubree Gray  03/06/21 0816

## 2021-03-06 NOTE — ED NOTES
Called [facility] to transport pt back home. shilpa states she will send us someone up      Aubree Gary  03/06/21 08

## 2021-03-06 NOTE — ED PROVIDER NOTES
Subjective   Patient presents to ER with pain right leg, out of pain medication, and his PCP cut him off.      Leg Pain  Location:  Leg  Leg location:  R leg and R upper leg  Pain details:     Quality:  Aching    Radiates to:  Does not radiate    Severity:  Mild    Onset quality:  Gradual    Timing:  Constant  Chronicity:  New      Review of Systems   Constitutional: Negative.    HENT: Negative.    Eyes: Negative.    Respiratory: Negative.    Cardiovascular: Positive for leg swelling.   Gastrointestinal: Negative.    Endocrine: Negative.    Genitourinary: Negative.    Musculoskeletal: Positive for gait problem.   Skin: Negative.    Allergic/Immunologic: Negative.    Hematological: Negative.    Psychiatric/Behavioral: Negative.        Past Medical History:   Diagnosis Date   • Anxiety    • CHF (congestive heart failure) (CMS/[facility])    • COPD (chronic obstructive pulmonary disease) (CMS/[facility])    • Coronary artery disease involving native coronary artery of native heart without angina pectoris 1/12/2021    Added automatically from request for surgery [id number]   • Diabetes mellitus (CMS/[facility])    • Hepatitis C    • Hyperlipidemia    • Hypertension    • Peripheral vascular disease (CMS/[facility])    • Sleep apnea    • T2DM on metformin  1/27/2021       No Known Allergies    Past Surgical History:   Procedure Laterality Date   • CARDIAC CATHETERIZATION  11/20/2020    x 2 stents   • CARDIAC CATHETERIZATION Right 1/20/2021    Procedure: Peripheral angiography;  Surgeon: Wesly Lund MD;  Location: [facility] INVASIVE LOCATION;  Service: Cardiovascular;  Laterality: Right;   • CORONARY ARTERY BYPASS GRAFT      x 5   • CORONARY STENT PLACEMENT  11/20/2020    Dr Mortensen x 2 stents   • FEMORAL DISTAL BYPASS N/A 1/27/2021    Procedure: FEMORAL DISTAL BYPASS;  Surgeon: Jeremy Trevizo MD;  Location: [facility] OR;  Service: Vascular;  Laterality: N/A;   • FINGER SURGERY      left   • INCISION AND DRAINAGE LEG Right 2/9/2021    Procedure:  INCISION AND DRAINAGE RIGHT LOWER EXTREMITY, EVACUATION OF HEMATOMA, LIGATION OF FEM DISTAL BYPASS;  Surgeon: Jeremy Trevizo MD;  Location: [facility];  Service: Cardiothoracic;  Laterality: Right;   • LEG AMPUTATION Left     left lower leg   • SHOULDER SURGERY Bilateral        Family History   Problem Relation Age of Onset   • Heart disease Mother    • Anuerysm Father    • Heart disease Father    • Heart disease Sister    • Heart disease Sister        Social History     Socioeconomic History   • Marital status: Legally      Spouse name: Not on file   • Number of children: 3   • Years of education: Not on file   • Highest education level: Not on file   Occupational History   • Occupation: [employer]      Comment: Disabled    Tobacco Use   • Smoking status: Current Every Day Smoker     Packs/day: 1.00     Years: 40.00     Pack years: 40.00     Types: Cigarettes   • Smokeless tobacco: Never Used   Substance and Sexual Activity   • Alcohol use: Yes     Alcohol/week: 3.0 standard drinks     Types: 2 Cans of beer, 1 Shots of liquor per week     Comment: weekly   • Drug use: Yes     Types: Marijuana     Comment: smoke Marijuana everyday   • Sexual activity: Defer   Social History Narrative    Pt lives in [place], [place] with somebody else.            Objective   Physical Exam  Vitals signs and nursing note reviewed. Exam conducted with a chaperone present.   Constitutional:       Appearance: Normal appearance.   HENT:      Head: Normocephalic.      Nose: Nose normal.      Mouth/Throat:      Mouth: Mucous membranes are moist.   Eyes:      Pupils: Pupils are equal, round, and reactive to light.   Neck:      Musculoskeletal: Normal range of motion.   Cardiovascular:      Rate and Rhythm: Normal rate.      Pulses: Normal pulses.   Pulmonary:      Effort: Pulmonary effort is normal.   Abdominal:      General: Abdomen is flat.   Musculoskeletal:         General: Swelling present.   Skin:     Capillary Refill:  Capillary refill takes less than 2 seconds.   Neurological:      General: No focal deficit present.      Mental Status: He is alert.   Psychiatric:         Mood and Affect: Mood normal.         Procedures           ED Course                                           MDM    Final diagnoses:   Cellulitis of right leg            Anders Doyle MD  03/05/21 0864

## 2021-03-09 ENCOUNTER — READMISSION MANAGEMENT (OUTPATIENT)
Dept: CALL CENTER | Facility: HOSPITAL | Age: 56
End: 2021-03-09

## 2021-03-09 NOTE — OUTREACH NOTE
General Surgery Week 3 Survey      Responses   [facility] patient discharged from?  [place]   Does the patient have one of the following disease processes/diagnoses(primary or secondary)?  Other   Week 3 attempt successful?  Yes   Call start time  1106   Call end time  1108   Discharge diagnosis  Postoperative infection, chronic CHF, COPD, Right femoral artery bypass graft infection with MRSA    Meds reviewed with patient/caregiver?  Yes   Is the patient having any side effects they believe may be caused by any medication additions or changes?  No   Does the patient have all medications related to this admission filled (includes all antibiotics, pain medications, etc.)  Yes   Is the patient taking all medications as directed (includes completed medication regime)?  Yes   Does the patient have a follow up appointment scheduled with their surgeon?  Yes   Has the patient kept scheduled appointments due by today?  Yes   What is the Home health agency?   [facility]   Has home health visited the patient within 72 hours of discharge?  Yes   Psychosocial issues?  No   Did the patient receive a copy of their discharge instructions?  Yes   Nursing interventions  Reviewed instructions with patient   What is the patient's perception of their health status since discharge?  Improving   Nursing interventions  Nurse provided patient education   Is the patient /caregiver able to teach back basic post-op care?  Continue use of incentive spirometry at least 1 week post discharge, Take showers only when approved by MD-sponge bathanton until then, Lifting as instructed by MD in discharge instructions   Is the patient/caregiver able to teach back signs and symptoms of incisional infection?  Increased redness, swelling or pain at the incisonal site, Incisional warmth, Fever, Increased drainage or bleeding, Pus or odor from incision   Is the patient/caregiver able to teach back steps to recovery  at home?  Eat a well-balance diet, Set small, achievable goals for return to baseline health, Rest and rebuild strength, gradually increase activity   If the patient is a current smoker, are they able to teach back resources for cessation?  [id number]   Is the patient/caregiver able to teach back the hierarchy of who to call/visit for symptoms/problems? PCP, Specialist, Home health nurse, Urgent Care, ED, 911  Yes   Week 3 call completed?  Yes          Reg Meza RN

## 2021-03-10 LAB
BACTERIA SPEC AEROBE CULT: NORMAL
BACTERIA SPEC AEROBE CULT: NORMAL

## 2021-03-11 ENCOUNTER — OFFICE VISIT (OUTPATIENT)
Dept: CARDIAC SURGERY | Facility: CLINIC | Age: 56
End: 2021-03-11

## 2021-03-11 VITALS
SYSTOLIC BLOOD PRESSURE: 158 MMHG | HEART RATE: 83 BPM | BODY MASS INDEX: 35.19 KG/M2 | WEIGHT: 224.2 LBS | HEIGHT: 67 IN | DIASTOLIC BLOOD PRESSURE: 98 MMHG | OXYGEN SATURATION: 98 % | TEMPERATURE: 97.1 F

## 2021-03-11 DIAGNOSIS — I73.9 PAD (PERIPHERAL ARTERY DISEASE) (HCC): Primary | ICD-10-CM

## 2021-03-11 PROCEDURE — 99024 POSTOP FOLLOW-UP VISIT: CPT | Performed by: NURSE PRACTITIONER

## 2021-03-11 NOTE — PROGRESS NOTES
[facility] Cardiothoracic Surgery Office Follow Up Note     Date of Encounter: 03/11/2021     MRN Number: [id number]  Name: Reg Sanchez  Phone Number: [phone]     Referred By: No ref. provider found  PCP: Sheila Sheikh APRN    Chief Complaint:    Chief Complaint   Patient presents with   • Peripheral Vascular Disease     Hospital follow up s/p right fem-distal bypass, I&D right groin incision, right knee incision, and right thigh       History of Present Illness:    Reg Sanchez is a 55 y.o. male with a history of hyperlipidemia, diabetes, tobacco use, COPD, hep C, CAD s/p CABG, CHF, bilateral carotid stenosis, left leg OM s/p left leg amputation and PVD s/p right SFA and popliteal artery occlusion with right femoral to posterior tibial distal bypass with reverse saphenous vein graft on 1/27/2021 with Dr. Trevizo and subsequent right femoral artery bypass graft MRSA infection s/p I&D of a right groin incision with opening of right knee incision for drainage incision on the right medial thigh for drainage on 2/9/2021 with Dr. Trevizo.  He presents today for wound check.  He is following closely with home health with wound VAC's on incisions.  Denies any fevers or chills.  Continues with IV/oral antibiotics with ID Dr. Washburn.  Last saw him 2 days ago.  He was seen in the emergency room on 3/5/2021 with right leg pain and out of his pain medications.  He was treated with oral Norco and this has improved.  He is taking over-the-counter analgesics.    Review of Systems:  Review of Systems   Constitutional: Negative for chills, decreased appetite, fever and malaise/fatigue.   Cardiovascular: Positive for leg swelling. Negative for chest pain, claudication, dyspnea on exertion, irregular heartbeat, near-syncope, orthopnea, palpitations and syncope.   Respiratory: Negative for cough, hemoptysis, shortness of breath, sputum production and wheezing.    Hematologic/Lymphatic: Negative for  bleeding problem. Does not bruise/bleed easily.   Skin: Positive for poor wound healing. Negative for color change and rash.   Musculoskeletal: Negative for back pain, falls and joint pain.   Gastrointestinal: Negative for abdominal pain, constipation, diarrhea, nausea and vomiting.   Neurological: Negative for focal weakness, numbness and paresthesias.   Psychiatric/Behavioral: Negative for depression. The patient does not have insomnia.        I have reviewed the review of systems as entered by my clinical support staff and have updated it as appropriate.         Allergies:  No Known Allergies    Medications:      Current Outpatient Medications:   •  albuterol (PROVENTIL) (2.5 MG/3ML) 0.083% nebulizer solution, Take 2.5 mg by nebulization Every 4 (Four) Hours As Needed for Wheezing., Disp: , Rfl:   •  albuterol sulfate  (90 Base) MCG/ACT inhaler, Inhale 1 puff Every 6 (Six) Hours As Needed for Wheezing. Prior to [facility] [place], operated by [facility]mission, Patient was on: 1 to 2 puffs every 4 to 6 hours as needed, Disp: , Rfl:   •  aspirin 81 MG chewable tablet, Chew 81 mg Daily., Disp: , Rfl:   •  atorvastatin (LIPITOR) 40 MG tablet, Take 40 mg by mouth Daily., Disp: , Rfl:   •  budesonide-formoterol (SYMBICORT) 160-4.5 MCG/ACT inhaler, Inhale 2 puffs 2 (Two) Times a Day As Needed., Disp: , Rfl:   •  clindamycin (CLEOCIN) 300 MG capsule, Take 1 capsule by mouth 3 (Three) Times a Day., Disp: 30 capsule, Rfl: 0  •  clopidogrel (PLAVIX) 75 MG tablet, Take 1 tablet by mouth Daily., Disp: 30 tablet, Rfl: 6  •  doxycycline (VIBRAMYCIN) 100 MG capsule, Take 1 capsule by mouth 2 (two) times a day., Disp: 28 capsule, Rfl: 0  •  DULoxetine (CYMBALTA) 60 MG capsule, Take 60 mg by mouth 2 (Two) Times a Day., Disp: , Rfl:   •  furosemide (LASIX) 20 MG tablet, Take 1 tablet by mouth Daily., Disp: 30 tablet, Rfl: 11  •  HYDROcodone-acetaminophen (Norco) 7.5-325 MG per tablet, Take 1 tablet by mouth Every 6 (Six) Hours As Needed for Moderate Pain  ., Disp: 24 tablet, Rfl: 0  •  hydrOXYzine (ATARAX) 25 MG tablet, Take 1 tablet by mouth 3 (Three) Times a Day As Needed for Itching or Anxiety., Disp: 30 tablet, Rfl: 0  •  Insulin Glargine (BASAGLAR KWIKPEN SC), Inject 20 Units under the skin into the appropriate area as directed 2 (Two) Times a Day As Needed., Disp: , Rfl:   •  isosorbide mononitrate (IMDUR) 30 MG 24 hr tablet, Take 1 tablet by mouth Daily., Disp: 30 tablet, Rfl: 11  •  lisinopril (PRINIVIL,ZESTRIL) 10 MG tablet, Take 10 mg by mouth Daily., Disp: , Rfl:   •  metFORMIN (GLUCOPHAGE) 1000 MG tablet, Take 1,000 mg by mouth 2 (Two) Times a Day With Meals., Disp: , Rfl:   •  metoprolol tartrate (LOPRESSOR) 50 MG tablet, Take 50 mg by mouth 2 (Two) Times a Day., Disp: , Rfl:   •  multivitamin (multivitamin) tablet tablet, Take 1 tablet by mouth Daily., Disp: , Rfl:   •  nicotine (NICODERM CQ) 21 MG/24HR patch, Place 1 patch on the skin as directed by provider Daily., Disp: , Rfl:   •  nitroglycerin (NITROSTAT) 0.4 MG SL tablet, 1 under the tongue as needed for angina, may repeat q5mins for up three doses, Disp: 30 tablet, Rfl: 5  •  omeprazole (priLOSEC) 20 MG capsule, Take 20 mg by mouth Daily., Disp: , Rfl:   •  ondansetron ODT (Zofran ODT) 4 MG disintegrating tablet, Place 1 tablet on the tongue Every 8 (Eight) Hours As Needed for Nausea or Vomiting., Disp: 15 tablet, Rfl: 1  •  potassium chloride 10 MEQ CR tablet, Take 1 tablet by mouth Daily., Disp: 30 tablet, Rfl: 11  •  pregabalin (LYRICA) 200 MG capsule, Take 200 mg by mouth 3 (Three) Times a Day., Disp: , Rfl:   •  rOPINIRole (REQUIP) 4 MG tablet, Take 4 mg by mouth Every Night., Disp: , Rfl:     Social History     Socioeconomic History   • Marital status: Legally      Spouse name: Not on file   • Number of children: 3   • Years of education: Not on file   • Highest education level: Not on file   Tobacco Use   • Smoking status: Current Every Day Smoker     Packs/day: 1.00     Years:  40.00     Pack years: 40.00     Types: Cigarettes   • Smokeless tobacco: Never Used   Substance and Sexual Activity   • Alcohol use: Yes     Alcohol/week: 3.0 standard drinks     Types: 2 Cans of beer, 1 Shots of liquor per week     Comment: weekly   • Drug use: Yes     Types: Marijuana     Comment: smoke Marijuana everyday   • Sexual activity: Defer       Family History   Problem Relation Age of Onset   • Heart disease Mother    • Anuerysm Father    • Heart disease Father    • Heart disease Sister    • Heart disease Sister        Past Medical History:   Diagnosis Date   • Anxiety    • CHF (congestive heart failure) (CMS/[facility])    • COPD (chronic obstructive pulmonary disease) (CMS/[facility])    • Coronary artery disease involving native coronary artery of native heart without angina pectoris 1/12/2021    Added automatically from request for surgery [id number]   • Diabetes mellitus (CMS/[facility])    • Hepatitis C    • Hyperlipidemia    • Hypertension    • Peripheral vascular disease (CMS/[facility])    • Sleep apnea    • T2DM on metformin  1/27/2021       Past Surgical History:   Procedure Laterality Date   • CARDIAC CATHETERIZATION  11/20/2020    x 2 stents   • CARDIAC CATHETERIZATION Right 1/20/2021    Procedure: Peripheral angiography;  Surgeon: Wesly Lund MD;  Location:  [facility];  Service: Cardiovascular;  Laterality: Right;   • CORONARY ARTERY BYPASS GRAFT      x 5   • CORONARY STENT PLACEMENT  11/20/2020    Dr Mortensen x 2 stents   • FEMORAL DISTAL BYPASS N/A 1/27/2021    Procedure: FEMORAL DISTAL BYPASS;  Surgeon: Jeremy Trevizo MD;  Location:  [facility];  Service: Vascular;  Laterality: N/A;   • FINGER SURGERY      left   • INCISION AND DRAINAGE LEG Right 2/9/2021    Procedure: INCISION AND DRAINAGE RIGHT LOWER EXTREMITY, EVACUATION OF HEMATOMA, LIGATION OF FEM DISTAL BYPASS;  Surgeon: Jeremy Trevizo MD;  Location:  [facility] OR;  Service: Cardiothoracic;  Laterality: Right;   • LEG AMPUTATION Left   "   left lower leg   • SHOULDER SURGERY Bilateral        Physical Exam:  Vital Signs:    Vitals:    03/11/21 1050   BP: 158/98   BP Location: Left arm   Patient Position: Sitting   Pulse: 83   Temp: 97.1 °F (36.2 °C)   SpO2: 98%   Weight: 102 kg (224 lb 3.2 oz)   Height: 170.2 cm (67\")     Body mass index is 35.11 kg/m².     Physical Exam  Cardiovascular:      Pulses:           Dorsalis pedis pulses are detected w/ Doppler on the right side and detected w/ Doppler on the left side.        Posterior tibial pulses are detected w/ Doppler on the right side and detected w/ Doppler on the left side.      Comments: Right femoral/popliteal/graft dopplered  Skin:     Comments: All RLE with sites with pink granulous tissue.  No drainage or erythema   Right knee incision and two below are almost closed.      Right ankle incision, staples removed.  Noted fibrinous tissue throughout wound, but no dehiscence.  Very small amount of serous sanguinous drainage after removal of staple from mid incision.    Throughout right leg scattered erythema from tape irriation with a few areas bleeding         Labs/Imaging:  CTA carotid 1/4/21 70% carotid stenosis and the right carotid bulb, 65% narrowing in the right ECA, bilateral vertebral 70% stenosis, 50% stenosis in the left ICA/ECA.    Assessment / Plan:  Diagnoses and all orders for this visit:    1. PAD (peripheral artery disease) (CMS/[facility]) (Primary)     Reg Sanchez is a 55 y.o. male with a history of hyperlipidemia, diabetes, tobacco use, COPD, hep C, CAD s/p CABG, CHF, bilateral carotid stenosis, left leg OM s/p left leg amputation and PVD s/p right SFA and popliteal artery occlusion with right femoral to posterior tibial distal bypass with reverse saphenous vein graft on 1/27/2021 with Dr. Trevizo and subsequent right femoral artery bypass graft MRSA infection s/p I&D of a right groin incision with opening of right knee incision for drainage incision on the right medial thigh " for drainage on 2/9/2021 with Dr. Trevizo.  Right lower extremity wounds are healing.  Will continue wound VAC to incisions on the right groin/thigh and wet-to-dry saline dressings to the remainder of the right lower extremity.  Patient with tape irritation throughout right lower extremity and have provided him with some cloth tape.  Right lower extremity pulses are Doppler throughout.  He denies any fevers or chills.  He follows closely with infectious disease, Dr. Washburn and continued IV/p.o. antibiotics.  He reports he has had some difficulty with flushing of his PICC line and have contacted the ID office and they will see him today to assess.  We will follow-up with patient in 3 weeks for wound check or earlier as needed.  Following wound healing, will need to address bilateral carotid stenosis.  He denies any current TIA/CVA symptoms.    Roseanne Abebe, APRBEBE  [facility] Cardiothoracic Surgery    Please note that portions of this note may have been completed with a voice recognition program. Efforts were made to edit the dictations, but occasionally words are mistranscribed.

## 2021-03-18 ENCOUNTER — READMISSION MANAGEMENT (OUTPATIENT)
Dept: CALL CENTER | Facility: HOSPITAL | Age: 56
End: 2021-03-18

## 2021-03-18 NOTE — OUTREACH NOTE
General Surgery Week 4 Survey      Responses   [facility] patient discharged from?  [place]   Does the patient have one of the following disease processes/diagnoses(primary or secondary)?  Other   Week 4 attempt successful?  Yes   Call start time  0837   Call end time  0840   Is the patient taking all medications as directed (includes completed medication regime)?  Yes   Has the patient kept scheduled appointments due by today?  Yes   Is the patient still receiving Home Health Services?  Yes   Psychosocial issues?  No   What is the patient's perception of their health status since discharge?  Improving   Nursing interventions  Nurse provided patient education   Is the patient/caregiver able to teach back steps to recovery at home?  Set small, achievable goals for return to baseline health, Rest and rebuild strength, gradually increase activity, Make a list of questions for surgeon's appointment, Eat a well-balance diet, Practice good oral hygiene   If the patient is a current smoker, are they able to teach back resources for cessation?  Smoking cessation support groups   Is the patient/caregiver able to teach back the hierarchy of who to call/visit for symptoms/problems? PCP, Specialist, Home health nurse, Urgent Care, ED, 911  Yes   Week 4 call completed?  Yes   Would the patient like one additional call?  No   Graduated  Yes   Is the patient interested in additional calls from an ambulatory ?  NOTE:  applies to high risk patients requiring additional follow-up.  No   Did the patient feel the follow up calls were helpful during their recovery period?  Yes   Was the number of calls appropriate?  Yes   Wrap up additional comments  PATIENT REPORTS DOING MUCH BETTER, AND DENIES NEED FOR A 5TH CALL.           Sadie Hunt LPN

## 2021-03-23 ENCOUNTER — BULK ORDERING (OUTPATIENT)
Dept: CASE MANAGEMENT | Facility: OTHER | Age: 56
End: 2021-03-23

## 2021-03-23 ENCOUNTER — LAB (OUTPATIENT)
Dept: LAB | Facility: HOSPITAL | Age: 56
End: 2021-03-23

## 2021-03-23 ENCOUNTER — TRANSCRIBE ORDERS (OUTPATIENT)
Dept: LAB | Facility: HOSPITAL | Age: 56
End: 2021-03-23

## 2021-03-23 DIAGNOSIS — Z23 IMMUNIZATION DUE: ICD-10-CM

## 2021-03-23 DIAGNOSIS — T82.7XXD INFECTION, DIALYSIS VASCULAR ACCESS, SUBSEQUENT ENCOUNTER: Primary | ICD-10-CM

## 2021-03-23 DIAGNOSIS — T82.7XXD INFECTION, DIALYSIS VASCULAR ACCESS, SUBSEQUENT ENCOUNTER: ICD-10-CM

## 2021-03-23 LAB
FUNGUS WND CULT: NORMAL
MYCOBACTERIUM SPEC CULT: NORMAL
NIGHT BLUE STAIN TISS: NORMAL

## 2021-03-23 PROCEDURE — 87070 CULTURE OTHR SPECIMN AEROBIC: CPT

## 2021-03-23 PROCEDURE — 87077 CULTURE AEROBIC IDENTIFY: CPT

## 2021-03-23 PROCEDURE — 87185 SC STD ENZYME DETCJ PER NZM: CPT

## 2021-03-23 PROCEDURE — 87205 SMEAR GRAM STAIN: CPT

## 2021-03-23 PROCEDURE — 87186 SC STD MICRODIL/AGAR DIL: CPT

## 2021-03-27 LAB
BACTERIA SPEC AEROBE CULT: ABNORMAL
GRAM STN SPEC: ABNORMAL
GRAM STN SPEC: ABNORMAL

## 2021-04-01 ENCOUNTER — OFFICE VISIT (OUTPATIENT)
Dept: CARDIAC SURGERY | Facility: CLINIC | Age: 56
End: 2021-04-01

## 2021-04-01 VITALS
OXYGEN SATURATION: 97 % | HEART RATE: 50 BPM | DIASTOLIC BLOOD PRESSURE: 77 MMHG | WEIGHT: 212 LBS | BODY MASS INDEX: 33.27 KG/M2 | SYSTOLIC BLOOD PRESSURE: 130 MMHG | HEIGHT: 67 IN | TEMPERATURE: 97.1 F

## 2021-04-01 DIAGNOSIS — I73.9 PAD (PERIPHERAL ARTERY DISEASE) (HCC): Primary | ICD-10-CM

## 2021-04-01 PROCEDURE — 99213 OFFICE O/P EST LOW 20 MIN: CPT | Performed by: NURSE PRACTITIONER

## 2021-04-01 RX ORDER — CEFDINIR 300 MG/1
300 CAPSULE ORAL EVERY 12 HOURS
COMMUNITY
Start: 2021-03-23

## 2021-04-01 NOTE — PROGRESS NOTES
[facility] Cardiothoracic Surgery Office Follow Up Note     Date of Encounter: 04/01/2021     MRN Number: [id number]  Name: Reg Sanchez  Phone Number: [phone]     Referred By: No ref. provider found  PCP: Sheila Sheikh APRN    Chief Complaint:    Chief Complaint   Patient presents with   • Post-op     3 week follow up for wound check.   • Peripheral Vascular Disease       History of Present Illness:    Reg Sanchez is a 55 y.o. male with a history of hyperlipidemia, diabetes, tobacco use, COPD, hep C, CAD s/p CABG, CHF, bilateral carotid stenosis, left leg osteomyelitis s/p left leg amputation and PVD s/p right SFA and popliteal artery occlusion with right femoral to posterior tibial distal bypass with reverse saphenous vein graft on 1/27/2021 with Dr. Trevizo and subsequent right femoral artery bypass graft MRSA infection s/p I&D of a right groin incision with opening of right knee incision for drainage incision on the right medial thigh for drainage on 2/9/2020 with Dr. Trevizo.  He presents today for wound check.  His incisions have been healing and continuing close follow-up with home health with his wound VAC.  He denies any fevers or chills.  He has been having some issues of depression with long-term wound healing.    Review of Systems:  Review of Systems   Constitutional: Negative. Negative for chills, decreased appetite, fever and malaise/fatigue.   Cardiovascular: Positive for chest pain and dyspnea on exertion. Negative for claudication, irregular heartbeat, leg swelling, near-syncope, orthopnea, palpitations and syncope.   Respiratory: Positive for shortness of breath. Negative for cough, hemoptysis, sputum production and wheezing.    Hematologic/Lymphatic: Negative for bleeding problem. Bruises/bleeds easily.   Skin: Positive for color change and poor wound healing. Negative for rash.   Musculoskeletal: Positive for back pain. Negative for falls and joint pain.    Gastrointestinal: Negative.  Negative for abdominal pain, constipation, diarrhea, nausea and vomiting.   Neurological: Positive for dizziness. Negative for focal weakness, numbness and paresthesias.   Psychiatric/Behavioral: Positive for depression. The patient has insomnia.        I have reviewed the review of systems as entered by my clinical support staff and have updated it as appropriate.       Allergies:  No Known Allergies    Medications:      Current Outpatient Medications:   •  albuterol (PROVENTIL) (2.5 MG/3ML) 0.083% nebulizer solution, Take 2.5 mg by nebulization Every 4 (Four) Hours As Needed for Wheezing., Disp: , Rfl:   •  albuterol sulfate  (90 Base) MCG/ACT inhaler, Inhale 1 puff Every 6 (Six) Hours As Needed for Wheezing. Prior to [facility] Admission, Patient was on: 1 to 2 puffs every 4 to 6 hours as needed, Disp: , Rfl:   •  aspirin 81 MG chewable tablet, Chew 81 mg Daily., Disp: , Rfl:   •  atorvastatin (LIPITOR) 40 MG tablet, Take 40 mg by mouth Daily., Disp: , Rfl:   •  budesonide-formoterol (SYMBICORT) 160-4.5 MCG/ACT inhaler, Inhale 2 puffs 2 (Two) Times a Day As Needed., Disp: , Rfl:   •  cefdinir (OMNICEF) 300 MG capsule, Take 300 mg by mouth Every 12 (Twelve) Hours., Disp: , Rfl:   •  clindamycin (CLEOCIN) 300 MG capsule, Take 1 capsule by mouth 3 (Three) Times a Day., Disp: 30 capsule, Rfl: 0  •  clopidogrel (PLAVIX) 75 MG tablet, Take 1 tablet by mouth Daily., Disp: 30 tablet, Rfl: 6  •  doxycycline (VIBRAMYCIN) 100 MG capsule, Take 1 capsule by mouth 2 (two) times a day., Disp: 28 capsule, Rfl: 0  •  DULoxetine (CYMBALTA) 60 MG capsule, Take 60 mg by mouth 2 (Two) Times a Day., Disp: , Rfl:   •  furosemide (LASIX) 20 MG tablet, Take 1 tablet by mouth Daily., Disp: 30 tablet, Rfl: 11  •  HYDROcodone-acetaminophen (Norco) 7.5-325 MG per tablet, Take 1 tablet by mouth Every 6 (Six) Hours As Needed for Moderate Pain ., Disp: 24 tablet, Rfl: 0  •  hydrOXYzine (ATARAX) 25 MG tablet,  Take 1 tablet by mouth 3 (Three) Times a Day As Needed for Itching or Anxiety., Disp: 30 tablet, Rfl: 0  •  Insulin Glargine (BASAGLAR KWIKPEN SC), Inject 20 Units under the skin into the appropriate area as directed 2 (Two) Times a Day As Needed., Disp: , Rfl:   •  isosorbide mononitrate (IMDUR) 30 MG 24 hr tablet, Take 1 tablet by mouth Daily., Disp: 30 tablet, Rfl: 11  •  lisinopril (PRINIVIL,ZESTRIL) 10 MG tablet, Take 10 mg by mouth Daily., Disp: , Rfl:   •  metFORMIN (GLUCOPHAGE) 1000 MG tablet, Take 1,000 mg by mouth 2 (Two) Times a Day With Meals., Disp: , Rfl:   •  metoprolol tartrate (LOPRESSOR) 50 MG tablet, Take 50 mg by mouth 2 (Two) Times a Day., Disp: , Rfl:   •  multivitamin (multivitamin) tablet tablet, Take 1 tablet by mouth Daily., Disp: , Rfl:   •  nicotine (NICODERM CQ) 21 MG/24HR patch, Place 1 patch on the skin as directed by provider Daily., Disp: , Rfl:   •  nitroglycerin (NITROSTAT) 0.4 MG SL tablet, 1 under the tongue as needed for angina, may repeat q5mins for up three doses, Disp: 30 tablet, Rfl: 5  •  omeprazole (priLOSEC) 20 MG capsule, Take 20 mg by mouth Daily., Disp: , Rfl:   •  ondansetron ODT (Zofran ODT) 4 MG disintegrating tablet, Place 1 tablet on the tongue Every 8 (Eight) Hours As Needed for Nausea or Vomiting., Disp: 15 tablet, Rfl: 1  •  potassium chloride 10 MEQ CR tablet, Take 1 tablet by mouth Daily., Disp: 30 tablet, Rfl: 11  •  pregabalin (LYRICA) 200 MG capsule, Take 200 mg by mouth 3 (Three) Times a Day., Disp: , Rfl:   •  rOPINIRole (REQUIP) 4 MG tablet, Take 4 mg by mouth Every Night., Disp: , Rfl:     Social History     Socioeconomic History   • Marital status: Legally      Spouse name: Not on file   • Number of children: 3   • Years of education: Not on file   • Highest education level: Not on file   Tobacco Use   • Smoking status: Current Every Day Smoker     Packs/day: 1.00     Years: 40.00     Pack years: 40.00     Types: Cigarettes   • Smokeless  tobacco: Never Used   Substance and Sexual Activity   • Alcohol use: Yes     Alcohol/week: 3.0 standard drinks     Types: 2 Cans of beer, 1 Shots of liquor per week     Comment: weekly   • Drug use: Yes     Types: Marijuana     Comment: smoke Marijuana everyday   • Sexual activity: Defer       Family History   Problem Relation Age of Onset   • Heart disease Mother    • Anuerysm Father    • Heart disease Father    • Heart disease Sister    • Heart disease Sister        Past Medical History:   Diagnosis Date   • Anxiety    • CHF (congestive heart failure) (CMS/[facility])    • COPD (chronic obstructive pulmonary disease) (CMS/[facility])    • Coronary artery disease involving native coronary artery of native heart without angina pectoris 1/12/2021    Added automatically from request for surgery [id number]   • Diabetes mellitus (CMS/HCC)    • Hepatitis C    • Hyperlipidemia    • Hypertension    • Peripheral vascular disease (CMS/HCC)    • Sleep apnea    • T2DM on metformin  1/27/2021       Past Surgical History:   Procedure Laterality Date   • CARDIAC CATHETERIZATION  11/20/2020    x 2 stents   • CARDIAC CATHETERIZATION Right 1/20/2021    Procedure: Peripheral angiography;  Surgeon: Wesly Lund MD;  Location:  [facility];  Service: Cardiovascular;  Laterality: Right;   • CORONARY ARTERY BYPASS GRAFT      x 5   • CORONARY STENT PLACEMENT  11/20/2020    Dr Mortensen x 2 stents   • FEMORAL DISTAL BYPASS N/A 1/27/2021    Procedure: FEMORAL DISTAL BYPASS;  Surgeon: Jeremy Trevizo MD;  Location: [facility] OR;  Service: Vascular;  Laterality: N/A;   • FINGER SURGERY      left   • INCISION AND DRAINAGE LEG Right 2/9/2021    Procedure: INCISION AND DRAINAGE RIGHT LOWER EXTREMITY, EVACUATION OF HEMATOMA, LIGATION OF FEM DISTAL BYPASS;  Surgeon: Jeremy Trevizo MD;  Location: [facility] OR;  Service: Cardiothoracic;  Laterality: Right;   • LEG AMPUTATION Left     left lower leg   • SHOULDER SURGERY Bilateral        Physical  "Exam:  Vital Signs:    Vitals:    04/01/21 1132   BP: 130/77   BP Location: Right arm   Patient Position: Sitting   Pulse: 50   Temp: 97.1 °F (36.2 °C)   SpO2: 97%   Weight: 96.2 kg (212 lb)   Height: 170.2 cm (67\")     Body mass index is 33.2 kg/m².     Physical Exam  Skin:     Comments: All right lower extremity sites with pink granulous tissue.  Right knee incision completely closed.  Right distal site with small amount of fibrinous exudate, but healing with noted pink granulous tissue.  Small to that incision is very small area that has pink granulous tissue.  Right groin is healing with wound VAC with noted pink   granulous tissue.  Distal to the right groin incision is now with pink granulous tissue and healed to the point we can use dressing changes.    Scattered areas of bleeding secondary to tape.         Labs/Imaging:    No radiology results for the last 30 days.     Assessment / Plan:  Diagnoses and all orders for this visit:    1. PAD (peripheral artery disease) (CMS/[facility]) (Primary)     Reg Sanchez is a 55 y.o. male with a history of hyperlipidemia, diabetes, tobacco use, COPD, hep C, CAD s/p CABG, CHF, bilateral carotid stenosis, left leg osteomyelitis s/p left leg amputation and PVD s/p right SFA and popliteal artery occlusion with right femoral to posterior tibial distal bypass with reverse saphenous vein graft on 1/27/2021 with Dr. Trevizo and subsequent right femoral artery bypass graft MRSA infection s/p I&D of a right groin incision with opening of right knee incision for drainage incision on the right medial thigh for drainage on 2/9/2020 with Dr. Trevizo.  Right lower extremity incision sites are healing.  Will have home health only reapply wound VAC to the right groin incision.  All other incisions are either healed or able to use wet-to-dry saline dressing changes twice a day.  Have asked patient to contact our office with any worsening erythema, necrosis, drainage, fevers or chills.  We " will follow-up with patient in 6 weeks for wound check.    Roseanne Abebe, [facility] Cardiothoracic Surgery    Please note that portions of this note may have been completed with a voice recognition program. Efforts were made to edit the dictations, but occasionally words are mistranscribed.

## 2021-04-12 ENCOUNTER — OFFICE VISIT (OUTPATIENT)
Dept: CARDIOLOGY | Facility: CLINIC | Age: 56
End: 2021-04-12

## 2021-04-12 VITALS
DIASTOLIC BLOOD PRESSURE: 72 MMHG | BODY MASS INDEX: 32.02 KG/M2 | HEIGHT: 67 IN | WEIGHT: 204 LBS | SYSTOLIC BLOOD PRESSURE: 111 MMHG | OXYGEN SATURATION: 96 % | HEART RATE: 80 BPM

## 2021-04-12 DIAGNOSIS — R42 DIZZY: ICD-10-CM

## 2021-04-12 DIAGNOSIS — I70.211 ATHEROSCLEROSIS OF NATIVE ARTERY OF RIGHT LOWER EXTREMITY WITH INTERMITTENT CLAUDICATION (HCC): Primary | ICD-10-CM

## 2021-04-12 DIAGNOSIS — R07.89 ATYPICAL CHEST PAIN: ICD-10-CM

## 2021-04-12 DIAGNOSIS — I10 ESSENTIAL HYPERTENSION: ICD-10-CM

## 2021-04-12 PROCEDURE — 99214 OFFICE O/P EST MOD 30 MIN: CPT | Performed by: NURSE PRACTITIONER

## 2021-04-12 RX ORDER — MIRTAZAPINE 15 MG/1
15 TABLET, FILM COATED ORAL NIGHTLY
COMMUNITY
Start: 2021-03-25

## 2021-04-12 NOTE — PROGRESS NOTES
Subjective     Reg Sanchez is a 55 y.o. male who presents to day for Follow-up (s/p right fem-distal bypass rt leg).    CHIEF COMPLIANT  Chief Complaint   Patient presents with   • Follow-up     s/p right fem-distal bypass rt leg       Active Problems:  Problem List Items Addressed This Visit        Cardiac and Vasculature    Atherosclerosis of native artery of right lower extremity with intermittent claudication (CMS/HCC) - Primary    Overview     Added automatically from request for surgery [id number]           Other Visit Diagnoses     Atypical chest pain        Essential hypertension        Dizzy          Problem list  1.  Coronary artery disease with previous bypass in [facility] 2018  1.1 left heart catheterization 3/18: Left main normal, LAD 70 to 80% proximal, 100% mid, circumflex 70% proximal but 100% occluded mid, RCA has 70% stenosis proximal 100% mid, the acute marginal branch has moderate disease but before that her percent occluded lesion, SVG to RCA widely patent SVG to OM1 and OM to the diagonal branch widely patent [place] to LAD widely patent  1.2 stress test 10/20: Mild inferior lateral ischemia post stress EF 69%  1.3 left heart catheterization 11/20: Both native coronary arteries were totally occluded proximally, SVG to PDA with no distal stenosis vein graft to the diagonal and first and second OM widely patent, EF 60%, LVEDP 20-22, LIMA LAD subtotal stenosis immediately distal to the touchdown followed by segmental 50 to 70% in which stents were placed at the subtotal stenosis as well as the 50 to 70% distal lesion.  2.  Chest pain  3.  Shortness of breath  4.  Peripheral vascular disease with right lower extremity claudication and left BKA  4.1.  Ultrasound of bilateral lower extremities 10/20: Common femoral 50%, high-grade stenosis cannot be ruled out in the right SFA, 50% or less on the left SFA, 50% or greater in the left profunda femoris artery, popliteal diffusely diseased on the  right but patent  4.2 CTA of the abdomen with bilateral lower extremity runoff 11/20: Fairly advanced atherosclerotic disease with total occlusion of the distal superficial femoral artery on the right the popliteal reconstitutes via collaterals and there is a faint 2-3 vessel runoff to the ankle no significant hemodynamically stenosis on the left thigh.  Patient also had moderate atherosclerotic calcifications of the aorta with diffuse atherosclerotic disease of the celiac and superior mesenteric arteries with multiple areas of stenosis in the range of 60 to 70% the inferior mesenteric artery is patent but severely stenotic renal arteries are without evidence of hemodynamically significant stenosis  4.3 right femoral distal bypass by Dr. Trevizo  5. Hypertension  6.  Dizziness  6.112/20 carotid duplex: Potentially hemodynamically significant stenosis in the right internal carotid artery severe segmental disease involving the right external carotid artery,    HPI  HPI  Mr. Sanchez is a 55-year-old male patient who is being followed up today for known coronary artery disease as well as peripheral vascular disease in which he recently went under a right femoral distal bypass by Dr. Trevizo in [place] who postop had a MRSA infection.  Patient does have coronary artery disease and does report an episode of chest pain that was midsternal slightly to the left side of his chest that was intermittent in nature and only lasted for a short period of time.  He described as a pressure-like sensation he did become short of breath and diaphoretic at the time.  He says when it occurred he was at home talking with the  about trying to get assistance on someone to clean his house.  He was not exerting himself at all.  He does report that he did take a nitroglycerin which did relieve his pain.  He says that it was not overly severe but it did scare him a little bit.  He says that he feels that it was mainly associated with  anxiety due to the fact that all the issues that he is having with his right lower extremity and already having his left lower extremity amputated.  He has had a significant increase in depression and anxiety due to this.  He says that he is not really made any good progress on his surgery and that he is not even able to walk as much as he was prior to the surgery.  He says he actually is able to do less.  He said he would just went to the shoe store and he had to stop multiple times to rest due to claudication type symptoms.  He says it significantly is interfering with his activities of daily living and that he cannot clean his house.  The wounds are starting to heal except for the most distal wound in which he is receiving wound care through home health on Monday Wednesday Friday.  He also reports that he has been having episodes of dizziness and jerking at times.  We did do a carotid duplex toward the end of last year that identified significant carotid artery disease however patient wishes to get his legs fixed first and once his legs are revealed he does feel like he needs to progress with getting his carotid fixed.  Patient does have chronic shortness of breath or he does become dyspneic on exertion and even his experiences some PND where he wakes with shortness of breath.  He does have some lower extremity edema where his right lower extremity swells.  Patient does have chronic arterial hypertension which is controlled on his current blood pressure medication regimen.  He is currently on lisinopril and metoprolol.  He is also on dual antiplatelet therapy of aspirin and Plavix as well as high-dose statin of atorvastatin 40 mg daily.  Patient denies any palpitations, fatigue, lightheadedness, syncope, orthopnea, or other neurological changes other than mentioned above.  PRIOR MEDS  Current Outpatient Medications on File Prior to Visit   Medication Sig Dispense Refill   • albuterol (PROVENTIL) (2.5 MG/3ML)  0.083% nebulizer solution Take 2.5 mg by nebulization Every 4 (Four) Hours As Needed for Wheezing.     • albuterol sulfate  (90 Base) MCG/ACT inhaler Inhale 1 puff Every 6 (Six) Hours As Needed for Wheezing. Prior to [facility] Admission, Patient was on: 1 to 2 puffs every 4 to 6 hours as needed     • aspirin 81 MG chewable tablet Chew 81 mg Daily.     • atorvastatin (LIPITOR) 40 MG tablet Take 40 mg by mouth Daily.     • budesonide-formoterol (SYMBICORT) 160-4.5 MCG/ACT inhaler Inhale 2 puffs 2 (Two) Times a Day As Needed.     • cefdinir (OMNICEF) 300 MG capsule Take 300 mg by mouth Every 12 (Twelve) Hours.     • clopidogrel (PLAVIX) 75 MG tablet Take 1 tablet by mouth Daily. 30 tablet 6   • doxycycline (VIBRAMYCIN) 100 MG capsule Take 1 capsule by mouth 2 (two) times a day. 28 capsule 0   • DULoxetine (CYMBALTA) 60 MG capsule Take 60 mg by mouth 2 (Two) Times a Day.     • furosemide (LASIX) 20 MG tablet Take 1 tablet by mouth Daily. 30 tablet 11   • hydrOXYzine (ATARAX) 25 MG tablet Take 1 tablet by mouth 3 (Three) Times a Day As Needed for Itching or Anxiety. 30 tablet 0   • Insulin Glargine (BASAGLAR KWIKPEN SC) Inject 20 Units under the skin into the appropriate area as directed 2 (Two) Times a Day As Needed.     • isosorbide mononitrate (IMDUR) 30 MG 24 hr tablet Take 1 tablet by mouth Daily. 30 tablet 11   • lisinopril (PRINIVIL,ZESTRIL) 10 MG tablet Take 10 mg by mouth Daily.     • metFORMIN (GLUCOPHAGE) 1000 MG tablet Take 1,000 mg by mouth 2 (Two) Times a Day With Meals.     • metoprolol tartrate (LOPRESSOR) 50 MG tablet Take 50 mg by mouth 2 (Two) Times a Day.     • mirtazapine (REMERON) 15 MG tablet Take 15 mg by mouth Every Night.     • multivitamin (multivitamin) tablet tablet Take 1 tablet by mouth Daily.     • nicotine (NICODERM CQ) 21 MG/24HR patch Place 1 patch on the skin as directed by provider Daily.     • nitroglycerin (NITROSTAT) 0.4 MG SL tablet 1 under the tongue as needed for angina,  may repeat q5mins for up three doses 30 tablet 5   • omeprazole (priLOSEC) 20 MG capsule Take 20 mg by mouth Daily.     • potassium chloride 10 MEQ CR tablet Take 1 tablet by mouth Daily. 30 tablet 11   • pregabalin (LYRICA) 200 MG capsule Take 200 mg by mouth 3 (Three) Times a Day.     • rOPINIRole (REQUIP) 4 MG tablet Take 4 mg by mouth Every Night.       No current facility-administered medications on file prior to visit.       ALLERGIES  Patient has no known allergies.    HISTORY  Past Medical History:   Diagnosis Date   • Anxiety    • CHF (congestive heart failure) (CMS/[facility])    • COPD (chronic obstructive pulmonary disease) (CMS/[facility])    • Coronary artery disease involving native coronary artery of native heart without angina pectoris 1/12/2021    Added automatically from request for surgery [id number]   • Diabetes mellitus (CMS/[facility])    • Hepatitis C    • Hyperlipidemia    • Hypertension    • Peripheral vascular disease (CMS/[facility])    • Sleep apnea    • T2DM on metformin  1/27/2021       Social History     Socioeconomic History   • Marital status: Legally      Spouse name: Not on file   • Number of children: 3   • Years of education: Not on file   • Highest education level: Not on file   Tobacco Use   • Smoking status: Current Every Day Smoker     Packs/day: 1.00     Years: 40.00     Pack years: 40.00     Types: Cigarettes   • Smokeless tobacco: Never Used   Substance and Sexual Activity   • Alcohol use: Yes     Alcohol/week: 3.0 standard drinks     Types: 2 Cans of beer, 1 Shots of liquor per week     Comment: weekly   • Drug use: Yes     Types: Marijuana     Comment: smoke Marijuana everyday   • Sexual activity: Defer       Family History   Problem Relation Age of Onset   • Heart disease Mother    • Anuerysm Father    • Heart disease Father    • Heart disease Sister    • Heart disease Sister        Review of Systems   Constitutional: Positive for appetite change (no appetite). Negative for chills, fatigue  "and fever.   HENT: Negative.  Negative for congestion, sinus pressure and sore throat.    Eyes: Positive for visual disturbance.   Respiratory: Positive for shortness of breath. Negative for chest tightness.    Cardiovascular: Positive for chest pain (few weeks ago, took nitro and asa, thinks r/t to anxiety, relieved cp) and leg swelling (usually at night). Negative for palpitations.   Gastrointestinal: Negative.  Negative for abdominal pain, blood in stool, constipation, diarrhea, nausea and vomiting.   Endocrine: Negative.  Negative for cold intolerance and heat intolerance.   Genitourinary: Negative.  Negative for dysuria, frequency, hematuria and urgency.   Musculoskeletal: Positive for back pain (when standing, hx of horse accident). Negative for arthralgias and neck pain.   Skin: Positive for wound (right leg, bypass). Negative for rash.   Allergic/Immunologic: Negative.  Negative for environmental allergies and food allergies.   Neurological: Positive for dizziness (increased dizziness, standing, position change). Negative for syncope and light-headedness.   Hematological: Bruises/bleeds easily.   Psychiatric/Behavioral: Positive for sleep disturbance (wakes with soa and cp). The patient is nervous/anxious (anxiety, depression, not sleeping, eating).        Objective        VITALS: /72 (BP Location: Left arm, Patient Position: Sitting)   Pulse 80   Ht 170.2 cm (67\")   Wt 92.5 kg (204 lb)   SpO2 96%   BMI 31.95 kg/m²     LABS:   Lab Results (most recent)     None          IMAGING:   CT Angio Abdominal Aorta Bilateral Iliofem Runoff    Result Date: 2/4/2021  1. Soft tissue stranding and fluid is seen surrounding the patient's recent right femoral/distal arterial graft. It is uncertain to what extent this may represent postoperative fluid versus infection. The graft itself appears patent with adequate runoff to the level of the smaller plantar vessels. Signer Name: Joselin Daniel MD  Signed: 2/4/2021 " 11:05 PM  Workstation Name: [id number]  [facility]      EXAM:  Physical Exam  Vitals and nursing note reviewed.   Constitutional:       Appearance: He is well-developed.   HENT:      Head: Normocephalic and atraumatic.   Eyes:      Pupils: Pupils are equal, round, and reactive to light.   Neck:      Vascular: No carotid bruit or JVD.   Cardiovascular:      Rate and Rhythm: Normal rate and regular rhythm.      Pulses:           Carotid pulses are 2+ on the right side and 2+ on the left side.       Radial pulses are 2+ on the right side and 2+ on the left side.        Dorsalis pedis pulses are 2+ on the right side.      Heart sounds: Normal heart sounds. No murmur heard.   No gallop.    Pulmonary:      Effort: Pulmonary effort is normal. No respiratory distress.      Breath sounds: Normal breath sounds.   Abdominal:      General: Bowel sounds are normal. There is no distension.      Palpations: Abdomen is soft.      Tenderness: There is no abdominal tenderness.   Musculoskeletal:         General: Normal range of motion.      Cervical back: Neck supple.   Skin:     General: Skin is warm and dry.   Neurological:      Mental Status: He is alert and oriented to person, place, and time.      Cranial Nerves: No cranial nerve deficit.      Sensory: No sensory deficit.   Psychiatric:         Speech: Speech normal.         Behavior: Behavior normal.         Thought Content: Thought content normal.         Judgment: Judgment normal.         Procedure   Procedures       Assessment/Plan    Diagnosis Plan   1. Atherosclerosis of native artery of right lower extremity with intermittent claudication (CMS/HCC)     2. Atypical chest pain     3. Essential hypertension     4. Dizzy     1.  Patient still having claudication type symptoms of his right lower extremity however he is soon after bypass and has a 2+ dorsalis pedis pulse.  His wounds are healing with mainly are closed except for the most distal wound in  which he is receiving wound care for.  Patient denies any fevers or chills.  He will be continue to be followed by Dr. Trevizo for this.  2.  Patient does have atypical chest pain which occurred at rest and not with activity and only on one occurrence.  We did discuss repeating stress test however he wishes to defer at this time he will notify me if he has any recurrent chest pain.  We will continue dual antiplatelet therapy with Plavix and aspirin due to recent stenting.  He will also be on a atorvastatin 40 mg daily.  We will also continue antianginal therapy of isosorbide 30 mg daily.  3.  Patient's blood pressure is well controlled on current blood pressure medication regimen.  No medication changes are warranted at this time.  Patient advised to monitor blood pressure on a daily basis and report any persistent highs or lows.  Set goal blood pressure for patient at 130/80 or below.  4.  Patient does have significant carotid artery disease as determined by carotid duplex however patient has elected to defer carotid artery intervention until his lower extremity is healed.  5.  Informed of signs and symptoms of ACS and advised to seek emergent treatment for any new worsening symptoms.  Patient also advised sooner follow-up as needed.  Also advised to follow-up with family doctor as needed  This note is dictated utilizing voice recognition software.  Although this record has been proof read, transcriptional errors may still be present. If questions occur regarding the content of this record please do not hesitate to call our office.  I have reviewed and confirmed the accuracy of the ROS as documented by the MA/LPN/RN YOUNG Alvarado    Return in about 3 months (around 7/12/2021), or if symptoms worsen or fail to improve.    Diagnoses and all orders for this visit:    1. Atherosclerosis of native artery of right lower extremity with intermittent claudication (CMS/HCC) (Primary)    2. Atypical chest pain    3.  Essential hypertension    4. Dizzy        Reg Sanchez  reports that he has been smoking cigarettes. He has a 40.00 pack-year smoking history. He has never used smokeless tobacco.. I have educated him on the risk of diseases from using tobacco products such as cancer, COPD and heart disease.     I advised him to quit and he is willing to quit. We have discussed the following method/s for tobacco cessation:  Counseling.      I spent 3  minutes counseling the patient.           Patient's Body mass index is 31.95 kg/m². BMI is above normal parameters. Recommendations include: educational material.           MEDS ORDERED DURING VISIT:  No orders of the defined types were placed in this encounter.          This document has been electronically signed by Devon Kwan Jr., APRN  April 13, 2021 08:24 EDT

## 2021-04-12 NOTE — PATIENT INSTRUCTIONS
Steps to Quit Smoking  Smoking tobacco is the leading cause of preventable death. It can affect almost every organ in the body. Smoking puts you and people around you at risk for many serious, long-lasting (chronic) diseases. Quitting smoking can be hard, but it is one of the best things that you can do for your health. It is never too late to quit.  How do I get ready to quit?  When you decide to quit smoking, make a plan to help you succeed. Before you quit:  · Pick a date to quit. Set a date within the next 2 weeks to give you time to prepare.  · Write down the reasons why you are quitting. Keep this list in places where you will see it often.  · Tell your family, friends, and co-workers that you are quitting. Their support is important.  · Talk with your doctor about the choices that may help you quit.  · Find out if your health insurance will pay for these treatments.  · Know the people, places, things, and activities that make you want to smoke (triggers). Avoid them.  What first steps can I take to quit smoking?  · Throw away all cigarettes at home, at work, and in your car.  · Throw away the things that you use when you smoke, such as ashtrays and lighters.  · Clean your car. Make sure to empty the ashtray.  · Clean your home, including curtains and carpets.  What can I do to help me quit smoking?  Talk with your doctor about taking medicines and seeing a counselor at the same time. You are more likely to succeed when you do both.  · If you are pregnant or breastfeeding, talk with your doctor about counseling or other ways to quit smoking. Do not take medicine to help you quit smoking unless your doctor tells you to do so.  To quit smoking:  Quit right away  · Quit smoking totally, instead of slowly cutting back on how much you smoke over a period of time.  · Go to counseling. You are more likely to quit if you go to counseling sessions regularly.  Take medicine  You may take medicines to help you quit. Some  medicines need a prescription, and some you can buy over-the-counter. Some medicines may contain a drug called nicotine to replace the nicotine in cigarettes. Medicines may:  · Help you to stop having the desire to smoke (cravings).  · Help to stop the problems that come when you stop smoking (withdrawal symptoms).  Your doctor may ask you to use:  · Nicotine patches, gum, or lozenges.  · Nicotine inhalers or sprays.  · Non-nicotine medicine that is taken by mouth.  Find resources  Find resources and other ways to help you quit smoking and remain smoke-free after you quit. These resources are most helpful when you use them often. They include:  · Online chats with a counselor.  · Phone quitlines.  · Printed self-help materials.  · Support groups or group counseling.  · Text messaging programs.  · Mobile phone apps. Use apps on your mobile phone or tablet that can help you stick to your quit plan. There are many free apps for mobile phones and tablets as well as websites. Examples include Quit Guide from the CDC and smokefree.gov    What things can I do to make it easier to quit?    · Talk to your family and friends. Ask them to support and encourage you.  · Call a phone quitline (4-175-QUITNOW), reach out to support groups, or work with a counselor.  · Ask people who smoke to not smoke around you.  · Avoid places that make you want to smoke, such as:  ? Bars.  ? Parties.  ? Smoke-break areas at work.  · Spend time with people who do not smoke.  · Lower the stress in your life. Stress can make you want to smoke. Try these things to help your stress:  ? Getting regular exercise.  ? Doing deep-breathing exercises.  ? Doing yoga.  ? Meditating.  ? Doing a body scan. To do this, close your eyes, focus on one area of your body at a time from head to toe. Notice which parts of your body are tense. Try to relax the muscles in those areas.  How will I feel when I quit smoking?  Day 1 to 3 weeks  Within the first 24 hours,  you may start to have some problems that come from quitting tobacco. These problems are very bad 2-3 days after you quit, but they do not often last for more than 2-3 weeks. You may get these symptoms:  · Mood swings.  · Feeling restless, nervous, angry, or annoyed.  · Trouble concentrating.  · Dizziness.  · Strong desire for high-sugar foods and nicotine.  · Weight gain.  · Trouble pooping (constipation).  · Feeling like you may vomit (nausea).  · Coughing or a sore throat.  · Changes in how the medicines that you take for other issues work in your body.  · Depression.  · Trouble sleeping (insomnia).  Week 3 and afterward  After the first 2-3 weeks of quitting, you may start to notice more positive results, such as:  · Better sense of smell and taste.  · Less coughing and sore throat.  · Slower heart rate.  · Lower blood pressure.  · Clearer skin.  · Better breathing.  · Fewer sick days.  Quitting smoking can be hard. Do not give up if you fail the first time. Some people need to try a few times before they succeed. Do your best to stick to your quit plan, and talk with your doctor if you have any questions or concerns.  Summary  · Smoking tobacco is the leading cause of preventable death. Quitting smoking can be hard, but it is one of the best things that you can do for your health.  · When you decide to quit smoking, make a plan to help you succeed.  · Quit smoking right away, not slowly over a period of time.  · When you start quitting, seek help from your doctor, family, or friends.  This information is not intended to replace advice given to you by your health care provider. Make sure you discuss any questions you have with your health care provider.  Document Revised: 09/11/2020 Document Reviewed: 03/07/2020  [employer] Patient Education © 2021 [employer]  BMI for Adults  What is BMI?  Body mass index (BMI) is a number that is calculated from a person's weight and height. BMI can help estimate how much of a  "person's weight is composed of fat. BMI does not measure body fat directly. Rather, it is an alternative to procedures that directly measure body fat, which can be difficult and expensive.  BMI can help identify people who may be at higher risk for certain medical problems.  What are BMI measurements used for?  BMI is used as a screening tool to identify possible weight problems. It helps determine whether a person is obese, overweight, a healthy weight, or underweight.  BMI is useful for:  · Identifying a weight problem that may be related to a medical condition or may increase the risk for medical problems.  · Promoting changes, such as changes in diet and exercise, to help reach a healthy weight. BMI screening can be repeated to see if these changes are working.  How is BMI calculated?  BMI involves measuring your weight in relation to your height. Both height and weight are measured, and the BMI is calculated from those numbers. This can be done either in English ([place] or metric measurements. Note that charts and online BMI calculators are available to help you find your BMI quickly and easily without having to do these calculations yourself.  To calculate your BMI in English ([place] measurements:    1. Measure your weight in pounds (lb).  2. Multiply the number of pounds by 703.  ? For example, for a person who weighs 180 lb, multiply that number by 703, which equals 126,540.  3. Measure your height in inches. Then multiply that number by itself to get a measurement called \"inches squared.\"  ? For example, for a person who is 70 inches tall, the \"inches squared\" measurement is 70 inches x 70 inches, which equals 4,900 inches squared.  4. Divide the total from step 2 (number of lb x 703) by the total from step 3 (inches squared): 126,540 ÷ 4,900 = 25.8. This is your BMI.  To calculate your BMI in metric measurements:  1. Measure your weight in kilograms (kg).  2. Measure your height in meters (m). Then multiply " "that number by itself to get a measurement called \"meters squared.\"  ? For example, for a person who is 1.75 m tall, the \"meters squared\" measurement is 1.75 m x 1.75 m, which is equal to 3.1 meters squared.  3. Divide the number of kilograms (your weight) by the meters squared number. In this example: 70 ÷ 3.1 = 22.6. This is your BMI.  What do the results mean?  BMI charts are used to identify whether you are underweight, normal weight, overweight, or obese. The following guidelines will be used:  · Underweight: BMI less than 18.5.  · Normal weight: BMI between 18.5 and 24.9.  · Overweight: BMI between 25 and 29.9.  · Obese: BMI of 30 or above.  Keep these notes in mind:  · Weight includes both fat and muscle, so someone with a muscular build, such as an athlete, may have a BMI that is higher than 24.9. In cases like these, BMI is not an accurate measure of body fat.  · To determine if excess body fat is the cause of a BMI of 25 or higher, further assessments may need to be done by a health care provider.  · BMI is usually interpreted in the same way for men and women.  Where to find more information  For more information about BMI, including tools to quickly calculate your BMI, go to these websites:  · Centers for Disease Control and Prevention: www.cdc.gov  · American Heart Association: www.heart.org  · National Heart, Lung, and [facility] [place]: www.nhlbi.nih.gov  Summary  · Body mass index (BMI) is a number that is calculated from a person's weight and height.  · BMI may help estimate how much of a person's weight is composed of fat. BMI can help identify those who may be at higher risk for certain medical problems.  · BMI can be measured using English measurements or metric measurements.  · BMI charts are used to identify whether you are underweight, normal weight, overweight, or obese.  This information is not intended to replace advice given to you by your health care provider. Make sure you discuss any " questions you have with your health care provider.  Document Revised: 09/09/2020 Document Reviewed: 07/17/2020  [employer] Patient Education © 2021 [employer]    Acute Coronary Syndrome  Acute coronary syndrome (ACS) is a serious problem in which there is suddenly not enough blood and oxygen reaching the heart. ACS can result in chest pain or a heart attack.  This condition is a medical emergency. If you have any symptoms of this condition, get help right away.  What are the causes?  This condition may be caused by:  · A buildup of fat and cholesterol inside the arteries (atherosclerosis). This is the most common cause. The buildup (plaque) can cause blood vessels in the heart (coronary arteries) to become narrow or blocked, which reduces blood flow to the heart. Plaque can also break off and lead to a clot, which can block an artery and cause a heart attack or stroke.  · Sudden tightening of the muscles around the coronary arteries (coronary spasm).  · Tearing of a coronary artery (spontaneous coronary artery dissection).  · Very low blood pressure (hypotension).  · An abnormal heartbeat (arrhythmia).  · Other medical conditions that cause a decrease of oxygen to the heart, such as anemiaorrespiratory failure.  · Using cocaine or methamphetamine.  What increases the risk?  The following factors may make you more likely to develop this condition:  · Age. The risk for ACS increases as you get older.  · History of chest pain, heart attack, peripheral artery disease, or stroke.  · Having taken chemotherapy or immune-suppressing medicines.  · Being male.  · Family history of chest pain, heart disease, or stroke.  · Smoking.  · Not exercising enough.  · Being overweight.  · High cholesterol.  · High blood pressure (hypertension).  · Diabetes.  · Excessive alcohol use.  What are the signs or symptoms?  Common symptoms of this condition include:  · Chest pain. The pain may last a long time, or it may stop and come back  (recur). It may feel like:  ? Crushing or squeezing.  ? Tightness, pressure, fullness, or heaviness.  · Arm, neck, jaw, or back pain.  · Heartburn or indigestion.  · Shortness of breath.  · Nausea.  · Sudden cold sweats.  · Light-headedness.  · Dizziness or passing out.  · Tiredness (fatigue).  Sometimes there are no symptoms.  How is this diagnosed?  This condition may be diagnosed based on:  · Your medical history and symptoms.  · Imaging tests, such as:  ? An electrocardiogram (ECG). This measures the heart's electrical activity.  ? X-rays.  ? CT scan.  ? A coronary angiogram. For this test, dye is injected into the heart arteries and then X-rays are taken.  ? Myocardial perfusion imaging. This test shows how well blood flows through your heart muscle.  · Blood tests. These may be repeated at certain time intervals.  · Exercise stress testing.  · Echocardiogram. This is a test that uses sound waves to produce detailed images of the heart.  How is this treated?  Treatment for this condition may include:  · Oxygen therapy.  · Medicines, such as:  ? Antiplatelet medicines and blood-thinning medicines, such as aspirin. These help prevent blood clots.  ? Medicine that dissolves any blood clots (fibrinolytic therapy).  ? Blood pressure medicines.  ? Nitroglycerin. This helps widen blood vessels to improve blood flow.  ? Pain medicine.  ? Cholesterol-lowering medicine.  · Surgery, such as:  ? Coronary angioplasty with stent placement. This involves placing a small piece of metal that looks like mesh or a spring into a narrow coronary artery. This widens the artery and keeps it open.  ? Coronary artery bypass surgery. This involves taking a section of a blood vessel from a different part of your body and placing it on the blocked coronary artery to allow blood to flow around the blockage.  · Cardiac rehabilitation. This is a program that includes exercise training, education, and counseling to help you recover.  Follow  these instructions at home:  Eating and drinking  · Eat a heart-healthy diet that includes whole grains, fruits and vegetables, lean proteins, and low-fat or nonfat dairy products.  · Limit how much salt (sodium) you eat as told by your health care provider. Follow instructions from your health care provider about any other eating or drinking restrictions, such as limiting foods that are high in fat and processed sugars.  · Use healthy cooking methods such as roasting, grilling, broiling, baking, poaching, steaming, or stir-frying.  · Work with a dietitian to follow a heart-healthy eating plan.  Medicines  · Take over-the-counter and prescription medicines only as told by your health care provider.  · Do not take these medicines unless your health care provider approves:  ? Vitamin supplements that contain vitamin A or vitamin E.  ? NSAIDs, such as ibuprofen, naproxen, or celecoxib.  ? Hormone replacement therapy that contains estrogen.  · If you are taking blood thinners:  ? Talk with your health care provider before you take any medicines that contain aspirin or NSAIDs. These medicines increase your risk for dangerous bleeding.  ? Take your medicine exactly as told, at the same time every day.  ? Avoid activities that could cause injury or bruising, and follow instructions about how to prevent falls.  ? Wear a medical alert bracelet, and carry a card that lists what medicines you take.  Activity  · Follow your cardiac rehabilitation program. Do exercises as told by your physical therapist.  · Ask your health care provider what activities and exercises are safe for you. Follow his or her instructions about lifting, driving, or climbing stairs.  Lifestyle  · Do not use any products that contain nicotine or tobacco, such as cigarettes, e-cigarettes, and chewing tobacco. If you need help quitting, ask your health care provider.  · Do not drink alcohol if your health care provider tells you not to drink.  · If you  drink alcohol:  ? Limit how much you have to 0-1 drink a day.  ? Be aware of how much alcohol is in your drink. In the [place] one drink equals one 12 oz bottle of beer (355 mL), one 5 oz glass of wine (148 mL), or one 1½ oz glass of hard liquor (44 mL).  · Maintain a healthy weight. If you need to lose weight, work with your health care provider to do so safely.  General instructions  · Tell all the health care providers who provide care for you about your heart condition, including your dentist. This may affect the medicines or treatment you receive.  · Manage any other health conditions you have, such as hypertension or diabetes. These conditions affect your heart.  · Pay attention to your mental health. You may be at higher risk for depression.  ? Find ways to manage stress.  ? Talk to your health care provider about depression screening and treatment.  · Keep your vaccinations up to date.  ? Get the flu shot (influenza vaccine) every year.  ? Get the pneumococcal vaccine if you are age 65 or older.  · If directed, monitor your blood pressure at home.  · Keep all follow-up visits as told by your health care provider. This is important.  Contact a health care provider if you:  · Feel overwhelmed or sad.  · Have trouble doing your daily activities.  Get help right away if you:  · Have pain in your chest, neck, arm, jaw, stomach, or back that recurs, and:  ? It lasts for more than a few minutes.  ? It is not relieved by taking the medicineyour health care provider prescribed.  · Have unexplained:  ? Heavy sweating.  ? Heartburn or indigestion.  ? Nausea or vomiting.  ? Shortness of breath.  ? Difficulty breathing.  ? Fatigue.  ? Nervousness or anxiety.  ? Weakness.  ? Diarrhea.  ? Dark stools or blood in your stool.  · Have sudden light-headedness or dizziness.  · Have blood pressure that is higher than 180/120.  · Faint.  · Have thoughts about hurting yourself.  These symptoms may represent a serious problem that is  an emergency. Do not wait to see if the symptoms will go away. Get medical help right away. Call your local emergency services (911 in the [place] Do not drive yourself to the hospital.   Summary  · Acute coronary syndrome (ACS) is when there is not enough blood and oxygen being supplied to the heart. ACS can result in chest pain or a heart attack.  · Acute coronary syndrome is a medical emergency. If you have any symptoms of this condition, get help right away.  · Treatment includes medicines and procedures to open the blocked arteries and restore blood flow.  This information is not intended to replace advice given to you by your health care provider. Make sure you discuss any questions you have with your health care provider.  Document Revised: 05/20/2020 Document Reviewed: 12/30/2019  [employer] Patient Education © 2021 [employer].

## 2021-05-06 ENCOUNTER — TELEPHONE (OUTPATIENT)
Dept: CARDIAC SURGERY | Facility: CLINIC | Age: 56
End: 2021-05-06

## 2021-06-08 ENCOUNTER — TELEPHONE (OUTPATIENT)
Dept: CARDIAC SURGERY | Facility: CLINIC | Age: 56
End: 2021-06-08

## 2021-07-09 ENCOUNTER — TELEPHONE (OUTPATIENT)
Dept: CARDIAC SURGERY | Facility: CLINIC | Age: 56
End: 2021-07-09

## 2021-08-02 DIAGNOSIS — R60.0 EDEMA OF ABDOMINAL WALL: ICD-10-CM

## 2021-08-02 DIAGNOSIS — R60.0 EDEMA OF RIGHT LOWER EXTREMITY: ICD-10-CM

## 2021-08-02 DIAGNOSIS — R07.89 CHEST PAIN, ATYPICAL: ICD-10-CM

## 2021-08-02 DIAGNOSIS — R94.39 POSITIVE CARDIAC STRESS TEST: ICD-10-CM

## 2021-08-02 RX ORDER — POTASSIUM CHLORIDE 750 MG/1
10 TABLET, FILM COATED, EXTENDED RELEASE ORAL DAILY
Qty: 30 TABLET | Refills: 11 | Status: SHIPPED | OUTPATIENT
Start: 2021-08-02

## 2021-08-02 RX ORDER — ISOSORBIDE MONONITRATE 30 MG/1
30 TABLET, EXTENDED RELEASE ORAL DAILY
Qty: 90 TABLET | Refills: 3 | Status: SHIPPED | OUTPATIENT
Start: 2021-08-02

## 2021-08-02 RX ORDER — FUROSEMIDE 20 MG/1
20 TABLET ORAL DAILY
Qty: 90 TABLET | Refills: 3 | Status: SHIPPED | OUTPATIENT
Start: 2021-08-02 | End: 2022-01-01 | Stop reason: SDUPTHER

## 2022-01-01 DIAGNOSIS — R60.0 EDEMA OF ABDOMINAL WALL: ICD-10-CM

## 2022-01-01 DIAGNOSIS — R60.0 EDEMA OF RIGHT LOWER EXTREMITY: ICD-10-CM

## 2022-01-01 RX ORDER — FUROSEMIDE 20 MG/1
20 TABLET ORAL DAILY
Qty: 90 TABLET | Refills: 0 | Status: SHIPPED | OUTPATIENT
Start: 2022-01-01

## 2022-03-09 NOTE — OUTREACH NOTE
General Surgery Week 1 Survey      Responses   [facility] patient discharged from?  [place]   Does the patient have one of the following disease processes/diagnoses(primary or secondary)?  General Surgery   Week 1 attempt successful?  No   Revoke  Readmitted          Venessa Wilcox RN  
0

## 2023-01-12 ENCOUNTER — TELEPHONE (OUTPATIENT)
Dept: CARDIOLOGY | Facility: CLINIC | Age: 58
End: 2023-01-12

## 2023-01-12 NOTE — TELEPHONE ENCOUNTER
Caller: ROBBI    Relationship: Other    Best call back number: [phone]  EXT: [id number]    What is the best time to reach you: ANYTIME    What was the call regarding: [facility] IS FAXING OVER A HEALTH CARE STATEMENT TO BE FILLED OUT. ROBBI STATED THAT THEY FAXED IT OVER PREVIOUSLY. ALSO MENTIONED IT SHOULD HAVE DETAILS ON IT ON WHAT SPECIFICALLY IS NEEDED TO PROVIDE BUT SPECIFICALLY MENTION RECORDS FROM April 2016 - April 2021. SHE STATED SHE REFAXED THE STATEMENT TODAY, 1.12.23.  NEEDS THIS DONE BEFORE 1.17.23    Do you require a callback: IF NEEDED

## 2023-01-20 ENCOUNTER — TELEPHONE (OUTPATIENT)
Dept: CARDIOLOGY | Facility: CLINIC | Age: 58
End: 2023-01-20
Payer: MEDICARE

## 2023-01-20 NOTE — TELEPHONE ENCOUNTER
Caller: CARMEN HUSSEIN    Relationship: Emergency Contact    Best call back number: [phone]    What form or medical record are you requesting: LIFE INSURANCE FORMS    Who is requesting this form or medical record from you: [employer]    How would you like to receive the form or medical records (pick-up, mail, fax): MAIL  If mail, what is the address: [address]  [address]  [place] [address]    Timeframe paperwork needed: ASAP    Additional notes: PT DAUGHTER CALLED IN STATING [facility] MAILED OVER PAPERWORK TO THE OFFICE OF DR. LEMUS ON 12.5.22 AND THEY HAVE YET TO RECEIVE ANYTHING BACK. PT DAUGHTER STATES THAT THEY HAVE TRIED A FEW TIMES AND HAVENT GOTTEN ANYTHING AND THEY WILL NOT PROCEED UNTIL THEY RECEIVE THE PAPERWORK. IF ANYTHING ELSE IS NEEDED PLEASE CONTACT CARMEN HUSSEIN

## 2023-01-20 NOTE — TELEPHONE ENCOUNTER
The PM called and provided the pt's daughter with the PM's name, fax number, & direct number and ask her to have the company to fax these forms since we haven't received any forms via mail or fax.  She verbalized an understanding.

## (undated) DEVICE — CANNULA,OXY,ADULT,SUPER SOFT,W/14'TUB,UC: Brand: MEDLINE INDUSTRIES, INC.

## (undated) DEVICE — ANTIBACTERIAL UNDYED BRAIDED (POLYGLACTIN 910), SYNTHETIC ABSORBABLE SUTURE: Brand: COATED VICRYL

## (undated) DEVICE — SUT SILK 2/0 TIES 18IN A185H

## (undated) DEVICE — SAFESECURE,SECUREMENT,FOLEY CATH,STERILE: Brand: MEDLINE

## (undated) DEVICE — SUT PROLN 6/0 C1 D/A 30IN 8706H

## (undated) DEVICE — BANDAGE ROLL,100% COTTON, 6 PLY, SMALL: Brand: KERLIX

## (undated) DEVICE — INTENDED TO BE USED TO OCCLUDE, RETRACT AND IDENTIFY ARTERIES, VEINS, TENDONS AND NERVES IN SURGICAL PROCEDURES: Brand: STERION®  VESSEL LOOP

## (undated) DEVICE — SKIN PREP TRAY W/CHG: Brand: MEDLINE INDUSTRIES, INC.

## (undated) DEVICE — ST EXT IV SMARTSITE 2VLV SP M LL 5ML IV1

## (undated) DEVICE — CLTH CLENS READYCLEANSE PERI CARE PK/5

## (undated) DEVICE — Device

## (undated) DEVICE — SUCTION CANISTER, 2500CC, RIGID: Brand: DEROYAL

## (undated) DEVICE — LN INJ CONTRST FLXCIL HP F/M LL 1200PSI10

## (undated) DEVICE — BANDAGE ROLL,100% COTTON, 6 PLY, LARGE: Brand: KERLIX

## (undated) DEVICE — PK CATH CARD 70

## (undated) DEVICE — CVR HNDL LIGHT RIGID

## (undated) DEVICE — GOWN,NON-REINFORCED,SIRUS,SET IN SLV,XL: Brand: MEDLINE

## (undated) DEVICE — CATH SFT VU RIM BR 5F65CM 0.35

## (undated) DEVICE — NAVICROSS SUPPORT CATHETER: Brand: NAVICROSS

## (undated) DEVICE — INTENDED USE FOR SURGICAL MARKING ON INTACT SKIN, ALSO PROVIDES A PERMANENT METHOD OF IDENTIFYING OBJECTS IN THE OPERATING ROOM: Brand: WRITESITE® REGULAR TIP SKIN MARKER

## (undated) DEVICE — SUT SILK 4/0 TIES 18IN A183H

## (undated) DEVICE — RADIFOCUS GLIDEWIRE: Brand: GLIDEWIRE

## (undated) DEVICE — SUT SILK 0/0 CT2 18IN C027D

## (undated) DEVICE — ST INF PRI SMRTSTE 20DRP 2VLV 24ML 117

## (undated) DEVICE — 3M™ IOBAN™ 2 ANTIMICROBIAL INCISE DRAPE 6651EZ: Brand: IOBAN™ 2

## (undated) DEVICE — SHEATH INTRO SUPERSHEATH JWIRE .035 5F 11CM

## (undated) DEVICE — SHEATH INTRO SUPERSHEATH JWIRE .035 6F 11CM

## (undated) DEVICE — DRSNG SURESITE WNDW 4X4.5

## (undated) DEVICE — PK VASC 10

## (undated) DEVICE — SKIN AFFIX SURG ADHESIVE 72/CS 0.55ML: Brand: MEDLINE

## (undated) DEVICE — INTENDED FOR TISSUE SEPARATION, AND OTHER PROCEDURES THAT REQUIRE A SHARP SURGICAL BLADE TO PUNCTURE OR CUT.: Brand: BARD-PARKER ® STAINLESS STEEL BLADES

## (undated) DEVICE — DESTINATION PERIPHERAL GUIDING SHEATH: Brand: DESTINATION

## (undated) DEVICE — PROXIMATE RH ROTATING HEAD SKIN STAPLERS (35 WIDE) CONTAINS 35 STAINLESS STEEL STAPLES: Brand: PROXIMATE

## (undated) DEVICE — GLV SURG BIOGEL LTX PF 7 1/2

## (undated) DEVICE — GLV SURG BIOGEL LTX PF 8

## (undated) DEVICE — ADULT DISPOSABLE SINGLE-PATIENT USE PULSE OXIMETER SENSOR: Brand: NONIN

## (undated) DEVICE — SUT PROLN 7/0 BV1 D/A 24IN 8702H

## (undated) DEVICE — TP MICROFM 3IN LF

## (undated) DEVICE — GUIDEWIRE BOWL W/LID: Brand: MEDLINE INDUSTRIES, INC.

## (undated) DEVICE — SYR LUERLOK 30CC

## (undated) DEVICE — KT MINI ACC 5F .18X40CM SS 21G 7CM

## (undated) DEVICE — DRSNG WND GZ PAD BORDERED 4X8IN STRL

## (undated) DEVICE — Device: Brand: MEDEX

## (undated) DEVICE — GW INQWIRE FC PTFE J/3MM .035 180

## (undated) DEVICE — DECANT BG O JET

## (undated) DEVICE — DRSNG WND BORDR/ADHS NONADHR/GZ LF 4X4IN STRL

## (undated) DEVICE — FOGARTY SPRING CLIPS 6MM: Brand: FOGARTY SOFTJAW

## (undated) DEVICE — MYNXGRIP 6F/7F: Brand: MYNXGRIP

## (undated) DEVICE — PK MINOR SPLT 10